# Patient Record
Sex: MALE | Race: WHITE | NOT HISPANIC OR LATINO | Employment: OTHER | URBAN - METROPOLITAN AREA
[De-identification: names, ages, dates, MRNs, and addresses within clinical notes are randomized per-mention and may not be internally consistent; named-entity substitution may affect disease eponyms.]

---

## 2017-01-26 ENCOUNTER — TRANSCRIBE ORDERS (OUTPATIENT)
Dept: LAB | Facility: CLINIC | Age: 62
End: 2017-01-26

## 2017-01-26 ENCOUNTER — APPOINTMENT (OUTPATIENT)
Dept: LAB | Facility: CLINIC | Age: 62
End: 2017-01-26
Payer: MEDICARE

## 2017-01-26 DIAGNOSIS — D72.829 ELEVATED WHITE BLOOD CELL COUNT: ICD-10-CM

## 2017-01-26 LAB
ALBUMIN SERPL BCP-MCNC: 2.9 G/DL (ref 3.5–5)
ALP SERPL-CCNC: 96 U/L (ref 46–116)
ALT SERPL W P-5'-P-CCNC: 17 U/L (ref 12–78)
ANION GAP SERPL CALCULATED.3IONS-SCNC: 7 MMOL/L (ref 4–13)
AST SERPL W P-5'-P-CCNC: 11 U/L (ref 5–45)
BILIRUB SERPL-MCNC: 0.24 MG/DL (ref 0.2–1)
BUN SERPL-MCNC: 12 MG/DL (ref 5–25)
CALCIUM SERPL-MCNC: 8.6 MG/DL (ref 8.3–10.1)
CHLORIDE SERPL-SCNC: 94 MMOL/L (ref 100–108)
CHOLEST SERPL-MCNC: 102 MG/DL (ref 50–200)
CO2 SERPL-SCNC: 29 MMOL/L (ref 21–32)
CREAT SERPL-MCNC: 0.94 MG/DL (ref 0.6–1.3)
ERYTHROCYTE [DISTWIDTH] IN BLOOD BY AUTOMATED COUNT: 14.6 % (ref 11.6–15.1)
EST. AVERAGE GLUCOSE BLD GHB EST-MCNC: 252 MG/DL
GFR SERPL CREATININE-BSD FRML MDRD: >60 ML/MIN/1.73SQ M
GLUCOSE SERPL-MCNC: 361 MG/DL (ref 65–140)
HBA1C MFR BLD: 10.4 % (ref 4.2–6.3)
HCT VFR BLD AUTO: 40.8 % (ref 36.5–49.3)
HDLC SERPL-MCNC: 32 MG/DL (ref 40–60)
HGB BLD-MCNC: 13.1 G/DL (ref 12–17)
LDLC SERPL CALC-MCNC: 38 MG/DL (ref 0–100)
MCH RBC QN AUTO: 27.4 PG (ref 26.8–34.3)
MCHC RBC AUTO-ENTMCNC: 32.1 G/DL (ref 31.4–37.4)
MCV RBC AUTO: 85 FL (ref 82–98)
PLATELET # BLD AUTO: 469 THOUSANDS/UL (ref 149–390)
PMV BLD AUTO: 10 FL (ref 8.9–12.7)
POTASSIUM SERPL-SCNC: 4.5 MMOL/L (ref 3.5–5.3)
PROT SERPL-MCNC: 8.1 G/DL (ref 6.4–8.2)
RBC # BLD AUTO: 4.78 MILLION/UL (ref 3.88–5.62)
SODIUM SERPL-SCNC: 130 MMOL/L (ref 136–145)
TRIGL SERPL-MCNC: 162 MG/DL
TSH SERPL DL<=0.05 MIU/L-ACNC: 2.3 UIU/ML (ref 0.36–3.74)
WBC # BLD AUTO: 11.62 THOUSAND/UL (ref 4.31–10.16)

## 2017-01-26 PROCEDURE — 83036 HEMOGLOBIN GLYCOSYLATED A1C: CPT

## 2017-01-26 PROCEDURE — 80053 COMPREHEN METABOLIC PANEL: CPT

## 2017-01-26 PROCEDURE — 36415 COLL VENOUS BLD VENIPUNCTURE: CPT

## 2017-01-26 PROCEDURE — 84443 ASSAY THYROID STIM HORMONE: CPT

## 2017-01-26 PROCEDURE — 85027 COMPLETE CBC AUTOMATED: CPT

## 2017-01-26 PROCEDURE — 80061 LIPID PANEL: CPT

## 2017-01-27 ENCOUNTER — ALLSCRIPTS OFFICE VISIT (OUTPATIENT)
Dept: OTHER | Facility: OTHER | Age: 62
End: 2017-01-27

## 2017-02-14 ENCOUNTER — ALLSCRIPTS OFFICE VISIT (OUTPATIENT)
Dept: OTHER | Facility: OTHER | Age: 62
End: 2017-02-14

## 2017-04-26 DIAGNOSIS — I10 ESSENTIAL (PRIMARY) HYPERTENSION: ICD-10-CM

## 2017-04-26 DIAGNOSIS — E11.65 TYPE 2 DIABETES MELLITUS WITH HYPERGLYCEMIA (HCC): ICD-10-CM

## 2017-09-05 ENCOUNTER — HOSPITAL ENCOUNTER (EMERGENCY)
Facility: HOSPITAL | Age: 62
Discharge: HOME | End: 2017-09-05
Attending: EMERGENCY MEDICINE
Payer: MEDICARE

## 2017-09-05 VITALS
HEIGHT: 72 IN | HEART RATE: 83 BPM | OXYGEN SATURATION: 92 % | BODY MASS INDEX: 40.36 KG/M2 | WEIGHT: 298 LBS | TEMPERATURE: 98.4 F | SYSTOLIC BLOOD PRESSURE: 106 MMHG | RESPIRATION RATE: 18 BRPM | DIASTOLIC BLOOD PRESSURE: 61 MMHG

## 2017-09-05 DIAGNOSIS — L03.119 CELLULITIS AND ABSCESS OF LEG: ICD-10-CM

## 2017-09-05 DIAGNOSIS — L02.415 ABSCESS OF RIGHT THIGH: Primary | ICD-10-CM

## 2017-09-05 DIAGNOSIS — L02.419 CELLULITIS AND ABSCESS OF LEG: ICD-10-CM

## 2017-09-05 LAB — GLUCOSE SERPL-MCNC: 149 MG/DL (ref 65–140)

## 2017-09-05 PROCEDURE — 82948 REAGENT STRIP/BLOOD GLUCOSE: CPT

## 2017-09-05 PROCEDURE — 99283 EMERGENCY DEPT VISIT LOW MDM: CPT

## 2017-09-05 RX ORDER — SULFAMETHOXAZOLE AND TRIMETHOPRIM 800; 160 MG/1; MG/1
1 TABLET ORAL ONCE
Status: COMPLETED | OUTPATIENT
Start: 2017-09-05 | End: 2017-09-05

## 2017-09-05 RX ORDER — SULFAMETHOXAZOLE AND TRIMETHOPRIM 800; 160 MG/1; MG/1
1 TABLET ORAL 2 TIMES DAILY
Qty: 14 TABLET | Refills: 0 | Status: SHIPPED | OUTPATIENT
Start: 2017-09-05 | End: 2017-09-12

## 2017-09-05 RX ORDER — LEVOFLOXACIN 500 MG/1
500 TABLET, FILM COATED ORAL DAILY
Qty: 10 TABLET | Refills: 0 | Status: SHIPPED | OUTPATIENT
Start: 2017-09-05 | End: 2017-09-15

## 2017-09-05 RX ORDER — LIDOCAINE HYDROCHLORIDE 10 MG/ML
5 INJECTION, SOLUTION EPIDURAL; INFILTRATION; INTRACAUDAL; PERINEURAL ONCE
Status: COMPLETED | OUTPATIENT
Start: 2017-09-05 | End: 2017-09-05

## 2017-09-05 RX ADMIN — SULFAMETHOXAZOLE AND TRIMETHOPRIM 1 TABLET: 800; 160 TABLET ORAL at 10:11

## 2017-09-05 RX ADMIN — LIDOCAINE HYDROCHLORIDE 5 ML: 10 INJECTION, SOLUTION EPIDURAL; INFILTRATION; INTRACAUDAL; PERINEURAL at 08:52

## 2017-09-05 RX ADMIN — LEVOFLOXACIN 750 MG: 500 TABLET, FILM COATED ORAL at 10:11

## 2017-10-12 ENCOUNTER — HOSPITAL ENCOUNTER (INPATIENT)
Facility: HOSPITAL | Age: 62
LOS: 8 days | Discharge: HOME HEALTH | DRG: 854 | End: 2017-10-20
Attending: EMERGENCY MEDICINE | Admitting: FAMILY MEDICINE
Payer: MEDICARE

## 2017-10-12 ENCOUNTER — APPOINTMENT (INPATIENT)
Dept: RADIOLOGY | Facility: HOSPITAL | Age: 62
DRG: 854 | End: 2017-10-12
Payer: MEDICARE

## 2017-10-12 ENCOUNTER — APPOINTMENT (EMERGENCY)
Dept: RADIOLOGY | Facility: HOSPITAL | Age: 62
DRG: 854 | End: 2017-10-12
Payer: MEDICARE

## 2017-10-12 DIAGNOSIS — L03.115 CELLULITIS OF RIGHT FOOT: ICD-10-CM

## 2017-10-12 DIAGNOSIS — E11.621 DIABETIC FOOT ULCER (HCC): Primary | ICD-10-CM

## 2017-10-12 DIAGNOSIS — R65.20 SEVERE SEPSIS (HCC): ICD-10-CM

## 2017-10-12 DIAGNOSIS — R78.81 POSITIVE BLOOD CULTURE: ICD-10-CM

## 2017-10-12 DIAGNOSIS — A41.9 SEVERE SEPSIS (HCC): ICD-10-CM

## 2017-10-12 DIAGNOSIS — L97.512 SKIN ULCER OF RIGHT FOOT WITH FAT LAYER EXPOSED (HCC): ICD-10-CM

## 2017-10-12 DIAGNOSIS — L97.509 DIABETIC FOOT ULCER (HCC): Primary | ICD-10-CM

## 2017-10-12 PROBLEM — I10 ESSENTIAL HYPERTENSION: Status: ACTIVE | Noted: 2017-10-12

## 2017-10-12 PROBLEM — Z79.4 TYPE 2 DIABETES MELLITUS WITH FOOT ULCER, WITH LONG-TERM CURRENT USE OF INSULIN (HCC): Status: ACTIVE | Noted: 2017-10-12

## 2017-10-12 PROBLEM — E66.01 MORBID OBESITY (HCC): Status: ACTIVE | Noted: 2017-10-12

## 2017-10-12 PROBLEM — Z79.891 CHRONIC PRESCRIPTION OPIATE USE: Status: ACTIVE | Noted: 2017-10-12

## 2017-10-12 PROBLEM — Z72.0 TOBACCO ABUSE: Status: ACTIVE | Noted: 2017-10-12

## 2017-10-12 LAB
ALBUMIN SERPL BCP-MCNC: 2.3 G/DL (ref 3.5–5)
ALP SERPL-CCNC: 105 U/L (ref 46–116)
ALT SERPL W P-5'-P-CCNC: 19 U/L (ref 12–78)
ANION GAP SERPL CALCULATED.3IONS-SCNC: 8 MMOL/L (ref 4–13)
APTT PPP: 40 SECONDS (ref 23–35)
AST SERPL W P-5'-P-CCNC: 13 U/L (ref 5–45)
ATRIAL RATE: 89 BPM
BACTERIA UR QL AUTO: NORMAL /HPF
BASOPHILS # BLD AUTO: 0.09 THOUSANDS/ΜL (ref 0–0.1)
BASOPHILS NFR BLD AUTO: 0 % (ref 0–1)
BILIRUB SERPL-MCNC: 0.2 MG/DL (ref 0.2–1)
BILIRUB UR QL STRIP: NEGATIVE
BUN SERPL-MCNC: 18 MG/DL (ref 5–25)
CALCIUM SERPL-MCNC: 9.5 MG/DL (ref 8.3–10.1)
CHLORIDE SERPL-SCNC: 98 MMOL/L (ref 100–108)
CLARITY UR: CLEAR
CO2 SERPL-SCNC: 29 MMOL/L (ref 21–32)
COLOR UR: YELLOW
CREAT SERPL-MCNC: 1.18 MG/DL (ref 0.6–1.3)
EOSINOPHIL # BLD AUTO: 0.17 THOUSAND/ΜL (ref 0–0.61)
EOSINOPHIL NFR BLD AUTO: 1 % (ref 0–6)
ERYTHROCYTE [DISTWIDTH] IN BLOOD BY AUTOMATED COUNT: 14.9 % (ref 11.6–15.1)
GFR SERPL CREATININE-BSD FRML MDRD: 67 ML/MIN/1.73SQ M
GLUCOSE SERPL-MCNC: 210 MG/DL (ref 65–140)
GLUCOSE SERPL-MCNC: 96 MG/DL (ref 65–140)
GLUCOSE UR STRIP-MCNC: ABNORMAL MG/DL
HCT VFR BLD AUTO: 38.3 % (ref 36.5–49.3)
HGB BLD-MCNC: 12.2 G/DL (ref 12–17)
HGB UR QL STRIP.AUTO: NEGATIVE
INR PPP: 1.08 (ref 0.86–1.16)
KETONES UR STRIP-MCNC: NEGATIVE MG/DL
LACTATE SERPL-SCNC: 2 MMOL/L (ref 0.5–2)
LACTATE SERPL-SCNC: 2.5 MMOL/L (ref 0.5–2)
LEUKOCYTE ESTERASE UR QL STRIP: ABNORMAL
LYMPHOCYTES # BLD AUTO: 2.43 THOUSANDS/ΜL (ref 0.6–4.47)
LYMPHOCYTES NFR BLD AUTO: 12 % (ref 14–44)
MCH RBC QN AUTO: 26 PG (ref 26.8–34.3)
MCHC RBC AUTO-ENTMCNC: 31.9 G/DL (ref 31.4–37.4)
MCV RBC AUTO: 82 FL (ref 82–98)
MONOCYTES # BLD AUTO: 1.28 THOUSAND/ΜL (ref 0.17–1.22)
MONOCYTES NFR BLD AUTO: 6 % (ref 4–12)
NEUTROPHILS # BLD AUTO: 15.79 THOUSANDS/ΜL (ref 1.85–7.62)
NEUTS SEG NFR BLD AUTO: 79 % (ref 43–75)
NITRITE UR QL STRIP: NEGATIVE
NON-SQ EPI CELLS URNS QL MICRO: NORMAL /HPF
NRBC BLD AUTO-RTO: 0 /100 WBCS
P AXIS: 54 DEGREES
PH UR STRIP.AUTO: 6.5 [PH] (ref 4.5–8)
PLATELET # BLD AUTO: 620 THOUSANDS/UL (ref 149–390)
PMV BLD AUTO: 9 FL (ref 8.9–12.7)
POTASSIUM SERPL-SCNC: 4.4 MMOL/L (ref 3.5–5.3)
PR INTERVAL: 158 MS
PROT SERPL-MCNC: 8.1 G/DL (ref 6.4–8.2)
PROT UR STRIP-MCNC: NEGATIVE MG/DL
PROTHROMBIN TIME: 14.3 SECONDS (ref 12.1–14.4)
QRS AXIS: -23 DEGREES
QRSD INTERVAL: 100 MS
QT INTERVAL: 362 MS
QTC INTERVAL: 440 MS
RBC # BLD AUTO: 4.69 MILLION/UL (ref 3.88–5.62)
RBC #/AREA URNS AUTO: NORMAL /HPF
SODIUM SERPL-SCNC: 135 MMOL/L (ref 136–145)
SP GR UR STRIP.AUTO: <=1.005 (ref 1–1.03)
T WAVE AXIS: 61 DEGREES
TROPONIN I SERPL-MCNC: <0.02 NG/ML
UROBILINOGEN UR QL STRIP.AUTO: 0.2 E.U./DL
VENTRICULAR RATE: 89 BPM
WBC # BLD AUTO: 20.09 THOUSAND/UL (ref 4.31–10.16)
WBC #/AREA URNS AUTO: NORMAL /HPF

## 2017-10-12 PROCEDURE — 84484 ASSAY OF TROPONIN QUANT: CPT | Performed by: EMERGENCY MEDICINE

## 2017-10-12 PROCEDURE — 36415 COLL VENOUS BLD VENIPUNCTURE: CPT | Performed by: EMERGENCY MEDICINE

## 2017-10-12 PROCEDURE — 85610 PROTHROMBIN TIME: CPT | Performed by: EMERGENCY MEDICINE

## 2017-10-12 PROCEDURE — 81001 URINALYSIS AUTO W/SCOPE: CPT | Performed by: EMERGENCY MEDICINE

## 2017-10-12 PROCEDURE — 87070 CULTURE OTHR SPECIMN AEROBIC: CPT | Performed by: EMERGENCY MEDICINE

## 2017-10-12 PROCEDURE — 85730 THROMBOPLASTIN TIME PARTIAL: CPT | Performed by: EMERGENCY MEDICINE

## 2017-10-12 PROCEDURE — 96375 TX/PRO/DX INJ NEW DRUG ADDON: CPT

## 2017-10-12 PROCEDURE — 0JDQ0ZZ EXTRACTION OF RIGHT FOOT SUBCUTANEOUS TISSUE AND FASCIA, OPEN APPROACH: ICD-10-PCS | Performed by: FAMILY MEDICINE

## 2017-10-12 PROCEDURE — 87186 SC STD MICRODIL/AGAR DIL: CPT | Performed by: EMERGENCY MEDICINE

## 2017-10-12 PROCEDURE — 93005 ELECTROCARDIOGRAM TRACING: CPT | Performed by: EMERGENCY MEDICINE

## 2017-10-12 PROCEDURE — 96361 HYDRATE IV INFUSION ADD-ON: CPT

## 2017-10-12 PROCEDURE — 71020 HB CHEST X-RAY 2VW FRONTAL&LATL: CPT

## 2017-10-12 PROCEDURE — 73630 X-RAY EXAM OF FOOT: CPT

## 2017-10-12 PROCEDURE — 83605 ASSAY OF LACTIC ACID: CPT | Performed by: EMERGENCY MEDICINE

## 2017-10-12 PROCEDURE — 87205 SMEAR GRAM STAIN: CPT | Performed by: EMERGENCY MEDICINE

## 2017-10-12 PROCEDURE — 87077 CULTURE AEROBIC IDENTIFY: CPT | Performed by: EMERGENCY MEDICINE

## 2017-10-12 PROCEDURE — 87040 BLOOD CULTURE FOR BACTERIA: CPT | Performed by: EMERGENCY MEDICINE

## 2017-10-12 PROCEDURE — 82948 REAGENT STRIP/BLOOD GLUCOSE: CPT

## 2017-10-12 PROCEDURE — 99285 EMERGENCY DEPT VISIT HI MDM: CPT

## 2017-10-12 PROCEDURE — 85025 COMPLETE CBC W/AUTO DIFF WBC: CPT | Performed by: EMERGENCY MEDICINE

## 2017-10-12 PROCEDURE — 80053 COMPREHEN METABOLIC PANEL: CPT | Performed by: EMERGENCY MEDICINE

## 2017-10-12 PROCEDURE — 96365 THER/PROPH/DIAG IV INF INIT: CPT

## 2017-10-12 RX ORDER — SODIUM CHLORIDE 9 MG/ML
100 INJECTION, SOLUTION INTRAVENOUS CONTINUOUS
Status: DISCONTINUED | OUTPATIENT
Start: 2017-10-12 | End: 2017-10-14

## 2017-10-12 RX ORDER — MORPHINE SULFATE 30 MG/1
30 TABLET, FILM COATED, EXTENDED RELEASE ORAL EVERY 8 HOURS
COMMUNITY
End: 2019-07-08 | Stop reason: HOSPADM

## 2017-10-12 RX ORDER — BUPROPION HYDROCHLORIDE 150 MG/1
150 TABLET ORAL DAILY
Status: DISCONTINUED | OUTPATIENT
Start: 2017-10-12 | End: 2017-10-20 | Stop reason: HOSPADM

## 2017-10-12 RX ORDER — OXYCODONE HYDROCHLORIDE 15 MG/1
15 TABLET ORAL EVERY 4 HOURS PRN
COMMUNITY
End: 2019-07-08 | Stop reason: HOSPADM

## 2017-10-12 RX ORDER — NICOTINE 21 MG/24HR
1 PATCH, TRANSDERMAL 24 HOURS TRANSDERMAL DAILY
Status: DISCONTINUED | OUTPATIENT
Start: 2017-10-13 | End: 2017-10-20 | Stop reason: HOSPADM

## 2017-10-12 RX ORDER — ATORVASTATIN CALCIUM 10 MG/1
10 TABLET, FILM COATED ORAL DAILY
COMMUNITY

## 2017-10-12 RX ORDER — INSULIN GLARGINE 100 [IU]/ML
70 INJECTION, SOLUTION SUBCUTANEOUS
Status: DISCONTINUED | OUTPATIENT
Start: 2017-10-12 | End: 2017-10-13

## 2017-10-12 RX ORDER — DOCUSATE SODIUM 100 MG/1
100 CAPSULE, LIQUID FILLED ORAL 2 TIMES DAILY
Status: DISCONTINUED | OUTPATIENT
Start: 2017-10-12 | End: 2017-10-20 | Stop reason: HOSPADM

## 2017-10-12 RX ORDER — LISINOPRIL 20 MG/1
40 TABLET ORAL DAILY
Status: DISCONTINUED | OUTPATIENT
Start: 2017-10-12 | End: 2017-10-14

## 2017-10-12 RX ORDER — LISINOPRIL 40 MG/1
40 TABLET ORAL DAILY
Status: ON HOLD | COMMUNITY
End: 2019-04-16 | Stop reason: ALTCHOICE

## 2017-10-12 RX ORDER — MORPHINE SULFATE 30 MG/1
30 TABLET, FILM COATED, EXTENDED RELEASE ORAL EVERY 8 HOURS
Status: DISCONTINUED | OUTPATIENT
Start: 2017-10-12 | End: 2017-10-20 | Stop reason: HOSPADM

## 2017-10-12 RX ORDER — ATORVASTATIN CALCIUM 10 MG/1
10 TABLET, FILM COATED ORAL DAILY
Status: DISCONTINUED | OUTPATIENT
Start: 2017-10-12 | End: 2017-10-20 | Stop reason: HOSPADM

## 2017-10-12 RX ORDER — INSULIN GLARGINE 100 [IU]/ML
70 INJECTION, SOLUTION SUBCUTANEOUS
Status: DISCONTINUED | OUTPATIENT
Start: 2017-10-13 | End: 2017-10-13

## 2017-10-12 RX ORDER — NICOTINE 21 MG/24HR
14 PATCH, TRANSDERMAL 24 HOURS TRANSDERMAL DAILY
Status: DISCONTINUED | OUTPATIENT
Start: 2017-10-12 | End: 2017-10-12

## 2017-10-12 RX ORDER — BUPROPION HYDROCHLORIDE 150 MG/1
150 TABLET ORAL DAILY
COMMUNITY

## 2017-10-12 RX ORDER — CHLORTHALIDONE 25 MG/1
25 TABLET ORAL DAILY
Status: DISCONTINUED | OUTPATIENT
Start: 2017-10-12 | End: 2017-10-20 | Stop reason: HOSPADM

## 2017-10-12 RX ORDER — CHLORTHALIDONE 25 MG/1
25 TABLET ORAL DAILY
COMMUNITY

## 2017-10-12 RX ORDER — CALCIUM CARBONATE 200(500)MG
1000 TABLET,CHEWABLE ORAL DAILY PRN
Status: DISCONTINUED | OUTPATIENT
Start: 2017-10-12 | End: 2017-10-20 | Stop reason: HOSPADM

## 2017-10-12 RX ADMIN — SODIUM CHLORIDE 500 ML: 0.9 INJECTION, SOLUTION INTRAVENOUS at 11:29

## 2017-10-12 RX ADMIN — MORPHINE SULFATE 30 MG: 30 TABLET, EXTENDED RELEASE ORAL at 21:34

## 2017-10-12 RX ADMIN — OXYCODONE HYDROCHLORIDE 15 MG: 5 TABLET ORAL at 20:10

## 2017-10-12 RX ADMIN — INSULIN GLARGINE 70 UNITS: 100 INJECTION, SOLUTION SUBCUTANEOUS at 21:30

## 2017-10-12 RX ADMIN — VANCOMYCIN HYDROCHLORIDE 2000 MG: 1 INJECTION, POWDER, LYOPHILIZED, FOR SOLUTION INTRAVENOUS at 13:29

## 2017-10-12 RX ADMIN — SODIUM CHLORIDE 125 ML/HR: 0.9 INJECTION, SOLUTION INTRAVENOUS at 16:40

## 2017-10-12 RX ADMIN — PIPERACILLIN SODIUM AND TAZOBACTAM SODIUM 3.38 G: 36; 4.5 INJECTION, POWDER, FOR SOLUTION INTRAVENOUS at 12:27

## 2017-10-12 RX ADMIN — MORPHINE SULFATE 30 MG: 30 TABLET, EXTENDED RELEASE ORAL at 16:34

## 2017-10-12 RX ADMIN — SODIUM CHLORIDE 1000 ML: 0.9 INJECTION, SOLUTION INTRAVENOUS at 13:32

## 2017-10-12 RX ADMIN — PIPERACILLIN SODIUM AND TAZOBACTAM SODIUM 3.38 G: 36; 4.5 INJECTION, POWDER, FOR SOLUTION INTRAVENOUS at 21:31

## 2017-10-12 RX ADMIN — ENOXAPARIN SODIUM 40 MG: 40 INJECTION SUBCUTANEOUS at 16:33

## 2017-10-12 RX ADMIN — OXYCODONE HYDROCHLORIDE 15 MG: 5 TABLET ORAL at 16:33

## 2017-10-12 RX ADMIN — HYDROMORPHONE HYDROCHLORIDE 1 MG: 1 INJECTION, SOLUTION INTRAMUSCULAR; INTRAVENOUS; SUBCUTANEOUS at 12:51

## 2017-10-12 RX ADMIN — NICOTINE 14 MG: 14 PATCH TRANSDERMAL at 17:14

## 2017-10-12 NOTE — SEPSIS NOTE
Sepsis Note   Raymond Delgado 63 y o  male MRN: [id number]  Unit/Bed#: ED 08 Encounter: [id number]            Initial Sepsis Screening     [address] Name 10/12/17 1156                Is the patient's history suggestive of a new or worsening infection? (!)  Yes (Proceed)  -SZ        Suspected source of infection soft tissue  -SZ        Are two or more of the following signs & symptoms of infection both present and new to the patient? (!)  Yes (Proceed)  -SZ        Indicate SIRS criteria Tachycardia > 90 bpm;Leukocytosis (WBC > [address] IJL)  -SZ        If the answer is yes to both questions, suspicion of sepsis is present          If severe sepsis is present AND tissue hypoperfusion perists in the hour after fluid resuscitation or lactate > 4, the patient meets criteria for SEPTIC SHOCK          Are any of the following organ dysfunction criteria present within 6 hours of suspected infection and SIRS criteria that are NOT considered to be chronic conditions? (!)  Yes  -SZ        Organ dysfunction          Date of presentation of severe sepsis          Time of presentation of severe sepsis          Tissue hypoperfusion persists in the hour after crystalloid fluid administration, evidenced, by either:          Was hypotension present within one hour of the conclusion of crystalloid fluid administration?           Date of presentation of septic shock          Time of presentation of septic shock            User Key  (r) = Recorded By, (t) = Taken By, (c) = Cosigned By    [address] Name Provider Type    LEIGHANN Liu DO Physician

## 2017-10-12 NOTE — H&P
History and Physical - [facility] Internal Medicine    Patient Information: Dara Gongora 63 y o  male MRN: [id number]  Unit/Bed#: [facility] Encounter: [id number]  Admitting Physician: Trung Zayas MD  PCP: Charlotte Walter MD  Date of Admission:  10/12/17    Assessment/Plan:    Hospital Problem List:     Principal Problem:    Severe sepsis [facility])  Active Problems:    Type 2 diabetes mellitus with foot ulcer, with long-term current use of insulin ([facility] [place] [facility] )    Skin ulcer of right foot with fat layer exposed ([facility] [place] [facility] )    Tobacco abuse    Morbid obesity ([facility] [place] [facility] )    Chronic prescription opiate use    Essential hypertension      Plan for the Primary Problem(s):  · Severe sepsis  · Present on admission, with tachycardia, significant leukocytosis, elevated lactic acid  The patient received IV fluids at 30 cc per sepsis protocol, will continue with aggressive hydration  · Agree with choice of antibiotics, will continue vancomycin and Zosyn, patient is a diabetic with a risk for MRSA  · Podiatry consultation  · Patient refused MRI for now  · Trend lactic acid  · Pain management  · Follow-up blood cultures and wound culture  · Check chest x-ray    Plan for Additional Problems:   · Type 2 diabetes with foot ulcer, with long-term current use of insulin  Per records review patient has been chronically historically uncontrolled  Patient is morbidly obese with limited mobility, significant anxiety contributing as well as chronic pain syndrome    Diabetic diet  Continue Lantus 70 units twice daily plus algorithm 5 sliding scale  Nutrition consult    · Skin ulcer of right foot with fat layer exposed  Apparently this has been ongoing issue for several months per records review, patient claims it has  been present only for a month as a small ulcer with within last couple days opening into a large defect  Wound culture obtained pending, continue vancomycin and Zosyn  Sepsis protocol  Consult Podiatry and wound care, elevate extremity  X-ray did not show any evidence of involvement of bones, significant odor on exam with large ulceration, consider bone scan or MRI, patient declined for now  PT evaluation  Case management for discharge planning, home VNA    · Tobacco abuse  Nicotine patch, continue Wellbutrin    · Morbid obesity  Diabetic diet  PT    · Chronic prescription opiate use  Continue home dose of MS Contin 30 mg t i d  and oxycodone 15 mg every 4 hours as needed  Dilaudid only for breakthrough pain  Patient managed by pain management outpatient, he states he has with tapering of slowly and his pain control at baseline is poor  Avoid escalating doses  Bowel protocol    · Essential hypertension  Continue home medication    VTE Prophylaxis: Enoxaparin (Lovenox)  / foot pump applied   Code Status: full  POLST: There is no POLST form on file for this patient (pre-hospital)    Anticipated Length of Stay:  Patient will be admitted on an Inpatient basis with an anticipated length of stay of  More than 2 midnights  Justification for Hospital Stay:  Management of severe sepsis, diabetic foot ulcer    Total Time for Visit, including Counseling / Coordination of Care: 1 hour  Greater than 50% of this total time spent on direct patient counseling and coordination of care      Chief Complaint:   Foot ulcer and pain    History of Present Illness:    Carina Britt is a 63 y o  male with past medical history pertinent for uncontrolled diabetes, chronic pain syndrome, prior toe amputation due to osteomyelitis, who presents with intractable right foot pain, right toe wound drainage ongoing for several days per patient's report  Patient states that he follows outpatient with Podiatry, he reports that he had a small wound at the bottom of the toe that started several weeks ago and suddenly progressed to a large wound  He apparently was treated outpatient with Keflex recently  He has been avoiding coming for evaluation due to being long caregiver to his disabled wife  He has chronic pain syndrome and takes chronic prescription opiates which were not effective anymore for his pain  Upon evaluation in the ED patient was found to be tachycardic with significant leukocytosis, wound in the plantar aspect of right great toe with foul odor   Patient was started on vancomycin and Zosyn, he received IV fluid resuscitation per sepsis protocol  Currently complains of pain at 8/10 after he received a dose of Dilaudid 40 minutes ago    Review of Systems:    Review of Systems   Constitutional: Positive for fatigue  Negative for chills, diaphoresis and fever  HENT: Positive for rhinorrhea  Eyes: Negative  Respiratory: Positive for cough  Cardiovascular: Negative  Gastrointestinal: Negative  Endocrine: Negative  Genitourinary: Negative  Musculoskeletal: Positive for arthralgias, back pain and myalgias  Skin: Positive for color change, rash and wound  Allergic/Immunologic: Negative  Neurological: Negative  Hematological: Negative  Psychiatric/Behavioral: Positive for sleep disturbance  Negative for suicidal ideas  The patient is nervous/anxious  All other systems reviewed and are negative  Past Medical and Surgical History:     Past Medical History:   Diagnosis Date    Chronic pain syndrome     COPD (chronic obstructive pulmonary disease) ([facility] )     Diabetes mellitus ([facility] )        Past Surgical History:   Procedure Laterality Date    AMPUTATION      HEMICOLECTOMY         Meds/Allergies:    Prior to Admission medications    Medication Sig Start Date End Date Taking?  Authorizing Provider   atorvastatin (LIPITOR) 10 mg tablet Take 10 mg by mouth daily   Yes Historical Provider, MD   buPROPion (WELLBUTRIN XL) 150 mg 24 hr tablet Take 150 mg by mouth daily   Yes Historical Provider, MD   chlorthalidone 25 mg tablet Take 25 mg by mouth daily   Yes Historical Provider, MD   Empagliflozin (JARDIANCE) 25 MG TABS Take 25 mg by mouth every morning   Yes Historical Provider, MD   Insulin Glargine-Lixisenatide (SOLIQUA) 100-33 UNT-MCG/ML SOPN Inject 15 Units under the skin daily   Yes Historical Provider, MD   lisinopril (ZESTRIL) 40 mg tablet Take 40 mg by mouth daily   Yes Historical Provider, MD   metFORMIN (GLUCOPHAGE) 1000 MG tablet Take 1,000 mg by mouth 2 (two) times a day with meals   Yes Historical Provider, MD   morphine (MS CONTIN) 30 mg 12 hr tablet Take 30 mg by mouth every 8 (eight) hours   Yes Historical Provider, MD   oxyCODONE (ROXICODONE) 15 mg immediate release tablet Take 15 mg by mouth every 4 (four) hours as needed for moderate pain   Yes Historical Provider, MD     I have reviewed home medications with patient personally  Allergies: No Known Allergies    Social History:     Marital Status: /Civil Union   Occupation:  Unemployed  Patient Pre-hospital Living Situation:  Home with wife  Patient Pre-hospital Level of Mobility:  Independent  Patient Pre-hospital Diet Restrictions:  Diabetic  Substance Use History:   History   Alcohol Use No     History   Smoking Status    Current Every Day Smoker    Packs/day: 1 00   Smokeless Tobacco    Never Used     History   Drug Use No       Family History:    Family History   Problem Relation Age of Onset    Family history unknown: Yes       Physical Exam:     Vitals:   Blood Pressure: 97/52 (10/12/17 1334)  Pulse: 84 (10/12/17 1334)  Temperature: 97 9 °F (36 6 °C) (10/12/17 1026)  Temp Source: Temporal (10/12/17 1026)  Respirations: 19 (10/12/17 1334)  Height: 6' (182 9 cm) (10/12/17 1434)  Weight - Scale: 136 kg (299 lb 13 2 oz) (10/12/17 1434)  SpO2: 95 % (10/12/17 1334)    Physical Exam   Constitutional: He is oriented to person, place, and time  He appears well-developed  No distress  Obese, disheveled   HENT:   Head: Normocephalic and atraumatic     Mouth/Throat: Oropharynx is clear and moist    Clear rhinorrhea   Eyes: Conjunctivae and EOM are normal  Pupils are equal, round, and reactive to light  Neck: Normal range of motion  Neck supple  No JVD present  No thyromegaly present  Cardiovascular: Regular rhythm and normal heart sounds  Tachycardia present  Exam reveals no gallop and no friction rub  No murmur heard  Intermittently tachycardic, worse with anxiety   Pulmonary/Chest: Effort normal and breath sounds normal  He has no wheezes  Abdominal: Soft  Bowel sounds are normal  There is no tenderness  Musculoskeletal:        Legs:       Feet:    Neurological: He is alert and oriented to person, place, and time  He displays no tremor  No cranial nerve deficit  Coordination and gait normal    Skin: Skin is warm and dry  He is not diaphoretic  Psychiatric: His speech is normal and behavior is normal  Judgment and thought content normal  His mood appears anxious  Cognition and memory are normal  He exhibits a depressed mood  He expresses no suicidal ideation  Nursing note and vitals reviewed  Additional Data:     Lab Results: I have personally reviewed pertinent reports  Results from last 7 days  Lab Units 10/12/17  1126   WBC Thousand/uL 20 09*   HEMOGLOBIN g/dL 12 2   HEMATOCRIT % 38 3   PLATELETS Thousands/uL 620*   NEUTROS PCT % 79*   LYMPHS PCT % 12*   MONOS PCT % 6   EOS PCT % 1       Results from last 7 days  Lab Units 10/12/17  1126   SODIUM mmol/L 135*   POTASSIUM mmol/L 4 4   CHLORIDE mmol/L 98*   CO2 mmol/L 29   BUN mg/dL 18   CREATININE mg/dL 1 18   CALCIUM mg/dL 9 5   TOTAL PROTEIN g/dL 8 1   BILIRUBIN TOTAL mg/dL 0 20   ALK PHOS U/L 105   ALT U/L 19   AST U/L 13   GLUCOSE RANDOM mg/dL 210*       Results from last 7 days  Lab Units 10/12/17  1126   INR  1 08       Imaging: I have personally reviewed pertinent reports  and I have personally reviewed pertinent films in PACS    Xr Foot 3+ Views Right    Result Date: 10/12/2017  Narrative: RIGHT FOOT INDICATION:  62-year-old man with infected wound of the right great toe   COMPARISON: None VIEWS:  3 IMAGES:  3 FINDINGS: There is no acute fracture or dislocation  No degenerative changes  No lytic or blastic lesions are seen  Impression: A soft tissue wound defect is present in the plantar aspect of the right great toe  IMPRESSION: No evidence of osteomyelitis, fracture or other significant bony abnormality in the right foot  Workstation performed: [id number]       EKG, Pathology, and Other Studies Reviewed on Admission:   · EKG: normal sinus rhythm    Allscripts Records Reviewed: Yes     ** Please Note: Dragon 360 Dictation voice to text software may have been used in the creation of this document   **

## 2017-10-12 NOTE — ED PROVIDER NOTES
History  Chief Complaint   Patient presents with    Wound Infection     Pt c/o infection of wound on right foot  Pt states wound on big toe  Hx of amputation on left foot, which also isn't healing  Pt states had wound care but doesn't anymore  Complaints of lots of pain  82-year-old male with a history of insulin-dependent diabetes presenting with chief complaint of right great toe pain  The patient states approximately a month ago he ate developed a small wound on his right great toe since that time it has gotten progressively larger and worse he has been following with his podiatrist's as an outpatient, Dr Vince Dickey and was previously on Keflex, and has been on Levaquin for the last week or so, he completed his last dose today, he has had worsening redness and pain localized to his right MTP joint and spreads proximally to the dorsum of his foot and ankle, it is otherwise nonradiating it is worse when he bears weight it is not improved any longer with his home medications a presents for evaluation of a draining wound from the bottom of his right toe  His saw  podiatrist 1 week ago though this has gotten progressively red and worse thought there is a chance since that time, the prep dry interest reported to the patient that again he might be able to save his foot at that time,  The patient was reluctant to come to the hospital because his wife is bedbound and has difficulty taking care of herself is secondary to some type of neuromuscular/spinal problem and he is here requesting evaluation of his wounds  He otherwise denies fevers chills constitutional symptoms headache chest pain cough shortness of breath nausea vomiting anorexia abdominal pain change in bowels or bladder or other associated symptoms  Complete review systems otherwise negative            Prior to Admission Medications   Prescriptions Last Dose Informant Patient Reported? Taking?    Empagliflozin (JARDIANCE) 25 MG TABS   Yes Yes   Sig: Take 25 mg by mouth every morning   Insulin Glargine-Lixisenatide (SOLIQUA) 100-33 UNT-MCG/ML SOPN   Yes Yes   Sig: Inject 15 Units under the skin daily   atorvastatin (LIPITOR) 10 mg tablet   Yes Yes   Sig: Take 10 mg by mouth daily   buPROPion (WELLBUTRIN XL) 150 mg 24 hr tablet   Yes Yes   Sig: Take 150 mg by mouth daily   chlorthalidone 25 mg tablet   Yes Yes   Sig: Take 25 mg by mouth daily   lisinopril (ZESTRIL) 40 mg tablet   Yes Yes   Sig: Take 40 mg by mouth daily   metFORMIN (GLUCOPHAGE) 1000 MG tablet   Yes Yes   Sig: Take 1,000 mg by mouth 2 (two) times a day with meals   morphine (MS CONTIN) 30 mg 12 hr tablet   Yes Yes   Sig: Take 30 mg by mouth every 8 (eight) hours   oxyCODONE (ROXICODONE) 15 mg immediate release tablet   Yes Yes   Sig: Take 15 mg by mouth every 4 (four) hours as needed for moderate pain      Facility-Administered Medications: None       Past Medical History:   Diagnosis Date    Chronic pain syndrome     [facility]     [facility]        Past Surgical History:   Procedure Laterality Date    AMPUTATION      HEMICOLECTOMY         Family History   Problem Relation Age of Onset    Family history unknown: Yes     I have reviewed and agree with the history as documented  Social History   Substance Use Topics    Smoking status: Current Every Day Smoker     Packs/day: 1 00    Smokeless tobacco: Never Used    Alcohol use No        Review of Systems   Constitutional: Positive for fatigue  Negative for chills and fever  HENT: Negative for rhinorrhea and sore throat  Eyes: Negative for photophobia and pain  Respiratory: Negative for cough and shortness of breath  Cardiovascular: Negative for chest pain and palpitations  Gastrointestinal: Negative for abdominal pain, diarrhea, nausea and vomiting  Genitourinary: Negative for dysuria, frequency and urgency  Musculoskeletal: Negative for arthralgias, back pain and myalgias  Right foot swelling   Skin: Positive for wound  Negative for color change and rash  Neurological: Negative for dizziness, weakness and numbness  Hematological: Negative for adenopathy  Does not bruise/bleed easily  Psychiatric/Behavioral: Negative for agitation and behavioral problems  All other systems reviewed and are negative  Physical Exam  ED Triage Vitals [10/12/17 1026]   Temperature Pulse Respirations Blood Pressure SpO2   97 9 °F (36 6 °C) 101 21 129/62 94 %      Temp Source Heart Rate Source Patient Position - Orthostatic VS BP Location FiO2 (%)   Temporal Monitor Lying Right arm --      Pain Score       Worst Possible Pain           Physical Exam   Constitutional: He is oriented to person, place, and time  He appears well-developed and well-nourished  No distress  HENT:   Head: Normocephalic and atraumatic  Eyes: EOM are normal  Pupils are equal, round, and reactive to light  Neck: Normal range of motion  Neck supple  No tracheal deviation present  Cardiovascular: Normal rate, regular rhythm and normal heart sounds  Exam reveals no gallop and no friction rub  No murmur heard  Pulmonary/Chest: Effort normal and breath sounds normal  He has no wheezes  He has no rales  Abdominal: Soft  Bowel sounds are normal  He exhibits no distension  There is no tenderness  There is no rebound and no guarding  Musculoskeletal:   Patient has approximately 3 centimeter large ulceration on the plantar aspect over his right great toe and 1st MTP joint that is foul smelling with purulent drainage surrounding 2 or 3 centimeters is intensely erythematous there is no lymphangitis how he defers he does has some mild warmth up the dorsum of his foot anterior shin there is no crepitus no bullae he does have palpable pulses close significant peripheral vascular changes on his bilateral lower extremities comma    Patient also has previous left great toe amputation with some mild dehiscence of the wound with granulation on the plantar aspect of his left foot tissue not does not appear acutely infected again he has palpable pulses bilaterally without other acute findings   Neurological: He is alert and oriented to person, place, and time  No cranial nerve deficit  He exhibits normal muscle tone  Coordination normal    Skin: Skin is warm and dry  No rash noted  Psychiatric: He has a normal mood and affect  His behavior is normal    Nursing note and vitals reviewed        ED Medications  Medications   piperacillin-tazobactam (ZOSYN) 3 375 g in sodium chloride 0 9 % 50 mL IVPB (3 375 g Intravenous New Bag 10/13/17 0959)   atorvastatin (LIPITOR) tablet 10 mg (10 mg Oral Not Given 10/12/17 1634)   buPROPion (WELLBUTRIN XL) 24 hr tablet 150 mg (150 mg Oral Given 10/13/17 0811)   chlorthalidone tablet 25 mg (25 mg Oral Given 10/13/17 0814)   lisinopril (ZESTRIL) tablet 40 mg (40 mg Oral Given 10/13/17 0811)   morphine (MS CONTIN) ER tablet 30 mg (30 mg Oral Given 10/13/17 0610)   oxyCODONE (ROXICODONE) IR tablet 15 mg (15 mg Oral Given 10/13/17 0959)   HYDROmorphone (DILAUDID) 1 mg/mL injection 0 5 mg (0 5 mg Intravenous Given 10/13/17 0727)   insulin glargine (LANTUS) subcutaneous injection 70 Units (70 Units Subcutaneous Given 10/13/17 0812)   insulin glargine (LANTUS) subcutaneous injection 70 Units (70 Units Subcutaneous Given 10/12/17 2130)   insulin lispro (HumaLOG) 100 units/mL subcutaneous injection 4-20 Units (4 Units Subcutaneous Given 10/13/17 0959)   sodium chloride 0 9 % infusion (125 mL/hr Intravenous New Bag 10/13/17 0820)   docusate sodium (COLACE) capsule 100 mg (100 mg Oral Given 10/13/17 0812)   nicotine (NICODERM CQ) 21 mg/24 hr TD 24 hr patch 1 patch (1 patch Transdermal Medication Applied 10/13/17 0811)   calcium carbonate (TUMS) chewable tablet 1,000 mg (not administered)   enoxaparin (LOVENOX) subcutaneous injection 40 mg (40 mg Subcutaneous Given 10/13/17 0812)   sodium chloride 0 9 % bolus 500 mL (0 mL Intravenous Stopped 10/12/17 1323)   piperacillin-tazobactam (ZOSYN) 3 375 g in sodium chloride 0 9 % 50 mL IVPB (0 g Intravenous Stopped 10/12/17 1323)   vancomycin (VANCOCIN) 2,000 mg in sodium chloride 0 9 % 500 mL IVPB (0 mg Intravenous Stopped 10/12/17 1716)   HYDROmorphone (DILAUDID) 1 mg/mL injection 1 mg (1 mg Intravenous Given 10/12/17 1251)   sodium chloride 0 9 % bolus 1,000 mL (0 mL Intravenous Stopped 10/12/17 1605)   vancomycin (VANCOCIN) 2,000 mg in sodium chloride 0 9 % 500 mL IVPB (2,000 mg Intravenous New Bag 10/13/17 0053)       Diagnostic Studies  Labs Reviewed   CBC AND DIFFERENTIAL - Abnormal        Result Value Ref Range Status    WBC 20 09 (*) 4 31 - 10 16 Thousand/uL Final    MCH 26 0 (*) 26 8 - 34 3 pg Final    Platelets 127 (*) 969 - 390 Thousands/uL Final    Neutrophils Relative 79 (*) 43 - 75 % Final    Lymphocytes Relative 12 (*) 14 - 44 % Final    Neutrophils Absolute 15 79 (*) 1 85 - 7 62 Thousands/µL Final    Monocytes Absolute 1 28 (*) 0 17 - 1 22 Thousand/µL Final    RBC 4 69  3 88 - 5 62 Million/uL Final    Hemoglobin 12 2  12 0 - 17 0 g/dL Final    Hematocrit 38 3  36 5 - 49 3 % Final    MCV 82  82 - 98 fL Final    MCHC 31 9  31 4 - 37 4 g/dL Final    RDW 14 9  11 6 - 15 1 % Final    MPV 9 0  8 9 - 12 7 fL Final    nRBC 0  /100 WBCs Final    Monocytes Relative 6  4 - 12 % Final    Eosinophils Relative 1  0 - 6 % Final    Basophils Relative 0  0 - 1 % Final    Lymphocytes Absolute 2 43  0 60 - 4 47 Thousands/µL Final    Eosinophils Absolute 0 17  0 00 - 0 61 Thousand/µL Final    Basophils Absolute 0 09  0 00 - 0 10 Thousands/µL Final   COMPREHENSIVE METABOLIC PANEL - Abnormal     Sodium 135 (*) 136 - 145 mmol/L Final    Chloride 98 (*) 100 - 108 mmol/L Final    Glucose 210 (*) 65 - 140 mg/dL Final    Comment:   If the patient is fasting, the ADA then defines impaired fasting glucose as > 100 mg/dL and diabetes as > or equal to 123 mg/dL    Specimen collection should occur prior to Sulfasalazine administration due to the potential for falsely depressed results  Specimen collection should occur prior to Sulfapyridine administration due to the potential for falsely elevated results  Albumin 2 3 (*) 3 5 - 5 0 g/dL Final    Potassium 4 4  3 5 - 5 3 mmol/L Final    CO2 29  21 - 32 mmol/L Final    Anion Gap 8  4 - 13 mmol/L Final    BUN 18  5 - 25 mg/dL Final    Creatinine 1 18  0 60 - 1 30 mg/dL Final    Comment: Standardized to IDMS reference method    Calcium 9 5  8 3 - 10 1 mg/dL Final    AST 13  5 - 45 U/L Final    Comment:   Specimen collection should occur prior to Sulfasalazine administration due to the potential for falsely depressed results  ALT 19  12 - 78 U/L Final    Comment:   Specimen collection should occur prior to Sulfasalazine administration due to the potential for falsely depressed results  Alkaline Phosphatase 105  46 - 116 U/L Final    Total Protein 8 1  6 4 - 8 2 g/dL Final    Total Bilirubin 0 20  0 20 - 1 00 mg/dL Final    eGFR 67  ml/min/1 73sq m Final    Narrative:     National Kidney Disease Education Program recommendations are as follows:  GFR calculation is accurate only with a steady state creatinine  Chronic Kidney disease less than 60 ml/min/1 73 sq  meters  Kidney failure less than 15 ml/min/1 73 sq  meters  LACTIC ACID, PLASMA - Abnormal     LACTIC ACID 2 5 (*) 0 5 - 2 0 mmol/L Final    Narrative:     Result may be elevated if tourniquet was used during collection  APTT - Abnormal     PTT 40 (*) 23 - 35 seconds Final    Narrative:      Therapeutic Heparin Range = 60-90 seconds   TROPONIN I - Normal    Troponin I <0 02  <=0 04 ng/mL Final    Comment: 3Autovalidation override    Narrative:     [employer] Chemistry analyzer 99% cutoff is > 0 04 ng/mL in network labs    o cTnI 99% cutoff is useful only when applied to patients in the clinical setting of myocardial ischemia  o cTnI 99% cutoff should be interpreted in the context of clinical history, ECG findings and possibly cardiac imaging to establish correct diagnosis  o cTnI 99% cutoff may be suggestive but clearly not indicative of a coronary event without the clinical setting of myocardial ischemia  PROTIME-INR - Normal    Protime 14 3  12 1 - 14 4 seconds Final    INR 1 08  0 86 - 1 16 Final   LACTIC ACID, PLASMA - Normal    LACTIC ACID 2 0  0 5 - 2 0 mmol/L Final    Narrative:     Result may be elevated if tourniquet was used during collection  BLOOD CULTURE   BLOOD CULTURE       XR chest pa & lateral   Final Result      No active pulmonary disease  Workstation performed: [id number]         XR foot 3+ views RIGHT   Final Result   A soft tissue wound defect is present in the plantar aspect of the right great toe  IMPRESSION:      No evidence of osteomyelitis, fracture or other significant bony abnormality in the right foot  Workstation performed: [id number]             Procedures  ECG 12 Lead Documentation  Date/Time: 10/12/2017 12:21 PM  Performed by: Chang Martin by: Shirley Gabriel     Indications / Diagnosis:  Sepsis  Patient location:  ED  Previous ECG:     Previous ECG:  Unavailable  Interpretation:     Interpretation: normal    Rate:     ECG rate:  89    ECG rate assessment: normal    Rhythm:     Rhythm: sinus rhythm    Ectopy:     Ectopy: none    QRS:     QRS axis:  Left  ST segments:     ST segments:  Normal  T waves:     T waves: normal            Phone Contacts  ED Phone Contact    ED Course  ED Course as of Oct 13 1156   u Oct 12, 2017   1153 Patient now states sepsis criteria will give antibiotics plan admission                          Initial Sepsis Screening     [address] Name 10/12/17 1156                Is the patient's history suggestive of a new or worsening infection?  (!)  Yes (Proceed)  -SZ        Suspected source of infection soft tissue  -SZ        Are two or more of the following signs & symptoms of infection both present and new to the patient? (!)  Yes (Proceed)  -SZ        Indicate SIRS criteria Tachycardia > 90 bpm;Leukocytosis (WBC > 02518 IJL)  -SZ        If the answer is yes to both questions, suspicion of sepsis is present          If severe sepsis is present AND tissue hypoperfusion perists in the hour after fluid resuscitation or lactate > 4, the patient meets criteria for SEPTIC SHOCK          Are any of the following organ dysfunction criteria present within 6 hours of suspected infection and SIRS criteria that are NOT considered to be chronic conditions? (!)  Yes  -SZ        Organ dysfunction          Date of presentation of severe sepsis          Time of presentation of severe sepsis          Tissue hypoperfusion persists in the hour after crystalloid fluid administration, evidenced, by either:          Was hypotension present within one hour of the conclusion of crystalloid fluid administration?           Date of presentation of septic shock          Time of presentation of septic shock            User Key  (r) = Recorded By, (t) = Taken By, (c) = Cosigned By    [address] Name Provider Type    LEIGHANN Lopez Certain, DO Physician                  MDM  Number of Diagnoses or Management Options  Cellulitis of right foot:   Diabetic foot ulcer ([facility] ):   Severe sepsis [facility]):   Diagnosis management comments: 45-year-old male with diabetes with progressive right foot wound currently on Levaquin no fevers, significant draining wound on the plantar aspect of his right great toe and 1st MTP joint with surrounding cellulitis, no other infectious findings he is afebrile normal vital signs the exception of mildly elevated heart rate initially, Will perform septic workup, fluids, pain control, x-ray of the right foot to exclude obvious osteomyelitis, likely admit comma at this times is not consistent with necrotizing fasciitis, abscess, the appearance of the wound is concerning and foot may not be salvageable, will need podiatrist consult    [facility] Time    Disposition  Final diagnoses:   Diabetic foot ulcer ([address] )   Cellulitis of right foot   Severe sepsis [facility])     ED Disposition     ED Disposition Condition Comment    Admit  Case was discussed with Leann Peter and the patient's admission status was agreed to be Admission Status: inpatient status to the service of Dr Kiran Snyder   Follow-up Information    None       Current Discharge Medication List      CONTINUE these medications which have NOT CHANGED    Details   atorvastatin (LIPITOR) 10 mg tablet Take 10 mg by mouth daily      buPROPion (WELLBUTRIN XL) 150 mg 24 hr tablet Take 150 mg by mouth daily      chlorthalidone 25 mg tablet Take 25 mg by mouth daily      Empagliflozin (JARDIANCE) 25 MG TABS Take 25 mg by mouth every morning      Insulin Glargine-Lixisenatide (SOLIQUA) 100-33 UNT-MCG/ML SOPN Inject 15 Units under the skin daily      lisinopril (ZESTRIL) 40 mg tablet Take 40 mg by mouth daily      metFORMIN (GLUCOPHAGE) 1000 MG tablet Take 1,000 mg by mouth 2 (two) times a day with meals      morphine (MS CONTIN) 30 mg 12 hr tablet Take 30 mg by mouth every 8 (eight) hours      oxyCODONE (ROXICODONE) 15 mg immediate release tablet Take 15 mg by mouth every 4 (four) hours as needed for moderate pain           No discharge procedures on file      ED Provider  Electronically Signed by       Armen Cabezas DO  10/13/17 3533

## 2017-10-13 LAB
ALBUMIN SERPL BCP-MCNC: 2 G/DL (ref 3.5–5)
ALP SERPL-CCNC: 85 U/L (ref 46–116)
ALT SERPL W P-5'-P-CCNC: 17 U/L (ref 12–78)
ANION GAP SERPL CALCULATED.3IONS-SCNC: 7 MMOL/L (ref 4–13)
AST SERPL W P-5'-P-CCNC: 15 U/L (ref 5–45)
BASOPHILS # BLD AUTO: 0.1 THOUSANDS/ΜL (ref 0–0.1)
BASOPHILS NFR BLD AUTO: 1 % (ref 0–1)
BILIRUB SERPL-MCNC: 0.3 MG/DL (ref 0.2–1)
BUN SERPL-MCNC: 14 MG/DL (ref 5–25)
CALCIUM SERPL-MCNC: 9.2 MG/DL (ref 8.3–10.1)
CHLORIDE SERPL-SCNC: 101 MMOL/L (ref 100–108)
CO2 SERPL-SCNC: 27 MMOL/L (ref 21–32)
CREAT SERPL-MCNC: 1.02 MG/DL (ref 0.6–1.3)
EOSINOPHIL # BLD AUTO: 0.33 THOUSAND/ΜL (ref 0–0.61)
EOSINOPHIL NFR BLD AUTO: 2 % (ref 0–6)
ERYTHROCYTE [DISTWIDTH] IN BLOOD BY AUTOMATED COUNT: 14.8 % (ref 11.6–15.1)
GFR SERPL CREATININE-BSD FRML MDRD: 80 ML/MIN/1.73SQ M
GLUCOSE SERPL-MCNC: 134 MG/DL (ref 65–140)
GLUCOSE SERPL-MCNC: 187 MG/DL (ref 65–140)
GLUCOSE SERPL-MCNC: 191 MG/DL (ref 65–140)
GLUCOSE SERPL-MCNC: 252 MG/DL (ref 65–140)
GLUCOSE SERPL-MCNC: 280 MG/DL (ref 65–140)
HCT VFR BLD AUTO: 39.4 % (ref 36.5–49.3)
HGB BLD-MCNC: 12.3 G/DL (ref 12–17)
LYMPHOCYTES # BLD AUTO: 3.68 THOUSANDS/ΜL (ref 0.6–4.47)
LYMPHOCYTES NFR BLD AUTO: 20 % (ref 14–44)
MCH RBC QN AUTO: 25.8 PG (ref 26.8–34.3)
MCHC RBC AUTO-ENTMCNC: 31.2 G/DL (ref 31.4–37.4)
MCV RBC AUTO: 83 FL (ref 82–98)
MONOCYTES # BLD AUTO: 1.33 THOUSAND/ΜL (ref 0.17–1.22)
MONOCYTES NFR BLD AUTO: 7 % (ref 4–12)
NEUTROPHILS # BLD AUTO: 12.96 THOUSANDS/ΜL (ref 1.85–7.62)
NEUTS SEG NFR BLD AUTO: 69 % (ref 43–75)
NRBC BLD AUTO-RTO: 0 /100 WBCS
PLATELET # BLD AUTO: 563 THOUSANDS/UL (ref 149–390)
PMV BLD AUTO: 8.9 FL (ref 8.9–12.7)
POTASSIUM SERPL-SCNC: 4.2 MMOL/L (ref 3.5–5.3)
PROT SERPL-MCNC: 7.7 G/DL (ref 6.4–8.2)
RBC # BLD AUTO: 4.76 MILLION/UL (ref 3.88–5.62)
SODIUM SERPL-SCNC: 135 MMOL/L (ref 136–145)
WBC # BLD AUTO: 18.7 THOUSAND/UL (ref 4.31–10.16)

## 2017-10-13 PROCEDURE — 80053 COMPREHEN METABOLIC PANEL: CPT | Performed by: FAMILY MEDICINE

## 2017-10-13 PROCEDURE — 87077 CULTURE AEROBIC IDENTIFY: CPT | Performed by: PODIATRIST

## 2017-10-13 PROCEDURE — 87186 SC STD MICRODIL/AGAR DIL: CPT | Performed by: PODIATRIST

## 2017-10-13 PROCEDURE — G8979 MOBILITY GOAL STATUS: HCPCS

## 2017-10-13 PROCEDURE — 87070 CULTURE OTHR SPECIMN AEROBIC: CPT | Performed by: PODIATRIST

## 2017-10-13 PROCEDURE — 87205 SMEAR GRAM STAIN: CPT | Performed by: PODIATRIST

## 2017-10-13 PROCEDURE — 82948 REAGENT STRIP/BLOOD GLUCOSE: CPT

## 2017-10-13 PROCEDURE — G8978 MOBILITY CURRENT STATUS: HCPCS

## 2017-10-13 PROCEDURE — 85025 COMPLETE CBC W/AUTO DIFF WBC: CPT | Performed by: FAMILY MEDICINE

## 2017-10-13 PROCEDURE — 97163 PT EVAL HIGH COMPLEX 45 MIN: CPT

## 2017-10-13 PROCEDURE — 87147 CULTURE TYPE IMMUNOLOGIC: CPT | Performed by: PODIATRIST

## 2017-10-13 RX ORDER — INSULIN GLARGINE 100 [IU]/ML
72 INJECTION, SOLUTION SUBCUTANEOUS
Status: DISCONTINUED | OUTPATIENT
Start: 2017-10-13 | End: 2017-10-15

## 2017-10-13 RX ORDER — INSULIN GLARGINE 100 [IU]/ML
72 INJECTION, SOLUTION SUBCUTANEOUS
Status: DISCONTINUED | OUTPATIENT
Start: 2017-10-14 | End: 2017-10-15

## 2017-10-13 RX ADMIN — BUPROPION HYDROCHLORIDE 150 MG: 150 TABLET, EXTENDED RELEASE ORAL at 08:11

## 2017-10-13 RX ADMIN — HYDROMORPHONE HYDROCHLORIDE 0.5 MG: 1 INJECTION, SOLUTION INTRAMUSCULAR; INTRAVENOUS; SUBCUTANEOUS at 18:35

## 2017-10-13 RX ADMIN — LISINOPRIL 40 MG: 20 TABLET ORAL at 08:11

## 2017-10-13 RX ADMIN — PIPERACILLIN SODIUM AND TAZOBACTAM SODIUM 3.38 G: 36; 4.5 INJECTION, POWDER, FOR SOLUTION INTRAVENOUS at 09:59

## 2017-10-13 RX ADMIN — HYDROMORPHONE HYDROCHLORIDE 0.5 MG: 1 INJECTION, SOLUTION INTRAMUSCULAR; INTRAVENOUS; SUBCUTANEOUS at 07:27

## 2017-10-13 RX ADMIN — VANCOMYCIN HYDROCHLORIDE 2000 MG: 1 INJECTION, POWDER, LYOPHILIZED, FOR SOLUTION INTRAVENOUS at 16:55

## 2017-10-13 RX ADMIN — MORPHINE SULFATE 30 MG: 30 TABLET, EXTENDED RELEASE ORAL at 13:44

## 2017-10-13 RX ADMIN — ENOXAPARIN SODIUM 40 MG: 40 INJECTION SUBCUTANEOUS at 08:12

## 2017-10-13 RX ADMIN — HYDROMORPHONE HYDROCHLORIDE 0.5 MG: 1 INJECTION, SOLUTION INTRAMUSCULAR; INTRAVENOUS; SUBCUTANEOUS at 02:43

## 2017-10-13 RX ADMIN — INSULIN GLARGINE 72 UNITS: 100 INJECTION, SOLUTION SUBCUTANEOUS at 21:59

## 2017-10-13 RX ADMIN — PIPERACILLIN SODIUM AND TAZOBACTAM SODIUM 3.38 G: 36; 4.5 INJECTION, POWDER, FOR SOLUTION INTRAVENOUS at 03:31

## 2017-10-13 RX ADMIN — ATORVASTATIN CALCIUM 10 MG: 10 TABLET, FILM COATED ORAL at 16:53

## 2017-10-13 RX ADMIN — VANCOMYCIN HYDROCHLORIDE 2000 MG: 1 INJECTION, POWDER, LYOPHILIZED, FOR SOLUTION INTRAVENOUS at 00:53

## 2017-10-13 RX ADMIN — DOCUSATE SODIUM 100 MG: 100 CAPSULE, LIQUID FILLED ORAL at 08:12

## 2017-10-13 RX ADMIN — INSULIN LISPRO 4 UNITS: 100 INJECTION, SOLUTION INTRAVENOUS; SUBCUTANEOUS at 09:59

## 2017-10-13 RX ADMIN — CHLORTHALIDONE 25 MG: 25 TABLET ORAL at 08:14

## 2017-10-13 RX ADMIN — INSULIN LISPRO 12 UNITS: 100 INJECTION, SOLUTION INTRAVENOUS; SUBCUTANEOUS at 13:45

## 2017-10-13 RX ADMIN — PIPERACILLIN SODIUM AND TAZOBACTAM SODIUM 3.38 G: 36; 4.5 INJECTION, POWDER, FOR SOLUTION INTRAVENOUS at 19:13

## 2017-10-13 RX ADMIN — HYDROMORPHONE HYDROCHLORIDE 0.5 MG: 1 INJECTION, SOLUTION INTRAMUSCULAR; INTRAVENOUS; SUBCUTANEOUS at 14:36

## 2017-10-13 RX ADMIN — OXYCODONE HYDROCHLORIDE 15 MG: 5 TABLET ORAL at 00:08

## 2017-10-13 RX ADMIN — HYDROMORPHONE HYDROCHLORIDE 0.5 MG: 1 INJECTION, SOLUTION INTRAMUSCULAR; INTRAVENOUS; SUBCUTANEOUS at 21:57

## 2017-10-13 RX ADMIN — INSULIN LISPRO 8 UNITS: 100 INJECTION, SOLUTION INTRAVENOUS; SUBCUTANEOUS at 17:05

## 2017-10-13 RX ADMIN — MORPHINE SULFATE 30 MG: 30 TABLET, EXTENDED RELEASE ORAL at 06:10

## 2017-10-13 RX ADMIN — NICOTINE 1 PATCH: 21 PATCH, EXTENDED RELEASE TRANSDERMAL at 08:11

## 2017-10-13 RX ADMIN — DOCUSATE SODIUM 100 MG: 100 CAPSULE, LIQUID FILLED ORAL at 18:35

## 2017-10-13 RX ADMIN — OXYCODONE HYDROCHLORIDE 15 MG: 5 TABLET ORAL at 09:59

## 2017-10-13 RX ADMIN — OXYCODONE HYDROCHLORIDE 15 MG: 5 TABLET ORAL at 16:53

## 2017-10-13 RX ADMIN — SODIUM CHLORIDE 125 ML/HR: 0.9 INJECTION, SOLUTION INTRAVENOUS at 08:20

## 2017-10-13 RX ADMIN — OXYCODONE HYDROCHLORIDE 15 MG: 5 TABLET ORAL at 05:08

## 2017-10-13 RX ADMIN — MORPHINE SULFATE 30 MG: 30 TABLET, EXTENDED RELEASE ORAL at 21:57

## 2017-10-13 RX ADMIN — INSULIN GLARGINE 70 UNITS: 100 INJECTION, SOLUTION SUBCUTANEOUS at 08:12

## 2017-10-13 NOTE — SOCIAL WORK
Dr Sammi Santoyo informed CM that pt has been non-compliant with his wound care and doctor visits due to financial issues  CM spoke to pt about STR's, but pt has to help care for his wife at home  CM left message with Bob Christensen in financial to see if we can provide assistance while he is here  CM also left message with OP Care Coordinator for assistance when he is discharged

## 2017-10-13 NOTE — PLAN OF CARE
INFECTION - ADULT     Absence or prevention of progression during hospitalization Progressing     Absence of fever/infection during neutropenic period Progressing        Knowledge Deficit     Patient/family/caregiver demonstrates understanding of disease process, treatment plan, medications, and discharge instructions Progressing        Nutrition/Hydration-ADULT     Nutrient/Hydration intake appropriate for improving, restoring or maintaining nutritional needs Progressing        PAIN - ADULT     Verbalizes/displays adequate comfort level or baseline comfort level Progressing        Potential for Falls     Patient will remain free of falls Progressing        SAFETY ADULT     Maintain or return to baseline ADL function Progressing     Maintain or return mobility status to optimal level Progressing          INFECTION - ADULT     Absence or prevention of progression during hospitalization Progressing     Absence of fever/infection during neutropenic period Progressing        Knowledge Deficit     Patient/family/caregiver demonstrates understanding of disease process, treatment plan, medications, and discharge instructions Progressing        Nutrition/Hydration-ADULT     Nutrient/Hydration intake appropriate for improving, restoring or maintaining nutritional needs Progressing        PAIN - ADULT     Verbalizes/displays adequate comfort level or baseline comfort level Progressing        Potential for Falls     Patient will remain free of falls Progressing        SAFETY ADULT     Maintain or return to baseline ADL function Progressing     Maintain or return mobility status to optimal level Progressing

## 2017-10-13 NOTE — PLAN OF CARE
Problem: PHYSICAL THERAPY ADULT  Goal: Performs mobility at highest level of function for planned discharge setting  See evaluation for individualized goals  Treatment/Interventions: Functional transfer training, LE strengthening/ROM, Elevations, Therapeutic exercise, Endurance training, Patient/family training, Equipment eval/education, Gait training, Spoke to nursing, Spoke to case management  Equipment Recommended:  (TBD, none at this time)       See flowsheet documentation for full assessment, interventions and recommendations  Prognosis: Good  Problem List: Decreased strength, Decreased endurance, Impaired balance, Decreased mobility, Pain, Decreased skin integrity  Assessment: Pt is 63 y o  male seen for PT evaluation on 10/13/2017 s/p admit to [facility] on 10/12/2017 w/ Severe sepsis ([facility] [address] )  PT consulted to assess pt's functional mobility and d/c needs  Order placed for PT eval and tx, w/ activity as tolerated and up and OOB as tolerated order  Comorbidities affecting pt's physical performance at time of assessment include: uncontrolled DM type 2 w/ foot ulcer, prior toe amputation 2* osteomyelitis, tobacco abuse, morbid obesity, COPD, essential HTN  PTA, pt was independent w/ all functional mobility w/ occasional use of SPC, has 13 GORDO, lives w/ wife in 1 level home and retired  Personal factors affecting pt at time of IE include: stairs to enter home, inability to navigate community distances, positive fall history and difficulty performing ADLs  Please find objective findings from PT assessment regarding body systems outlined above with impairments and limitations including weakness, impaired balance, decreased endurance, gait deviations, pain, decreased functional mobility tolerance, fall risk and decreased skin integrity  The following objective measures performed on IE also reveal limitations: Barthel Index: 65/100 and Modified Haywood: 3 (moderate disability)   Pt's clinical presentation is currently unstable/unpredictable seen in pt's presentation of unstable medical status and uncontrolled DM  Pt to benefit from continued PT tx to address deficits as defined above and maximize level of functional independent mobility and consistency  From PT/mobility standpoint, recommendation at time of d/c would be Home PT pending progress in order to facilitate return to PLOF  Barriers to Discharge: Inaccessible home environment     Recommendation: Home PT, Home with family support     PT - OK to Discharge: No    See flowsheet documentation for full assessment

## 2017-10-13 NOTE — SOCIAL WORK
CM met with pt at bedside  Pt lives with his wife Kurt Oleary in a one story house with 13 steps w/railing to enter the residence  He has no problem navigating steps and takes care of all ADL's  He uses no DME's except a nebulizer from [facility]   His pulmonologist is Dr Migdalia Jordan  His PCP is Dr Freire Ar  He has never been in rehab or used OP/PT  He has used HH with Kelli Jaramillo in the past   He uses [address] and has no problem with his co-pays  He does not have an Advanced Directive or POA  His brother is an , so he will speak to him about getting this done  He is disabled and does not work  He does drive  He denies issues with drugs or alcohol  Denies hx of anxiety or depression  His cousin Suze Bell will probably transport home upon discharge  CM discussed discharge plan  He would like to be discharged with [facility] services  CM will put in referral and keep pt informed of decision    Plan;  Home w/Pepe

## 2017-10-13 NOTE — PLAN OF CARE
Problem: DISCHARGE PLANNING - CARE MANAGEMENT  Goal: Discharge to post-acute care or home with appropriate resources  INTERVENTIONS:  - Conduct assessment to determine patient/family and health care team treatment goals, and need for post-acute services based on payer coverage, community resources, and patient preferences, and barriers to discharge  - Address psychosocial, clinical, and financial barriers to discharge as identified in assessment in conjunction with the patient/family and health care team  - Arrange appropriate level of post-acute services according to patient's   needs and preference and payer coverage in collaboration with the physician and health care team  - Communicate with and update the patient/family, physician, and health care team regarding progress on the discharge plan  - Arrange appropriate transportation to post-acute venues   Outcome: Progressing  CM met with pt at bedside  Pt lives with his wife Rajan Hogan in a one story house with 13 steps w/railing to enter the residence  He has no problem navigating steps and takes care of all ADL's  He uses no DME's except a nebulizer [facility]   His pulmonologist is Dr Luisa Salinas  His PCP is Dr Elton Sarkar  He has never been in rehab or used OP/PT  He has used HH with Shanika Blanco in the past   He uses [facility] and has no problem with his co-pays  He does not have an Advanced Directive or POA  His brother is an , so he will speak to him about getting this done  He is disabled and does not work  He does drive  He denies issues with drugs or alcohol  Denies hx of anxiety or depression  His cousin Patrick Moscoso will probably transport home upon discharge  CM discussed discharge plan  He would like to be discharged with [facility] services  CM will put in referral and keep pt informed of decision    Plan;  Home w/[place]

## 2017-10-13 NOTE — SUBJECTIVE & OBJECTIVE
VTE Pharmacologic Prophylaxis:   Pharmacologic: Enoxaparin (Lovenox)  Mechanical: Mechanical VTE prophylaxis not  in place  Swelling    Patient Centered Rounds: I have performed bedside rounds with nursing staff today  Discussions with Specialists or Other Care Team Provider: n/a  Education and Discussions with Family / Patient:  Patient  Time Spent for Care: 25  More than 50% of total time spent on counseling and coordination of care as described above  Current Length of Stay: 1 day(s)  Current Patient Status: Inpatient   Certification Statement: The patient will continue to require additional inpatient hospital stay due to Management of sepsis in setting of diabetic foot ulcer    Discharge Plan:  Home with home health, patient declined any higher level of care, he has a long caregiver for his wife and this causes him significant increase in stress while he is being admitted here  Code Status: Level 1 - Full Code    Subjective:   Overnight no issues, patient states his pain level is controlled and he feels better today, less anxious, had bowel movement  Denies any fever, chills, chest pressure    Objective:   Vitals:   Temp (24hrs), Av 3 °F (36 8 °C), Min:98 1 °F (36 7 °C), Max:98 4 °F (36 9 °C)    HR:  [76-84] 76  Resp:  [19] 19  BP: (107-126)/(58-64) 114/64  SpO2:  [92 %] 92 %  Body mass index is 40 93 kg/m²  Input and Output Summary (last 24 hours): Intake/Output Summary (Last 24 hours) at 10/13/17 1509  Last data filed at 10/13/17 1301   Gross per 24 hour   Intake           2209 5 ml   Output             1950 ml   Net            259 5 ml       Physical Exam:     Physical Exam   Constitutional: He is oriented to person, place, and time  He appears well-developed  No distress  Morbidly obese male   HENT:   Head: Normocephalic and atraumatic  Moist lips and tongue   Eyes: EOM are normal  Pupils are equal, round, and reactive to light  Neck: Neck supple  No JVD present     Cardiovascular: Normal rate, regular rhythm and normal heart sounds  No murmur heard  Pulmonary/Chest: Effort normal and breath sounds normal    Abdominal: Soft  Bowel sounds are normal  He exhibits distension  Musculoskeletal: He exhibits edema and tenderness  Feet:    Neurological: He is alert and oriented to person, place, and time  He displays no tremor  No cranial nerve deficit  Coordination abnormal    Difficulty with initiating movement, due to body habitus has difficult time reaching around   Skin: Skin is warm and dry  No ecchymosis noted  He is not diaphoretic  No cyanosis  Nails show no clubbing  Psychiatric: His speech is normal and behavior is normal  Judgment and thought content normal  His mood appears anxious  Cognition and memory are normal    Nursing note and vitals reviewed  Additional Data:   Labs:    Results from last 7 days  Lab Units 10/13/17  0515   WBC Thousand/uL 18 70*   HEMOGLOBIN g/dL 12 3   HEMATOCRIT % 39 4   PLATELETS Thousands/uL 563*   NEUTROS PCT % 69   LYMPHS PCT % 20   MONOS PCT % 7   EOS PCT % 2       Results from last 7 days  Lab Units 10/13/17  0515   SODIUM mmol/L 135*   POTASSIUM mmol/L 4 2   CHLORIDE mmol/L 101   CO2 mmol/L 27   BUN mg/dL 14   CREATININE mg/dL 1 02   CALCIUM mg/dL 9 2   TOTAL PROTEIN g/dL 7 7   BILIRUBIN TOTAL mg/dL 0 30   ALK PHOS U/L 85   ALT U/L 17   AST U/L 15   GLUCOSE RANDOM mg/dL 187*       Results from last 7 days  Lab Units 10/12/17  1126   INR  1 08       * I Have Reviewed All Lab Data Listed Above  * Additional Pertinent Lab Tests Reviewed:  All Labs Within Last 24 Hours Reviewed    Imaging:    Imaging Reports Reviewed Today Include: CXR normal    Cultures:   Blood Culture: No results found for: BLOODCX  Urine Culture: No results found for: URINECX  Sputum Culture: No components found for: SPUTUMCX  Wound Culture:   Lab Results   Component Value Date    WOUNDCULT Culture too young- will reincubate 10/12/2017       Last 24 Hours Medication List:     atorvastatin 10 mg Oral Daily   buPROPion 150 mg Oral Daily   chlorthalidone 25 mg Oral Daily   docusate sodium 100 mg Oral BID   enoxaparin 40 mg Subcutaneous Daily   insulin glargine 70 Units Subcutaneous Daily With Breakfast   insulin glargine 70 Units Subcutaneous HS   insulin lispro 4-20 Units Subcutaneous TID AC   lisinopril 40 mg Oral Daily   morphine 30 mg Oral Q8H   nicotine 1 patch Transdermal Daily   piperacillin-tazobactam 3 375 g Intravenous Q6H   vancomycin 2,000 mg Intravenous Q24H        Today, Patient Was Seen By: Rosalba Rivas MD    ** Please Note: Dragon 360 Dictation voice to text software may have been used in the creation of this document   **

## 2017-10-13 NOTE — SEPSIS NOTE
Sepsis Note   Celia Salinas 63 y o  male MRN: [id number]  Unit/Bed#: -01 Encounter: [id number]            Initial Sepsis Screening     [address] Name 10/12/17 1156                Is the patient's history suggestive of a new or worsening infection? (!)  Yes (Proceed)  -SZ        Suspected source of infection soft tissue  -SZ        Are two or more of the following signs & symptoms of infection both present and new to the patient? (!)  Yes (Proceed)  -SZ        Indicate SIRS criteria Tachycardia > 90 bpm;Leukocytosis (WBC > 15993 IJL)  -SZ        If the answer is yes to both questions, suspicion of sepsis is present          If severe sepsis is present AND tissue hypoperfusion perists in the hour after fluid resuscitation or lactate > 4, the patient meets criteria for SEPTIC SHOCK          Are any of the following organ dysfunction criteria present within 6 hours of suspected infection and SIRS criteria that are NOT considered to be chronic conditions? (!)  Yes  -SZ        Organ dysfunction          Date of presentation of severe sepsis          Time of presentation of severe sepsis          Tissue hypoperfusion persists in the hour after crystalloid fluid administration, evidenced, by either:          Was hypotension present within one hour of the conclusion of crystalloid fluid administration?         Date of presentation of septic shock          Time of presentation of septic shock            User Key  (r) = Recorded By, (t) = Taken By, (c) = Cosigned By    Initials Name Provider Type    LEIGHANN Cruz DO Physician               Default Flowsheet Data (last 720 hours)      Sepsis Reassessment     Row Name 10/13/17 3174                   Volume Status and Tissue Perfusion Post Fluid Resuscitation- Must Document ALL of the Following:    Vital Signs Reviewed Yes  -KK        Cardio Normal S1/S2; Regular rate and rhythm; No murmor  -KK        Pulmonary Normal effort  -KK        Capillary Refill Sluggish  -KK Peripheral Pulses Posterior Tibialis  -KK        Posterior Tibialis +2  -KK        Skin Warm;Dry  -KK           *OR*   Intensive Monitoring- Must Document Two * of the Following Four *:    Vital Signs Reviewed          * Central Venous Pressure (CVP or RAP)          * Central Venous Oxygen (SVO2, ScvO2 or Oxygen saturation via central catheter)          * Bedside Cardiovascular US in IVC diameter and % collapse          * Passive Leg Raise OR Crystalloid Challenge            User Key  (r) = Recorded By, (t) = Taken By, (c) = Cosigned By    Initials Name Provider Alcides Silverman MD Physician

## 2017-10-13 NOTE — PLAN OF CARE
Problem: DISCHARGE PLANNING - CARE MANAGEMENT  Goal: Discharge to post-acute care or home with appropriate resources  INTERVENTIONS:  - Conduct assessment to determine patient/family and health care team treatment goals, and need for post-acute services based on payer coverage, community resources, and patient preferences, and barriers to discharge  - Address psychosocial, clinical, and financial barriers to discharge as identified in assessment in conjunction with the patient/family and health care team  - Arrange appropriate level of post-acute services according to patient's   needs and preference and payer coverage in collaboration with the physician and health care team  - Communicate with and update the patient/family, physician, and health care team regarding progress on the discharge plan  - Arrange appropriate transportation to post-acute venues    Outcome: Progressing  Dr Brian Montague informed CM that pt has been non-compliant with his wound care and doctor visits due to financial issues  CM spoke to pt about STR's, but pt has to help care for his wife at home  CM left message with Assgissell Cousins in financial to see if we can provide assistance while he is here  DARON also left message with OP Care Coordinator for assistance when he is discharged

## 2017-10-13 NOTE — PROGRESS NOTES
Progress Note - Tonya Heredia 63 y o  male MRN: [id number]    Unit/Bed#: -01 Encounter: [id number]        * Severe sepsis ([facility] )   Overview    In setting of nonhealing diabetic foot ulcer, associated with tachycardia, leukocytosis, source of infection, lactic acid elevation  Improved with IV antibiotics, hydration     Skin ulcer of right foot with fat layer exposed ([facility] )   Overview    Suspected, depth of ulceration or not entirely evaluated by me due to pain, await podiatry consultation, continue IV vancomycin and Zosyn, leukocytosis is trending down  Wound culture preliminary mixed monserrat  Patient declines to consider additional imaging for now until seen by Podiatry, plain x-ray did not show any bone involvement     Type 2 diabetes mellitus with foot ulcer, with long-term current use of insulin ([facility])   Overview    A1c above 8, will increase long-acting insulin by 2 units each morning and nighttime  Per records reviewed this is a chronic nonhealing ulcer     Chronic prescription opiate use   Overview    Continue same dose of medications, Dilaudid low-dose only for severe pain  Pain control is improved since admission     Morbid obesity ([facility] )   Overview    Complicated by a 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 as well as current nonhealing ulcer, difficulty with ambulation, patient also has issues with anxiety and depression     Tobacco abuse   Overview    On patch, doing well     Essential hypertension   Overview    Patient is on lisinopril and chlorthalidone, however he states he does not have problems with hypertension and refuses to be put on cardiac diet/low sodium  Renal function is stable, blood pressure in normal range           VTE Pharmacologic Prophylaxis:   Pharmacologic: Enoxaparin (Lovenox)  Mechanical: Mechanical VTE prophylaxis not  in place  Swelling    Patient Centered Rounds: I have performed bedside rounds with nursing staff today    Discussions with Specialists or Other Care Team Provider: n/a  Education and Discussions with Family / Patient:  Patient  Time Spent for Care: 25  More than 50% of total time spent on counseling and coordination of care as described above  Current Length of Stay: 1 day(s)  Current Patient Status: Inpatient   Certification Statement: The patient will continue to require additional inpatient hospital stay due to Management of sepsis in setting of diabetic foot ulcer    Discharge Plan:  Home with home health, patient declined any higher level of care, he has a long caregiver for his wife and this causes him significant increase in stress while he is being admitted here  Code Status: Level 1 - Full Code    Subjective:   Overnight no issues, patient states his pain level is controlled and he feels better today, less anxious, had bowel movement  Denies any fever, chills, chest pressure    Objective:   Vitals:   Temp (24hrs), Av 3 °F (36 8 °C), Min:98 1 °F (36 7 °C), Max:98 4 °F (36 9 °C)    HR:  [76-84] 76  Resp:  [19] 19  BP: (107-126)/(58-64) 114/64  SpO2:  [92 %] 92 %  Body mass index is 40 93 kg/m²  Input and Output Summary (last 24 hours): Intake/Output Summary (Last 24 hours) at 10/13/17 1509  Last data filed at 10/13/17 1301   Gross per 24 hour   Intake           2209 5 ml   Output             1950 ml   Net            259 5 ml       Physical Exam:     Physical Exam   Constitutional: He is oriented to person, place, and time  He appears well-developed  No distress  Morbidly obese male   HENT:   Head: Normocephalic and atraumatic  Moist lips and tongue   Eyes: EOM are normal  Pupils are equal, round, and reactive to light  Neck: Neck supple  No JVD present  Cardiovascular: Normal rate, regular rhythm and normal heart sounds  No murmur heard  Pulmonary/Chest: Effort normal and breath sounds normal    Abdominal: Soft  Bowel sounds are normal  He exhibits distension  Musculoskeletal: He exhibits edema and tenderness  Feet:    Neurological: He is alert and oriented to person, place, and time  He displays no tremor  No cranial nerve deficit  Coordination abnormal    Difficulty with initiating movement, due to body habitus has difficult time reaching around   Skin: Skin is warm and dry  No ecchymosis noted  He is not diaphoretic  No cyanosis  Nails show no clubbing  Psychiatric: His speech is normal and behavior is normal  Judgment and thought content normal  His mood appears anxious  Cognition and memory are normal    Nursing note and vitals reviewed  Additional Data:   Labs:    Results from last 7 days  Lab Units 10/13/17  0515   WBC Thousand/uL 18 70*   HEMOGLOBIN g/dL 12 3   HEMATOCRIT % 39 4   PLATELETS Thousands/uL 563*   NEUTROS PCT % 69   LYMPHS PCT % 20   MONOS PCT % 7   EOS PCT % 2       Results from last 7 days  Lab Units 10/13/17  0515   SODIUM mmol/L 135*   POTASSIUM mmol/L 4 2   CHLORIDE mmol/L 101   CO2 mmol/L 27   BUN mg/dL 14   CREATININE mg/dL 1 02   CALCIUM mg/dL 9 2   TOTAL PROTEIN g/dL 7 7   BILIRUBIN TOTAL mg/dL 0 30   ALK PHOS U/L 85   ALT U/L 17   AST U/L 15   GLUCOSE RANDOM mg/dL 187*       Results from last 7 days  Lab Units 10/12/17  1126   INR  1 08       * I Have Reviewed All Lab Data Listed Above  * Additional Pertinent Lab Tests Reviewed:  All Labs Within Last 24 Hours Reviewed    Imaging:    Imaging Reports Reviewed Today Include: CXR normal    Cultures:   Blood Culture: No results found for: BLOODCX  Urine Culture: No results found for: URINECX  Sputum Culture: No components found for: SPUTUMCX  Wound Culture:   Lab Results   Component Value Date    WOUNDCULT Culture too young- will reincubate 10/12/2017       Last 24 Hours Medication List:     atorvastatin 10 mg Oral Daily   buPROPion 150 mg Oral Daily   chlorthalidone 25 mg Oral Daily   docusate sodium 100 mg Oral BID   enoxaparin 40 mg Subcutaneous Daily   insulin glargine 70 Units Subcutaneous Daily With Breakfast   insulin glargine 70 Units Subcutaneous HS   insulin lispro 4-20 Units Subcutaneous TID AC   lisinopril 40 mg Oral Daily   morphine 30 mg Oral Q8H   nicotine 1 patch Transdermal Daily   piperacillin-tazobactam 3 375 g Intravenous Q6H   vancomycin 2,000 mg Intravenous Q24H        Today, Patient Was Seen By: Slim Kearns MD    ** Please Note: Dragon 360 Dictation voice to text software may have been used in the creation of this document   **

## 2017-10-13 NOTE — CONSULTS
Consult - [address] Jonny Alvarez 63 y o  male MRN: [id number]  Unit/Bed#: -01 Encounter: [id number]    Assessment/Plan     Assessment:  (1) Exacerbation of chronic diabetic foot ulcer right foot  (2) chronic diabetic foot ulcer left foot  (3) history of poor compliance  (4) diabetes with peripheral vascular manifestation  (5) diabetic peripheral neuropathy  Plan:  Right foot ulcer debrided at bedside  Deep swabs for culture and sensitivity are obtained  Continue present IV antibiotics until cultures are returned  Instructions are given for daily wound care  Bedrest and [place] chair with bathroom privileges  Consider MRI predicated upon clinical course and labs  History of Present Illness     HPI:  Celia Salinas is a 63 y o  male who presents with increasing redness swelling and drainage from a right hallux ulcer  There is a long history of diabetic ulcers  The patient is well known to the podiatry office and the wound [facility]  He has been being treated for the past several years for bilateral diabetic foot ulcers  He was last seen in the podiatry office 3 weeks ago  At that time the right hallux ulcer had been noted to be getting larger  Patient compliance with treatment regimen has been very poor  He is very inconsistent with follow-up appointments and treatments  He has many stressors at home including financial problems and he has been caring for his wife who has been very sick  Consults  Review of Systems   Constitutional: Negative  HENT: Negative  Eyes: Negative  Respiratory: Negative  Cardiovascular: Negative  Gastrointestinal: Negative  Musculoskeletal:  General orthopedic aches and pains  Skin: Abnormal bilaterally with ulcers  Neurological: Diabetic sensory neuropathy with painful neuropathy      Historical Information   Past Medical History:   Diagnosis Date    Chronic pain syndrome     [facility] [address] [facility]     Diabetes mellitus ([facility] [address] )      Past Surgical History:   Procedure Laterality Date    AMPUTATION      HEMICOLECTOMY       Social History   History   Alcohol Use No     History   Drug Use No     History   Smoking Status    Current Every Day Smoker    Packs/day: 1 00   Smokeless Tobacco    Never Used     Family History:   Family History   Problem Relation Age of Onset    Family history unknown: Yes       Meds/Allergies   Prescriptions Prior to Admission   Medication    atorvastatin (LIPITOR) 10 mg tablet    buPROPion (WELLBUTRIN XL) 150 mg 24 hr tablet    chlorthalidone 25 mg tablet    Empagliflozin (JARDIANCE) 25 MG TABS    Insulin Glargine-Lixisenatide (SOLIQUA) 100-33 UNT-MCG/ML SOPN    lisinopril (ZESTRIL) 40 mg tablet    metFORMIN (GLUCOPHAGE) 1000 MG tablet    morphine (MS CONTIN) 30 mg 12 hr tablet    oxyCODONE (ROXICODONE) 15 mg immediate release tablet     No Known Allergies    Objective   First Vitals:   Blood Pressure: 129/62 (10/12/17 1026)  Pulse: 101 (10/12/17 1026)  Temperature: 97 9 °F (36 6 °C) (10/12/17 1026)  Temp Source: Temporal (10/12/17 1026)  Respirations: 21 (10/12/17 1026)  Height: 6' (182 9 cm) (10/12/17 1434)  Weight - Scale: 136 kg (299 lb 13 2 oz) (10/12/17 1434)  SpO2: 94 % (10/12/17 1026)    Current Vitals:   Blood Pressure: 111/64 (10/13/17 1500)  Pulse: 87 (10/13/17 1500)  Temperature: 97 9 °F (36 6 °C) (10/13/17 1500)  Temp Source: Oral (10/13/17 1500)  Respirations: 18 (10/13/17 1500)  Height: 6' (182 9 cm) (10/12/17 1543)  Weight - Scale: (!) 137 kg (301 lb 13 oz) (10/13/17 0537)  SpO2: 96 % (10/13/17 1500)        /64   Pulse 87   Temp 97 9 °F (36 6 °C) (Oral)   Resp 18   Ht 6' (1 829 m)   Wt (!) 137 kg (301 lb 13 oz)   SpO2 96%   BMI 40 93 kg/m²      General Appearance:    Alert, cooperative, no distress   Head:    Normocephalic, without obvious abnormality, atraumatic   Eyes:    PERRL, conjunctiva/corneas clear, EOM's intact        Nose:   Moist mucous membranes Neck:   Supple, symmetrical, trachea midline   Back:     Symmetric   Lungs:     Respirations unlabored   Heart:    Regular rate and rhythm, S1 and S2 normal, no murmur, rub   or gallop   Abdomen:     Soft, non-tender   Extremities: There is previous partial amputation of the left foot  There is no focal weakness  Pulses:   Pedal pulses are not palpable  There is no distal cyanosis or gangrene  There is no wound cyanosis or gangrene  There is no sign of acute evolving dysvascularity  Skin:   There is bilateral chronic brawny edema  There is full-thickness ulceration present in the left foot at the site of previous amputation  There is pale pink granulation tissue present  There is no undermining there is no deep track no pus or purulence or drainage  There is no cellulitis  There is full-thickness ulceration present in the right foot in the plantar aspect of the right hallux extending from the IPJ to the MPJ  There is no visible bone or tendon however the wound probes deep and bone is easily palpable  There is a modest amount of necrotic tissue and foul smelling slough in the deepest aspect of the wound  There is no altaf pus expressed  Proximally there is no palpable fluctuance or crepitus  Neurologic:   Gross sensation is diminished  Protective sensation is absent             Lab Results:   Admission on 10/12/2017   Component Date Value    WBC 10/12/2017 20 09*    RBC 10/12/2017 4 69     Hemoglobin 10/12/2017 12 2     Hematocrit 10/12/2017 38 3     MCV 10/12/2017 82     MCH 10/12/2017 26 0*    MCHC 10/12/2017 31 9     RDW 10/12/2017 14 9     MPV 10/12/2017 9 0     Platelets 62/69/8429 620*    nRBC 10/12/2017 0     Neutrophils Relative 10/12/2017 79*    Lymphocytes Relative 10/12/2017 12*    Monocytes Relative 10/12/2017 6     Eosinophils Relative 10/12/2017 1     Basophils Relative 10/12/2017 0     Neutrophils Absolute 10/12/2017 15 79*    Lymphocytes Absolute 10/12/2017 2 43     Monocytes Absolute 10/12/2017 1 28*    Eosinophils Absolute 10/12/2017 0 17     Basophils Absolute 10/12/2017 0 09     Sodium 10/12/2017 135*    Potassium 10/12/2017 4 4     Chloride 10/12/2017 98*    CO2 10/12/2017 29     Anion Gap 10/12/2017 8     BUN 10/12/2017 18     Creatinine 10/12/2017 1 18     Glucose 10/12/2017 210*    Calcium 10/12/2017 9 5     AST 10/12/2017 13     ALT 10/12/2017 19     Alkaline Phosphatase 10/12/2017 105     Total Protein 10/12/2017 8 1     Albumin 10/12/2017 2 3*    Total Bilirubin 10/12/2017 0 20     eGFR 10/12/2017 67     Wound Culture 10/13/2017 Culture too young- will reincubate     Gram Stain Result 10/13/2017 1+ Polys     Gram Stain Result 10/13/2017 4+ Gram positive cocci in pairs     Gram Stain Result 10/13/2017 4+ Gram negative rods     Gram Stain Result 10/13/2017 2+ Gram positive rods     Gram Stain Result 10/13/2017 1+ Gram positive cocci in chains     Troponin I 10/12/2017 <0 02     LACTIC ACID 10/12/2017 2 5*    Ventricular Rate 10/12/2017 89     Atrial Rate 10/12/2017 89     CO Interval 10/12/2017 158     QRSD Interval 10/12/2017 100     QT Interval 10/12/2017 362     QTC Interval 10/12/2017 440     P Axis 10/12/2017 54     QRS [place] 10/12/2017 -23     T Wave Axis 10/12/2017 61     Color, UA 10/12/2017 Yellow     Clarity, UA 10/12/2017 Clear     Specific Gravity, UA 10/12/2017 <=1 005     pH, UA 10/12/2017 6 5     Leukocytes, UA 10/12/2017 Elevated glucose may cause decreased leukocyte values   See urine microscopic for [facility] result/*    Nitrite, UA 10/12/2017 Negative     Protein, UA 10/12/2017 Negative     Glucose, UA 10/12/2017 >=1000 (1%)*    Ketones, UA 10/12/2017 Negative     Urobilinogen, UA 10/12/2017 0 2     Bilirubin, UA 10/12/2017 Negative     Blood, UA 10/12/2017 Negative     Protime 10/12/2017 14 3     INR 10/12/2017 1 08     PTT 10/12/2017 40*    LACTIC ACID 10/12/2017 2 0     POC Glucose 10/12/2017 96     RBC, UA 10/12/2017 None Seen     WBC, UA 10/12/2017 None Seen     Epithelial Cells 10/12/2017 None Seen     Bacteria, UA 10/12/2017 None Seen     WBC 10/13/2017 18 70*    RBC 10/13/2017 4 76     Hemoglobin 10/13/2017 12 3     Hematocrit 10/13/2017 39 4     MCV 10/13/2017 83     MCH 10/13/2017 25 8*    MCHC 10/13/2017 31 2*    RDW 10/13/2017 14 8     MPV 10/13/2017 8 9     Platelets 99/36/1581 563*    nRBC 10/13/2017 0     Neutrophils Relative 10/13/2017 69     Lymphocytes Relative 10/13/2017 20     Monocytes Relative 10/13/2017 7     Eosinophils Relative 10/13/2017 2     Basophils Relative 10/13/2017 1     Neutrophils Absolute 10/13/2017 12 96*    Lymphocytes Absolute 10/13/2017 3 68     Monocytes Absolute 10/13/2017 1 33*    Eosinophils Absolute 10/13/2017 0 33     Basophils Absolute 10/13/2017 0 10     Sodium 10/13/2017 135*    Potassium 10/13/2017 4 2     Chloride 10/13/2017 101     CO2 10/13/2017 27     Anion Gap 10/13/2017 7     BUN 10/13/2017 14     Creatinine 10/13/2017 1 02     Glucose 10/13/2017 187*    Calcium 10/13/2017 9 2     AST 10/13/2017 15     ALT 10/13/2017 17     Alkaline Phosphatase 10/13/2017 85     Total Protein 10/13/2017 7 7     Albumin 10/13/2017 2 0*    Total Bilirubin 10/13/2017 0 30     eGFR 10/13/2017 80     POC Glucose 10/13/2017 191*    POC Glucose 10/13/2017 280*    POC Glucose 10/13/2017 252*         Results from last 7 days  Lab Units 10/12/17  1224   GRAM STAIN RESULT  1+ Polys  4+ Gram positive cocci in pairs  4+ Gram negative rods  2+ Gram positive rods  1+ Gram positive cocci in chains             Invalid input(s): LABAEARO            Imaging: I have personally reviewed pertinent films in PACS  EKG, Pathology, and Other Studies: I have personally reviewed pertinent reports        Code Status: Level 1 - Full Code  Advance Directive and Living Will:      Power of :    POLST:

## 2017-10-13 NOTE — MALNUTRITION/BMI
This medical record reflects one or more clinical indicators suggestive of  morbid obesity  Please indicate the one diagnosis below which you feel best reflects the clinical picture  Morbid obesity related to energy intake exceeding energy expenditure over time as evidenced by BMI    BMI Findings:  40-44 9    Body mass index is 40 93 kg/m²  See Nutrition note dated 10/13/17 for additional details  Completed nutrition assessment is viewable in the nutrition documentation

## 2017-10-13 NOTE — PHYSICAL THERAPY NOTE
Physical Therapy Evaluation    Patient's Name: Jailyn Prescott    Admitting Diagnosis  Diabetic foot ulcer ([facility]) [E11 621, L97 509]  Cellulitis of right foot [L03 115]  Encounter for postoperative wound care [Z48 89]  Severe sepsis ([facility]) [A41 9, R65 20]    Problem List  Patient Active Problem List   Diagnosis    Tobacco abuse    Morbid obesity ([facility])    Type 2 diabetes mellitus with foot ulcer, with long-term current use of insulin ([facility])    Chronic prescription opiate use    Severe sepsis ([facility])    Skin ulcer of right foot with fat layer exposed ([facility])    Essential hypertension       Past Medical History  Past Medical History:   Diagnosis Date    Chronic pain syndrome     COPD (chronic obstructive pulmonary disease) ([facility])     Diabetes mellitus ([facility])        Past Surgical History  Past Surgical History:   Procedure Laterality Date    AMPUTATION      HEMICOLECTOMY        10/13/17 9755   Note Type   Note type Eval/Treat   Pain Assessment   Pain Assessment 0-10   Pain Score 7   Pain Type Acute pain;Chronic pain   Pain Location Foot   Pain Orientation Right   Pain Descriptors Sharp; Sore   Pain Frequency Constant/continuous   Effect of Pain on Daily Activities limited prolonged WBing activities   Patient's Stated Pain Goal No pain   Hospital Pain Intervention(s) Medication (See MAR); Elevated; Rest  (pain medications per nsg staff)   Home Living   Type of [address] [place] [address] One level;Performs ADLs on one level; Able to live on main level with bedroom/bathroom;Stairs to enter with rails  (raised ranch; 13 GORDO w/ U/L HR)   Bathroom Shower/Tub Tub/shower unit   Bathroom Toilet Standard   Bathroom Equipment Shower chair   [address]  (no AD at baseline- occasional use of SPC)   Prior Function   Level of [place] Independent with ADLs and functional mobility   Lives With Spouse  (pt reports he is wife's caregiver)   Receives Help From Family   ADL Assistance Independent IADLs Independent   Falls in the last 6 months 1 to 4  (1 fall a few months ago- reports falling out of shower)   Vocational Retired   Comments pt drives; reporting he cooks, cleans   Restrictions/Precautions   [place] Bearing Precautions Per Order No   Braces or Orthoses (pt reporting he has diabetic shoes)   Other Precautions Fall Risk;Pain;Multiple lines   General   Family/Caregiver Present No   Cognition   Overall Cognitive Status WFL   Arousal/Participation Alert   Orientation Level Oriented X4   Memory Within functional limits   Following Commands Follows all commands and directions without difficulty   RUE Assessment   RUE Assessment WFL   LUE Assessment   LUE Assessment WFL   RLE Assessment   RLE Assessment WFL  (AROM WFL)   LLE Assessment   LLE Assessment WFL  (AROM WFL)   Coordination   Movements are Fluid and Coordinated 1   Sensation WFL  (pt denies numbness/tingling)   Bed Mobility   Rolling R Unable to assess  (pt received OOB in recliner)   Transfers   Sit to Stand 5  Supervision   Additional items Assist x 1; Armrests; Increased time required   Stand to Sit 5  Supervision   Additional items Assist x 1;Verbal cues   Ambulation/Elevation   Gait pattern Improper Weight shift; Antalgic; Wide DAVID  (increased lateral sway)   Gait Assistance 5  Supervision   Additional items Verbal cues   Assistive Device None   Distance 15'; PT deferred further at this time 2* pt reports of mod/severe pain in R foot   Stair Management Assistance Not tested   Balance   Static Sitting Normal   Dynamic Sitting Good   Static Standing Fair +   Dynamic Standing Fair   Ambulatory Fair   Endurance Deficit   Endurance Deficit Yes   Assessment   Prognosis Good   Problem List Decreased strength;Decreased endurance; Impaired balance;Decreased mobility;Pain;Decreased skin integrity   Assessment Pt is 63 y o  male seen for PT evaluation on 10/13/2017 s/p admit to [facility] on 10/12/2017 w/ Severe sepsis ([facility] [address] )   PT consulted to assess pt's functional mobility and d/c needs  Order placed for PT eval and tx, w/ activity as tolerated and up and OOB as tolerated order  Comorbidities affecting pt's physical performance at time of assessment include: uncontrolled DM type 2 w/ foot ulcer, prior toe amputation 2* osteomyelitis, tobacco abuse, morbid obesity, COPD, essential HTN  PTA, pt was independent w/ all functional mobility w/ occasional use of SPC, has 13 GODRO, lives w/ wife in 1 level home and retired  Personal factors affecting pt at time of IE include: stairs to enter home, inability to navigate community distances, positive fall history and difficulty performing ADLs  Please find objective findings from PT assessment regarding body systems outlined above with impairments and limitations including weakness, impaired balance, decreased endurance, gait deviations, pain, decreased functional mobility tolerance, fall risk and decreased skin integrity  The following objective measures performed on IE also reveal limitations: Barthel Index: 65/100 and Modified Jelena: 3 (moderate disability)  Pt's clinical presentation is currently unstable/unpredictable seen in pt's presentation of unstable medical status and uncontrolled DM  Pt to benefit from continued PT tx to address deficits as defined above and maximize level of functional independent mobility and consistency  From PT/mobility standpoint, recommendation at time of d/c would be Home PT pending progress in order to facilitate return to PLOF     Barriers to Discharge Inaccessible home environment   Goals   Patient Goals "to go home"   [facility] Expiration Date 10/23/17   Short Term Goal #1 In 7-10 days: Increase bilateral LE strength 1/2 grade to facilitate independent mobility, Perform all bed mobility tasks independently to decrease caregiver burden, Perform all transfers modified independent to improve independence, Ambulate > 100 ft  with least restrictive assistive device modified independent w/o LOB and w/ normalized gait pattern 100% of the time, Navigate 13 stairs modified independent with unilateral handrail to facilitate return to previous living environment, Increase all balance 1/2 grade to decrease risk for falls and Complete exercise program independently   Treatment Day 0   Plan   Treatment/Interventions Functional transfer training;LE strengthening/ROM; Elevations; Therapeutic exercise; Endurance training;Patient/family training;Equipment eval/education;Gait training;Spoke to nursing;Spoke to case management   PT Frequency (3-5x/wk)   Recommendation   Recommendation Home PT; Home with family support   Equipment Recommended (TBD, none at this time)   PT - OK to Discharge No   Additional Comments pt to demonstrate ability to navigate 13 steps to enter home   Modified [place] Scale   Modified [place] Scale 3   Barthel Index   Feeding 10   Bathing 0   Grooming Score 5   Dressing Score 10   Bladder Score 10   Bowels Score 10   Toilet Use Score 10   Transfers (Bed/Chair) Score 10   Mobility (Level Surface) Score 0   Stairs Score 0   Barthel Index Score 65           Blily Larios, PT

## 2017-10-13 NOTE — CASE MANAGEMENT
Initial Clinical Review    Admission: Date/Time/Statement: 10/12/17 @ 1314     Orders Placed This Encounter   Procedures    Inpatient Admission (expected length of stay for this patient is greater than two midnights)     Standing Status:   Standing     Number of Occurrences:   1     Order Specific Question:   Admitting Physician     Answer:   Murali Rocha [[id number]]     Order Specific Question:   Level of Care     Answer:   Med Surg [[id number]]     Order Specific Question:   Estimated length of stay     Answer:   More than 2 Midnights     Order Specific Question:   Certification     Answer:   I certify that inpatient services are medically necessary for this patient for a duration of greater than two midnights  See H&P and MD Progress Notes for additional information about the patient's course of treatment  ED: Date/Time/Mode of Arrival:   ED Arrival Information     Expected Arrival Acuity Means of Arrival Escorted By Service Admission Type    - 10/12/2017 10:17 Emergent [address]  [facility] Emergency    Arrival Complaint    WOUND CARE         Chief Complaint:   Chief Complaint   Patient presents with    Wound Infection     Pt c/o infection of wound on right foot  Pt states wound on big toe  Hx of amputation on left foot, which also isn't healing  Pt states had wound care but doesn't anymore  Complaints of lots of pain  History of Illness: Wero Cisneros is a 63 y o  male with past medical history pertinent for uncontrolled diabetes, chronic pain syndrome, prior toe amputation due to osteomyelitis, who presents with intractable right foot pain, right toe wound drainage ongoing for several days per patient's report Patient states that he follows outpatient with Podiatry, he reports that he had a small wound at the bottom of the toe that started several weeks ago and suddenly progressed to a large wound He apparently was treated outpatient with Keflex recently   He has been avoiding coming for evaluation due to being long caregiver to his disabled wife  He has chronic pain syndrome and takes chronic prescription opiates which were not effective anymore for his pain  Upon evaluation in the ED patient was found to be tachycardic with significant leukocytosis, wound in the plantar aspect of right great toe with foul odor   Patient was started on vancomycin and Zosyn, he received IV fluid resuscitation per sepsis protocol Currently complains of pain at 8/10 after he received a dose of Dilaudid 40 minutes ago      ED Vital Signs:   ED Triage Vitals [10/12/17 1026]   Temperature Pulse Respirations Blood Pressure SpO2   97 9 °F (36 6 °C) 101 21 129/62 94 %      Temp Source Heart Rate Source Patient Position - Orthostatic VS BP Location FiO2 (%)   Temporal Monitor Lying Right arm --      Pain Score       Worst Possible Pain        Wt Readings from Last 1 Encounters:   10/13/17 (!) 137 kg (301 lb 13 oz)     Abnormal Labs/Diagnostic Test Results:   Sodium 135 135   Chloride 98 101   Glucose 210 187   Albumin 2 3 2 0      WBC 20 09 18 70   MCH 26 0 25 8   Platelets 174 886     Trop WNL  Lactic acid - 2 5  PTT 40  POC glu 191  Blood and wound cultures pending    Glucose, UA >=1000    Leukocytes, UA Elevat    Xray R foot - A soft tissue wound defect is present in the plantar aspect of the right great toe  ED Treatment:   Medication Administration from 10/12/2017 1017 to 10/12/2017 1618    Date/Time Order Dose Route Action   10/12/2017 1227 piperacillin-tazobactam (ZOSYN) 3 375 g in sodium chloride 0 9 % 50 mL IVPB 3 375 g Intravenous New Bag   10/12/2017 1329 vancomycin (VANCOCIN) 2,000 mg in sodium chloride 0 9 % 500 mL IVPB 2,000 mg Intravenous New Bag   10/12/2017 1332 sodium chloride 0 9 % bolus 1,000 mL 1,000 mL Intravenous New Bag        Past Medical/Surgical History:    Active Ambulatory Problems     Diagnosis Date Noted    No Active Ambulatory Problems     Resolved Ambulatory Problems     Diagnosis Date Noted    No Resolved Ambulatory Problems     Past Medical History:   Diagnosis Date    Chronic pain syndrome     COPD [facility]chronic obstructive pulmonary disease) ([facility])     Diabetes mellitus ([place] [address] )        Admitting Diagnosis: Diabetic foot ulcer ([place] [address] ) [J75 900, L97 509]  Cellulitis of right foot [L03 115]  Encounter for postoperative wound care [Z48 89]  Severe sepsis ([place] [address] ) [A41 9, R65 20]    Age/Sex: 63 y o  male    Assessment/Plan:   Principal Problem:    Severe sepsis ([place] [address] )  Active Problems:    Type 2 diabetes mellitus with foot ulcer, with long-term current use of insulin ([facility])    Skin ulcer of right foot with fat layer exposed ([place] [address] )    Tobacco abuse    Morbid obesity ([place] [address] )    Chronic prescription opiate use    Essential hypertension      Plan for the Primary Problem(s):  · Severe sepsis  ? Present on admission, with tachycardia, significant leukocytosis, elevated lactic acid  The patient received IV fluids at 30 cc per sepsis protocol, will continue with aggressive hydration  ? Agree with choice of antibiotics, will continue vancomycin and Zosyn, patient is a diabetic with a risk for MRSA  ? Podiatry consultation  ? Patient refused MRI for now  ? Trend lactic acid  ? Pain management  ? Follow-up blood cultures and wound culture  ? Check chest x-ray     Plan for Additional Problems:   · Type 2 diabetes with foot ulcer, with long-term current use of insulin  Per records review patient has been chronically historically uncontrolled  Patient is morbidly obese with limited mobility, significant anxiety contributing as well as chronic pain syndrome    Diabetic diet  Continue Lantus 70 units twice daily plus algorithm 5 sliding scale  Nutrition consult     · Skin ulcer of right foot with fat layer exposed  Apparently this has been ongoing issue for several months per records review, patient claims it has  been present only for a month as a small ulcer with within last couple days opening into a large defect  Wound culture obtained pending, continue vancomycin and Zosyn  Sepsis protocol  Consult Podiatry and wound care, elevate extremity  X-ray did not show any evidence of involvement of bones, significant odor on exam with large ulceration, consider bone scan or MRI, patient declined for now  PT evaluation  Case management for discharge planning, home VNA     · Tobacco abuse  Nicotine patch, continue Wellbutrin     · Morbid obesity  Diabetic diet  PT     · Chronic prescription opiate use  Continue home dose of MS Contin 30 mg t i d  and oxycodone 15 mg every 4 hours as needed  Dilaudid only for breakthrough pain  Patient managed by pain management outpatient, he states he has with tapering of slowly and his pain control at baseline is poor  Avoid escalating doses  Bowel protocol     · Essential hypertension  Continue home medication     VTE Prophylaxis: Enoxaparin (Lovenox)  / foot pump applied   Anticipated Length of Stay:  Patient will be admitted on an Inpatient basis with an anticipated length of stay of  More than 2 midnights     Justification for Hospital Stay:  Management of severe sepsis, diabetic foot ulcer    Admission Orders:  Scheduled Meds:   atorvastatin 10 mg Oral Daily   buPROPion 150 mg Oral Daily   chlorthalidone 25 mg Oral Daily   docusate sodium 100 mg Oral BID   enoxaparin 40 mg Subcutaneous Daily   insulin glargine 70 Units Subcutaneous Daily With Breakfast   insulin glargine 70 Units Subcutaneous HS   insulin lispro 4-20 Units Subcutaneous TID AC   lisinopril 40 mg Oral Daily   morphine 30 mg Oral Q8H   nicotine 1 patch Transdermal Daily   piperacillin-tazobactam 3 375 g Intravenous Q6H     Continuous Infusions:   sodium chloride 125 mL/hr Last Rate: 125 mL/hr (10/13/17 0820)     PRN Meds:  calcium carbonate    HYDROmorphone IV x 2    oxyCODONE x 4    Cond Podiatry  Cons Wound Care  Cons CM   PT pete/tx  Diet   Michael/CHO Controlled Consistent Carbohydrate Diet Level 2 (5 carb servings/75 grams CHO/meal) Other Restriction(s): Cardiac Step 1      Accuchecks ac/hs  Up and OOB as kat  SCD via foot pumps  I/O q shift  Daily wt  Elevate extremity

## 2017-10-13 NOTE — PHYSICIAN ADVISOR
Current patient class: Inpatient  The patient is currently on Hospital Day: 2      The patient was admitted to the hospital at [phone] on 10/12/17 for the following diagnosis:  Diabetic foot ulcer ([facility] ) [Y78 816, L97 509]  Cellulitis of right foot [L03 115]  Encounter for postoperative wound care [Z48 89]  Severe sepsis ([facility] [address] ) [A41 9, R65 20]       There is documentation in the medical record of an expected length of stay of at least 2 midnights  The patient is therefore expected to satisfy the 2 midnight benchmark and given the 2 midnight presumption is appropriate for INPATIENT ADMISSION  Given this expectation of a satisfying stay, CMS instructs us that the patient is most often appropriate for inpatient admission under part A provided medical necessity is documented in the chart  After review of the relevant documentation, labs, vital signs and test results, the patient is appropriate for INPATIENT ADMISSION  Admission to the hospital as an inpatient is a complex decision making process which requires the practitioner to consider the patients presenting complaint, history and physical examination and all relevant testing  With this in mind, in this case, the patient was deemed appropriate for INPATIENT ADMISSION  After review of the documentation and testing available at the time of the admission I concur with this clinical determination of medical necessity  Rationale is as follows: The patient is a 63 yrs old Male who presented to the ED at 10/12/2017 10:21 AM with a chief complaint of Wound Infection (Pt c/o infection of wound on right foot  Pt states wound on big toe  Hx of amputation on left foot, which also isn't healing  Pt states had wound care but doesn't anymore  Complaints of lots of pain  )     The patient presented with intractable right foot pain and open wound with drainage  He has known uncontrolled diabetes with complications and prior amputations due to osteomyelitis    He reported recent outpatient treatment with Keflex  He had drainage with foul odor, tachycardia, and leukocytosis  He was diagnosed with severe sepsis given lactic acidosis  He was aggressively hydrated and started on broad-spectrum intravenous antibiotics  The patient is expected to remain hospitalized for more than two midnights      The patients vitals on arrival were ED Triage Vitals [10/12/17 1026]   Temperature Pulse Respirations Blood Pressure SpO2   97 9 °F (36 6 °C) 101 21 129/62 94 %      Temp Source Heart Rate Source Patient Position - Orthostatic VS BP Location FiO2 (%)   Temporal Monitor Lying Right arm --      Pain Score       Worst Possible Pain           Past Medical History:   Diagnosis Date    Chronic pain syndrome     COPD (chronic obstructive pulmonary disease) ([facility])     Diabetes mellitus ([facility])      Past Surgical History:   Procedure Laterality Date    AMPUTATION      HEMICOLECTOMY             Consults have been placed to:   IP CONSULT TO PODIATRY  IP CONSULT TO CASE MANAGEMENT    Vitals:    10/12/17 1543 10/12/17 2300 10/13/17 0537 10/13/17 0700   BP: 126/60 107/58  114/64   Pulse: 84 83  76   Resp: 19 19  19   Temp: 98 4 °F (36 9 °C) 98 3 °F (36 8 °C)  98 1 °F (36 7 °C)   TempSrc: Oral Oral  Oral   SpO2:  92%  92%   Weight: 136 kg (299 lb 13 2 oz)  (!) 137 kg (301 lb 13 oz)    Height: 6' (1 829 m)          Most recent labs:    Recent Labs      10/12/17   1126  10/13/17   0515   WBC  20 09*  18 70*   HGB  12 2  12 3   HCT  38 3  39 4   PLT  620*  563*   K  4 4  4 2   NA  135*  135*   CALCIUM  9 5  9 2   BUN  18  14   CREATININE  1 18  1 02   INR  1 08   --    TROPONINI  <0 02   --    AST  13  15   ALT  19  17   ALKPHOS  105  85   BILITOT  0 20  0 30       Scheduled Meds:  atorvastatin 10 mg Oral Daily   buPROPion 150 mg Oral Daily   chlorthalidone 25 mg Oral Daily   docusate sodium 100 mg Oral BID   enoxaparin 40 mg Subcutaneous Daily   insulin glargine 70 Units Subcutaneous Daily With Breakfast insulin glargine 70 Units Subcutaneous HS   insulin lispro 4-20 Units Subcutaneous TID AC   lisinopril 40 mg Oral Daily   morphine 30 mg Oral Q8H   nicotine 1 patch Transdermal Daily   piperacillin-tazobactam 3 375 g Intravenous Q6H     Continuous Infusions:  sodium chloride 125 mL/hr Last Rate: 125 mL/hr (10/13/17 0820)     PRN Meds: calcium carbonate    HYDROmorphone    oxyCODONE    Surgical procedures (if appropriate):

## 2017-10-14 LAB
ANION GAP SERPL CALCULATED.3IONS-SCNC: 8 MMOL/L (ref 4–13)
BUN SERPL-MCNC: 13 MG/DL (ref 5–25)
CALCIUM SERPL-MCNC: 9.1 MG/DL (ref 8.3–10.1)
CHLORIDE SERPL-SCNC: 102 MMOL/L (ref 100–108)
CO2 SERPL-SCNC: 27 MMOL/L (ref 21–32)
CREAT SERPL-MCNC: 1 MG/DL (ref 0.6–1.3)
ERYTHROCYTE [DISTWIDTH] IN BLOOD BY AUTOMATED COUNT: 14.8 % (ref 11.6–15.1)
EST. AVERAGE GLUCOSE BLD GHB EST-MCNC: 206 MG/DL
GFR SERPL CREATININE-BSD FRML MDRD: 81 ML/MIN/1.73SQ M
GLUCOSE SERPL-MCNC: 170 MG/DL (ref 65–140)
GLUCOSE SERPL-MCNC: 191 MG/DL (ref 65–140)
GLUCOSE SERPL-MCNC: 260 MG/DL (ref 65–140)
GLUCOSE SERPL-MCNC: 282 MG/DL (ref 65–140)
GLUCOSE SERPL-MCNC: 318 MG/DL (ref 65–140)
HBA1C MFR BLD: 8.8 % (ref 4.2–6.3)
HCT VFR BLD AUTO: 35.7 % (ref 36.5–49.3)
HGB BLD-MCNC: 11.4 G/DL (ref 12–17)
MCH RBC QN AUTO: 26 PG (ref 26.8–34.3)
MCHC RBC AUTO-ENTMCNC: 31.9 G/DL (ref 31.4–37.4)
MCV RBC AUTO: 82 FL (ref 82–98)
PLATELET # BLD AUTO: 609 THOUSANDS/UL (ref 149–390)
PMV BLD AUTO: 8.8 FL (ref 8.9–12.7)
POTASSIUM SERPL-SCNC: 4 MMOL/L (ref 3.5–5.3)
RBC # BLD AUTO: 4.38 MILLION/UL (ref 3.88–5.62)
SODIUM SERPL-SCNC: 137 MMOL/L (ref 136–145)
WBC # BLD AUTO: 22.31 THOUSAND/UL (ref 4.31–10.16)

## 2017-10-14 PROCEDURE — 80048 BASIC METABOLIC PNL TOTAL CA: CPT | Performed by: FAMILY MEDICINE

## 2017-10-14 PROCEDURE — 85027 COMPLETE CBC AUTOMATED: CPT | Performed by: FAMILY MEDICINE

## 2017-10-14 PROCEDURE — 94760 N-INVAS EAR/PLS OXIMETRY 1: CPT

## 2017-10-14 PROCEDURE — 82948 REAGENT STRIP/BLOOD GLUCOSE: CPT

## 2017-10-14 PROCEDURE — 87040 BLOOD CULTURE FOR BACTERIA: CPT | Performed by: FAMILY MEDICINE

## 2017-10-14 PROCEDURE — 83036 HEMOGLOBIN GLYCOSYLATED A1C: CPT | Performed by: FAMILY MEDICINE

## 2017-10-14 PROCEDURE — 94660 CPAP INITIATION&MGMT: CPT

## 2017-10-14 RX ADMIN — VANCOMYCIN HYDROCHLORIDE 2000 MG: 1 INJECTION, POWDER, LYOPHILIZED, FOR SOLUTION INTRAVENOUS at 21:38

## 2017-10-14 RX ADMIN — PIPERACILLIN SODIUM AND TAZOBACTAM SODIUM 3.38 G: 36; 4.5 INJECTION, POWDER, FOR SOLUTION INTRAVENOUS at 01:17

## 2017-10-14 RX ADMIN — HYDROMORPHONE HYDROCHLORIDE 1 MG: 1 INJECTION, SOLUTION INTRAMUSCULAR; INTRAVENOUS; SUBCUTANEOUS at 19:55

## 2017-10-14 RX ADMIN — MORPHINE SULFATE 30 MG: 30 TABLET, EXTENDED RELEASE ORAL at 06:14

## 2017-10-14 RX ADMIN — CHLORTHALIDONE 25 MG: 25 TABLET ORAL at 10:17

## 2017-10-14 RX ADMIN — PIPERACILLIN SODIUM,TAZOBACTAM SODIUM 4.5 G: 4; .5 INJECTION, POWDER, FOR SOLUTION INTRAVENOUS at 18:11

## 2017-10-14 RX ADMIN — OXYCODONE HYDROCHLORIDE 15 MG: 5 TABLET ORAL at 01:17

## 2017-10-14 RX ADMIN — INSULIN LISPRO 4 UNITS: 100 INJECTION, SOLUTION INTRAVENOUS; SUBCUTANEOUS at 10:15

## 2017-10-14 RX ADMIN — INSULIN LISPRO 12 UNITS: 100 INJECTION, SOLUTION INTRAVENOUS; SUBCUTANEOUS at 18:11

## 2017-10-14 RX ADMIN — OXYCODONE HYDROCHLORIDE 15 MG: 5 TABLET ORAL at 12:04

## 2017-10-14 RX ADMIN — OXYCODONE HYDROCHLORIDE 15 MG: 5 TABLET ORAL at 18:11

## 2017-10-14 RX ADMIN — INSULIN GLARGINE 72 UNITS: 100 INJECTION, SOLUTION SUBCUTANEOUS at 21:27

## 2017-10-14 RX ADMIN — HYDROMORPHONE HYDROCHLORIDE 0.5 MG: 1 INJECTION, SOLUTION INTRAMUSCULAR; INTRAVENOUS; SUBCUTANEOUS at 10:16

## 2017-10-14 RX ADMIN — DOCUSATE SODIUM 100 MG: 100 CAPSULE, LIQUID FILLED ORAL at 18:11

## 2017-10-14 RX ADMIN — ENOXAPARIN SODIUM 40 MG: 40 INJECTION SUBCUTANEOUS at 10:18

## 2017-10-14 RX ADMIN — HYDROMORPHONE HYDROCHLORIDE 1 MG: 1 INJECTION, SOLUTION INTRAMUSCULAR; INTRAVENOUS; SUBCUTANEOUS at 15:30

## 2017-10-14 RX ADMIN — NICOTINE 1 PATCH: 21 PATCH, EXTENDED RELEASE TRANSDERMAL at 10:23

## 2017-10-14 RX ADMIN — ATORVASTATIN CALCIUM 10 MG: 10 TABLET, FILM COATED ORAL at 18:11

## 2017-10-14 RX ADMIN — BUPROPION HYDROCHLORIDE 150 MG: 150 TABLET, EXTENDED RELEASE ORAL at 10:16

## 2017-10-14 RX ADMIN — PIPERACILLIN SODIUM AND TAZOBACTAM SODIUM 3.38 G: 36; 4.5 INJECTION, POWDER, FOR SOLUTION INTRAVENOUS at 06:14

## 2017-10-14 RX ADMIN — MORPHINE SULFATE 30 MG: 30 TABLET, EXTENDED RELEASE ORAL at 21:37

## 2017-10-14 RX ADMIN — VANCOMYCIN HYDROCHLORIDE 2000 MG: 1 INJECTION, POWDER, LYOPHILIZED, FOR SOLUTION INTRAVENOUS at 12:05

## 2017-10-14 RX ADMIN — MORPHINE SULFATE 30 MG: 30 TABLET, EXTENDED RELEASE ORAL at 14:20

## 2017-10-14 RX ADMIN — HYDROMORPHONE HYDROCHLORIDE 1 MG: 1 INJECTION, SOLUTION INTRAMUSCULAR; INTRAVENOUS; SUBCUTANEOUS at 23:08

## 2017-10-14 RX ADMIN — INSULIN GLARGINE 72 UNITS: 100 INJECTION, SOLUTION SUBCUTANEOUS at 10:14

## 2017-10-14 RX ADMIN — INSULIN LISPRO 8 UNITS: 100 INJECTION, SOLUTION INTRAVENOUS; SUBCUTANEOUS at 12:05

## 2017-10-14 NOTE — PROGRESS NOTES
Progress Note - Tico Billy 63 y o  male MRN: [id number]    Unit/Bed#: -01 Encounter: [id number]        * Skin ulcer of right foot with fat layer exposed ([facility] )   Overview    Suspected depth, wound cultures are pending, preliminary mixed monserrat  Debrided yesterday by bedside  Leukocytosis worsened today, continue vancomycin and Zosyn, patient is not febrile, adjust antibiotics per sensitivities  Patient is hesitant to consider additional imaging of foot  Pending arterial duplex of both lower extremities  Appreciate Podiatry input      Severe sepsis ([facility] )   Overview    Present on admission, In setting of nonhealing diabetic foot ulcer, associated with tachycardia, leukocytosis, source of infection, lactic acid elevation  Improved with IV antibiotics, IV hydration  Currently does not meet criteria, leukocytosis persists     Type 2 diabetes mellitus with foot ulcer, with long-term current use of insulin ([facility])   Overview    Chronically poorly controlled, with nonadherence to outpatient follow-up in regards to his diabetes and foot ulcer  Recheck A1c, slight improvement and glucose trend after minor change of Lantus yesterday  Appreciate nutrition consult      Chronic prescription opiate use   Overview    Continue same dose of medications, Dilaudid for breakthrough  Pain is well controlled     Morbid obesity ([facility] )   Overview    Complicated by a 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 as well as current nonhealing ulcer, difficulty with ambulation, financial issues, depression and caregiver burnout     Tobacco abuse   Overview    Doing well with nicotine patch     Essential hypertension   Overview    Will hold lisinopril due to soft blood pressures, continue chlorthalidone  Patient claims he does not have hypertension, he refused low-sodium diet           VTE Pharmacologic Prophylaxis:   Pharmacologic: Enoxaparin (Lovenox)  Mechanical: Mechanical VTE prophylaxis in place      Patient Centered Rounds: I have performed bedside rounds with nursing staff today  Discussions with Specialists or Other Care Team Provider:  Case management  Education and Discussions with Family / Patient:  Patient  Time Spent for Care: 20 minutes  More than 50% of total time spent on counseling and coordination of care as described above  Current Length of Stay: 2 day(s)  Current Patient Status: Inpatient   Certification Statement: The patient will continue to require additional inpatient hospital stay due to Management of chronic nonhealing diabetic foot ulcer    Discharge Plan:  Home with home health, patient is unable to agree for placement given that he is the caregiver for his wife  Code Status: Level 1 - Full Code    Subjective:   Overnight no events, podiatry saw patient yesterday, foot ulcer was debrided at bedside  Pain is well controlled, no fevers    Objective:   Vitals:   Temp (24hrs), Av 3 °F (36 8 °C), Min:97 9 °F (36 6 °C), Max:98 9 °F (37 2 °C)    HR:  [78-87] 78  Resp:  [18-19] 19  BP: ()/(54-69) 91/54  SpO2:  [94 %-96 %] 94 %  Body mass index is 41 02 kg/m²  Input and Output Summary (last 24 hours): Intake/Output Summary (Last 24 hours) at 10/14/17 0902  Last data filed at 10/14/17 7493   Gross per 24 hour   Intake           1422 5 ml   Output             2100 ml   Net           -677 5 ml       Physical Exam:     Physical Exam   Constitutional: He is oriented to person, place, and time  He appears well-developed  No distress  Morbidly obese   HENT:   Head: Normocephalic and atraumatic  Eyes: EOM are normal  Pupils are equal, round, and reactive to light  Neck: Normal range of motion  Neck supple  Cardiovascular: Normal rate and regular rhythm  Pulmonary/Chest: Effort normal  No respiratory distress  He has no wheezes  Musculoskeletal: He exhibits edema  Neurological: He is alert and oriented to person, place, and time  No cranial nerve deficit  Skin: Skin is warm and dry   He is not diaphoretic  No cyanosis  No pallor  Nails show no clubbing  Psychiatric: His behavior is normal  Judgment and thought content normal  His mood appears anxious  Cognition and memory are normal  He expresses no suicidal ideation  Nursing note and vitals reviewed  Additional Data:   Labs:    Results from last 7 days  Lab Units 10/14/17  0437 10/13/17  0515   WBC Thousand/uL 22 31* 18 70*   HEMOGLOBIN g/dL 11 4* 12 3   HEMATOCRIT % 35 7* 39 4   PLATELETS Thousands/uL 609* 563*   NEUTROS PCT %  --  69   LYMPHS PCT %  --  20   MONOS PCT %  --  7   EOS PCT %  --  2       Results from last 7 days  Lab Units 10/14/17  0437 10/13/17  0515   SODIUM mmol/L 137 135*   POTASSIUM mmol/L 4 0 4 2   CHLORIDE mmol/L 102 101   CO2 mmol/L 27 27   BUN mg/dL 13 14   CREATININE mg/dL 1 00 1 02   CALCIUM mg/dL 9 1 9 2   TOTAL PROTEIN g/dL  --  7 7   BILIRUBIN TOTAL mg/dL  --  0 30   ALK PHOS U/L  --  85   ALT U/L  --  17   AST U/L  --  15   GLUCOSE RANDOM mg/dL 191* 187*       Results from last 7 days  Lab Units 10/12/17  1126   INR  1 08       * I Have Reviewed All Lab Data Listed Above  * Additional Pertinent Lab Tests Reviewed: All Labs Within Last 24 Hours Reviewed    Imaging:    Imaging Reports Reviewed Today Include:  None new, arterial duplex is pending    Cultures:   Blood Culture:   Lab Results   Component Value Date    BLOODCX No Growth at 24 hrs  10/12/2017    BLOODCX No Growth at 24 hrs   10/12/2017     Urine Culture: No results found for: URINECX  Sputum Culture: No components found for: SPUTUMCX  Wound Culture:   Lab Results   Component Value Date    WOUNDCULT Culture too young- will reincubate 10/12/2017       Last 24 Hours Medication List:     atorvastatin 10 mg Oral Daily   buPROPion 150 mg Oral Daily   chlorthalidone 25 mg Oral Daily   docusate sodium 100 mg Oral BID   enoxaparin 40 mg Subcutaneous Daily   insulin glargine 72 Units Subcutaneous Daily With Breakfast   insulin glargine 72 Units Subcutaneous HS insulin lispro 4-20 Units Subcutaneous TID AC   morphine 30 mg Oral Q8H   nicotine 1 patch Transdermal Daily   piperacillin-tazobactam 3 375 g Intravenous Q6H   vancomycin 2,000 mg Intravenous Q24H        Today, Patient Was Seen By: Georgina Reyes MD    ** Please Note: Dragon 360 Dictation voice to text software may have been used in the creation of this document   **

## 2017-10-14 NOTE — SUBJECTIVE & OBJECTIVE
VTE Pharmacologic Prophylaxis:   Pharmacologic: Enoxaparin (Lovenox)  Mechanical: Mechanical VTE prophylaxis in place  Patient Centered Rounds: I have performed bedside rounds with nursing staff today  Discussions with Specialists or Other Care Team Provider:  Case management  Education and Discussions with Family / Patient:  Patient  Time Spent for Care: 20 minutes  More than 50% of total time spent on counseling and coordination of care as described above  Current Length of Stay: 2 day(s)  Current Patient Status: Inpatient   Certification Statement: The patient will continue to require additional inpatient hospital stay due to Management of chronic nonhealing diabetic foot ulcer    Discharge Plan:  Home with home health, patient is unable to agree for placement given that he is the caregiver for his wife  Code Status: Level 1 - Full Code    Subjective:   Overnight no events, podiatry saw patient yesterday, foot ulcer was debrided at bedside  Pain is well controlled, no fevers    Objective:   Vitals:   Temp (24hrs), Av 3 °F (36 8 °C), Min:97 9 °F (36 6 °C), Max:98 9 °F (37 2 °C)    HR:  [78-87] 78  Resp:  [18-19] 19  BP: ()/(54-69) 91/54  SpO2:  [94 %-96 %] 94 %  Body mass index is 41 02 kg/m²  Input and Output Summary (last 24 hours): Intake/Output Summary (Last 24 hours) at 10/14/17 0902  Last data filed at 10/14/17 1287   Gross per 24 hour   Intake           1422 5 ml   Output             2100 ml   Net           -677 5 ml       Physical Exam:     Physical Exam   Constitutional: He is oriented to person, place, and time  He appears well-developed  No distress  Morbidly obese   HENT:   Head: Normocephalic and atraumatic  Eyes: EOM are normal  Pupils are equal, round, and reactive to light  Neck: Normal range of motion  Neck supple  Cardiovascular: Normal rate and regular rhythm  Pulmonary/Chest: Effort normal  No respiratory distress  He has no wheezes     Musculoskeletal: He exhibits edema  Neurological: He is alert and oriented to person, place, and time  No cranial nerve deficit  Skin: Skin is warm and dry  He is not diaphoretic  No cyanosis  No pallor  Nails show no clubbing  Psychiatric: His behavior is normal  Judgment and thought content normal  His mood appears anxious  Cognition and memory are normal  He expresses no suicidal ideation  Nursing note and vitals reviewed  Additional Data:   Labs:    Results from last 7 days  Lab Units 10/14/17  0437 10/13/17  0515   WBC Thousand/uL 22 31* 18 70*   HEMOGLOBIN g/dL 11 4* 12 3   HEMATOCRIT % 35 7* 39 4   PLATELETS Thousands/uL 609* 563*   NEUTROS PCT %  --  69   LYMPHS PCT %  --  20   MONOS PCT %  --  7   EOS PCT %  --  2       Results from last 7 days  Lab Units 10/14/17  0437 10/13/17  0515   SODIUM mmol/L 137 135*   POTASSIUM mmol/L 4 0 4 2   CHLORIDE mmol/L 102 101   CO2 mmol/L 27 27   BUN mg/dL 13 14   CREATININE mg/dL 1 00 1 02   CALCIUM mg/dL 9 1 9 2   TOTAL PROTEIN g/dL  --  7 7   BILIRUBIN TOTAL mg/dL  --  0 30   ALK PHOS U/L  --  85   ALT U/L  --  17   AST U/L  --  15   GLUCOSE RANDOM mg/dL 191* 187*       Results from last 7 days  Lab Units 10/12/17  1126   INR  1 08       * I Have Reviewed All Lab Data Listed Above  * Additional Pertinent Lab Tests Reviewed: All Labs Within Last 24 Hours Reviewed    Imaging:    Imaging Reports Reviewed Today Include:  None new, arterial duplex is pending    Cultures:   Blood Culture:   Lab Results   Component Value Date    BLOODCX No Growth at 24 hrs  10/12/2017    BLOODCX No Growth at 24 hrs   10/12/2017     Urine Culture: No results found for: URINECX  Sputum Culture: No components found for: SPUTUMCX  Wound Culture:   Lab Results   Component Value Date    WOUNDCULT Culture too young- will reincubate 10/12/2017       Last 24 Hours Medication List:     atorvastatin 10 mg Oral Daily   buPROPion 150 mg Oral Daily   chlorthalidone 25 mg Oral Daily   docusate sodium 100 mg Oral BID   enoxaparin 40 mg Subcutaneous Daily   insulin glargine 72 Units Subcutaneous Daily With Breakfast   insulin glargine 72 Units Subcutaneous HS   insulin lispro 4-20 Units Subcutaneous TID AC   morphine 30 mg Oral Q8H   nicotine 1 patch Transdermal Daily   piperacillin-tazobactam 3 375 g Intravenous Q6H   vancomycin 2,000 mg Intravenous Q24H        Today, Patient Was Seen By: Carly Buenrostro MD    ** Please Note: Dragon 360 Dictation voice to text software may have been used in the creation of this document   **

## 2017-10-14 NOTE — PLAN OF CARE
DISCHARGE PLANNING - CARE MANAGEMENT     Discharge to post-acute care or home with appropriate resources Progressing        INFECTION - ADULT     Absence or prevention of progression during hospitalization Progressing     Absence of fever/infection during neutropenic period Progressing        Knowledge Deficit     Patient/family/caregiver demonstrates understanding of disease process, treatment plan, medications, and discharge instructions Progressing        Nutrition/Hydration-ADULT     Nutrient/Hydration intake appropriate for improving, restoring or maintaining nutritional needs Progressing        Potential for Falls     Patient will remain free of falls Progressing        Prexisting or High Potential for Compromised Skin Integrity     Skin integrity is maintained or improved Progressing        SAFETY ADULT     Maintain or return to baseline ADL function Progressing     Maintain or return mobility status to optimal level Progressing

## 2017-10-14 NOTE — PROGRESS NOTES
Pt's diabetic foot ulcers dressed as per recommendations  Pt currently on geriatric chair with extremities elevated  Continues on IV ABX and Blood glucose management  Continues on pain management  Confirmed with Mitali Gutiérrez from Ultrasound for Vascular Study to be done in the am  Pt aware  Blood cultures to be repeated as per Dr Jayden Menjivar  Will continue to follow up

## 2017-10-15 ENCOUNTER — APPOINTMENT (INPATIENT)
Dept: ULTRASOUND IMAGING | Facility: HOSPITAL | Age: 62
DRG: 854 | End: 2017-10-15
Payer: MEDICARE

## 2017-10-15 PROBLEM — R78.81 POSITIVE BLOOD CULTURE: Status: ACTIVE | Noted: 2017-10-15

## 2017-10-15 PROBLEM — R65.20 SEVERE SEPSIS (HCC): Status: RESOLVED | Noted: 2017-10-12 | Resolved: 2017-10-15

## 2017-10-15 PROBLEM — A41.9 SEVERE SEPSIS (HCC): Status: RESOLVED | Noted: 2017-10-12 | Resolved: 2017-10-15

## 2017-10-15 LAB
ALBUMIN SERPL BCP-MCNC: 2 G/DL (ref 3.5–5)
ALP SERPL-CCNC: 94 U/L (ref 46–116)
ALT SERPL W P-5'-P-CCNC: 19 U/L (ref 12–78)
ANION GAP SERPL CALCULATED.3IONS-SCNC: 8 MMOL/L (ref 4–13)
AST SERPL W P-5'-P-CCNC: 14 U/L (ref 5–45)
BILIRUB SERPL-MCNC: 0.2 MG/DL (ref 0.2–1)
BUN SERPL-MCNC: 13 MG/DL (ref 5–25)
CALCIUM SERPL-MCNC: 9.2 MG/DL (ref 8.3–10.1)
CHLORIDE SERPL-SCNC: 102 MMOL/L (ref 100–108)
CO2 SERPL-SCNC: 26 MMOL/L (ref 21–32)
CREAT SERPL-MCNC: 0.91 MG/DL (ref 0.6–1.3)
ERYTHROCYTE [DISTWIDTH] IN BLOOD BY AUTOMATED COUNT: 14.7 % (ref 11.6–15.1)
GFR SERPL CREATININE-BSD FRML MDRD: 91 ML/MIN/1.73SQ M
GLUCOSE SERPL-MCNC: 234 MG/DL (ref 65–140)
GLUCOSE SERPL-MCNC: 240 MG/DL (ref 65–140)
GLUCOSE SERPL-MCNC: 269 MG/DL (ref 65–140)
GLUCOSE SERPL-MCNC: 288 MG/DL (ref 65–140)
GLUCOSE SERPL-MCNC: 312 MG/DL (ref 65–140)
HCT VFR BLD AUTO: 36.7 % (ref 36.5–49.3)
HGB BLD-MCNC: 11.7 G/DL (ref 12–17)
MCH RBC QN AUTO: 25.8 PG (ref 26.8–34.3)
MCHC RBC AUTO-ENTMCNC: 31.9 G/DL (ref 31.4–37.4)
MCV RBC AUTO: 81 FL (ref 82–98)
PLATELET # BLD AUTO: 591 THOUSANDS/UL (ref 149–390)
PMV BLD AUTO: 9 FL (ref 8.9–12.7)
POTASSIUM SERPL-SCNC: 4.2 MMOL/L (ref 3.5–5.3)
PROT SERPL-MCNC: 7.7 G/DL (ref 6.4–8.2)
RBC # BLD AUTO: 4.53 MILLION/UL (ref 3.88–5.62)
SODIUM SERPL-SCNC: 136 MMOL/L (ref 136–145)
WBC # BLD AUTO: 17.75 THOUSAND/UL (ref 4.31–10.16)

## 2017-10-15 PROCEDURE — 85027 COMPLETE CBC AUTOMATED: CPT | Performed by: FAMILY MEDICINE

## 2017-10-15 PROCEDURE — 93923 UPR/LXTR ART STDY 3+ LVLS: CPT

## 2017-10-15 PROCEDURE — 80053 COMPREHEN METABOLIC PANEL: CPT | Performed by: FAMILY MEDICINE

## 2017-10-15 PROCEDURE — 82948 REAGENT STRIP/BLOOD GLUCOSE: CPT

## 2017-10-15 RX ORDER — SACCHAROMYCES BOULARDII 250 MG
250 CAPSULE ORAL 2 TIMES DAILY
Status: DISCONTINUED | OUTPATIENT
Start: 2017-10-15 | End: 2017-10-20 | Stop reason: HOSPADM

## 2017-10-15 RX ORDER — INSULIN GLARGINE 100 [IU]/ML
74 INJECTION, SOLUTION SUBCUTANEOUS
Status: DISCONTINUED | OUTPATIENT
Start: 2017-10-15 | End: 2017-10-20 | Stop reason: HOSPADM

## 2017-10-15 RX ORDER — INSULIN GLARGINE 100 [IU]/ML
74 INJECTION, SOLUTION SUBCUTANEOUS
Status: DISCONTINUED | OUTPATIENT
Start: 2017-10-16 | End: 2017-10-20 | Stop reason: HOSPADM

## 2017-10-15 RX ADMIN — CHLORTHALIDONE 25 MG: 25 TABLET ORAL at 08:16

## 2017-10-15 RX ADMIN — OXYCODONE HYDROCHLORIDE 15 MG: 5 TABLET ORAL at 13:02

## 2017-10-15 RX ADMIN — INSULIN LISPRO 8 UNITS: 100 INJECTION, SOLUTION INTRAVENOUS; SUBCUTANEOUS at 11:47

## 2017-10-15 RX ADMIN — INSULIN LISPRO 12 UNITS: 100 INJECTION, SOLUTION INTRAVENOUS; SUBCUTANEOUS at 17:02

## 2017-10-15 RX ADMIN — DOCUSATE SODIUM 100 MG: 100 CAPSULE, LIQUID FILLED ORAL at 17:03

## 2017-10-15 RX ADMIN — HYDROMORPHONE HYDROCHLORIDE 1 MG: 1 INJECTION, SOLUTION INTRAMUSCULAR; INTRAVENOUS; SUBCUTANEOUS at 16:21

## 2017-10-15 RX ADMIN — ATORVASTATIN CALCIUM 10 MG: 10 TABLET, FILM COATED ORAL at 17:02

## 2017-10-15 RX ADMIN — HYDROMORPHONE HYDROCHLORIDE 1 MG: 1 INJECTION, SOLUTION INTRAMUSCULAR; INTRAVENOUS; SUBCUTANEOUS at 19:55

## 2017-10-15 RX ADMIN — Medication 250 MG: at 11:38

## 2017-10-15 RX ADMIN — BUPROPION HYDROCHLORIDE 150 MG: 150 TABLET, EXTENDED RELEASE ORAL at 08:09

## 2017-10-15 RX ADMIN — MORPHINE SULFATE 30 MG: 30 TABLET, EXTENDED RELEASE ORAL at 14:40

## 2017-10-15 RX ADMIN — NICOTINE 1 PATCH: 21 PATCH, EXTENDED RELEASE TRANSDERMAL at 08:10

## 2017-10-15 RX ADMIN — Medication 250 MG: at 17:02

## 2017-10-15 RX ADMIN — MORPHINE SULFATE 30 MG: 30 TABLET, EXTENDED RELEASE ORAL at 21:39

## 2017-10-15 RX ADMIN — PIPERACILLIN SODIUM,TAZOBACTAM SODIUM 4.5 G: 4; .5 INJECTION, POWDER, FOR SOLUTION INTRAVENOUS at 06:03

## 2017-10-15 RX ADMIN — HYDROMORPHONE HYDROCHLORIDE 1 MG: 1 INJECTION, SOLUTION INTRAMUSCULAR; INTRAVENOUS; SUBCUTANEOUS at 08:23

## 2017-10-15 RX ADMIN — VANCOMYCIN HYDROCHLORIDE 2000 MG: 1 INJECTION, POWDER, LYOPHILIZED, FOR SOLUTION INTRAVENOUS at 23:03

## 2017-10-15 RX ADMIN — MORPHINE SULFATE 30 MG: 30 TABLET, EXTENDED RELEASE ORAL at 06:00

## 2017-10-15 RX ADMIN — PIPERACILLIN SODIUM,TAZOBACTAM SODIUM 4.5 G: 4; .5 INJECTION, POWDER, FOR SOLUTION INTRAVENOUS at 15:34

## 2017-10-15 RX ADMIN — HYDROMORPHONE HYDROCHLORIDE 1 MG: 1 INJECTION, SOLUTION INTRAMUSCULAR; INTRAVENOUS; SUBCUTANEOUS at 03:40

## 2017-10-15 RX ADMIN — DOCUSATE SODIUM 100 MG: 100 CAPSULE, LIQUID FILLED ORAL at 08:12

## 2017-10-15 RX ADMIN — INSULIN LISPRO 8 UNITS: 100 INJECTION, SOLUTION INTRAVENOUS; SUBCUTANEOUS at 08:15

## 2017-10-15 RX ADMIN — VANCOMYCIN HYDROCHLORIDE 2000 MG: 1 INJECTION, POWDER, LYOPHILIZED, FOR SOLUTION INTRAVENOUS at 11:39

## 2017-10-15 RX ADMIN — PIPERACILLIN SODIUM,TAZOBACTAM SODIUM 4.5 G: 4; .5 INJECTION, POWDER, FOR SOLUTION INTRAVENOUS at 00:17

## 2017-10-15 RX ADMIN — INSULIN GLARGINE 74 UNITS: 100 INJECTION, SOLUTION SUBCUTANEOUS at 21:39

## 2017-10-15 RX ADMIN — ENOXAPARIN SODIUM 40 MG: 40 INJECTION SUBCUTANEOUS at 08:11

## 2017-10-15 RX ADMIN — OXYCODONE HYDROCHLORIDE 15 MG: 5 TABLET ORAL at 01:29

## 2017-10-15 RX ADMIN — OXYCODONE HYDROCHLORIDE 15 MG: 5 TABLET ORAL at 22:30

## 2017-10-15 RX ADMIN — INSULIN GLARGINE 72 UNITS: 100 INJECTION, SOLUTION SUBCUTANEOUS at 08:12

## 2017-10-15 RX ADMIN — PIPERACILLIN SODIUM,TAZOBACTAM SODIUM 4.5 G: 4; .5 INJECTION, POWDER, FOR SOLUTION INTRAVENOUS at 21:39

## 2017-10-15 NOTE — PROGRESS NOTES
Progress Note - Tico Billy 63 y o  male MRN: [id number]    Unit/Bed#: -01 Encounter: [id number]        * Skin ulcer of right foot with fat layer exposed ([facility] 75 )   Overview    Suspected depth, wound cultures are pending, preliminary mixed monserrat  Debridement done at bedside 2 days ago, appreciate Podiatry consult  White count is trending down, continue Zosyn and vancomycin at increased dose  Patient is hesitant to consider additional imaging of foot  Pending arterial duplex of both lower extremities  Patient wants to go home, he has poor insight into significance of his infection, at minimum he will require home health once cleared for discharge     Type 2 diabetes mellitus with foot ulcer, with long-term current use of insulin ([facility])   Overview    Chronically poorly controlled, latest A1c 8 8   nonadherence to outpatient follow-up in regards to his diabetes and foot ulcer  Increase Lantus again 74 units b i d , suspect component of resistance and poor absorption due to body habitus  Suspect her infection is causing hyperglycemia as well     Chronic prescription opiate use   Overview    Continue same dosage of home medications, Dilaudid for breakthrough, patient required increased dose, component of anxiety worsening pain       Morbid obesity ([facility] )   Overview    With 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 as well as current nonhealing ulcer of right great toe, difficulty with ambulation, financial issues, depression and caregiver burnout     Tobacco abuse   Overview    Nicotine patch     Positive blood culture   Overview    Upon admission 1 out of to positive for gram-positive rods, I suspect contamination, 2nd set drawn yesterday pending  White count is trending down, no fevers  Continue Zosyn and vancomycin     Essential hypertension   Overview    Lisinopril on hold due to low blood pressures, continue chlorthalidone  Patient reports he does not have hypertension and refuses low-salt diet           VTE Pharmacologic Prophylaxis:   Pharmacologic: Enoxaparin (Lovenox)  Mechanical: Mechanical VTE prophylaxis in place  Patient Centered Rounds: I have performed bedside rounds with nursing staff today  Discussions with Specialists or Other Care Team Provider: n/a  Education and Discussions with Family / Patient:  Patient  Time Spent for Care: 20 minutes  More than 50% of total time spent on counseling and coordination of care as described above  Current Length of Stay: 3 day(s)  Current Patient Status: Inpatient   Certification Statement: The patient will continue to require additional inpatient hospital stay due to Pending repeat blood cultures, management of diabetic foot ulcer, need for IV antibiotics    Discharge Plan:  Home with physical therapy once cleared by Podiatry  Code Status: Level 1 - Full Code    Subjective:   No events overnight, Dilaudid was increased in strength yesterday, no fevers, no dyspnea  Patient complains of dental pain    Objective:   Vitals:   Temp (24hrs), Av 1 °F (36 7 °C), Min:98 °F (36 7 °C), Max:98 2 °F (36 8 °C)    HR:  [74-82] 75  Resp:  [16-18] 18  BP: (104-126)/(58-59) 126/58  SpO2:  [92 %-98 %] 92 %  Body mass index is 41 02 kg/m²  Input and Output Summary (last 24 hours): Intake/Output Summary (Last 24 hours) at 10/15/17 0919  Last data filed at 10/14/17 2139   Gross per 24 hour   Intake              600 ml   Output                0 ml   Net              600 ml       Physical Exam:     Physical Exam   Constitutional: He is oriented to person, place, and time  He appears well-developed  No distress  Morbidly obese, unkempt   HENT:   Head: Normocephalic and atraumatic  Eyes: EOM are normal  Pupils are equal, round, and reactive to light  Neck: Normal range of motion  Neck supple  Cardiovascular: Normal rate and regular rhythm      Pulmonary/Chest: Effort normal and breath sounds normal    Musculoskeletal:   Bilateral lower extremities with mild edema, chronic venous status changes   Neurological: He is alert and oriented to person, place, and time  No cranial nerve deficit  Skin: He is not diaphoretic  No cyanosis  Nails show no clubbing  Psychiatric: His behavior is normal  Judgment and thought content normal  His mood appears anxious  He exhibits a depressed mood  He expresses no suicidal ideation  Nursing note and vitals reviewed  Additional Data:   Labs:    Results from last 7 days  Lab Units 10/15/17  0429  10/13/17  0515   WBC Thousand/uL 17 75*  < > 18 70*   HEMOGLOBIN g/dL 11 7*  < > 12 3   HEMATOCRIT % 36 7  < > 39 4   PLATELETS Thousands/uL 591*  < > 563*   NEUTROS PCT %  --   --  69   LYMPHS PCT %  --   --  20   MONOS PCT %  --   --  7   EOS PCT %  --   --  2   < > = values in this interval not displayed  Results from last 7 days  Lab Units 10/15/17  0429   SODIUM mmol/L 136   POTASSIUM mmol/L 4 2   CHLORIDE mmol/L 102   CO2 mmol/L 26   BUN mg/dL 13   CREATININE mg/dL 0 91   CALCIUM mg/dL 9 2   TOTAL PROTEIN g/dL 7 7   BILIRUBIN TOTAL mg/dL 0 20   ALK PHOS U/L 94   ALT U/L 19   AST U/L 14   GLUCOSE RANDOM mg/dL 269*       Results from last 7 days  Lab Units 10/12/17  1126   INR  1 08       * I Have Reviewed All Lab Data Listed Above  * Additional Pertinent Lab Tests Reviewed: All Labs Within Last 24 Hours Reviewed    Imaging:    Imaging Reports Reviewed Today Include:  Nothing new    Cultures:   Blood Culture:   Lab Results   Component Value Date    BLOODCX No Growth at 48 hrs  10/12/2017     Urine Culture: No results found for: URINECX  Sputum Culture: No components found for: SPUTUMCX  Wound Culture:   Lab Results   Component Value Date    WOUNDCULT Culture results to follow   10/13/2017    WOUNDCULT 4+ Growth of Diphtheroids 10/12/2017    WOUNDCULT 2+ Growth of  10/12/2017       Last 24 Hours Medication List:     atorvastatin 10 mg Oral Daily   buPROPion 150 mg Oral Daily   chlorthalidone 25 mg Oral Daily   docusate sodium 100 mg Oral BID   enoxaparin 40 mg Subcutaneous Daily   [START ON 10/16/2017] insulin glargine 74 Units Subcutaneous Daily With Breakfast   insulin glargine 74 Units Subcutaneous HS   insulin lispro 4-20 Units Subcutaneous TID AC   morphine 30 mg Oral Q8H   nicotine 1 patch Transdermal Daily   piperacillin-tazobactam 4 5 g Intravenous Q6H   vancomycin 20 mg/kg (Adjusted) Intravenous Q12H        Today, Patient Was Seen By: Slim Kearns MD    ** Please Note: Dragon 360 Dictation voice to text software may have been used in the creation of this document   **

## 2017-10-15 NOTE — SUBJECTIVE & OBJECTIVE
VTE Pharmacologic Prophylaxis:   Pharmacologic: Enoxaparin (Lovenox)  Mechanical: Mechanical VTE prophylaxis in place  Patient Centered Rounds: I have performed bedside rounds with nursing staff today  Discussions with Specialists or Other Care Team Provider: n/a  Education and Discussions with Family / Patient:  Patient  Time Spent for Care: 20 minutes  More than 50% of total time spent on counseling and coordination of care as described above  Current Length of Stay: 3 day(s)  Current Patient Status: Inpatient   Certification Statement: The patient will continue to require additional inpatient hospital stay due to Pending repeat blood cultures, management of diabetic foot ulcer, need for IV antibiotics    Discharge Plan:  Home with physical therapy once cleared by Podiatry  Code Status: Level 1 - Full Code    Subjective:   No events overnight, Dilaudid was increased in strength yesterday, no fevers, no dyspnea  Patient complains of dental pain    Objective:   Vitals:   Temp (24hrs), Av 1 °F (36 7 °C), Min:98 °F (36 7 °C), Max:98 2 °F (36 8 °C)    HR:  [74-82] 75  Resp:  [16-18] 18  BP: (104-126)/(58-59) 126/58  SpO2:  [92 %-98 %] 92 %  Body mass index is 41 02 kg/m²  Input and Output Summary (last 24 hours): Intake/Output Summary (Last 24 hours) at 10/15/17 0919  Last data filed at 10/14/17 2139   Gross per 24 hour   Intake              600 ml   Output                0 ml   Net              600 ml       Physical Exam:     Physical Exam   Constitutional: He is oriented to person, place, and time  He appears well-developed  No distress  Morbidly obese, unkempt   HENT:   Head: Normocephalic and atraumatic  Eyes: EOM are normal  Pupils are equal, round, and reactive to light  Neck: Normal range of motion  Neck supple  Cardiovascular: Normal rate and regular rhythm      Pulmonary/Chest: Effort normal and breath sounds normal    Musculoskeletal:   Bilateral lower extremities with mild edema, chronic venous status changes   Neurological: He is alert and oriented to person, place, and time  No cranial nerve deficit  Skin: He is not diaphoretic  No cyanosis  Nails show no clubbing  Psychiatric: His behavior is normal  Judgment and thought content normal  His mood appears anxious  He exhibits a depressed mood  He expresses no suicidal ideation  Nursing note and vitals reviewed  Additional Data:   Labs:    Results from last 7 days  Lab Units 10/15/17  0429  10/13/17  0515   WBC Thousand/uL 17 75*  < > 18 70*   HEMOGLOBIN g/dL 11 7*  < > 12 3   HEMATOCRIT % 36 7  < > 39 4   PLATELETS Thousands/uL 591*  < > 563*   NEUTROS PCT %  --   --  69   LYMPHS PCT %  --   --  20   MONOS PCT %  --   --  7   EOS PCT %  --   --  2   < > = values in this interval not displayed  Results from last 7 days  Lab Units 10/15/17  0429   SODIUM mmol/L 136   POTASSIUM mmol/L 4 2   CHLORIDE mmol/L 102   CO2 mmol/L 26   BUN mg/dL 13   CREATININE mg/dL 0 91   CALCIUM mg/dL 9 2   TOTAL PROTEIN g/dL 7 7   BILIRUBIN TOTAL mg/dL 0 20   ALK PHOS U/L 94   ALT U/L 19   AST U/L 14   GLUCOSE RANDOM mg/dL 269*       Results from last 7 days  Lab Units 10/12/17  1126   INR  1 08       * I Have Reviewed All Lab Data Listed Above  * Additional Pertinent Lab Tests Reviewed: All Labs Within Last 24 Hours Reviewed    Imaging:    Imaging Reports Reviewed Today Include:  Nothing new    Cultures:   Blood Culture:   Lab Results   Component Value Date    BLOODCX No Growth at 48 hrs  10/12/2017     Urine Culture: No results found for: URINECX  Sputum Culture: No components found for: SPUTUMCX  Wound Culture:   Lab Results   Component Value Date    WOUNDCULT Culture results to follow   10/13/2017    WOUNDCULT 4+ Growth of Diphtheroids 10/12/2017    WOUNDCULT 2+ Growth of  10/12/2017       Last 24 Hours Medication List:     atorvastatin 10 mg Oral Daily   buPROPion 150 mg Oral Daily   chlorthalidone 25 mg Oral Daily   docusate sodium 100 mg Oral BID   enoxaparin 40 mg Subcutaneous Daily   [START ON 10/16/2017] insulin glargine 74 Units Subcutaneous Daily With Breakfast   insulin glargine 74 Units Subcutaneous HS   insulin lispro 4-20 Units Subcutaneous TID AC   morphine 30 mg Oral Q8H   nicotine 1 patch Transdermal Daily   piperacillin-tazobactam 4 5 g Intravenous Q6H   vancomycin 20 mg/kg (Adjusted) Intravenous Q12H        Today, Patient Was Seen By: Joe Holland MD    ** Please Note: Dragon 360 Dictation voice to text software may have been used in the creation of this document   **

## 2017-10-16 ENCOUNTER — ANESTHESIA (INPATIENT)
Dept: PERIOP | Facility: HOSPITAL | Age: 62
DRG: 854 | End: 2017-10-16
Payer: MEDICARE

## 2017-10-16 ENCOUNTER — APPOINTMENT (INPATIENT)
Dept: RADIOLOGY | Facility: HOSPITAL | Age: 62
DRG: 854 | End: 2017-10-16
Payer: MEDICARE

## 2017-10-16 ENCOUNTER — ANESTHESIA EVENT (INPATIENT)
Dept: PERIOP | Facility: HOSPITAL | Age: 62
DRG: 854 | End: 2017-10-16
Payer: MEDICARE

## 2017-10-16 LAB
ANION GAP SERPL CALCULATED.3IONS-SCNC: 8 MMOL/L (ref 4–13)
BACTERIA WND AEROBE CULT: ABNORMAL
BUN SERPL-MCNC: 10 MG/DL (ref 5–25)
CALCIUM SERPL-MCNC: 9 MG/DL (ref 8.3–10.1)
CHLORIDE SERPL-SCNC: 102 MMOL/L (ref 100–108)
CO2 SERPL-SCNC: 25 MMOL/L (ref 21–32)
CREAT SERPL-MCNC: 0.81 MG/DL (ref 0.6–1.3)
ERYTHROCYTE [DISTWIDTH] IN BLOOD BY AUTOMATED COUNT: 14.6 % (ref 11.6–15.1)
GFR SERPL CREATININE-BSD FRML MDRD: 97 ML/MIN/1.73SQ M
GLUCOSE SERPL-MCNC: 101 MG/DL (ref 65–140)
GLUCOSE SERPL-MCNC: 133 MG/DL (ref 65–140)
GLUCOSE SERPL-MCNC: 155 MG/DL (ref 65–140)
GLUCOSE SERPL-MCNC: 204 MG/DL (ref 65–140)
GLUCOSE SERPL-MCNC: 230 MG/DL (ref 65–140)
GLUCOSE SERPL-MCNC: 251 MG/DL (ref 65–140)
GRAM STN SPEC: ABNORMAL
HCT VFR BLD AUTO: 37 % (ref 36.5–49.3)
HGB BLD-MCNC: 12 G/DL (ref 12–17)
MCH RBC QN AUTO: 26 PG (ref 26.8–34.3)
MCHC RBC AUTO-ENTMCNC: 32.4 G/DL (ref 31.4–37.4)
MCV RBC AUTO: 80 FL (ref 82–98)
PLATELET # BLD AUTO: 630 THOUSANDS/UL (ref 149–390)
PMV BLD AUTO: 8.8 FL (ref 8.9–12.7)
POTASSIUM SERPL-SCNC: 3.8 MMOL/L (ref 3.5–5.3)
RBC # BLD AUTO: 4.62 MILLION/UL (ref 3.88–5.62)
SODIUM SERPL-SCNC: 135 MMOL/L (ref 136–145)
VANCOMYCIN TROUGH SERPL-MCNC: 18.6 UG/ML (ref 10–20)
WBC # BLD AUTO: 17.57 THOUSAND/UL (ref 4.31–10.16)

## 2017-10-16 PROCEDURE — 82948 REAGENT STRIP/BLOOD GLUCOSE: CPT

## 2017-10-16 PROCEDURE — 88311 DECALCIFY TISSUE: CPT | Performed by: PODIATRIST

## 2017-10-16 PROCEDURE — 87070 CULTURE OTHR SPECIMN AEROBIC: CPT | Performed by: PODIATRIST

## 2017-10-16 PROCEDURE — 73630 X-RAY EXAM OF FOOT: CPT

## 2017-10-16 PROCEDURE — 87205 SMEAR GRAM STAIN: CPT | Performed by: PODIATRIST

## 2017-10-16 PROCEDURE — 88305 TISSUE EXAM BY PATHOLOGIST: CPT | Performed by: PODIATRIST

## 2017-10-16 PROCEDURE — 85027 COMPLETE CBC AUTOMATED: CPT | Performed by: FAMILY MEDICINE

## 2017-10-16 PROCEDURE — 87075 CULTR BACTERIA EXCEPT BLOOD: CPT | Performed by: PODIATRIST

## 2017-10-16 PROCEDURE — 0Y6P0Z0 DETACHMENT AT RIGHT 1ST TOE, COMPLETE, OPEN APPROACH: ICD-10-PCS | Performed by: FAMILY MEDICINE

## 2017-10-16 PROCEDURE — 87176 TISSUE HOMOGENIZATION CULTR: CPT | Performed by: PODIATRIST

## 2017-10-16 PROCEDURE — 80048 BASIC METABOLIC PNL TOTAL CA: CPT | Performed by: FAMILY MEDICINE

## 2017-10-16 PROCEDURE — 80202 ASSAY OF VANCOMYCIN: CPT | Performed by: FAMILY MEDICINE

## 2017-10-16 RX ORDER — FENTANYL CITRATE/PF 50 MCG/ML
25 SYRINGE (ML) INJECTION
Status: COMPLETED | OUTPATIENT
Start: 2017-10-16 | End: 2017-10-16

## 2017-10-16 RX ORDER — LIDOCAINE HYDROCHLORIDE 10 MG/ML
INJECTION, SOLUTION INFILTRATION; PERINEURAL AS NEEDED
Status: DISCONTINUED | OUTPATIENT
Start: 2017-10-16 | End: 2017-10-16 | Stop reason: SURG

## 2017-10-16 RX ORDER — SODIUM CHLORIDE, SODIUM LACTATE, POTASSIUM CHLORIDE, CALCIUM CHLORIDE 600; 310; 30; 20 MG/100ML; MG/100ML; MG/100ML; MG/100ML
INJECTION, SOLUTION INTRAVENOUS CONTINUOUS PRN
Status: DISCONTINUED | OUTPATIENT
Start: 2017-10-16 | End: 2017-10-16 | Stop reason: SURG

## 2017-10-16 RX ORDER — ONDANSETRON 2 MG/ML
4 INJECTION INTRAMUSCULAR; INTRAVENOUS ONCE AS NEEDED
Status: DISCONTINUED | OUTPATIENT
Start: 2017-10-16 | End: 2017-10-16 | Stop reason: HOSPADM

## 2017-10-16 RX ORDER — METOCLOPRAMIDE HYDROCHLORIDE 5 MG/ML
10 INJECTION INTRAMUSCULAR; INTRAVENOUS ONCE AS NEEDED
Status: DISCONTINUED | OUTPATIENT
Start: 2017-10-16 | End: 2017-10-16 | Stop reason: HOSPADM

## 2017-10-16 RX ORDER — FENTANYL CITRATE 50 UG/ML
INJECTION, SOLUTION INTRAMUSCULAR; INTRAVENOUS AS NEEDED
Status: DISCONTINUED | OUTPATIENT
Start: 2017-10-16 | End: 2017-10-16 | Stop reason: SURG

## 2017-10-16 RX ORDER — PROPOFOL 10 MG/ML
INJECTION, EMULSION INTRAVENOUS CONTINUOUS PRN
Status: DISCONTINUED | OUTPATIENT
Start: 2017-10-16 | End: 2017-10-16 | Stop reason: SURG

## 2017-10-16 RX ORDER — MAGNESIUM HYDROXIDE 1200 MG/15ML
LIQUID ORAL AS NEEDED
Status: DISCONTINUED | OUTPATIENT
Start: 2017-10-16 | End: 2017-10-16 | Stop reason: HOSPADM

## 2017-10-16 RX ORDER — SODIUM CHLORIDE, SODIUM LACTATE, POTASSIUM CHLORIDE, CALCIUM CHLORIDE 600; 310; 30; 20 MG/100ML; MG/100ML; MG/100ML; MG/100ML
100 INJECTION, SOLUTION INTRAVENOUS CONTINUOUS
Status: DISCONTINUED | OUTPATIENT
Start: 2017-10-16 | End: 2017-10-17

## 2017-10-16 RX ORDER — MIDAZOLAM HYDROCHLORIDE 1 MG/ML
INJECTION INTRAMUSCULAR; INTRAVENOUS AS NEEDED
Status: DISCONTINUED | OUTPATIENT
Start: 2017-10-16 | End: 2017-10-16 | Stop reason: SURG

## 2017-10-16 RX ORDER — KETAMINE HYDROCHLORIDE 100 MG/ML
INJECTION, SOLUTION INTRAMUSCULAR; INTRAVENOUS AS NEEDED
Status: DISCONTINUED | OUTPATIENT
Start: 2017-10-16 | End: 2017-10-16 | Stop reason: SURG

## 2017-10-16 RX ORDER — PROPOFOL 10 MG/ML
INJECTION, EMULSION INTRAVENOUS AS NEEDED
Status: DISCONTINUED | OUTPATIENT
Start: 2017-10-16 | End: 2017-10-16 | Stop reason: SURG

## 2017-10-16 RX ADMIN — PIPERACILLIN SODIUM,TAZOBACTAM SODIUM 4.5 G: 4; .5 INJECTION, POWDER, FOR SOLUTION INTRAVENOUS at 20:48

## 2017-10-16 RX ADMIN — PIPERACILLIN SODIUM,TAZOBACTAM SODIUM 4.5 G: 4; .5 INJECTION, POWDER, FOR SOLUTION INTRAVENOUS at 03:30

## 2017-10-16 RX ADMIN — FENTANYL CITRATE 25 MCG: 50 INJECTION INTRAMUSCULAR; INTRAVENOUS at 18:47

## 2017-10-16 RX ADMIN — PROPOFOL 30 MG: 10 INJECTION, EMULSION INTRAVENOUS at 17:26

## 2017-10-16 RX ADMIN — INSULIN GLARGINE 74 UNITS: 100 INJECTION, SOLUTION SUBCUTANEOUS at 21:49

## 2017-10-16 RX ADMIN — MIDAZOLAM HYDROCHLORIDE 2 MG: 1 INJECTION, SOLUTION INTRAMUSCULAR; INTRAVENOUS at 17:17

## 2017-10-16 RX ADMIN — FENTANYL CITRATE 25 MCG: 50 INJECTION INTRAMUSCULAR; INTRAVENOUS at 18:53

## 2017-10-16 RX ADMIN — NICOTINE 1 PATCH: 21 PATCH, EXTENDED RELEASE TRANSDERMAL at 08:16

## 2017-10-16 RX ADMIN — OXYCODONE HYDROCHLORIDE 15 MG: 5 TABLET ORAL at 10:02

## 2017-10-16 RX ADMIN — MORPHINE SULFATE 30 MG: 30 TABLET, EXTENDED RELEASE ORAL at 14:26

## 2017-10-16 RX ADMIN — OXYCODONE HYDROCHLORIDE 15 MG: 5 TABLET ORAL at 03:30

## 2017-10-16 RX ADMIN — FENTANYL CITRATE 25 MCG: 50 INJECTION, SOLUTION INTRAMUSCULAR; INTRAVENOUS at 17:37

## 2017-10-16 RX ADMIN — PROPOFOL 50 MCG/KG/MIN: 10 INJECTION, EMULSION INTRAVENOUS at 17:26

## 2017-10-16 RX ADMIN — INSULIN LISPRO 8 UNITS: 100 INJECTION, SOLUTION INTRAVENOUS; SUBCUTANEOUS at 11:29

## 2017-10-16 RX ADMIN — HYDROMORPHONE HYDROCHLORIDE 1 MG: 1 INJECTION, SOLUTION INTRAMUSCULAR; INTRAVENOUS; SUBCUTANEOUS at 07:40

## 2017-10-16 RX ADMIN — FENTANYL CITRATE 25 MCG: 50 INJECTION, SOLUTION INTRAMUSCULAR; INTRAVENOUS at 17:59

## 2017-10-16 RX ADMIN — PIPERACILLIN SODIUM,TAZOBACTAM SODIUM 4.5 G: 4; .5 INJECTION, POWDER, FOR SOLUTION INTRAVENOUS at 09:55

## 2017-10-16 RX ADMIN — SODIUM CHLORIDE, SODIUM LACTATE, POTASSIUM CHLORIDE, AND CALCIUM CHLORIDE: .6; .31; .03; .02 INJECTION, SOLUTION INTRAVENOUS at 16:48

## 2017-10-16 RX ADMIN — HYDROMORPHONE HYDROCHLORIDE 1 MG: 1 INJECTION, SOLUTION INTRAMUSCULAR; INTRAVENOUS; SUBCUTANEOUS at 21:54

## 2017-10-16 RX ADMIN — FENTANYL CITRATE 25 MCG: 50 INJECTION, SOLUTION INTRAMUSCULAR; INTRAVENOUS at 17:26

## 2017-10-16 RX ADMIN — DOCUSATE SODIUM 100 MG: 100 CAPSULE, LIQUID FILLED ORAL at 08:15

## 2017-10-16 RX ADMIN — VANCOMYCIN HYDROCHLORIDE 2000 MG: 1 INJECTION, POWDER, LYOPHILIZED, FOR SOLUTION INTRAVENOUS at 11:28

## 2017-10-16 RX ADMIN — OXYCODONE HYDROCHLORIDE 15 MG: 5 TABLET ORAL at 20:47

## 2017-10-16 RX ADMIN — LIDOCAINE HYDROCHLORIDE 50 MG: 10 INJECTION, SOLUTION INFILTRATION; PERINEURAL at 17:26

## 2017-10-16 RX ADMIN — FENTANYL CITRATE 25 MCG: 50 INJECTION INTRAMUSCULAR; INTRAVENOUS at 18:42

## 2017-10-16 RX ADMIN — HYDROMORPHONE HYDROCHLORIDE 1 MG: 1 INJECTION, SOLUTION INTRAMUSCULAR; INTRAVENOUS; SUBCUTANEOUS at 12:45

## 2017-10-16 RX ADMIN — INSULIN LISPRO 4 UNITS: 100 INJECTION, SOLUTION INTRAVENOUS; SUBCUTANEOUS at 08:15

## 2017-10-16 RX ADMIN — CHLORTHALIDONE 25 MG: 25 TABLET ORAL at 08:17

## 2017-10-16 RX ADMIN — FENTANYL CITRATE 25 MCG: 50 INJECTION, SOLUTION INTRAMUSCULAR; INTRAVENOUS at 18:10

## 2017-10-16 RX ADMIN — KETAMINE HYDROCHLORIDE 30 MG: 100 INJECTION INTRAMUSCULAR; INTRAVENOUS at 17:27

## 2017-10-16 RX ADMIN — Medication 250 MG: at 08:15

## 2017-10-16 RX ADMIN — SODIUM CHLORIDE, SODIUM LACTATE, POTASSIUM CHLORIDE, AND CALCIUM CHLORIDE 100 ML/HR: .6; .31; .03; .02 INJECTION, SOLUTION INTRAVENOUS at 20:48

## 2017-10-16 RX ADMIN — HYDROMORPHONE HYDROCHLORIDE 1 MG: 1 INJECTION, SOLUTION INTRAMUSCULAR; INTRAVENOUS; SUBCUTANEOUS at 01:54

## 2017-10-16 RX ADMIN — MORPHINE SULFATE 30 MG: 30 TABLET, EXTENDED RELEASE ORAL at 21:50

## 2017-10-16 RX ADMIN — KETAMINE HYDROCHLORIDE 10 MG: 100 INJECTION INTRAMUSCULAR; INTRAVENOUS at 17:37

## 2017-10-16 RX ADMIN — VANCOMYCIN HYDROCHLORIDE 2000 MG: 1 INJECTION, POWDER, LYOPHILIZED, FOR SOLUTION INTRAVENOUS at 21:53

## 2017-10-16 RX ADMIN — PIPERACILLIN SODIUM,TAZOBACTAM SODIUM 4.5 G: 4; .5 INJECTION, POWDER, FOR SOLUTION INTRAVENOUS at 15:15

## 2017-10-16 RX ADMIN — BUPROPION HYDROCHLORIDE 150 MG: 150 TABLET, EXTENDED RELEASE ORAL at 08:15

## 2017-10-16 RX ADMIN — MORPHINE SULFATE 30 MG: 30 TABLET, EXTENDED RELEASE ORAL at 05:58

## 2017-10-16 NOTE — CASE MANAGEMENT
Continued Stay Review    Date: 10/16/17    inpatient    Vital Signs: /65   Pulse 74   Temp 97 8 °F (36 6 °C) (Oral)   Resp 18   Ht 6' (1 829 m)   Wt (!) 137 kg (302 lb 7 5 oz)   SpO2 91%   BMI 41 02 kg/m²     Medications:   Scheduled Meds:   atorvastatin 10 mg Oral Daily   buPROPion 150 mg Oral Daily   chlorthalidone 25 mg Oral Daily   docusate sodium 100 mg Oral BID   enoxaparin 40 mg Subcutaneous Daily   insulin glargine 74 Units Subcutaneous Daily With Breakfast   insulin glargine 74 Units Subcutaneous HS   insulin lispro 4-20 Units Subcutaneous TID AC   morphine 30 mg Oral Q8H   nicotine 1 patch Transdermal Daily   piperacillin-tazobactam 4 5 g Intravenous Q6H   saccharomyces boulardii 250 mg Oral BID   vancomycin 20 mg/kg (Adjusted) Intravenous Q12H      PRN Meds: calcium carbonate    HYDROmorphone    oxyCODONE    Abnormal Labs/Diagnostic Results:   BLE SCOTTY & waveform analysis completed on 10/15  RIGHT LOWER LIMB:  Ankle/Brachial Index: 1 0   PPG/PVR tracing are normal   Unable to obtain metatarsal and large toe pressures due to bandages  Tracing taken on big toe  LEFT LOWER LIMB:  Ankle/Brachial Index: 1 0  PPG/PVR tracing are normal   Unable to obtain metatarsal and large toe pressures due to bandages  Tracing taken on 3 rd toe  10/16: na 135   Gluc 230, 204, 251   Wbc 17 57   Plate 968       Age/Sex: 63 y o  male     Assessment/Plan:   PER MED 10/16:         * Skin ulcer of right foot with fat layer exposed ([facility] )   Overview     Podiatry following, patient to or today for amputation  Continue Zosyn and vancomycin  Wound culture preliminary positive for Enterococcus  ABIs above level of ankles normal      Type 2 diabetes mellitus with foot ulcer, with long-term current use of insulin ([facility])   Overview     Chronically poorly controlled, latest A1c 8 8   nonadherence to outpatient follow-up in regards to his diabetes and foot ulcers  Lantus increased at 74 units b i d      Chronic prescription opiate use   Overview     Continue same dosage of home medications-patient reportedly on morphine and oxycodone, overnight has been requesting switch from morphine to oxycodone 30 mg  He is currently going for surgery, he will need different management postop however upon discharge should be continued on his usual pain management protocol plus water per Podiatry recommends  Further follow up with pain management outpatient,       Morbid obesity ([facility] )   Overview     difficulty with ambulation, financial issues, depression and caregiver burnout   Tobacco abuse   Overview     Continue Nicotine patch   Positive blood culture   Overview     Upon admission one out of two positive for gram-positive rods, I suspect contamination, 2nd set drawn yesterday preliminary negative  White count is high, unchanged  Continue Zosyn and vancomycin   Essential hypertension   Overview     Continue chlorthalidone     Certification Statement: The patient will continue to require additional inpatient hospital stay due to Management of diabetic foot ulcer, planned amputation

## 2017-10-16 NOTE — ANESTHESIA PREPROCEDURE EVALUATION
Review of Systems/Medical History  Patient summary reviewed  Chart reviewed  No history of anesthetic complications     Cardiovascular  EKG reviewed, Hyperlipidemia, Hypertension ,   Comment: Normal sinus rhythm  Normal ECG  No previous ECGs available  Confirmed by Alyssa Mcleod MD, Gricel Alanis ([id number]) on 10/12/2017 12:09:04 PM    Specimen Collected: 10/12/17 11:45      ,  Pulmonary  COPD , Sleep apnea , ,        GI/Hepatic            Endo/Other  Diabetes poorly controlled type 2 Oral agent,      GYN       Hematology   Musculoskeletal       Neurology   Psychology           Physical Exam    Airway    Mallampati score: II  TM Distance: >3 FB  Neck ROM: full     Dental       Cardiovascular  Cardiovascular exam normal    Pulmonary  Pulmonary exam normal     Other Findings        Anesthesia Plan  ASA Score- 3       Anesthesia Type- IV sedation with anesthesia with ASA Monitors  Additional Monitors:   Airway Plan:           Induction- intravenous  Informed Consent-   I personally reviewed this patient with the CRNA  Discussed and agreed on the Anesthesia Plan with the CRNA       Lab Results   Component Value Date    WBC 17 57 (H) 10/16/2017    HGB 12 0 10/16/2017    HCT 37 0 10/16/2017    MCV 80 (L) 10/16/2017     (H) 10/16/2017     Lab Results   Component Value Date    GLUCOSE 230 (H) 10/16/2017    CALCIUM 9 0 10/16/2017     (L) 10/16/2017    K 3 8 10/16/2017    CO2 25 10/16/2017     10/16/2017    BUN 10 10/16/2017    CREATININE 0 81 10/16/2017     Lab Results   Component Value Date    INR 1 08 10/12/2017    PROTIME 14 3 10/12/2017     Lab Results   Component Value Date    PTT 40 (H) 10/12/2017     Type and Screen:        I, Dr Wiliam rKause, the attending physician, have personally seen and evaluated the patient prior to anesthetic care  I have reviewed the pre-anesthetic record, and other medical records if appropriate to the anesthetic care   If a CRNA is involved in the case, I have reviewed the CRNA assessment, if present, and agree  The patient is in a suitable condition to proceed with my formulated anesthetic plan

## 2017-10-16 NOTE — ANESTHESIA POSTPROCEDURE EVALUATION
Post-Op Assessment Note      CV Status:  Stable    Mental Status:  Alert and awake    Hydration Status:  Euvolemic    PONV Controlled:  Controlled    Airway Patency:  Patent    Post Op Vitals Reviewed: Yes          Staff: CRNA           /75 (10/16/17 1832)    Temp 98 2 °F (36 8 °C) (10/16/17 1832)    Pulse 74 (10/16/17 1832)   Resp (!) 23 (10/16/17 1832)    SpO2 99 % (10/16/17 1832)

## 2017-10-16 NOTE — PROGRESS NOTES
Patient was  Cleansed with Chlorhexidine wipes  Feet washed with Chlorhexidine soap  Dressing changed to BLLE wounds  Dilaudid given prior to dressing change  Patient tolerated well

## 2017-10-16 NOTE — SUBJECTIVE & OBJECTIVE
VTE Pharmacologic Prophylaxis:   Pharmacologic: Enoxaparin (Lovenox)  Mechanical: Mechanical VTE prophylaxis in place  Foot pump ordered    Patient Centered Rounds: I have performed bedside rounds with nursing staff today  Discussions with Specialists or Other Care Team Provider: n/a  Education and Discussions with Family / Patient:  Patient  Time Spent for Care: 25  More than 50% of total time spent on counseling and coordination of care as described above  Current Length of Stay: 4 day(s)  Current Patient Status: Inpatient   Certification Statement: The patient will continue to require additional inpatient hospital stay due to Management of diabetic foot ulcer, planned amputation    Discharge Plan:  Patient wants to go home, he will likely refuse rehab  Code Status: Level 1 - Full Code    Subjective:   Overnight patient has been requesting more pain medication, he did agree to surgery today    Objective:   Vitals:   Temp (24hrs), Av 2 °F (36 8 °C), Min:97 8 °F (36 6 °C), Max:98 6 °F (37 °C)    HR:  [72-76] 74  Resp:  [18-21] 18  BP: (114-127)/(59-67) 120/65  SpO2:  [91 %-94 %] 91 %  Body mass index is 41 02 kg/m²  Input and Output Summary (last 24 hours): Intake/Output Summary (Last 24 hours) at 10/16/17 0917  Last data filed at 10/15/17 1302   Gross per 24 hour   Intake              240 ml   Output                0 ml   Net              240 ml       Physical Exam:     Physical Exam   Constitutional: He is oriented to person, place, and time  He appears well-developed  No distress  Morbidly obese   HENT:   Head: Normocephalic and atraumatic  Mouth/Throat: Oropharynx is clear and moist    Eyes: Conjunctivae and EOM are normal  Pupils are equal, round, and reactive to light  Neck: Normal range of motion  Neck supple  Cardiovascular: Normal rate, regular rhythm and normal heart sounds  No murmur heard    Bilateral lower extremities with varicosities   Pulmonary/Chest: Effort normal and breath sounds normal  He has no wheezes  He has no rales  Musculoskeletal: He exhibits edema  Neurological: He is alert and oriented to person, place, and time  No cranial nerve deficit  Skin: Skin is warm and dry  No rash noted  He is not diaphoretic  No pallor  Psychiatric: His speech is normal  Judgment and thought content normal  His mood appears anxious  His affect is angry  He is agitated  Cognition and memory are normal  He expresses no suicidal ideation  He expresses no suicidal plans  Nursing note and vitals reviewed  Additional Data:   Labs:    Results from last 7 days  Lab Units 10/16/17  0517  10/13/17  0515   WBC Thousand/uL 17 57*  < > 18 70*   HEMOGLOBIN g/dL 12 0  < > 12 3   HEMATOCRIT % 37 0  < > 39 4   PLATELETS Thousands/uL 630*  < > 563*   NEUTROS PCT %  --   --  69   LYMPHS PCT %  --   --  20   MONOS PCT %  --   --  7   EOS PCT %  --   --  2   < > = values in this interval not displayed  Results from last 7 days  Lab Units 10/16/17  0517 10/15/17  0429   SODIUM mmol/L 135* 136   POTASSIUM mmol/L 3 8 4 2   CHLORIDE mmol/L 102 102   CO2 mmol/L 25 26   BUN mg/dL 10 13   CREATININE mg/dL 0 81 0 91   CALCIUM mg/dL 9 0 9 2   TOTAL PROTEIN g/dL  --  7 7   BILIRUBIN TOTAL mg/dL  --  0 20   ALK PHOS U/L  --  94   ALT U/L  --  19   AST U/L  --  14   GLUCOSE RANDOM mg/dL 230* 269*       Results from last 7 days  Lab Units 10/12/17  1126   INR  1 08       * I Have Reviewed All Lab Data Listed Above  * Additional Pertinent Lab Tests Reviewed: All Labs Within Last 24 Hours Reviewed    Imaging:    Imaging Reports Reviewed Today Include:  Arterial duplex bilateral lower extremities    Cultures:   Blood Culture:   Lab Results   Component Value Date    BLOODCX No Growth at 24 hrs  10/14/2017    BLOODCX No Growth at 24 hrs  10/14/2017    BLOODCX No Growth at 72 hrs   10/12/2017     Urine Culture: No results found for: URINECX  Sputum Culture: No components found for: SPUTUMCX  Wound Culture: Lab Results   Component Value Date    WOUNDCULT 4+ Growth of Enterococcus species (A) 10/13/2017    WOUNDCULT 4+ Growth of Diphtheroids 10/13/2017    WOUNDCULT 4+ Growth of Diphtheroids 10/12/2017    WOUNDCULT Few Colonies of Enterococcus species (A) 10/12/2017    WOUNDCULT 1+ Growth of  10/12/2017       Last 24 Hours Medication List:     atorvastatin 10 mg Oral Daily   buPROPion 150 mg Oral Daily   chlorthalidone 25 mg Oral Daily   docusate sodium 100 mg Oral BID   enoxaparin 40 mg Subcutaneous Daily   insulin glargine 74 Units Subcutaneous Daily With Breakfast   insulin glargine 74 Units Subcutaneous HS   insulin lispro 4-20 Units Subcutaneous TID AC   morphine 30 mg Oral Q8H   nicotine 1 patch Transdermal Daily   piperacillin-tazobactam 4 5 g Intravenous Q6H   saccharomyces boulardii 250 mg Oral BID   vancomycin 20 mg/kg (Adjusted) Intravenous Q12H        Today, Patient Was Seen By: Rachele Werner MD    ** Please Note: Dragon 360 Dictation voice to text software may have been used in the creation of this document   **

## 2017-10-16 NOTE — PROGRESS NOTES
Progress Note - Boyd Tsang 63 y o  male MRN: [id number]    Unit/Bed#: -01 Encounter: [id number]        * [facility] of right foot with [facility] layer [facility] ([facility] )   Overview    Podiatry following, patient to or today for amputation  Continue Zosyn and vancomycin  Wound culture preliminary positive for Enterococcus  ABIs above level of ankles normal       Type 2 diabetes mellitus with foot ulcer, with long-term current use of insulin (HCC)   Overview    Chronically poorly controlled, latest A1c 8 8   nonadherence to outpatient follow-up in regards to his diabetes and foot ulcers  Lantus increased at 74 units b i d  Chronic prescription opiate use   Overview    Continue same dosage of home medications-patient reportedly on morphine and oxycodone, overnight has been requesting switch from morphine to oxycodone 30 mg  He is currently going for surgery, he will need different management postop however upon discharge should be continued on his usual pain management protocol plus water per Podiatry recommends  Further follow up with pain management outpatient,        Morbid obesity ([facility] )   Overview    difficulty with ambulation, financial issues, depression and caregiver burnout     Tobacco abuse   Overview    Continue Nicotine patch     Positive blood culture   Overview    Upon admission one out of two positive for gram-positive rods, I suspect contamination, 2nd set drawn yesterday preliminary negative  White count is high, unchanged  Continue Zosyn and vancomycin     Essential hypertension   Overview    Continue chlorthalidone           VTE Pharmacologic Prophylaxis:   Pharmacologic: Enoxaparin (Lovenox)  Mechanical: Mechanical VTE prophylaxis in place  Foot pump ordered    Patient Centered Rounds: I have performed bedside rounds with nursing staff today  Discussions with Specialists or Other Care Team Provider: n/a  Education and Discussions with Family / Patient:  Patient  Time Spent for Care: 25    More than 50% of total time spent on counseling and coordination of care as described above  Current Length of Stay: 4 day(s)  Current Patient Status: Inpatient   Certification Statement: The patient will continue to require additional inpatient hospital stay due to Management of diabetic foot ulcer, planned amputation    Discharge Plan:  Patient wants to go home, he will likely refuse rehab  Code Status: Level 1 - Full Code    Subjective:   Overnight patient has been requesting more pain medication, he did agree to surgery today    Objective:   Vitals:   Temp (24hrs), Av 2 °F (36 8 °C), Min:97 8 °F (36 6 °C), Max:98 6 °F (37 °C)    HR:  [72-76] 74  Resp:  [18-21] 18  BP: (114-127)/(59-67) 120/65  SpO2:  [91 %-94 %] 91 %  Body mass index is 41 02 kg/m²  Input and Output Summary (last 24 hours): Intake/Output Summary (Last 24 hours) at 10/16/17 0917  Last data filed at 10/15/17 1302   Gross per 24 hour   Intake              240 ml   Output                0 ml   Net              240 ml       Physical Exam:     Physical Exam   Constitutional: He is oriented to person, place, and time  He appears well-developed  No distress  Morbidly obese   HENT:   Head: Normocephalic and atraumatic  Mouth/Throat: Oropharynx is clear and moist    Eyes: Conjunctivae and EOM are normal  Pupils are equal, round, and reactive to light  Neck: Normal range of motion  Neck supple  Cardiovascular: Normal rate, regular rhythm and normal heart sounds  No murmur heard  Bilateral lower extremities with varicosities   Pulmonary/Chest: Effort normal and breath sounds normal  He has no wheezes  He has no rales  Musculoskeletal: He exhibits edema  Neurological: He is alert and oriented to person, place, and time  No cranial nerve deficit  Skin: Skin is warm and dry  No rash noted  He is not diaphoretic  No pallor  Psychiatric: His speech is normal  Judgment and thought content normal  His mood appears anxious   His affect is angry  He is agitated  Cognition and memory are normal  He expresses no suicidal ideation  He expresses no suicidal plans  Nursing note and vitals reviewed  Additional Data:   Labs:    Results from last 7 days  Lab Units 10/16/17  0517  10/13/17  0515   WBC Thousand/uL 17 57*  < > 18 70*   HEMOGLOBIN g/dL 12 0  < > 12 3   HEMATOCRIT % 37 0  < > 39 4   PLATELETS Thousands/uL 630*  < > 563*   NEUTROS PCT %  --   --  69   LYMPHS PCT %  --   --  20   MONOS PCT %  --   --  7   EOS PCT %  --   --  2   < > = values in this interval not displayed  Results from last 7 days  Lab Units 10/16/17  0517 10/15/17  0429   SODIUM mmol/L 135* 136   POTASSIUM mmol/L 3 8 4 2   CHLORIDE mmol/L 102 102   CO2 mmol/L 25 26   BUN mg/dL 10 13   CREATININE mg/dL 0 81 0 91   CALCIUM mg/dL 9 0 9 2   TOTAL PROTEIN g/dL  --  7 7   BILIRUBIN TOTAL mg/dL  --  0 20   ALK PHOS U/L  --  94   ALT U/L  --  19   AST U/L  --  14   GLUCOSE RANDOM mg/dL 230* 269*       Results from last 7 days  Lab Units 10/12/17  1126   INR  1 08       * I Have Reviewed All Lab Data Listed Above  * Additional Pertinent Lab Tests Reviewed: All Labs Within Last 24 Hours Reviewed    Imaging:    Imaging Reports Reviewed Today Include:  Arterial duplex bilateral lower extremities    Cultures:   Blood Culture:   Lab Results   Component Value Date    BLOODCX No Growth at 24 hrs  10/14/2017    BLOODCX No Growth at 24 hrs  10/14/2017    BLOODCX No Growth at 72 hrs   10/12/2017     Urine Culture: No results found for: URINECX  Sputum Culture: No components found for: SPUTUMCX  Wound Culture:   Lab Results   Component Value Date    WOUNDCULT 4+ Growth of Enterococcus species (A) 10/13/2017    WOUNDCULT 4+ Growth of Diphtheroids 10/13/2017    WOUNDCULT 4+ Growth of Diphtheroids 10/12/2017    WOUNDCULT Few Colonies of Enterococcus species (A) 10/12/2017    WOUNDCULT 1+ Growth of  10/12/2017       Last 24 Hours Medication List:     atorvastatin 10 mg Oral Daily buPROPion 150 mg Oral Daily   chlorthalidone 25 mg Oral Daily   docusate sodium 100 mg Oral BID   enoxaparin 40 mg Subcutaneous Daily   insulin glargine 74 Units Subcutaneous Daily With Breakfast   insulin glargine 74 Units Subcutaneous HS   insulin lispro 4-20 Units Subcutaneous TID AC   morphine 30 mg Oral Q8H   nicotine 1 patch Transdermal Daily   piperacillin-tazobactam 4 5 g Intravenous Q6H   saccharomyces boulardii 250 mg Oral BID   vancomycin 20 mg/kg (Adjusted) Intravenous Q12H        Today, Patient Was Seen By: Joe Holland MD    ** Please Note: Dragon 360 Dictation voice to text software may have been used in the creation of this document   **

## 2017-10-16 NOTE — ANESTHESIA PREPROCEDURE EVALUATION
Review of Systems/Medical History  Patient summary reviewed  Chart reviewed      Cardiovascular  EKG reviewed, Exercise tolerance: poor,  Hyperlipidemia, Hypertension , TYSON,   Comment: Normal sinus rhythm  Normal ECG  No previous ECGs available  Confirmed by Alyssa Mcleod MD, Gricel Alanis ([id number]) on 10/12/2017 12:09:04 PM    Specimen Collected: 10/12/17 11:45      ,  Pulmonary  Smoker more than 10 packs per year , Tobacco cessation counseling given, COPD , Sleep apnea , ,        GI/Hepatic            Endo/Other  Diabetes well controlled type 1 Insulin,      GYN       Hematology   Musculoskeletal  Obesity  morbid obesity,        Neurology  Negative neurology ROS      Psychology   Negative psychology ROS            Physical Exam    Airway    Mallampati score: III  TM Distance: >3 FB  Neck ROM: limited     Dental   Comment: Dentition in poor condition; #29 loose,     Cardiovascular  Rhythm: regular, Rate: normal, Cardiovascular exam normal    Pulmonary  Breath sounds clear to auscultation, Decreased breath sounds,     Other Findings        Anesthesia Plan  ASA Score- 3 Emergent      Anesthesia Type- IV sedation with anesthesia with ASA Monitors  Additional Monitors:   Airway Plan:     Comment: MAC possible GA with LMA if necessary  Induction- intravenous  Informed Consent- Anesthetic plan and risks discussed with patient  I personally reviewed this patient with the CRNA  Discussed and agreed on the Anesthesia Plan with the CRNA       Lab Results   Component Value Date    WBC 17 57 (H) 10/16/2017    HGB 12 0 10/16/2017    HCT 37 0 10/16/2017    MCV 80 (L) 10/16/2017     (H) 10/16/2017     Lab Results   Component Value Date    GLUCOSE 230 (H) 10/16/2017    CALCIUM 9 0 10/16/2017     (L) 10/16/2017    K 3 8 10/16/2017    CO2 25 10/16/2017     10/16/2017    BUN 10 10/16/2017    CREATININE 0 81 10/16/2017     Lab Results   Component Value Date    INR 1 08 10/12/2017    PROTIME 14 3 10/12/2017     Lab Results   Component Value Date    PTT 40 (H) 10/12/2017           I, Dr Sadaf Davis, the attending physician, have personally seen and evaluated the patient prior to anesthetic care  I have reviewed the pre-anesthetic record, and other medical records if appropriate to the anesthetic care  If a CRNA is involved in the case, I have reviewed the CRNA assessment, if present, and agree  The patient is in a suitable condition to proceed with my formulated anesthetic plan

## 2017-10-16 NOTE — OP NOTE
OPERATIVE REPORT  PATIENT NAME: Saskia Harmon    :  1955  MRN: [id number]  Pt Location: [facility] OR ROOM 03    SURGERY DATE: 10/16/2017    Surgeon(s) and Role:     * Lou Pham DPM - Primary    Preop Diagnosis:  Chronic diabetic ulcer, osteomyelitis right hallux and 1st metatarsal     * No Diagnosis Codes entered *    Procedure(s) (LRB):  AMPUTATION TRANSMETATARSAL (TMA) (Right)    Specimen(s):  ID Type Source Tests Collected by Time Destination   1 : right big toe and bone Tissue Toe, Right TISSUE EXAM Jacinto Lozano DPM 10/16/2017 1827    A :  Tissue Toe, Right ANAEROBIC CULTURE AND GRAM STAIN, CULTURE, TISSUE AND GRAM STAIN Jacinto Lozano DPM 10/16/2017 174        Estimated Blood Loss:   20 cc    Drains:  None       Anesthesia Type:   IV Sedation with Anesthesia    Operative Indications:  Chronic diabetic ulcer right hallux with osteomyelitis 1st metatarsal phalangeal joint  Nonhealing worsening ulcer with persistent leukocytosis  Operative Findings:  The patient was identified in the preoperative area, paperwork and consents were confirmed  After satisfactory administration of sedation the foot was blocked with a total of 10 cc of a 1-1 mix of 1% plain lidocaine and 0 5% plain Marcaine in [place] block fashion  The foot was sterilely prepped and draped in the usual manner  Ankle cuff was applied to the right ankle and inflated to 250 mm of mercury where would remain for the duration of the procedure 42 minutes  Preston exsanguination was utilized  There is a very large necrotic ulcer seen encompassing the entire plantar aspect of the base of the proximal phalanx of the hallux and the metatarsophalangeal joint  Two semi elliptical incision were made to encompass the entire hallux these were deepened using sharp and blunt dissection down to the level of the metatarsophalangeal joint  The incisions were joined and the hallux was removed at the joint   A 4 cm linear incision was then made along the dorsal aspect of the 1st metatarsal to facilitate excision of the metatarsal   Sagittal saw was utilized to remove the metatarsal about 2/3 of the way towards the base  Note that the metatarsal head was soft and the cartilage was soft and brown in coloration and not normal in its appearance  The metatarsal was resected to an area of healthy appearing pink bleeding bone  The surgical wound was inspected and all necrotic appearing tissues were removed  There was a small amount of pus in the dorsal aspect of the wound and any necrotic appearing tissues associated with this small collection of pus were identified and resected  The wound was inspected for a final time for any residual soft tissue or osseous pathology or signs of infection or gangrene and there was none to be seen  The surgical wound was copiously irrigated via pulse lavage with 1000 cc of normal saline solution  Deep swabs were taken for culture and sensitivity  A small amount of bone from the 1st metatarsophalangeal joint was also sent for microbiology analysis  The remainder of the toe and metatarsal was sent for pathology analysis  The dorsal longitudinal incision was closed using 4 0 Vicryl suture and 3 0 nylon suture  The remainder of the wound is lightly packed with quarter-inch iodoform Nu Gauze, damp 4 x 4s, 4 x 4 fluffs, ABD, Kerlix roll, and Ace bandage in light compression fashion  The patient tolerated the procedure well and leaves the operating room for the PACU with all vital signs stable and stable circulation noted upon let down of the tourniquet with pinking seen in the remaining digits  The patient will be transferred back to the hospital floor where he will resume his preoperative medical regimen  X-rays will be taken in the PACU  Dr Edenilson Sherman will do the 1st dressing change      Complications:   None  Procedure and Technique:  See above   I was present for the entire procedure    Patient Disposition:  PACU SIGNATURE: [facility]  DATE: October 16, 2017  TIME: 6:46 PM

## 2017-10-17 LAB
ANION GAP SERPL CALCULATED.3IONS-SCNC: 6 MMOL/L (ref 4–13)
BACTERIA BLD CULT: NORMAL
BUN SERPL-MCNC: 10 MG/DL (ref 5–25)
CALCIUM SERPL-MCNC: 8.8 MG/DL (ref 8.3–10.1)
CHLORIDE SERPL-SCNC: 100 MMOL/L (ref 100–108)
CO2 SERPL-SCNC: 27 MMOL/L (ref 21–32)
CREAT SERPL-MCNC: 0.83 MG/DL (ref 0.6–1.3)
ERYTHROCYTE [DISTWIDTH] IN BLOOD BY AUTOMATED COUNT: 14.7 % (ref 11.6–15.1)
GFR SERPL CREATININE-BSD FRML MDRD: 96 ML/MIN/1.73SQ M
GLUCOSE SERPL-MCNC: 250 MG/DL (ref 65–140)
GLUCOSE SERPL-MCNC: 277 MG/DL (ref 65–140)
GLUCOSE SERPL-MCNC: 290 MG/DL (ref 65–140)
GLUCOSE SERPL-MCNC: 329 MG/DL (ref 65–140)
HCT VFR BLD AUTO: 36.7 % (ref 36.5–49.3)
HGB BLD-MCNC: 11.8 G/DL (ref 12–17)
MCH RBC QN AUTO: 25.9 PG (ref 26.8–34.3)
MCHC RBC AUTO-ENTMCNC: 32.2 G/DL (ref 31.4–37.4)
MCV RBC AUTO: 81 FL (ref 82–98)
PLATELET # BLD AUTO: 631 THOUSANDS/UL (ref 149–390)
PMV BLD AUTO: 9 FL (ref 8.9–12.7)
POTASSIUM SERPL-SCNC: 4 MMOL/L (ref 3.5–5.3)
RBC # BLD AUTO: 4.56 MILLION/UL (ref 3.88–5.62)
SODIUM SERPL-SCNC: 133 MMOL/L (ref 136–145)
WBC # BLD AUTO: 17.04 THOUSAND/UL (ref 4.31–10.16)

## 2017-10-17 PROCEDURE — 94760 N-INVAS EAR/PLS OXIMETRY 1: CPT

## 2017-10-17 PROCEDURE — 94640 AIRWAY INHALATION TREATMENT: CPT

## 2017-10-17 PROCEDURE — 82948 REAGENT STRIP/BLOOD GLUCOSE: CPT

## 2017-10-17 PROCEDURE — 85027 COMPLETE CBC AUTOMATED: CPT | Performed by: FAMILY MEDICINE

## 2017-10-17 PROCEDURE — 80048 BASIC METABOLIC PNL TOTAL CA: CPT | Performed by: FAMILY MEDICINE

## 2017-10-17 RX ORDER — IPRATROPIUM BROMIDE AND ALBUTEROL SULFATE 2.5; .5 MG/3ML; MG/3ML
3 SOLUTION RESPIRATORY (INHALATION)
Status: DISCONTINUED | OUTPATIENT
Start: 2017-10-17 | End: 2017-10-20 | Stop reason: HOSPADM

## 2017-10-17 RX ORDER — IPRATROPIUM BROMIDE AND ALBUTEROL SULFATE 2.5; .5 MG/3ML; MG/3ML
3 SOLUTION RESPIRATORY (INHALATION)
Status: DISCONTINUED | OUTPATIENT
Start: 2017-10-17 | End: 2017-10-17

## 2017-10-17 RX ORDER — METRONIDAZOLE 500 MG/1
500 TABLET ORAL EVERY 8 HOURS SCHEDULED
Status: DISCONTINUED | OUTPATIENT
Start: 2017-10-17 | End: 2017-10-19

## 2017-10-17 RX ORDER — METRONIDAZOLE 500 MG/1
500 TABLET ORAL EVERY 8 HOURS SCHEDULED
Status: DISCONTINUED | OUTPATIENT
Start: 2017-10-17 | End: 2017-10-17

## 2017-10-17 RX ADMIN — INSULIN LISPRO 8 UNITS: 100 INJECTION, SOLUTION INTRAVENOUS; SUBCUTANEOUS at 16:40

## 2017-10-17 RX ADMIN — PIPERACILLIN SODIUM,TAZOBACTAM SODIUM 4.5 G: 4; .5 INJECTION, POWDER, FOR SOLUTION INTRAVENOUS at 22:30

## 2017-10-17 RX ADMIN — Medication 250 MG: at 17:09

## 2017-10-17 RX ADMIN — HYDROMORPHONE HYDROCHLORIDE 1 MG: 1 INJECTION, SOLUTION INTRAMUSCULAR; INTRAVENOUS; SUBCUTANEOUS at 01:04

## 2017-10-17 RX ADMIN — METRONIDAZOLE 500 MG: 500 TABLET ORAL at 22:00

## 2017-10-17 RX ADMIN — CHLORTHALIDONE 25 MG: 25 TABLET ORAL at 08:15

## 2017-10-17 RX ADMIN — MORPHINE SULFATE 30 MG: 30 TABLET, EXTENDED RELEASE ORAL at 14:26

## 2017-10-17 RX ADMIN — DOCUSATE SODIUM 100 MG: 100 CAPSULE, LIQUID FILLED ORAL at 17:09

## 2017-10-17 RX ADMIN — INSULIN LISPRO 12 UNITS: 100 INJECTION, SOLUTION INTRAVENOUS; SUBCUTANEOUS at 12:36

## 2017-10-17 RX ADMIN — IPRATROPIUM BROMIDE AND ALBUTEROL SULFATE 3 ML: .5; 3 SOLUTION RESPIRATORY (INHALATION) at 13:20

## 2017-10-17 RX ADMIN — MORPHINE SULFATE 30 MG: 30 TABLET, EXTENDED RELEASE ORAL at 23:09

## 2017-10-17 RX ADMIN — ATORVASTATIN CALCIUM 10 MG: 10 TABLET, FILM COATED ORAL at 16:39

## 2017-10-17 RX ADMIN — OXYCODONE HYDROCHLORIDE 15 MG: 5 TABLET ORAL at 14:21

## 2017-10-17 RX ADMIN — METRONIDAZOLE 500 MG: 500 TABLET ORAL at 14:21

## 2017-10-17 RX ADMIN — INSULIN LISPRO 12 UNITS: 100 INJECTION, SOLUTION INTRAVENOUS; SUBCUTANEOUS at 08:11

## 2017-10-17 RX ADMIN — HYDROMORPHONE HYDROCHLORIDE 1 MG: 1 INJECTION, SOLUTION INTRAMUSCULAR; INTRAVENOUS; SUBCUTANEOUS at 03:58

## 2017-10-17 RX ADMIN — DOCUSATE SODIUM 100 MG: 100 CAPSULE, LIQUID FILLED ORAL at 08:09

## 2017-10-17 RX ADMIN — IPRATROPIUM BROMIDE AND ALBUTEROL SULFATE 3 ML: .5; 3 SOLUTION RESPIRATORY (INHALATION) at 20:21

## 2017-10-17 RX ADMIN — BUPROPION HYDROCHLORIDE 150 MG: 150 TABLET, EXTENDED RELEASE ORAL at 08:10

## 2017-10-17 RX ADMIN — INSULIN GLARGINE 74 UNITS: 100 INJECTION, SOLUTION SUBCUTANEOUS at 22:30

## 2017-10-17 RX ADMIN — MORPHINE SULFATE 30 MG: 30 TABLET, EXTENDED RELEASE ORAL at 06:15

## 2017-10-17 RX ADMIN — OXYCODONE HYDROCHLORIDE 15 MG: 5 TABLET ORAL at 05:09

## 2017-10-17 RX ADMIN — HYDROMORPHONE HYDROCHLORIDE 1 MG: 1 INJECTION, SOLUTION INTRAMUSCULAR; INTRAVENOUS; SUBCUTANEOUS at 18:28

## 2017-10-17 RX ADMIN — HYDROMORPHONE HYDROCHLORIDE 1 MG: 1 INJECTION, SOLUTION INTRAMUSCULAR; INTRAVENOUS; SUBCUTANEOUS at 13:00

## 2017-10-17 RX ADMIN — INSULIN GLARGINE 74 UNITS: 100 INJECTION, SOLUTION SUBCUTANEOUS at 08:09

## 2017-10-17 RX ADMIN — PIPERACILLIN SODIUM,TAZOBACTAM SODIUM 4.5 G: 4; .5 INJECTION, POWDER, FOR SOLUTION INTRAVENOUS at 09:56

## 2017-10-17 RX ADMIN — ENOXAPARIN SODIUM 40 MG: 40 INJECTION SUBCUTANEOUS at 08:09

## 2017-10-17 RX ADMIN — OXYCODONE HYDROCHLORIDE 15 MG: 5 TABLET ORAL at 20:12

## 2017-10-17 RX ADMIN — HYDROMORPHONE HYDROCHLORIDE 1 MG: 1 INJECTION, SOLUTION INTRAMUSCULAR; INTRAVENOUS; SUBCUTANEOUS at 08:34

## 2017-10-17 RX ADMIN — Medication 250 MG: at 08:10

## 2017-10-17 RX ADMIN — PIPERACILLIN SODIUM,TAZOBACTAM SODIUM 4.5 G: 4; .5 INJECTION, POWDER, FOR SOLUTION INTRAVENOUS at 03:32

## 2017-10-17 RX ADMIN — OXYCODONE HYDROCHLORIDE 15 MG: 5 TABLET ORAL at 00:31

## 2017-10-17 RX ADMIN — VANCOMYCIN HYDROCHLORIDE 2000 MG: 1 INJECTION, POWDER, LYOPHILIZED, FOR SOLUTION INTRAVENOUS at 10:58

## 2017-10-17 RX ADMIN — NICOTINE 1 PATCH: 21 PATCH, EXTENDED RELEASE TRANSDERMAL at 08:17

## 2017-10-17 RX ADMIN — PIPERACILLIN SODIUM,TAZOBACTAM SODIUM 4.5 G: 4; .5 INJECTION, POWDER, FOR SOLUTION INTRAVENOUS at 16:26

## 2017-10-17 RX ADMIN — OXYCODONE HYDROCHLORIDE 15 MG: 5 TABLET ORAL at 09:52

## 2017-10-17 NOTE — PROGRESS NOTES
[facility] Internal Medicine Progress Note  Patient: Cameron Faith 63 y o  male   MRN: [id number]  PCP: Bryant Grant MD  Unit/Bed#: [facility]-01 Encounter: [id number]  Date Of Visit: 10/17/17    Assessment:    Principal Problem:    Skin ulcer of right foot with fat layer exposed ([facility] )  Active Problems:    Tobacco abuse    Morbid obesity ([place] [address] )    Type 2 diabetes mellitus with foot ulcer, with long-term current use of insulin ([facility] 75 )    Chronic prescription opiate use    Essential hypertension    Positive blood culture      Plan:    * Skin ulcer of right foot with fat layer exposed ([place] [address] )   Overview     Podiatry following, s/p amputation 10/16/17  Wound culture positive for diptheroids Enterococcus sens to vanco and zosyn and diptheroids-d/c vancomycin and continue zosyn and add flagyl  l      Type 2 diabetes mellitus with foot ulcer, with long-term current use of insulin ([facility])   Overview     Chronically poorly controlled, latest A1c 8 8   nonadherence to outpatient follow-up in regards to his diabetes and foot ulcers  Lantus increased at 74 units b i d  Chronic prescription opiate use   Overview     Podiatry recommends  Further follow up with pain management outpatient,       Morbid obesity ([facility] 75 )   Overview     difficulty with ambulation, financial issues, depression and caregiver burnout   Tobacco abuse   Overview     Continue Nicotine patch   Positive blood culture   Overview     GPR-likely diptheroids-but also in wound culture-add flagyl with leucocytosis   Essential hypertension   Overview     Continue chlorthalidone            VTE Pharmacologic Prophylaxis:   Pharmacologic: Enoxaparin (Lovenox)  Mechanical VTE Prophylaxis in Place: Yes    Patient Centered Rounds: I have performed bedside rounds with nursing staff today  Discussions with Specialists or Other Care Team Provider:     Education and Discussions with Family / Patient:     Time Spent for Care: 30 minutes    More than 50% of total time spent on counseling and coordination of care as described above  Current Length of Stay: 5 day(s)    Current Patient Status: Inpatient   Certification Statement: The patient will continue to require additional inpatient hospital stay due to osteomyelitis    Discharge Plan: pending    Code Status: Level 1 - Full Code      Subjective:   C/o post op pain right; says he has a pilonidal cyst that is draining-he has had prior surgery    Objective:     Vitals:   Temp (24hrs), Av °F (36 7 °C), Min:97 3 °F (36 3 °C), Max:98 6 °F (37 °C)    HR:  [70-82] 79  Resp:  [13-26] 20  BP: (120-140)/(56-75) 128/64  SpO2:  [92 %-100 %] 93 %  Body mass index is 40 96 kg/m²  Input and Output Summary (last 24 hours): Intake/Output Summary (Last 24 hours) at 10/17/17 1114  Last data filed at 10/17/17 7210   Gross per 24 hour   Intake             1550 ml   Output             2000 ml   Net             -450 ml       Physical Exam:     Physical Exam   Constitutional: He is oriented to person, place, and time  He appears well-developed and well-nourished  HENT:   Head: Normocephalic  Eyes: Pupils are equal, round, and reactive to light  Cardiovascular: Normal rate and regular rhythm  Pulmonary/Chest: Effort normal  He has wheezes  Abdominal: Soft  Musculoskeletal: He exhibits edema  Right foot bandaged   Neurological: He is alert and oriented to person, place, and time  Additional Data:     Labs:      Results from last 7 days  Lab Units 10/17/17  0508  10/13/17  0515   WBC Thousand/uL 17 04*  < > 18 70*   HEMOGLOBIN g/dL 11 8*  < > 12 3   HEMATOCRIT % 36 7  < > 39 4   PLATELETS Thousands/uL 631*  < > 563*   NEUTROS PCT %  --   --  69   LYMPHS PCT %  --   --  20   MONOS PCT %  --   --  7   EOS PCT %  --   --  2   < > = values in this interval not displayed      Results from last 7 days  Lab Units 10/17/17  0508  10/15/17  0429   SODIUM mmol/L 133*  < > 136   POTASSIUM mmol/L 4 0  < > 4 2   CHLORIDE mmol/L 100  < > 102 CO2 mmol/L 27  < > 26   BUN mg/dL 10  < > 13   CREATININE mg/dL 0 83  < > 0 91   CALCIUM mg/dL 8 8  < > 9 2   TOTAL PROTEIN g/dL  --   --  7 7   BILIRUBIN TOTAL mg/dL  --   --  0 20   ALK PHOS U/L  --   --  94   ALT U/L  --   --  19   AST U/L  --   --  14   GLUCOSE RANDOM mg/dL 329*  < > 269*   < > = values in this interval not displayed  Results from last 7 days  Lab Units 10/12/17  1126   INR  1 08       * I Have Reviewed All Lab Data Listed Above  * Additional Pertinent Lab Tests Reviewed: [facility] Admission Reviewed    Imaging:    Imaging Reports Reviewed Today Include:   Imaging Personally Reviewed by Myself Includes:      Recent Cultures (last 7 days):       Results from last 7 days  Lab Units 10/16/17  1744 10/14/17  1738 10/13/17  1618 10/12/17  1224 10/12/17  1222 10/12/17  1220   BLOOD CULTURE   --  No Growth at 48 hrs  No Growth at 48 hrs   --   --  No Growth After 4 Days   Anaerobe (Organism type)*   GRAM STAIN RESULT  No Polys or Bacteria seen  --  Rare Polys  4+ Gram negative rods  2+ Gram positive cocci in pairs 1+ Polys  4+ Gram positive cocci in pairs  4+ Gram negative rods  2+ Gram positive rods  1+ Gram positive cocci in chains  --  Gram positive rods   WOUND CULTURE   --   --  4+ Growth of Enterococcus faecalis*  4+ Growth of Diphtheroids 4+ Growth of Diphtheroids  Few Colonies of Enterococcus faecalis*  1+ Growth of   --   --        Last 24 Hours Medication List:     atorvastatin 10 mg Oral Daily   buPROPion 150 mg Oral Daily   chlorthalidone 25 mg Oral Daily   docusate sodium 100 mg Oral BID   enoxaparin 40 mg Subcutaneous Daily   insulin glargine 74 Units Subcutaneous Daily With Breakfast   insulin glargine 74 Units Subcutaneous HS   insulin lispro 4-20 Units Subcutaneous TID AC   morphine 30 mg Oral Q8H   nicotine 1 patch Transdermal Daily   piperacillin-tazobactam 4 5 g Intravenous Q6H   saccharomyces boulardii 250 mg Oral BID   vancomycin 20 mg/kg (Adjusted) Intravenous Q12H        Today, Patient Was Seen By: Gabby Merino MD    ** Please Note: Dragon 360 Dictation voice to text software may have been used in the creation of this document   **

## 2017-10-17 NOTE — SOCIAL WORK
CM met with pt at bedside  [facility] (pt's [facility]st co[facility]) did not accept him, but [facility] did  Pt is agreeable to [facility]   OP/Care Coordinator will see pt upon discharge

## 2017-10-17 NOTE — PHYSICAL THERAPY NOTE
Physical Therapy Cancellation Note      Chart review performed  PT spoke with nurse Petrona Mendenhall regarding weight bearing status of patient secondary to patient underwent R transmetatarsal amputation on 10/16/2017  PT session cancelled at this time secondary to awaiting weight bearing clarification order for right lower extremity  PT will continue to follow patient and provide PT interventions as appropriate       Katerina Gaston, PT

## 2017-10-17 NOTE — PROGRESS NOTES
The metronidazole has / have been converted to Oral per [facility] IV-to-PO Auto-Conversion Protocol for Adults as approved by the Pharmacy and Therapeutics Committee  The patient met all eligible criteria:  3 Age = 27years old   2) Received at least one dose of the IV form   3) Receiving at least one other scheduled oral/enteral medication   4) Tolerating an oral/enteral diet   and did not have any exclusions:   1) Critical care patient   2) Active GI bleed (IF assessing H2RAs or PPIs)   3) Continuous tube feeding (IF assessing cipro, doxycycline, levofloxacin, minocycline, rifampin, or voriconazole)   4) Receiving PO vancomycin (IF assessing metronidazole)   5) Persistent nausea and/or vomiting   6) Ileus or gastrointestinal obstruction   7) Kenneth/nasogastric tube set for continuous suction   8) Specific order not to automatically convert to PO (in the order's comments or if discussed in the most recent Infectious Disease or primary team's progress notes)

## 2017-10-17 NOTE — PLAN OF CARE
Problem: DISCHARGE PLANNING - CARE MANAGEMENT  Goal: Discharge to post-acute care or home with appropriate resources  INTERVENTIONS:  - Conduct assessment to determine patient/family and health care team treatment goals, and need for post-acute services based on payer coverage, community resources, and patient preferences, and barriers to discharge  - Address psychosocial, clinical, and financial barriers to discharge as identified in assessment in conjunction with the patient/family and health care team  - Arrange appropriate level of post-acute services according to patient's   needs and preference and payer coverage in collaboration with the physician and health care team  - Communicate with and update the patient/family, physician, and health care team regarding progress on the discharge plan  - Arrange appropriate transportation to post-acute venues    Outcome: Progressing  CM met with pt at bedside  [facility] (pt's [facility]) did not accept him, but [facility] did  Pt is agreeable to [facility]   OP/Care Coordinator will see pt upon discharge

## 2017-10-17 NOTE — PROGRESS NOTES
Patient c/o Burning with IV Vanco  This nurse aware of Antibiotic changes per conversation with Dr Marianne Pagan    Per Dr Guy Jennie ok to discontinue Vanco at this time

## 2017-10-17 NOTE — PROGRESS NOTES
Progress Note - Podiatry  Shakeel Weaver 63 y o  male MRN: [id number]  Unit/Bed#: [facility] Encounter: [id number]      Assessment:   Status post amputation right hallux and 1st metatarsal     Plan:   Reviewed progress with patient  Wound examined irrigated and dressed  Nursing instructions given for daily irrigation with normal saline and light packing with Dermagran  Should continue present antibiotics until cultures are returned  Patient can be out of bed to go to was chair or to the bathroom  Limited weight-bearing in postop shoe  Subjective:  Patient reports general aches and pains otherwise feeling generally well  Had a pilonidal cyst looked that earlier today  He is anxious for discharge  Objective:    Vitals: Blood pressure 128/64, pulse 79, temperature 98 6 °F (37 °C), temperature source Oral, resp  rate 20, height 6' (1 829 m), weight (!) 137 kg (302 lb 0 5 oz), SpO2 94 %  ,Body mass index is 40 96 kg/m²  WBCs 17 04  Intra op cultures are pending  Physical Exam:  Patient is alert and oriented in all spheres he is in no acute distress  There are no new or acute neurovascular changes  There is no toe cyanosis or gangrene  There is no wound cyanosis or gangrene  Surgical wound is clean with pink edges and no appreciable necrotic tissue seen  There is some bloody oozing and some small blood clots in the wound  There is no pus or purulent discharge  There is no malodor  Appearance is stable  Left foot ulcer appears stable  There is pink pale moist granulation present  There is no acute edema or erythema  There is no pus or purulent discharge  Lab, Imaging and other studies: I have personally reviewed pertinent reports  Incision 10/16/17 Foot Right (Active)   Incision Description REFUGIO 10/17/2017  8:34 AM   Leeann-wound Assessment Clean;Dry; Intact 10/16/2017  6:56 PM   Drainage Amount Small 10/17/2017 12:31 AM   Drainage Description Bloody 10/17/2017 12:31 AM   Dressing Gauze 10/17/2017 12:31 AM   Wound packed? Yes 10/16/2017  6:56 PM   Packing- # inserted 1 10/16/2017  6:56 PM   Dressing Status Intact; Other (Comment) 10/17/2017 12:31 AM       Other Ulcers 10/13/17 Foot Right (Active)   Wound Description REFUGIO 10/17/2017  8:34 AM   Leeann-wound Assessment Edema;Pink;Purple 10/17/2017  8:34 AM   Shape oval 10/13/2017  4:53 PM   Wound Length (cm) 4 5 cm 10/13/2017  4:53 PM   Wound Width (cm) 2 5 cm 10/13/2017  4:53 PM   Wound Depth (cm) 1 5 10/13/2017  4:53 PM   Calculated Wound Volume (cm^3) 16 88 cm^3 10/13/2017  4:53 PM   Drainage Amount Small 10/17/2017  8:34 AM   Drainage Description Bloody 10/17/2017 12:31 AM   Treatment Offload 10/16/2017 12:45 PM   Dressings Gauze; Ace bandage;Sterile dressing 10/17/2017 12:31 AM   Wound packed? Yes 10/15/2017  1:00 PM   Dressing Changed Reinforced 10/16/2017 12:45 PM   Patient Tolerance Tolerated well 10/15/2017  1:00 PM   Dressing Status Intact; Old drainage 10/17/2017  8:34 AM       Other Ulcers 10/13/17 Foot Left (Active)   Wound Description REFUGIO 10/17/2017 12:31 AM   Leeann-wound Assessment Clean;Pink 10/15/2017  8:00 PM   Size oval 10/13/2017  4:53 PM   Wound Length (cm) 6 cm 10/13/2017  4:53 PM   Wound Width (cm) 2 75 cm 10/13/2017  4:53 PM   Drainage Amount None 10/17/2017 12:31 AM   Drainage Description REFUGIO 10/16/2017 12:45 PM   Treatment Offload 10/16/2017 12:45 PM   Dressings Gauze;Sterile dressing;Calcium Alginate with Silver 10/15/2017  1:00 PM   Wound packed? Yes 10/15/2017  1:00 PM   Dressing Changed Reinforced 10/16/2017 12:45 PM   Patient Tolerance Tolerated well 10/15/2017  1:00 PM   Dressing Status Clean;Dry; Intact 10/17/2017 12:31 AM       Other Ulcers 10/13/17 Foot Right; Inner (Active)   Wound Description REFUGIO 10/17/2017  8:34 AM   Leeann-wound Assessment [facility] 10/17/2017  8:34 AM   Wound Length (cm) 0 5 cm 10/13/2017  4:53 PM   Wound Width (cm) 0 75 cm 10/13/2017  4:53 PM   Drainage Amount REFUGIO 10/16/2017 12:45 PM   Drainage Description [facility] 10/16/2017 12:45 PM   Treatment Offload 10/16/2017 12:45 PM   Dressings Other (Comment) 10/15/2017  8:00 PM   Wound packed? Yes 10/14/2017  7:41 PM   Dressing Changed Reinforced 10/16/2017 12:45 PM   Patient Tolerance Tolerated well 10/15/2017  8:00 PM   Dressing Status Clean;Dry; Intact 10/17/2017  8:34 AM

## 2017-10-18 LAB
ALBUMIN SERPL BCP-MCNC: 2.2 G/DL (ref 3.5–5)
ALP SERPL-CCNC: 83 U/L (ref 46–116)
ALT SERPL W P-5'-P-CCNC: 18 U/L (ref 12–78)
ANION GAP SERPL CALCULATED.3IONS-SCNC: 8 MMOL/L (ref 4–13)
AST SERPL W P-5'-P-CCNC: 12 U/L (ref 5–45)
BASOPHILS # BLD AUTO: 0.12 THOUSANDS/ΜL (ref 0–0.1)
BASOPHILS NFR BLD AUTO: 1 % (ref 0–1)
BILIRUB SERPL-MCNC: 0.2 MG/DL (ref 0.2–1)
BUN SERPL-MCNC: 11 MG/DL (ref 5–25)
CALCIUM SERPL-MCNC: 9.1 MG/DL (ref 8.3–10.1)
CHLORIDE SERPL-SCNC: 101 MMOL/L (ref 100–108)
CO2 SERPL-SCNC: 26 MMOL/L (ref 21–32)
CREAT SERPL-MCNC: 0.82 MG/DL (ref 0.6–1.3)
EOSINOPHIL # BLD AUTO: 0.38 THOUSAND/ΜL (ref 0–0.61)
EOSINOPHIL NFR BLD AUTO: 3 % (ref 0–6)
ERYTHROCYTE [DISTWIDTH] IN BLOOD BY AUTOMATED COUNT: 14.7 % (ref 11.6–15.1)
GFR SERPL CREATININE-BSD FRML MDRD: 96 ML/MIN/1.73SQ M
GLUCOSE SERPL-MCNC: 190 MG/DL (ref 65–140)
GLUCOSE SERPL-MCNC: 212 MG/DL (ref 65–140)
GLUCOSE SERPL-MCNC: 247 MG/DL (ref 65–140)
GLUCOSE SERPL-MCNC: 277 MG/DL (ref 65–140)
GLUCOSE SERPL-MCNC: 300 MG/DL (ref 65–140)
GLUCOSE SERPL-MCNC: 310 MG/DL (ref 65–140)
HCT VFR BLD AUTO: 39.3 % (ref 36.5–49.3)
HGB BLD-MCNC: 12.7 G/DL (ref 12–17)
LYMPHOCYTES # BLD AUTO: 4.07 THOUSANDS/ΜL (ref 0.6–4.47)
LYMPHOCYTES NFR BLD AUTO: 28 % (ref 14–44)
MCH RBC QN AUTO: 26.1 PG (ref 26.8–34.3)
MCHC RBC AUTO-ENTMCNC: 32.3 G/DL (ref 31.4–37.4)
MCV RBC AUTO: 81 FL (ref 82–98)
MONOCYTES # BLD AUTO: 0.81 THOUSAND/ΜL (ref 0.17–1.22)
MONOCYTES NFR BLD AUTO: 6 % (ref 4–12)
NEUTROPHILS # BLD AUTO: 8.93 THOUSANDS/ΜL (ref 1.85–7.62)
NEUTS SEG NFR BLD AUTO: 62 % (ref 43–75)
NRBC BLD AUTO-RTO: 0 /100 WBCS
PLATELET # BLD AUTO: 634 THOUSANDS/UL (ref 149–390)
PMV BLD AUTO: 9 FL (ref 8.9–12.7)
POTASSIUM SERPL-SCNC: 4 MMOL/L (ref 3.5–5.3)
PROT SERPL-MCNC: 7.8 G/DL (ref 6.4–8.2)
RBC # BLD AUTO: 4.87 MILLION/UL (ref 3.88–5.62)
SODIUM SERPL-SCNC: 135 MMOL/L (ref 136–145)
WBC # BLD AUTO: 14.51 THOUSAND/UL (ref 4.31–10.16)

## 2017-10-18 PROCEDURE — 94760 N-INVAS EAR/PLS OXIMETRY 1: CPT

## 2017-10-18 PROCEDURE — 94640 AIRWAY INHALATION TREATMENT: CPT

## 2017-10-18 PROCEDURE — 85025 COMPLETE CBC W/AUTO DIFF WBC: CPT | Performed by: GENERAL PRACTICE

## 2017-10-18 PROCEDURE — 82948 REAGENT STRIP/BLOOD GLUCOSE: CPT

## 2017-10-18 PROCEDURE — 80053 COMPREHEN METABOLIC PANEL: CPT | Performed by: GENERAL PRACTICE

## 2017-10-18 RX ADMIN — HYDROMORPHONE HYDROCHLORIDE 1 MG: 1 INJECTION, SOLUTION INTRAMUSCULAR; INTRAVENOUS; SUBCUTANEOUS at 00:20

## 2017-10-18 RX ADMIN — OXYCODONE HYDROCHLORIDE 15 MG: 5 TABLET ORAL at 09:05

## 2017-10-18 RX ADMIN — Medication 250 MG: at 08:56

## 2017-10-18 RX ADMIN — INSULIN LISPRO 8 UNITS: 100 INJECTION, SOLUTION INTRAVENOUS; SUBCUTANEOUS at 11:37

## 2017-10-18 RX ADMIN — PIPERACILLIN SODIUM,TAZOBACTAM SODIUM 4.5 G: 4; .5 INJECTION, POWDER, FOR SOLUTION INTRAVENOUS at 03:04

## 2017-10-18 RX ADMIN — MORPHINE SULFATE 30 MG: 30 TABLET, EXTENDED RELEASE ORAL at 21:41

## 2017-10-18 RX ADMIN — ATORVASTATIN CALCIUM 10 MG: 10 TABLET, FILM COATED ORAL at 15:58

## 2017-10-18 RX ADMIN — IPRATROPIUM BROMIDE AND ALBUTEROL SULFATE 3 ML: .5; 3 SOLUTION RESPIRATORY (INHALATION) at 20:43

## 2017-10-18 RX ADMIN — IPRATROPIUM BROMIDE AND ALBUTEROL SULFATE 3 ML: .5; 3 SOLUTION RESPIRATORY (INHALATION) at 08:00

## 2017-10-18 RX ADMIN — NICOTINE 1 PATCH: 21 PATCH, EXTENDED RELEASE TRANSDERMAL at 08:56

## 2017-10-18 RX ADMIN — PIPERACILLIN SODIUM,TAZOBACTAM SODIUM 4.5 G: 4; .5 INJECTION, POWDER, FOR SOLUTION INTRAVENOUS at 22:40

## 2017-10-18 RX ADMIN — INSULIN LISPRO 4 UNITS: 100 INJECTION, SOLUTION INTRAVENOUS; SUBCUTANEOUS at 07:49

## 2017-10-18 RX ADMIN — INSULIN GLARGINE 74 UNITS: 100 INJECTION, SOLUTION SUBCUTANEOUS at 21:40

## 2017-10-18 RX ADMIN — IPRATROPIUM BROMIDE AND ALBUTEROL SULFATE 3 ML: .5; 3 SOLUTION RESPIRATORY (INHALATION) at 13:42

## 2017-10-18 RX ADMIN — METRONIDAZOLE 500 MG: 500 TABLET ORAL at 14:07

## 2017-10-18 RX ADMIN — HYDROMORPHONE HYDROCHLORIDE 1 MG: 1 INJECTION, SOLUTION INTRAMUSCULAR; INTRAVENOUS; SUBCUTANEOUS at 11:00

## 2017-10-18 RX ADMIN — METRONIDAZOLE 500 MG: 500 TABLET ORAL at 06:19

## 2017-10-18 RX ADMIN — Medication 250 MG: at 18:04

## 2017-10-18 RX ADMIN — CHLORTHALIDONE 25 MG: 25 TABLET ORAL at 08:56

## 2017-10-18 RX ADMIN — OXYCODONE HYDROCHLORIDE 15 MG: 5 TABLET ORAL at 13:42

## 2017-10-18 RX ADMIN — OXYCODONE HYDROCHLORIDE 15 MG: 5 TABLET ORAL at 03:05

## 2017-10-18 RX ADMIN — ENOXAPARIN SODIUM 40 MG: 40 INJECTION SUBCUTANEOUS at 08:56

## 2017-10-18 RX ADMIN — MORPHINE SULFATE 30 MG: 30 TABLET, EXTENDED RELEASE ORAL at 14:07

## 2017-10-18 RX ADMIN — DOCUSATE SODIUM 100 MG: 100 CAPSULE, LIQUID FILLED ORAL at 18:04

## 2017-10-18 RX ADMIN — PIPERACILLIN SODIUM,TAZOBACTAM SODIUM 4.5 G: 4; .5 INJECTION, POWDER, FOR SOLUTION INTRAVENOUS at 15:49

## 2017-10-18 RX ADMIN — MORPHINE SULFATE 30 MG: 30 TABLET, EXTENDED RELEASE ORAL at 06:19

## 2017-10-18 RX ADMIN — DOCUSATE SODIUM 100 MG: 100 CAPSULE, LIQUID FILLED ORAL at 08:56

## 2017-10-18 RX ADMIN — OXYCODONE HYDROCHLORIDE 15 MG: 5 TABLET ORAL at 18:10

## 2017-10-18 RX ADMIN — PIPERACILLIN SODIUM,TAZOBACTAM SODIUM 4.5 G: 4; .5 INJECTION, POWDER, FOR SOLUTION INTRAVENOUS at 09:05

## 2017-10-18 RX ADMIN — OXYCODONE HYDROCHLORIDE 15 MG: 5 TABLET ORAL at 22:48

## 2017-10-18 RX ADMIN — METRONIDAZOLE 500 MG: 500 TABLET ORAL at 21:41

## 2017-10-18 RX ADMIN — INSULIN GLARGINE 74 UNITS: 100 INJECTION, SOLUTION SUBCUTANEOUS at 07:46

## 2017-10-18 RX ADMIN — BUPROPION HYDROCHLORIDE 150 MG: 150 TABLET, EXTENDED RELEASE ORAL at 08:56

## 2017-10-18 RX ADMIN — INSULIN LISPRO 12 UNITS: 100 INJECTION, SOLUTION INTRAVENOUS; SUBCUTANEOUS at 16:02

## 2017-10-18 NOTE — PROGRESS NOTES
Patient continuously requesting for food, (3 cereal bowls etc), snack brought from home at bedside, gums, etc  Patient is told that his blood glucose is high and him eating very often might not be such a good idea  Patient not very happy about it  Will continue to monitor

## 2017-10-18 NOTE — PROGRESS NOTES
Progress Note - Wound   Phylliss Dennis 63 y o  male MRN: [id number]  Unit/Bed#: [facility] Encounter: [id number]      Assessment:   Status post amputation right hallux and 1st metatarsal   Leukocytosis is improving  Plan:   Reviewed progress with patient  Wound examined irrigated and dressed  Continue present wound care  Intraoperative cultures are pending  Subjective:  Patient reports his usual general aches and pains  He takes oral narcotic analgesics for this at home  Objective:    Vitals: Blood pressure 118/68, pulse 76, temperature 98 °F (36 7 °C), temperature source Oral, resp  rate 18, height 6' (1 829 m), weight (!) 137 kg (302 lb 0 5 oz), SpO2 94 %  ,Body mass index is 40 96 kg/m²  WBCs are improving at 14 51  Intraoperative cultures are still pending  Previous wound swabs showed enterococcus and diphtheroids  Physical Exam:  Patient is alert and oriented in all spheres  He is in no acute distress  There are no new or acute neurovascular changes  There is a profound bilateral sensory neuropathy in the feet  There is no pain with manipulation and examination of surgical wound  There is no cyanosis or gangrene  There has been bleeding through the previous right foot bandage  Surgical wound right foot is clean with pink bleeding edges  There is no necrotic material seen  There is no pus or purulence expressed  There is no malodor  The wound is moist and pink with some bloody oozing  Appearance is stable  Left foot ulcer remains stable  There is no acute edema or erythema, no pus or purulent discharge  There is pink pale moist granulation present throughout  Lab, Imaging and other studies: I have personally reviewed pertinent reports        Incision 10/16/17 Foot Right (Active)   Incision Description REFUGIO 10/18/2017  7:45 AM   Leeann-wound Assessment Bleeding 10/18/2017 12:00 AM   Drainage Amount Large 10/18/2017 12:00 AM   Drainage Description Bloody 10/18/2017 12:00 AM   Dressing Gauze 10/18/2017 12:00 AM   Wound packed? Yes 10/16/2017  6:56 PM   Packing- # inserted 1 10/16/2017  6:56 PM   Dressing Changed New dressing applied 10/18/2017 12:00 AM   Patient Tolerance Tolerated well 10/18/2017 12:00 AM   Dressing Status Clean;Dry; Intact 10/18/2017 12:00 AM       Other Ulcers 10/13/17 Foot Right (Active)   Wound Description REFUGIO 10/18/2017  7:45 AM   Leeann-wound Assessment Edema 10/18/2017 12:00 AM   Shape oval 10/13/2017  4:53 PM   Wound Length (cm) 4 5 cm 10/13/2017  4:53 PM   Wound Width (cm) 2 5 cm 10/13/2017  4:53 PM   Wound Depth (cm) 1 5 10/13/2017  4:53 PM   Calculated Wound Volume (cm^3) 16 88 cm^3 10/13/2017  4:53 PM   Drainage Amount Small 10/18/2017  7:45 AM   Drainage Description Bloody 10/18/2017 12:00 AM   Treatment Elevated with pillow(s) 10/18/2017  7:45 AM   Dressings Gauze 10/18/2017 12:00 AM   Wound packed? Yes 10/18/2017 12:00 AM   Dressing Changed Changed 10/18/2017 12:00 AM   Patient Tolerance Tolerated well 10/18/2017 12:00 AM   Dressing Status Intact; Old drainage 10/18/2017  7:45 AM       Other Ulcers 10/13/17 Foot Left (Active)   Wound Description REFUGIO 10/18/2017 12:00 AM   Leeann-wound Assessment [place];Pale 10/17/2017  6:00 PM   Size oval 10/13/2017  4:53 PM   Wound Length (cm) 6 cm 10/13/2017  4:53 PM   Wound Width (cm) 2 75 cm 10/13/2017  4:53 PM   Drainage Amount None 10/18/2017 12:00 AM   Drainage Description [place] 10/17/2017  6:00 PM   Treatment Cleansed;Elevated with pillow(s) 10/17/2017  6:00 PM   Dressings Gauze; Other (Comment) 10/17/2017  6:00 PM   Wound packed? Yes 10/15/2017  1:00 PM   Dressing Changed Reinforced 10/16/2017 12:45 PM   Patient Tolerance Tolerated well 10/17/2017  8:00 PM   Dressing Status Clean;Dry; Intact 10/18/2017 12:00 AM       Other Ulcers 10/13/17 Foot Right; Inner (Active)   Wound Description REFUGIO 10/18/2017  7:45 AM   Leeann-wound Assessment REFUGIO 10/18/2017  7:45 AM   Wound Length (cm) 0 5 cm 10/13/2017  4:53 PM   Wound Width (cm) 0 75 cm 10/13/2017  4:53 PM   Drainage Amount Large 10/18/2017 12:00 AM   Drainage Description Bloody 10/18/2017 12:00 AM   Treatment Offload;Cleansed;Elevated with pillow(s) 10/18/2017 12:00 AM   Dressings Gauze 10/18/2017 12:00 AM   Wound packed? Yes 10/18/2017 12:00 AM   Dressing Changed New dressing applied 10/18/2017 12:00 AM   Patient Tolerance Tolerated well 10/18/2017 12:00 AM   Dressing Status Clean;Dry; Intact 10/18/2017  7:45 AM

## 2017-10-18 NOTE — PROGRESS NOTES
[facility] Internal Medicine Progress Note  Patient: Rafi Faustin 63 y o  male   MRN: [id number]  PCP: Roxana Lou MD  Unit/Bed#: -01 Encounter: [id number]  Date Of Visit: 10/18/17    Assessment:    Principal Problem:    Skin ulcer of right foot with fat layer exposed ([facility] )  Active Problems:    Tobacco abuse    Morbid obesity ([facility] )    Type 2 diabetes mellitus with foot ulcer, with long-term current use of insulin ([facility] )    Chronic prescription opiate use    Essential hypertension    Positive blood culture      Plan:      * Skin ulcer of right foot with fat layer exposed ([facility] )   Overview     Podiatry following, s/p amputation 10/16/17  Wound culture positive for diptheroids Enterococcus sens to vanco and zosyn and diptheroids-d/c vancomycin and continue zosyn and add flagyl; awaiting tissue cultures   l      Type 2 diabetes mellitus with foot ulcer, with long-term current use of insulin ([facility])   Overview     Chronically poorly controlled, latest A1c 8 8   nonadherence to outpatient follow-up in regards to his diabetes and foot ulcers  Lantus 74 units b i d  Chronic prescription opiate use   Overview     Podiatry recommends  Further follow up with pain management outpatient,       Morbid obesity ([facility] )   Overview     difficulty with ambulation, financial issues, depression and caregiver burnout   Tobacco abuse   Overview     Continue Nicotine patch   Positive blood culture   Overview     GPR-likely diptheroids-but also in wound culture-add flagyl with leucocytosis   Essential hypertension   Overview     Continue chlorthalidone      Opioid dependence, in the setting of  MS Contin Q 8 hours & oxycodone prn use at home, and continued on this admission  VTE Pharmacologic Prophylaxis:   Pharmacologic: Enoxaparin (Lovenox)  Mechanical VTE Prophylaxis in Place: Yes    Patient Centered Rounds: I have performed bedside rounds with nursing staff today      Discussions with Specialists or Other Care Team Provider: Education and Discussions with Family / Patient:     Time Spent for Care: 30 minutes  More than 50% of total time spent on counseling and coordination of care as described above  Current Length of Stay: 6 day(s)    Current Patient Status: Inpatient   Certification Statement: The patient will continue to require additional inpatient hospital stay due to osteomyelitis    Discharge Plan: home with PT    Code Status: Level 1 - Full Code      Subjective:   Less foot pain  Objective:     Vitals:   Temp (24hrs), Av 2 °F (36 8 °C), Min:97 8 °F (36 6 °C), Max:98 7 °F (37 1 °C)    HR:  [72-81] 76  Resp:  [16-18] 18  BP: (109-129)/(59-69) 118/68  SpO2:  [92 %-96 %] 94 %  Body mass index is 40 96 kg/m²  Input and Output Summary (last 24 hours): Intake/Output Summary (Last 24 hours) at 10/18/17 1157  Last data filed at 10/18/17 0800   Gross per 24 hour   Intake              460 ml   Output             2025 ml   Net            -1565 ml       Physical Exam:     Physical Exam   HENT:   Head: Normocephalic and atraumatic  Eyes: Pupils are equal, round, and reactive to light  Cardiovascular: Normal rate and regular rhythm  Pulmonary/Chest: Effort normal and breath sounds normal    Abdominal: Soft  Musculoskeletal: He exhibits edema     sanginous d/c on foot bandages       Additional Data:     Labs:      Results from last 7 days  Lab Units 10/18/17  0526   WBC Thousand/uL 14 51*   HEMOGLOBIN g/dL 12 7   HEMATOCRIT % 39 3   PLATELETS Thousands/uL 634*   NEUTROS PCT % 62   LYMPHS PCT % 28   MONOS PCT % 6   EOS PCT % 3       Results from last 7 days  Lab Units 10/18/17  0527   SODIUM mmol/L 135*   POTASSIUM mmol/L 4 0   CHLORIDE mmol/L 101   CO2 mmol/L 26   BUN mg/dL 11   CREATININE mg/dL 0 82   CALCIUM mg/dL 9 1   TOTAL PROTEIN g/dL 7 8   BILIRUBIN TOTAL mg/dL 0 20   ALK PHOS U/L 83   ALT U/L 18   AST U/L 12   GLUCOSE RANDOM mg/dL 212*       Results from last 7 days  Lab Units 10/12/17  1126   INR  1 08 * I Have Reviewed All Lab Data Listed Above  * Additional Pertinent Lab Tests Reviewed: All Labs Within Last 24 Hours Reviewed    Imaging:    Imaging Reports Reviewed Today Include:   Imaging Personally Reviewed by Myself Includes:      Recent Cultures (last 7 days):       Results from last 7 days  Lab Units 10/16/17  1744 10/14/17  1738 10/13/17  1618 10/12/17  1224 10/12/17  1222 10/12/17  1220   BLOOD CULTURE   --  No Growth at 72 hrs  No Growth at 72 hrs   --   --  No Growth After 5 Days  Anaerobe (Organism type)*   GRAM STAIN RESULT  No Polys or Bacteria seen  --  Rare Polys  4+ Gram negative rods  2+ Gram positive cocci in pairs 1+ Polys  4+ Gram positive cocci in pairs  4+ Gram negative rods  2+ Gram positive rods  1+ Gram positive cocci in chains  --  Gram positive rods   WOUND CULTURE   --   --  4+ Growth of Enterococcus faecalis*  4+ Growth of Diphtheroids 4+ Growth of Diphtheroids  Few Colonies of Enterococcus faecalis*  1+ Growth of   --   --        Last 24 Hours Medication List:     atorvastatin 10 mg Oral Daily   buPROPion 150 mg Oral Daily   chlorthalidone 25 mg Oral Daily   docusate sodium 100 mg Oral BID   enoxaparin 40 mg Subcutaneous Daily   insulin glargine 74 Units Subcutaneous Daily With Breakfast   insulin glargine 74 Units Subcutaneous HS   insulin lispro 4-20 Units Subcutaneous TID AC   ipratropium-albuterol 3 mL Nebulization TID   metroNIDAZOLE 500 mg Oral Q8H LEONCIO   morphine 30 mg Oral Q8H   nicotine 1 patch Transdermal Daily   piperacillin-tazobactam 4 5 g Intravenous Q6H   saccharomyces boulardii 250 mg Oral BID        Today, Patient Was Seen By: Cj Hoover MD    ** Please Note: Dragon 360 Dictation voice to text software may have been used in the creation of this document   **

## 2017-10-19 LAB
ALBUMIN SERPL BCP-MCNC: 2.2 G/DL (ref 3.5–5)
ALP SERPL-CCNC: 84 U/L (ref 46–116)
ALT SERPL W P-5'-P-CCNC: 17 U/L (ref 12–78)
ANION GAP SERPL CALCULATED.3IONS-SCNC: 7 MMOL/L (ref 4–13)
AST SERPL W P-5'-P-CCNC: 11 U/L (ref 5–45)
BACTERIA BLD CULT: ABNORMAL
BACTERIA BLD CULT: NORMAL
BACTERIA BLD CULT: NORMAL
BACTERIA SPEC ANAEROBE CULT: NO GROWTH
BASOPHILS # BLD AUTO: 0.12 THOUSANDS/ΜL (ref 0–0.1)
BASOPHILS NFR BLD AUTO: 1 % (ref 0–1)
BILIRUB SERPL-MCNC: 0.2 MG/DL (ref 0.2–1)
BUN SERPL-MCNC: 15 MG/DL (ref 5–25)
CALCIUM SERPL-MCNC: 8.9 MG/DL (ref 8.3–10.1)
CHLORIDE SERPL-SCNC: 100 MMOL/L (ref 100–108)
CO2 SERPL-SCNC: 27 MMOL/L (ref 21–32)
CREAT SERPL-MCNC: 0.97 MG/DL (ref 0.6–1.3)
EOSINOPHIL # BLD AUTO: 0.41 THOUSAND/ΜL (ref 0–0.61)
EOSINOPHIL NFR BLD AUTO: 3 % (ref 0–6)
ERYTHROCYTE [DISTWIDTH] IN BLOOD BY AUTOMATED COUNT: 14.6 % (ref 11.6–15.1)
GFR SERPL CREATININE-BSD FRML MDRD: 84 ML/MIN/1.73SQ M
GLUCOSE SERPL-MCNC: 246 MG/DL (ref 65–140)
GLUCOSE SERPL-MCNC: 250 MG/DL (ref 65–140)
GLUCOSE SERPL-MCNC: 302 MG/DL (ref 65–140)
GLUCOSE SERPL-MCNC: 321 MG/DL (ref 65–140)
GLUCOSE SERPL-MCNC: 359 MG/DL (ref 65–140)
GRAM STN SPEC: ABNORMAL
HCT VFR BLD AUTO: 39.1 % (ref 36.5–49.3)
HGB BLD-MCNC: 12.3 G/DL (ref 12–17)
LYMPHOCYTES # BLD AUTO: 4.05 THOUSANDS/ΜL (ref 0.6–4.47)
LYMPHOCYTES NFR BLD AUTO: 28 % (ref 14–44)
MCH RBC QN AUTO: 25.6 PG (ref 26.8–34.3)
MCHC RBC AUTO-ENTMCNC: 31.5 G/DL (ref 31.4–37.4)
MCV RBC AUTO: 81 FL (ref 82–98)
MONOCYTES # BLD AUTO: 0.78 THOUSAND/ΜL (ref 0.17–1.22)
MONOCYTES NFR BLD AUTO: 5 % (ref 4–12)
NEUTROPHILS # BLD AUTO: 9.18 THOUSANDS/ΜL (ref 1.85–7.62)
NEUTS SEG NFR BLD AUTO: 62 % (ref 43–75)
NRBC BLD AUTO-RTO: 0 /100 WBCS
PLATELET # BLD AUTO: 643 THOUSANDS/UL (ref 149–390)
PMV BLD AUTO: 8.9 FL (ref 8.9–12.7)
POTASSIUM SERPL-SCNC: 4.2 MMOL/L (ref 3.5–5.3)
PROT SERPL-MCNC: 7.8 G/DL (ref 6.4–8.2)
RBC # BLD AUTO: 4.81 MILLION/UL (ref 3.88–5.62)
SODIUM SERPL-SCNC: 134 MMOL/L (ref 136–145)
WBC # BLD AUTO: 14.75 THOUSAND/UL (ref 4.31–10.16)

## 2017-10-19 PROCEDURE — 85025 COMPLETE CBC W/AUTO DIFF WBC: CPT | Performed by: GENERAL PRACTICE

## 2017-10-19 PROCEDURE — 94760 N-INVAS EAR/PLS OXIMETRY 1: CPT

## 2017-10-19 PROCEDURE — 82948 REAGENT STRIP/BLOOD GLUCOSE: CPT

## 2017-10-19 PROCEDURE — 94640 AIRWAY INHALATION TREATMENT: CPT

## 2017-10-19 PROCEDURE — 80053 COMPREHEN METABOLIC PANEL: CPT | Performed by: GENERAL PRACTICE

## 2017-10-19 RX ORDER — MAGNESIUM CARB/ALUMINUM HYDROX 105-160MG
296 TABLET,CHEWABLE ORAL ONCE
Status: COMPLETED | OUTPATIENT
Start: 2017-10-19 | End: 2017-10-19

## 2017-10-19 RX ORDER — AMOXICILLIN AND CLAVULANATE POTASSIUM 875; 125 MG/1; MG/1
1 TABLET, FILM COATED ORAL EVERY 12 HOURS SCHEDULED
Status: DISCONTINUED | OUTPATIENT
Start: 2017-10-19 | End: 2017-10-20 | Stop reason: HOSPADM

## 2017-10-19 RX ADMIN — INSULIN LISPRO 12 UNITS: 100 INJECTION, SOLUTION INTRAVENOUS; SUBCUTANEOUS at 11:59

## 2017-10-19 RX ADMIN — AMOXICILLIN AND CLAVULANATE POTASSIUM 1 TABLET: 875; 125 TABLET, FILM COATED ORAL at 21:36

## 2017-10-19 RX ADMIN — METRONIDAZOLE 500 MG: 500 TABLET ORAL at 05:50

## 2017-10-19 RX ADMIN — IPRATROPIUM BROMIDE AND ALBUTEROL SULFATE 3 ML: .5; 3 SOLUTION RESPIRATORY (INHALATION) at 20:26

## 2017-10-19 RX ADMIN — INSULIN GLARGINE 74 UNITS: 100 INJECTION, SOLUTION SUBCUTANEOUS at 08:01

## 2017-10-19 RX ADMIN — INSULIN LISPRO 8 UNITS: 100 INJECTION, SOLUTION INTRAVENOUS; SUBCUTANEOUS at 15:53

## 2017-10-19 RX ADMIN — ENOXAPARIN SODIUM 40 MG: 40 INJECTION SUBCUTANEOUS at 08:03

## 2017-10-19 RX ADMIN — OXYCODONE HYDROCHLORIDE 15 MG: 5 TABLET ORAL at 08:02

## 2017-10-19 RX ADMIN — OXYCODONE HYDROCHLORIDE 15 MG: 5 TABLET ORAL at 03:20

## 2017-10-19 RX ADMIN — Medication 250 MG: at 08:03

## 2017-10-19 RX ADMIN — MAGESIUM CITRATE 296 ML: 1.75 LIQUID ORAL at 16:20

## 2017-10-19 RX ADMIN — BUPROPION HYDROCHLORIDE 150 MG: 150 TABLET, EXTENDED RELEASE ORAL at 08:03

## 2017-10-19 RX ADMIN — METRONIDAZOLE 500 MG: 500 TABLET ORAL at 13:42

## 2017-10-19 RX ADMIN — MORPHINE SULFATE 30 MG: 30 TABLET, EXTENDED RELEASE ORAL at 13:42

## 2017-10-19 RX ADMIN — OXYCODONE HYDROCHLORIDE 15 MG: 5 TABLET ORAL at 21:42

## 2017-10-19 RX ADMIN — MORPHINE SULFATE 30 MG: 30 TABLET, EXTENDED RELEASE ORAL at 05:50

## 2017-10-19 RX ADMIN — PIPERACILLIN SODIUM,TAZOBACTAM SODIUM 4.5 G: 4; .5 INJECTION, POWDER, FOR SOLUTION INTRAVENOUS at 09:16

## 2017-10-19 RX ADMIN — NICOTINE 1 PATCH: 21 PATCH, EXTENDED RELEASE TRANSDERMAL at 08:03

## 2017-10-19 RX ADMIN — Medication 250 MG: at 15:51

## 2017-10-19 RX ADMIN — OXYCODONE HYDROCHLORIDE 15 MG: 5 TABLET ORAL at 15:51

## 2017-10-19 RX ADMIN — OXYCODONE HYDROCHLORIDE 15 MG: 5 TABLET ORAL at 11:58

## 2017-10-19 RX ADMIN — IPRATROPIUM BROMIDE AND ALBUTEROL SULFATE 3 ML: .5; 3 SOLUTION RESPIRATORY (INHALATION) at 08:26

## 2017-10-19 RX ADMIN — DOCUSATE SODIUM 100 MG: 100 CAPSULE, LIQUID FILLED ORAL at 08:03

## 2017-10-19 RX ADMIN — PIPERACILLIN SODIUM,TAZOBACTAM SODIUM 4.5 G: 4; .5 INJECTION, POWDER, FOR SOLUTION INTRAVENOUS at 03:20

## 2017-10-19 RX ADMIN — IPRATROPIUM BROMIDE AND ALBUTEROL SULFATE 3 ML: .5; 3 SOLUTION RESPIRATORY (INHALATION) at 15:32

## 2017-10-19 RX ADMIN — CHLORTHALIDONE 25 MG: 25 TABLET ORAL at 09:16

## 2017-10-19 RX ADMIN — INSULIN GLARGINE 74 UNITS: 100 INJECTION, SOLUTION SUBCUTANEOUS at 21:36

## 2017-10-19 RX ADMIN — ATORVASTATIN CALCIUM 10 MG: 10 TABLET, FILM COATED ORAL at 15:51

## 2017-10-19 RX ADMIN — INSULIN LISPRO 12 UNITS: 100 INJECTION, SOLUTION INTRAVENOUS; SUBCUTANEOUS at 07:54

## 2017-10-19 NOTE — PROGRESS NOTES
[facility] Internal Medicine Progress Note  Patient: Karla Steven 63 y o  male   MRN: [id number]  PCP: Soni Evans MD  Unit/Bed#: -01 Encounter: [id number]  Date Of Visit: 10/19/17    Assessment:    Principal Problem:    Skin ulcer of right foot with fat layer exposed ([facility] )  Active Problems:    Tobacco abuse    Morbid obesity ([place] [id number] )    Type 2 diabetes mellitus with foot ulcer, with long-term current use of insulin ([facility] )    Chronic prescription opiate use    Essential hypertension    Positive blood culture      Plan:  * Skin ulcer of right foot with fat layer exposed ([place] [address] )   Overview     Podiatry following, s/p amputation 10/16/17  Wound culture positive for diptheroids Enterococcus sens to vanco and zosyn and diptheroids-d/c vancomycin and continue zosyn and add flagyl; awaiting tissue cultures   Persistent leucocytosis-ID consult      Type 2 diabetes mellitus with foot ulcer, with long-term current use of insulin ([facility])   Overview     Chronically poorly controlled, latest A1c 8 8   nonadherence to outpatient follow-up in regards to his diabetes and foot ulcers  Lantus 74 units b i d      Chronic prescription opiate use   Overview     Podiatry recommends  Further follow up with pain management outpatient,       Morbid obesity ([facility] )   Overview     difficulty with ambulation, financial issues, depression and caregiver burnout   Tobacco abuse   Overview     Continue Nicotine patch   Positive blood culture   Overview     GPR-likely diptheroids-but also in wound culture-flagyl with leucocytosis; fup BC neg with persistent leucocytosis-ID consult   Essential hypertension   Overview     Continue chlorthalidone      Opioid dependence, in the setting of  MS Contin Q 8 hours & oxycodone prn use at home, and continued on this admission            VTE Pharmacologic Prophylaxis:   Pharmacologic: Enoxaparin (Lovenox)  Mechanical VTE Prophylaxis in Place: Yes    Patient Centered Rounds: I have performed bedside rounds with nursing staff today  Discussions with Specialists or Other Care Team Provider:     Education and Discussions with Family / Patient:     Time Spent for Care: 30 minutes  More than 50% of total time spent on counseling and coordination of care as described above  Current Length of Stay: 7 day(s)    Current Patient Status: Inpatient   Certification Statement: The patient will continue to require additional inpatient hospital stay due to osteomyelitis    Discharge Plan: home with PT    Code Status: Level 1 - Full Code      Subjective:   Anxious to go home foot pain improved    Objective:     Vitals:   Temp (24hrs), Av 9 °F (36 6 °C), Min:97 6 °F (36 4 °C), Max:98 °F (36 7 °C)    HR:  [71-82] 71  Resp:  [18] 18  BP: (118-132)/(60-71) 123/68  SpO2:  [94 %-97 %] 96 %  Body mass index is 39 87 kg/m²  Input and Output Summary (last 24 hours): Intake/Output Summary (Last 24 hours) at 10/19/17 1059  Last data filed at 10/19/17 0826   Gross per 24 hour   Intake              960 ml   Output              525 ml   Net              435 ml       Physical Exam:     Physical Exam   Constitutional: He appears well-developed and well-nourished  HENT:   Head: Normocephalic and atraumatic  Eyes: Pupils are equal, round, and reactive to light  Cardiovascular: Normal rate and regular rhythm  Pulmonary/Chest: Effort normal and breath sounds normal    Musculoskeletal: He exhibits edema         Additional Data:     Labs:      Results from last 7 days  Lab Units 10/19/17  0432   WBC Thousand/uL 14 75*   HEMOGLOBIN g/dL 12 3   HEMATOCRIT % 39 1   PLATELETS Thousands/uL 643*   NEUTROS PCT % 62   LYMPHS PCT % 28   MONOS PCT % 5   EOS PCT % 3       Results from last 7 days  Lab Units 10/19/17  0432   SODIUM mmol/L 134*   POTASSIUM mmol/L 4 2   CHLORIDE mmol/L 100   CO2 mmol/L 27   BUN mg/dL 15   CREATININE mg/dL 0 97   CALCIUM mg/dL 8 9   TOTAL PROTEIN g/dL 7 8   BILIRUBIN TOTAL mg/dL 0 20   ALK PHOS U/L 84   ALT U/L 17 AST U/L 11   GLUCOSE RANDOM mg/dL 359*       Results from last 7 days  Lab Units 10/12/17  1126   INR  1 08       * I Have Reviewed All Lab Data Listed Above  * Additional Pertinent Lab Tests Reviewed: All Labs Within Last 24 Hours Reviewed    Imaging:    Imaging Reports Reviewed Today Include:   Imaging Personally Reviewed by Myself Includes:      Recent Cultures (last 7 days):       Results from last 7 days  Lab Units 10/16/17  1744 10/14/17  1738 10/13/17  1618 10/12/17  1224 10/12/17  1222 10/12/17  1220   BLOOD CULTURE   --  No Growth After 4 Days  No Growth After 4 Days  --   --  No Growth After 5 Days  Anaerobe (Organism type)*   GRAM STAIN RESULT  No Polys or Bacteria seen  --  Rare Polys  4+ Gram negative rods  2+ Gram positive cocci in pairs 1+ Polys  4+ Gram positive cocci in pairs  4+ Gram negative rods  2+ Gram positive rods  1+ Gram positive cocci in chains  --  Gram positive rods   WOUND CULTURE   --   --  4+ Growth of Enterococcus faecalis*  4+ Growth of Diphtheroids 4+ Growth of Diphtheroids  Few Colonies of Enterococcus faecalis*  1+ Growth of   --   --        Last 24 Hours Medication List:     atorvastatin 10 mg Oral Daily   buPROPion 150 mg Oral Daily   chlorthalidone 25 mg Oral Daily   docusate sodium 100 mg Oral BID   enoxaparin 40 mg Subcutaneous Daily   insulin glargine 74 Units Subcutaneous Daily With Breakfast   insulin glargine 74 Units Subcutaneous HS   insulin lispro 4-20 Units Subcutaneous TID AC   ipratropium-albuterol 3 mL Nebulization TID   metroNIDAZOLE 500 mg Oral Q8H LEONCIO   morphine 30 mg Oral Q8H   nicotine 1 patch Transdermal Daily   piperacillin-tazobactam 4 5 g Intravenous Q6H   saccharomyces boulardii 250 mg Oral BID        Today, Patient Was Seen By: Gabby Merino MD    ** Please Note: Dragon 360 Dictation voice to text software may have been used in the creation of this document   **

## 2017-10-19 NOTE — PROGRESS NOTES
Upon entering pt's room, smell of smoke from bathroom  Pt denied smoking in room  This RN stated: Hospital policy is no smoking and with 02 in room very dangerous " Pt also advised of having a nicotine patch in place  [facility]

## 2017-10-19 NOTE — CONSULTS
Consultation - Infectious Disease   Laura Vo 63 y o  male MRN: [id number]  Unit/Bed#: -01 Encounter: [id number]      IMPRESSION & RECOMMENDATIONS:   1  Presumptive right hallux osteomyelitis-likely polymicrobial   No resistant organisms isolated  He is status post curative resection of the toe down to the proximal metatarsal region  He has been on extremely broad-spectrum antibiotics  At this point it seems reasonable to narrow the spectrum of the antibiotics, and transition to oral   -discontinue Zosyn and Flagyl  -Augmentin 875 mg p o  q 12 hours through 10/23/2017 to complete 7 days of treatment status post resection  -local wound care  -close Podiatry follow-up  -possible vac dressing placement    2  Gram-positive xiomara bacteremia-appears to be an anaerobe  Not sure this is of clinical significance  But if this is clinically significant, the patient has cleared the bacteremia, and the patient has received more than 7 days of intravenous antibiotics   -discontinue intravenous antibiotics as above  -follow-up blood cultures  -no additional ID workup for now    3  Sepsis-POA  Leukocytosis and tachycardia  Likely all secondary to 1  No other clear source appreciated  Sepsis has resolved   -antibiotics as above  -no additional ID workup for now    4  Leukocytosis-appears to be chronic  Possibly that 1  Is still playing a role  Possible that the patient has a primary blood disorder   -monitor CBC with diff  -if the leukocytosis would persist, would recommend hematology oncology evaluation  -no additional ID workup for now    5  Diabetes mellitus-type 2 with foot ulcer    On long-term insulin    HISTORY OF PRESENT ILLNESS:  Reason for Consult:  Right foot osteomyelitis  HPI: Laura Vo is a 63y o  year old male with a history of poorly controlled diabetes mellitus, chronic pain syndrome, and prior left toe amputation Lyme asked to assist with management of an infected right foot and positive blood cultures  The patient states that he has had a small ulceration in his right hallux for number of years  However over the last month prior to this admission it broke down substantially and started draining more  He was treated as an outpatient with Keflex without any therapeutic response  Because of the lack of response to treatment he came to the emergency department for further evaluation on the 12th of October  In the emergency department he was found to be tachycardic with a brisk leukocytosis  He was found to have a large defect in his toe with malodorous drainage  He had blood cultures and wound cultures obtained and was started on vancomycin and Zosyn admitted for further management  X-ray of the foot did not reveal any clear osteomyelitis  He was seen by Podiatry and after bedside debridement was noted to have a more deep ulceration that went down and the tendons  He was diagnosed with presumptive right hallux osteomyelitis, and taken to the operating room and underwent hallux amputation down to the level of the proximal metatarsal   The vancomycin was discontinued and the patient was continued on Zosyn  A blood culture came back positive with 1/2 sets as an anaerobic gram-positive xiomara  Flagyl was added to his antibiotics  The patient is actually feeling very well and has remained afebrile during his entire hospital stay  He denies any fever chills or sweats, denies any nausea vomiting or diarrhea, denies any cough or shortness of breath  REVIEW OF SYSTEMS:  A complete 12 point system-based review of systems is otherwise negative      PAST MEDICAL HISTORY:  Past Medical History:   Diagnosis Date    Chronic pain syndrome     [facility]     [facility]     Diverticulitis     Hyperlipidemia     Hypertension     Sleep apnea      Past Surgical History:   Procedure Laterality Date    AMPUTATION Left     toe     HEMICOLECTOMY      [place] AMPUTATION FOOT,TRANSMETATARSAL Right 10/16/2017    Procedure: AMPUTATION TRANSMETATARSAL (TMA);  Surgeon: Aureliano Redd DPM;  Location: [facility];  Service: Podiatry    TREATMENT FISTULA ANAL         FAMILY HISTORY:  Non-contributory    SOCIAL HISTORY:  Social History   History   Alcohol Use No     History   Drug Use No     History   Smoking Status    Current Every Day Smoker    Packs/day: 0 50   Smokeless Tobacco    Never Used       ALLERGIES:  No Known Allergies    MEDICATIONS:  All current active medications have been reviewed  Antibiotics:  Zosyn 8   and Flagyl 3  Postop day 3  PHYSICAL EXAM:  HR:  [71-82] 71  Resp:  [18] 18  BP: (123-132)/(60-71) 123/68  SpO2:  [94 %-97 %] 96 %  Temp (24hrs), Av 8 °F (36 6 °C), Min:97 6 °F (36 4 °C), Max:98 °F (36 7 °C)  Current: Temperature: 97 6 °F (36 4 °C)    Intake/Output Summary (Last 24 hours) at 10/19/17 1442  Last data filed at 10/19/17 1300   Gross per 24 hour   Intake              960 ml   Output              525 ml   Net              435 ml       General Appearance:  Appearing well, nontoxic, and in no distress   Head:  Normocephalic, without obvious abnormality, atraumatic   Eyes:  Conjunctiva pink and sclera anicteric, both eyes   Nose: Nares normal, mucosa normal, no drainage   Throat: Oropharynx moist without lesions   Neck: Supple, symmetrical, no adenopathy, no tenderness/mass/nodules   Back:   Symmetric, no curvature, ROM normal, no CVA tenderness   Lungs:   Clear to auscultation bilaterally, no audible wheezes, rhonchi and rales, respirations unlabored   Chest Wall:  No tenderness or deformity   Heart:  RRR; no murmur, rub or gallop   Abdomen:   Soft, non-tender, non-distended, positive bowel sounds,    Extremities: No cyanosis, clubbing or edema  Right foot status post hallux amputation with a deep postoperative ulceration noted  No purulence or surrounding erythema  Skin: No other rashes or lesions   No other draining wounds noted    Lymph nodes: Cervical, supraclavicular nodes normal   Neurologic: Alert and oriented times 3, extremity strength 5/5 and symmetric  Decreased sensation in both feet       LABS, IMAGING, & OTHER STUDIES:  Lab Results:  I have personally reviewed pertinent labs  Results from last 7 days  Lab Units 10/19/17  0432 10/18/17  0526 10/17/17  0508   WBC Thousand/uL 14 75* 14 51* 17 04*   HEMOGLOBIN g/dL 12 3 12 7 11 8*   PLATELETS Thousands/uL 643* 634* 631*       Results from last 7 days  Lab Units 10/19/17  0432 10/18/17  0527 10/17/17  0508  10/15/17  0429   SODIUM mmol/L 134* 135* 133*  < > 136   POTASSIUM mmol/L 4 2 4 0 4 0  < > 4 2   CHLORIDE mmol/L 100 101 100  < > 102   CO2 mmol/L 27 26 27  < > 26   ANION GAP mmol/L 7 8 6  < > 8   BUN mg/dL 15 11 10  < > 13   CREATININE mg/dL 0 97 0 82 0 83  < > 0 91   EGFR ml/min/1 73sq m 84 96 96  < > 91   GLUCOSE RANDOM mg/dL 359* 212* 329*  < > 269*   CALCIUM mg/dL 8 9 9 1 8 8  < > 9 2   AST U/L 11 12  --   --  14   ALT U/L 17 18  --   --  19   ALK PHOS U/L 84 83  --   --  94   TOTAL PROTEIN g/dL 7 8 7 8  --   --  7 7   ALBUMIN g/dL 2 2* 2 2*  --   --  2 0*   BILIRUBIN TOTAL mg/dL 0 20 0 20  --   --  0 20   < > = values in this interval not displayed  Results from last 7 days  Lab Units 10/16/17  1744 10/14/17  1738 10/13/17  1618   BLOOD CULTURE   --  No Growth After 4 Days  No Growth After 4 Days  --    GRAM STAIN RESULT  No Polys or Bacteria seen  --  Rare Polys  4+ Gram negative rods  2+ Gram positive cocci in pairs   WOUND CULTURE   --   --  4+ Growth of Enterococcus faecalis*  4+ Growth of Diphtheroids       Imaging Studies:   I have personally reviewed pertinent imaging study reports and images in PACS      X-ray right foot-status post amputation of the right 1st toe at the level of the 1st metatarsal proximal shaft

## 2017-10-19 NOTE — PROGRESS NOTES
Progress Note - Wound   Karla Steven 63 y o  male MRN: [id number]  Unit/Bed#: -01 Encounter: [id number]      Assessment:   Status post amputation right hallux and 1st metatarsal   White count is still high today  Plan:   Reviewed progress with patient  Continue present wound care  Intraoperative cultures are pending  Subjective:  Reports overall feeling a lot better  Objective:    Vitals: Blood pressure 123/68, pulse 71, temperature 97 6 °F (36 4 °C), temperature source Oral, resp  rate 18, height 6' (1 829 m), weight 133 kg (294 lb), SpO2 97 %  ,Body mass index is 39 87 kg/m²  WBCs 14 75, intraoperative cultures are pending  Most recent blood cultures are negative  Physical Exam:  Patient is alert and oriented and in no acute distress  There is some bloody drainage through the bandage on the right foot otherwise the bandage is clean and intact  Left foot bandages are clean dry and intact  There are no new or acute changes in the appearance of the feet  Lab, Imaging and other studies: I have personally reviewed pertinent reports  Incision 10/16/17 Foot Right (Active)   Incision Description REFUGIO 10/18/2017  8:33 PM   Leeann-wound Assessment Bleeding 10/18/2017 12:00 AM   Drainage Amount Large 10/18/2017 12:00 AM   Drainage Description Bloody 10/18/2017 12:00 AM   Dressing Gauze 10/18/2017 12:00 AM   Wound packed? Yes 10/16/2017  6:56 PM   Packing- # inserted 1 10/16/2017  6:56 PM   Dressing Changed New dressing applied 10/18/2017 12:00 AM   Patient Tolerance Tolerated well 10/18/2017 12:00 AM   Dressing Status Clean;Dry; Intact 10/18/2017  8:33 PM       Other Ulcers 10/13/17 Foot Right (Active)   Wound Description REFUGIO 10/18/2017  8:33 PM   Leeann-wound Assessment Edema 10/18/2017 12:00 AM   Shape oval 10/13/2017  4:53 PM   Wound Length (cm) 4 5 cm 10/13/2017  4:53 PM   Wound Width (cm) 2 5 cm 10/13/2017  4:53 PM   Wound Depth (cm) 1 5 10/13/2017  4:53 PM   Calculated Wound Volume (cm^3) 16 88 cm^3 10/13/2017  4:53 PM   Drainage Amount Small 10/18/2017  8:33 PM   Drainage Description Bloody 10/18/2017 12:00 AM   Treatment Elevated with pillow(s) 10/18/2017  7:45 AM   Dressings Gauze 10/18/2017 12:00 AM   Wound packed? Yes 10/18/2017 12:00 AM   Dressing Changed Changed 10/18/2017 12:00 AM   Patient Tolerance Tolerated well 10/18/2017 12:00 AM   Dressing Status Intact; Clean;Dry 10/18/2017  8:33 PM       Other Ulcers 10/13/17 Foot Left (Active)   Wound Description Beefy red 10/18/2017  8:33 PM   Leeann-wound Assessment Pink;Pale;Fragile 10/18/2017  8:33 PM   Shape Oval 10/18/2017  8:33 PM   Size oval 10/13/2017  4:53 PM   Wound Length (cm) 6 cm 10/13/2017  4:53 PM   Wound Width (cm) 2 75 cm 10/13/2017  4:53 PM   Drainage Amount None 10/18/2017  8:33 PM   Drainage Description Tan 10/17/2017  6:00 PM   Treatment Cleansed 10/18/2017  8:33 PM   Dressings Other (Comment) 10/18/2017  8:33 PM   Wound packed? No 10/18/2017  8:33 PM   Dressing Changed Changed 10/18/2017  8:33 PM   Patient Tolerance Tolerated well 10/18/2017  8:33 PM   Dressing Status Clean;Dry; Intact 10/18/2017  8:33 PM       Other Ulcers 10/13/17 Foot Right; Inner (Active)   Wound Description REFUGIO 10/18/2017  8:33 PM   Leeann-wound Assessment REFUGIO 10/18/2017  8:33 PM   Wound Length (cm) 0 5 cm 10/13/2017  4:53 PM   Wound Width (cm) 0 75 cm 10/13/2017  4:53 PM   Drainage Amount Large 10/18/2017 12:00 AM   Drainage Description Bloody 10/18/2017 12:00 AM   Treatment Offload;Cleansed;Elevated with pillow(s) 10/18/2017 12:00 AM   Dressings Gauze 10/18/2017 12:00 AM   Wound packed? Yes 10/18/2017 12:00 AM   Dressing Changed New dressing applied 10/18/2017 12:00 AM   Patient Tolerance Tolerated well 10/18/2017 12:00 AM   Dressing Status Clean;Dry; Intact 10/18/2017  8:33 PM

## 2017-10-20 VITALS
DIASTOLIC BLOOD PRESSURE: 63 MMHG | TEMPERATURE: 98.3 F | SYSTOLIC BLOOD PRESSURE: 131 MMHG | HEIGHT: 72 IN | RESPIRATION RATE: 18 BRPM | WEIGHT: 297.62 LBS | OXYGEN SATURATION: 98 % | BODY MASS INDEX: 40.31 KG/M2 | HEART RATE: 71 BPM

## 2017-10-20 PROBLEM — L97.512 SKIN ULCER OF RIGHT FOOT WITH FAT LAYER EXPOSED (HCC): Status: RESOLVED | Noted: 2017-10-12 | Resolved: 2017-10-20

## 2017-10-20 PROBLEM — R78.81 POSITIVE BLOOD CULTURE: Status: RESOLVED | Noted: 2017-10-15 | Resolved: 2017-10-20

## 2017-10-20 LAB
BACTERIA TISS AEROBE CULT: NORMAL
BASOPHILS # BLD AUTO: 0.08 THOUSANDS/ΜL (ref 0–0.1)
BASOPHILS NFR BLD AUTO: 1 % (ref 0–1)
EOSINOPHIL # BLD AUTO: 0.36 THOUSAND/ΜL (ref 0–0.61)
EOSINOPHIL NFR BLD AUTO: 2 % (ref 0–6)
ERYTHROCYTE [DISTWIDTH] IN BLOOD BY AUTOMATED COUNT: 14.8 % (ref 11.6–15.1)
GLUCOSE SERPL-MCNC: 313 MG/DL (ref 65–140)
GLUCOSE SERPL-MCNC: 337 MG/DL (ref 65–140)
GRAM STN SPEC: NORMAL
HCT VFR BLD AUTO: 38.9 % (ref 36.5–49.3)
HGB BLD-MCNC: 12.4 G/DL (ref 12–17)
LYMPHOCYTES # BLD AUTO: 3.39 THOUSANDS/ΜL (ref 0.6–4.47)
LYMPHOCYTES NFR BLD AUTO: 23 % (ref 14–44)
MCH RBC QN AUTO: 25.8 PG (ref 26.8–34.3)
MCHC RBC AUTO-ENTMCNC: 31.9 G/DL (ref 31.4–37.4)
MCV RBC AUTO: 81 FL (ref 82–98)
MONOCYTES # BLD AUTO: 0.74 THOUSAND/ΜL (ref 0.17–1.22)
MONOCYTES NFR BLD AUTO: 5 % (ref 4–12)
NEUTROPHILS # BLD AUTO: 10.05 THOUSANDS/ΜL (ref 1.85–7.62)
NEUTS SEG NFR BLD AUTO: 68 % (ref 43–75)
NRBC BLD AUTO-RTO: 0 /100 WBCS
PLATELET # BLD AUTO: 680 THOUSANDS/UL (ref 149–390)
PMV BLD AUTO: 9.2 FL (ref 8.9–12.7)
RBC # BLD AUTO: 4.81 MILLION/UL (ref 3.88–5.62)
WBC # BLD AUTO: 14.79 THOUSAND/UL (ref 4.31–10.16)

## 2017-10-20 PROCEDURE — 94640 AIRWAY INHALATION TREATMENT: CPT

## 2017-10-20 PROCEDURE — 85025 COMPLETE CBC W/AUTO DIFF WBC: CPT | Performed by: GENERAL PRACTICE

## 2017-10-20 PROCEDURE — 82948 REAGENT STRIP/BLOOD GLUCOSE: CPT

## 2017-10-20 PROCEDURE — 94760 N-INVAS EAR/PLS OXIMETRY 1: CPT

## 2017-10-20 RX ORDER — SACCHAROMYCES BOULARDII 250 MG
250 CAPSULE ORAL 2 TIMES DAILY
Qty: 10 CAPSULE | Refills: 0 | Status: SHIPPED | OUTPATIENT
Start: 2017-10-20

## 2017-10-20 RX ORDER — NICOTINE 21 MG/24HR
1 PATCH, TRANSDERMAL 24 HOURS TRANSDERMAL DAILY
Qty: 14 PATCH | Refills: 0 | Status: ON HOLD | OUTPATIENT
Start: 2017-10-21 | End: 2019-04-16 | Stop reason: SDDI

## 2017-10-20 RX ORDER — AMOXICILLIN AND CLAVULANATE POTASSIUM 875; 125 MG/1; MG/1
1 TABLET, FILM COATED ORAL EVERY 12 HOURS SCHEDULED
Qty: 20 TABLET | Refills: 0 | Status: SHIPPED | OUTPATIENT
Start: 2017-10-20 | End: 2017-10-24

## 2017-10-20 RX ADMIN — ENOXAPARIN SODIUM 40 MG: 40 INJECTION SUBCUTANEOUS at 08:46

## 2017-10-20 RX ADMIN — INSULIN GLARGINE 74 UNITS: 100 INJECTION, SOLUTION SUBCUTANEOUS at 08:46

## 2017-10-20 RX ADMIN — OXYCODONE HYDROCHLORIDE 15 MG: 5 TABLET ORAL at 11:13

## 2017-10-20 RX ADMIN — IPRATROPIUM BROMIDE AND ALBUTEROL SULFATE 3 ML: .5; 3 SOLUTION RESPIRATORY (INHALATION) at 08:34

## 2017-10-20 RX ADMIN — BUPROPION HYDROCHLORIDE 150 MG: 150 TABLET, EXTENDED RELEASE ORAL at 08:47

## 2017-10-20 RX ADMIN — AMOXICILLIN AND CLAVULANATE POTASSIUM 1 TABLET: 875; 125 TABLET, FILM COATED ORAL at 08:48

## 2017-10-20 RX ADMIN — CHLORTHALIDONE 25 MG: 25 TABLET ORAL at 08:49

## 2017-10-20 RX ADMIN — OXYCODONE HYDROCHLORIDE 15 MG: 5 TABLET ORAL at 02:46

## 2017-10-20 RX ADMIN — Medication 250 MG: at 10:23

## 2017-10-20 RX ADMIN — INSULIN LISPRO 16 UNITS: 100 INJECTION, SOLUTION INTRAVENOUS; SUBCUTANEOUS at 08:48

## 2017-10-20 RX ADMIN — INSULIN LISPRO 12 UNITS: 100 INJECTION, SOLUTION INTRAVENOUS; SUBCUTANEOUS at 12:44

## 2017-10-20 RX ADMIN — OXYCODONE HYDROCHLORIDE 15 MG: 5 TABLET ORAL at 06:37

## 2017-10-20 RX ADMIN — HYDROMORPHONE HYDROCHLORIDE 1 MG: 1 INJECTION, SOLUTION INTRAMUSCULAR; INTRAVENOUS; SUBCUTANEOUS at 12:44

## 2017-10-20 RX ADMIN — MORPHINE SULFATE 30 MG: 30 TABLET, EXTENDED RELEASE ORAL at 05:24

## 2017-10-20 RX ADMIN — NICOTINE 1 PATCH: 21 PATCH, EXTENDED RELEASE TRANSDERMAL at 08:50

## 2017-10-20 NOTE — PROGRESS NOTES
Progress Note - Infectious Disease   Rafi Faustin 63 y o  male MRN: [id number]  Unit/Bed#: -01 Encounter: [id number]      Impression/Plan:  1  Presumptive right hallux osteomyelitis-likely polymicrobial   No resistant organisms isolated  He is status post curative resection of the toe down to the proximal metatarsal region  He has been on extremely broad-spectrum antibiotics  At this point it seems reasonable to narrow the spectrum of the antibiotics, and transition to oral   -continue Augmentin through 10/23/2017 to complete 7 days of treatment status post resection  -local wound care  -close Podiatry follow-up  -possible vac dressing placement as an outpatient     2  Gram-positive xiomara bacteremia-appears to be an anaerobe  Not sure this is of clinical significance  But if this is clinically significant, the patient has cleared the bacteremia, and the patient has received more than 7 days of intravenous antibiotics  -antibiotics as above  -follow-up blood cultures  -no additional ID workup for now     3  Sepsis-POA  Leukocytosis and tachycardia  Likely all secondary to 1  No other clear source appreciated  Sepsis has resolved   -antibiotics as above  -no additional ID workup for now     4   Leukocytosis-appears to be chronic  Possibly that 1  Is still playing a role  Possible that the patient has a primary blood disorder  No labs done today   -monitor CBC with diff as an outpatient  -if the leukocytosis would persist, would recommend hematology oncology evaluation  -no additional ID workup for now     5  Diabetes mellitus-type 2 with foot ulcer  On long-term insulin    6  Disposition-okay to discharge from infectious disease standpoint  Discussed in detail with the primary service    Antibiotics:  Augmentin 2  Postop day 4  Subjective:  Patient has no fever, chills, sweats; no nausea, vomiting, diarrhea; no cough, shortness of breath; no pain  No new symptoms    He is in good spirits and anxious to get home  Objective:  Vitals:  HR:  [71-83] 71  Resp:  [18] 18  BP: (125-131)/(63-69) 131/63  SpO2:  [94 %-98 %] 98 %  Temp (24hrs), Av 3 °F (36 8 °C), Min:98 2 °F (36 8 °C), Max:98 5 °F (36 9 °C)  Current: Temperature: 98 3 °F (36 8 °C)    Physical Exam:   General Appearance:  Alert, nontoxic, no acute distress  Throat: Oropharynx moist without lesions  Lungs:   Clear to auscultation bilaterally; respirations unlabored   Heart:  RRR; no murmur, rub or gallop   Abdomen:   Soft, non-tender, non-distended, positive bowel sounds  Extremities: No clubbing, cyanosis  Bilateral feet heavily dressed with dry dressings in place  Skin: No new rashes or lesions  No draining wounds noted  Labs, Imaging, & Other studies:   All pertinent labs and imaging studies were personally reviewed    Results from last 7 days  Lab Units 10/19/17  0432 10/18/17  0526 10/17/17  0508   WBC Thousand/uL 14 75* 14 51* 17 04*   HEMOGLOBIN g/dL 12 3 12 7 11 8*   PLATELETS Thousands/uL 643* 634* 631*       Results from last 7 days  Lab Units 10/19/17  0432 10/18/17  0527 10/17/17  0508  10/15/17  0429   SODIUM mmol/L 134* 135* 133*  < > 136   POTASSIUM mmol/L 4 2 4 0 4 0  < > 4 2   CHLORIDE mmol/L 100 101 100  < > 102   CO2 mmol/L 27 26 27  < > 26   ANION GAP mmol/L 7 8 6  < > 8   BUN mg/dL 15 11 10  < > 13   CREATININE mg/dL 0 97 0 82 0 83  < > 0 91   EGFR ml/min/1 73sq m 84 96 96  < > 91   GLUCOSE RANDOM mg/dL 359* 212* 329*  < > 269*   CALCIUM mg/dL 8 9 9 1 8 8  < > 9 2   AST U/L 11 12  --   --  14   ALT U/L 17 18  --   --  19   ALK PHOS U/L 84 83  --   --  94   TOTAL PROTEIN g/dL 7 8 7 8  --   --  7 7   ALBUMIN g/dL 2 2* 2 2*  --   --  2 0*   BILIRUBIN TOTAL mg/dL 0 20 0 20  --   --  0 20   < > = values in this interval not displayed  Results from last 7 days  Lab Units 10/16/17  1744 10/14/17  1738 10/13/17  1618   BLOOD CULTURE   --  No Growth After 5 Days  No Growth After 5 Days    --    GRAM STAIN RESULT  No Polys or Bacteria seen  --  Rare Polys  4+ Gram negative rods  2+ Gram positive cocci in pairs   WOUND CULTURE   --   --  4+ Growth of Enterococcus faecalis*  4+ Growth of Diphtheroids

## 2017-10-20 NOTE — SOCIAL WORK
Cm met with pt at bedside  Pt to be discharged home with [facility]  Pt's cousin will transport home  IMM reviewed with pt  No questions offered  Pt signed IMM  Copy to pt and copy to MR for scanning

## 2017-10-20 NOTE — PROGRESS NOTES
Progress Note - Wound   Avery Nicole 63 y o  male MRN: [id number]  Unit/Bed#: -[place] Encounter: [id number]      Assessment:   Status post amputation right hallux and 1st metatarsal     Plan:  Reviewed progress with patient  Reviewed ID consult  Continue present wound care  From a foot point of view condition is stable for discharge on p o  antibiotic per ID and daily wound care from visiting nurses  Will need follow-up in the [address] [place] [address] Road on Monday  Patient advised of the importance of following diabetic and medical regimen including diet and exercise for the benefit of his health, improved healing, and to attain/maintain a healthy weight and body mass index  Subjective:  Patient reports feeling well  Objective:    Vitals: Blood pressure 131/63, pulse 71, temperature 98 3 °F (36 8 °C), temperature source Oral, resp  rate 18, height 6' (1 829 m), weight 135 kg (297 lb 9 9 oz), SpO2 98 %  ,Body mass index is 40 36 kg/m²  Intraoperative cultures:  No anaerobes  Normal skin monserrat isolated  Physical Exam:  Patient is alert and oriented and in no acute distress  There are no new or acute neurovascular changes or findings  Amputation site continues to improve  There is good granulation filling in the wound  There is no pus or purulence  There is no malodor  The wound looks good  Left foot ulcer is stable  No deep track no undermining and no drainage  Lab, Imaging and other studies: I have personally reviewed pertinent reports  Incision 10/16/17 Foot Right (Active)   Incision Description REFUGIO 10/19/2017  3:56 PM   Leeann-wound Assessment Bleeding 10/18/2017 12:00 AM   Drainage Amount Large 10/18/2017 12:00 AM   Drainage Description Bloody 10/18/2017 12:00 AM   Dressing Gauze 10/18/2017 12:00 AM   Wound packed?  Yes 10/16/2017  6:56 PM   Packing- # inserted 1 10/16/2017  6:56 PM   Dressing Changed New dressing applied 10/18/2017 12:00 AM   Patient Tolerance Tolerated well 10/18/2017 12:00 AM   Dressing Status Clean;Dry; Intact 10/19/2017  9:00 AM       Other Ulcers 10/13/17 Foot Right (Active)   Wound Description Clean;Fragile 10/19/2017  9:00 AM   Leeann-wound Assessment Clean;Pink 10/19/2017  3:56 PM   Shape oval 10/13/2017  4:53 PM   Wound Length (cm) 4 5 cm 10/13/2017  4:53 PM   Wound Width (cm) 2 5 cm 10/13/2017  4:53 PM   Wound Depth (cm) 1 5 10/13/2017  4:53 PM   Calculated Wound Volume (cm^3) 16 88 cm^3 10/13/2017  4:53 PM   Drainage Amount Small 10/19/2017  9:00 AM   Drainage Description Serosanguineous 10/19/2017  9:00 AM   Treatment Cleansed 10/19/2017  9:00 AM   Dressings Saline gauze; Wet to dry 10/19/2017  9:00 AM   Wound packed? Yes 10/18/2017 12:00 AM   Dressing Changed Changed 10/18/2017 12:00 AM   Patient Tolerance Tolerated well 10/19/2017  9:00 AM   Dressing Status Intact; Clean;Dry 10/19/2017  9:00 AM       Other Ulcers 10/13/17 Foot Left (Active)   Wound Description Beefy red 10/19/2017  9:00 AM   Leeann-wound Assessment Pink;Pale;Fragile 10/19/2017  9:00 AM   Shape Oval 10/18/2017  8:33 PM   Size oval 10/13/2017  4:53 PM   Wound Length (cm) 6 cm 10/13/2017  4:53 PM   Wound Width (cm) 2 75 cm 10/13/2017  4:53 PM   Drainage Amount None 10/19/2017  9:00 AM   Drainage Description Tan 10/17/2017  6:00 PM   Treatment Cleansed 10/18/2017  8:33 PM   Dressings Other (Comment) 10/18/2017  8:33 PM   Wound packed? No 10/19/2017  9:00 AM   Dressing Changed Changed 10/18/2017  8:33 PM   Patient Tolerance Tolerated well 10/18/2017  8:33 PM   Dressing Status Clean;Dry; Intact 10/19/2017  9:00 AM       Other Ulcers 10/13/17 Foot Right; Inner (Active)   Wound Description REFUGIO 10/19/2017  3:56 PM   Leeann-wound Assessment REFUGIO 10/19/2017  3:56 PM   Wound Length (cm) 0 5 cm 10/13/2017  4:53 PM   Wound Width (cm) 0 75 cm 10/13/2017  4:53 PM   Drainage Amount Large 10/18/2017 12:00 AM   Drainage Description Bloody 10/18/2017 12:00 AM   Treatment Offload;Cleansed;Elevated with pillow(s) 10/18/2017 12:00 AM Dressings Gauze 10/18/2017 12:00 AM   Wound packed? Yes 10/18/2017 12:00 AM   Dressing Changed New dressing applied 10/18/2017 12:00 AM   Patient Tolerance Tolerated well 10/18/2017 12:00 AM   Dressing Status Clean;Dry; Intact 10/19/2017  9:00 AM

## 2017-10-20 NOTE — CASE MANAGEMENT
Continued Stay Review    Date: 10/20/17    Vital Signs: /63   Pulse 71   Temp 98 3 °F (36 8 °C) (Oral)   Resp 18   Ht 6' (1 829 m)   Wt 135 kg (297 lb 9 9 oz)   SpO2 98%   BMI 40 36 kg/m²     Medications:   Scheduled Meds:   amoxicillin-clavulanate 1 tablet Oral Q12H LEONCIO   atorvastatin 10 mg Oral Daily   buPROPion 150 mg Oral Daily   chlorthalidone 25 mg Oral Daily   docusate sodium 100 mg Oral BID   enoxaparin 40 mg Subcutaneous Daily   insulin glargine 74 Units Subcutaneous Daily With Breakfast   insulin glargine 74 Units Subcutaneous HS   insulin lispro 4-20 Units Subcutaneous TID AC   ipratropium-albuterol 3 mL Nebulization TID   morphine 30 mg Oral Q8H   nicotine 1 patch Transdermal Daily   saccharomyces boulardii 250 mg Oral BID     Continuous Infusions:    PRN Meds: calcium carbonate    HYDROmorphone    oxyCODONE    Abnormal Labs/Diagnostic Results:   Lab Units 10/19/17  0432   WBC Thousand/uL 14 75*   HEMOGLOBIN g/dL 12 3   HEMATOCRIT % 39 1   PLATELETS Thousands/uL 643*   NEUTROS PCT % 62   LYMPHS PCT % 28   MONOS PCT % 5   EOS PCT % 3         Results from last 7 days  Lab Units 10/19/17  0432   SODIUM mmol/L 134*   POTASSIUM mmol/L 4 2   CHLORIDE mmol/L 100   CO2 mmol/L 27   BUN mg/dL 15   CREATININE mg/dL 0 97   CALCIUM mg/dL 8 9   TOTAL PROTEIN g/dL 7 8   BILIRUBIN TOTAL mg/dL 0 20   ALK PHOS U/L 84   ALT U/L 17   AST U/L 11   GLUCOSE RANDOM mg/dL 359*         Results from last 7 days  Lab Units 10/12/17  1126   INR   1 08         * I Have Reviewed All Lab Data Listed Above  * Additional Pertinent Lab Tests Reviewed: All Labs Within Last 24 Hours Reviewed     Imaging:     Imaging Reports Reviewed Today Include:   Imaging Personally Reviewed by Myself Includes:       Recent Cultures (last 7 days):         Results from last 7 days  Lab Units 10/16/17  1744 10/14/17  1738 10/13/17  1618 10/12/17  1224 10/12/17  1222 10/12/17  1220   BLOOD CULTURE    --  No Growth After 4 Days    No Growth After 4 Days  --   --  No Growth After 5 Days  Anaerobe (Organism type)*   GRAM STAIN RESULT   No Polys or Bacteria seen  --  Rare Polys  4+ Gram negative rods  2+ Gram positive cocci in pairs 1+ Polys  4+ Gram positive cocci in pairs  4+ Gram negative rods  2+ Gram positive rods  1+ Gram positive cocci in chains  --  Gram positive rods   WOUND CULTURE    --   --  4+ Growth of Enterococcus faecalis*  4+ Growth of Diphtheroids 4+ Growth of Diphtheroids  Few Colonies of Enterococcus faecalis*  1+ Growth of   --   --           Age/Sex: 63 y o  male     Assessment/Plan: Assessment:     Principal Problem:    Skin ulcer of right foot with fat layer exposed ([facility] )  Active Problems:    Tobacco abuse    Morbid obesity ([facility])    Type 2 diabetes mellitus with foot ulcer, with long-term current use of insulin ([facility])    Chronic prescription opiate use    Essential hypertension    Positive blood culture        Plan:      * Skin ulcer of right foot with fat layer exposed ([facility] )   Overview     Podiatry following, s/p amputation 10/16/17  Wound culture positive for diptheroids Enterococcus sens to vanco and zosyn and diptheroids-d/c vancomycin and continue zosyn and add flagyl; awaiting tissue cultures   Persistent leucocytosis-ID consult      Type 2 diabetes mellitus with foot ulcer, with long-term current use of insulin ([facility])   Overview     Chronically poorly controlled, latest A1c 8 8   nonadherence to outpatient follow-up in regards to his diabetes and foot ulcers  Lantus 74 units b i d      Chronic prescription opiate use   Overview     Podiatry recommends  Further follow up with pain management outpatient,       Morbid obesity ([facility] )   Overview     difficulty with ambulation, financial issues, depression and caregiver burnout   Tobacco abuse   Overview     Continue Nicotine patch   Positive blood culture   Overview     GPR-likely diptheroids-but also in wound culture-flagyl with leucocytosis; fup BC neg with persistent leucocytosis-ID consult   Essential hypertension   Overview     Continue chlorthalidone      Opioid dependence, in the setting of  MS Contin Q 8 hours & oxycodone prn use at home, and continued on this admission               VTE Pharmacologic Prophylaxis:   Pharmacologic: Enoxaparin (Lovenox)  Mechanical VTE Prophylaxis in Place: Yes     Patient Centered Rounds: I have performed bedside rounds with nursing staff today      Discussions with Specialists or Other Care Team Provider:      Education and Discussions with Family / Patient:      Time Spent for Care: 30 minutes    More than 50% of total time spent on counseling and coordination of care as described above      Current Length of Stay: 7 day(s)     Current Patient Status: Inpatient   Certification Statement: The patient will continue to require additional inpatient hospital stay due to osteomyelitis     Discharge Plan: home with PT     Code Status: Level 1 - Full

## 2017-10-20 NOTE — DISCHARGE SUMMARY
Discharge Summary - [address] [facility]    Patient Information: Karla Steven 63 y o  male MRN: [id number]  Unit/Bed#: -01 Encounter: [id number]    Discharging Physician / Practitioner: Navya Munguia MD  PCP: Soni Evans MD  Admission Date: 10/12/2017  Discharge Date: 10/20/17    Reason for Admission:     Principal Problem:    Severe sepsis [facility])  Active Problems:    Type 2 diabetes mellitus with foot ulcer, with long-term current use of insulin ([facility] )    Skin ulcer of right foot with fat layer exposed ([facility] )    Tobacco abuse    Morbid obesity ([facility] )    Chronic prescription opiate use    Essential hypertension    Discharge Diagnoses:     Principal Problem:    Skin ulcer of right foot with fat layer exposed ([facility] )  Active Problems:    Tobacco abuse    Morbid obesity ([facility] )    Type 2 diabetes mellitus with foot ulcer, with long-term current use of insulin ([facility] )    Chronic prescription opiate use    Essential hypertension    Positive blood culture  Resolved Problems:    Severe sepsis ([facility] )  leucocytosis    Consultations During Hospital Stay:  · Infectious disease, podiatry    Procedures Performed:     AMPUTATION TRANSMETATARSAL (TMA) (Right) 10/16/17    Significant Findings:     ·     Incidental Findings:   ·      Test Results Pending at Discharge (will require follow up):   ·      Outpatient Tests Requested:  · Cbc and bmp 1 week    Complications:      Hospital Course:     Karla Steven is a 63 y o  male patient who originally presented to the hospital on 10/12/2017 due to infected right great toe  Patient with a history of diabetes and has had chronic pain and possible osteomyelitis whitish of his right great toe previously  The pain had been worsening in his foot and he came to the emergency department for evaluation  He was evaluated by Podiatry and a transmetatarsal amputation was performed after appropriate vascular studies    Cultures grow Enterococcus and he was initially treated with vancomycin and Zosyn Enterococcus was sensitive to both ampicillin and vancomycin patient does have a history of leukocytosis and his initial leukocytosis up to 17 she will to resolve infectious Disease saw him and felt comfortable with a switched to Augmentin for discharge orally and and he will follow up with Hematology for his persistent leukocytosis he is discharged with home health care    Condition at Discharge: good     Discharge Day Visit / Exam:     * Please refer to separate progress for these details *    Discharge instructions/Information to patient and family:   See after visit summary for information provided to patient and family  Provisions for Follow-Up Care:  See after visit summary for information related to follow-up care and any pertinent home health orders  Disposition:     Home with [facility]NA Services (Reminder: Complete face to face encounter)    For Discharges to [facility] SNF:   · Not Applicable to this Patient - Not Applicable to this Patient    Planned Readmission:     Discharge Statement:  I spent 40 minutes discharging the patient  This time was spent on the day of discharge  I had direct contact with the patient on the day of discharge  Greater than 50% of the total time was spent examining patient, answering all patient questions, arranging and discussing plan of care with patient as well as directly providing post-discharge instructions  Additional time then spent on discharge activities  Discharge Medications:  See after visit summary for reconciled discharge medications provided to patient and family  ** Please Note: Dragon 360 Dictation voice to text software may have been used in the creation of this document   **

## 2017-10-20 NOTE — PLAN OF CARE
Problem: DISCHARGE PLANNING - CARE MANAGEMENT  Goal: Discharge to post-acute care or home with appropriate resources  INTERVENTIONS:  - Conduct assessment to determine patient/family and health care team treatment goals, and need for post-acute services based on payer coverage, community resources, and patient preferences, and barriers to discharge  - Address psychosocial, clinical, and financial barriers to discharge as identified in assessment in conjunction with the patient/family and health care team  - Arrange appropriate level of post-acute services according to patient's   needs and preference and payer coverage in collaboration with the physician and health care team  - Communicate with and update the patient/family, physician, and health care team regarding progress on the discharge plan  - Arrange appropriate transportation to post-acute venues    Outcome: Completed Date Met: 10/20/17  Cm met with pt at bedside  Pt to be discharged home with [facility]  Pt's cousin will transport home  IMM reviewed with pt  No questions offered  Pt signed IMM  Copy to pt and copy to MR for scanning

## 2017-10-20 NOTE — PLAN OF CARE
DISCHARGE PLANNING     Discharge to home or other facility with appropriate resources Completed        INFECTION - ADULT     Absence or prevention of progression during hospitalization Completed     Absence of fever/infection during neutropenic period Completed        Knowledge Deficit     Patient/family/caregiver demonstrates understanding of disease process, treatment plan, medications, and discharge instructions Completed        Nutrition/Hydration-ADULT     Nutrient/Hydration intake appropriate for improving, restoring or maintaining nutritional needs Completed        PAIN - ADULT     Verbalizes/displays adequate comfort level or baseline comfort level Completed        Potential for Falls     Patient will remain free of falls Completed        Prexisting or High Potential for Compromised Skin Integrity     Skin integrity is maintained or improved Completed        SAFETY ADULT     Maintain or return to baseline ADL function Completed     Maintain or return mobility status to optimal level Completed

## 2017-10-25 ENCOUNTER — APPOINTMENT (OUTPATIENT)
Dept: LAB | Facility: CLINIC | Age: 62
End: 2017-10-25
Payer: MEDICARE

## 2017-10-25 ENCOUNTER — GENERIC CONVERSION - ENCOUNTER (OUTPATIENT)
Dept: OTHER | Facility: OTHER | Age: 62
End: 2017-10-25

## 2017-10-25 ENCOUNTER — ALLSCRIPTS OFFICE VISIT (OUTPATIENT)
Dept: OTHER | Facility: OTHER | Age: 62
End: 2017-10-25

## 2017-10-25 DIAGNOSIS — E11.65 TYPE 2 DIABETES MELLITUS WITH HYPERGLYCEMIA (HCC): ICD-10-CM

## 2017-10-25 LAB
ALBUMIN SERPL BCP-MCNC: 2.8 G/DL (ref 3.5–5)
ALP SERPL-CCNC: 76 U/L (ref 46–116)
ALT SERPL W P-5'-P-CCNC: 13 U/L (ref 12–78)
ANION GAP SERPL CALCULATED.3IONS-SCNC: 8 MMOL/L (ref 4–13)
AST SERPL W P-5'-P-CCNC: 12 U/L (ref 5–45)
BASOPHILS # BLD AUTO: 0.08 THOUSANDS/ΜL (ref 0–0.1)
BASOPHILS NFR BLD AUTO: 1 % (ref 0–1)
BILIRUB SERPL-MCNC: 0.21 MG/DL (ref 0.2–1)
BUN SERPL-MCNC: 36 MG/DL (ref 5–25)
CALCIUM SERPL-MCNC: 9.1 MG/DL (ref 8.3–10.1)
CHLORIDE SERPL-SCNC: 100 MMOL/L (ref 100–108)
CO2 SERPL-SCNC: 28 MMOL/L (ref 21–32)
CREAT SERPL-MCNC: 1.43 MG/DL (ref 0.6–1.3)
EOSINOPHIL # BLD AUTO: 0.2 THOUSAND/ΜL (ref 0–0.61)
EOSINOPHIL NFR BLD AUTO: 1 % (ref 0–6)
ERYTHROCYTE [DISTWIDTH] IN BLOOD BY AUTOMATED COUNT: 15.8 % (ref 11.6–15.1)
GFR SERPL CREATININE-BSD FRML MDRD: 53 ML/MIN/1.73SQ M
GLUCOSE P FAST SERPL-MCNC: 161 MG/DL (ref 65–99)
HCT VFR BLD AUTO: 39.2 % (ref 36.5–49.3)
HGB BLD-MCNC: 12.7 G/DL (ref 12–17)
LYMPHOCYTES # BLD AUTO: 3.62 THOUSANDS/ΜL (ref 0.6–4.47)
LYMPHOCYTES NFR BLD AUTO: 22 % (ref 14–44)
MCH RBC QN AUTO: 26.6 PG (ref 26.8–34.3)
MCHC RBC AUTO-ENTMCNC: 32.4 G/DL (ref 31.4–37.4)
MCV RBC AUTO: 82 FL (ref 82–98)
MONOCYTES # BLD AUTO: 0.97 THOUSAND/ΜL (ref 0.17–1.22)
MONOCYTES NFR BLD AUTO: 6 % (ref 4–12)
NEUTROPHILS # BLD AUTO: 11.39 THOUSANDS/ΜL (ref 1.85–7.62)
NEUTS SEG NFR BLD AUTO: 70 % (ref 43–75)
NRBC BLD AUTO-RTO: 0 /100 WBCS
PLATELET # BLD AUTO: 665 THOUSANDS/UL (ref 149–390)
PMV BLD AUTO: 9.7 FL (ref 8.9–12.7)
POTASSIUM SERPL-SCNC: 4.5 MMOL/L (ref 3.5–5.3)
PROT SERPL-MCNC: 8.2 G/DL (ref 6.4–8.2)
RBC # BLD AUTO: 4.77 MILLION/UL (ref 3.88–5.62)
SODIUM SERPL-SCNC: 136 MMOL/L (ref 136–145)
WBC # BLD AUTO: 16.38 THOUSAND/UL (ref 4.31–10.16)

## 2017-10-25 PROCEDURE — 85025 COMPLETE CBC W/AUTO DIFF WBC: CPT

## 2017-10-25 PROCEDURE — 80053 COMPREHEN METABOLIC PANEL: CPT

## 2017-10-25 PROCEDURE — 36415 COLL VENOUS BLD VENIPUNCTURE: CPT

## 2017-11-14 ENCOUNTER — GENERIC CONVERSION - ENCOUNTER (OUTPATIENT)
Dept: OTHER | Facility: OTHER | Age: 62
End: 2017-11-14

## 2017-12-04 ENCOUNTER — GENERIC CONVERSION - ENCOUNTER (OUTPATIENT)
Dept: OTHER | Facility: OTHER | Age: 62
End: 2017-12-04

## 2017-12-12 ENCOUNTER — GENERIC CONVERSION - ENCOUNTER (OUTPATIENT)
Dept: OTHER | Facility: OTHER | Age: 62
End: 2017-12-12

## 2018-01-03 ENCOUNTER — GENERIC CONVERSION - ENCOUNTER (OUTPATIENT)
Dept: OTHER | Facility: OTHER | Age: 63
End: 2018-01-03

## 2018-01-11 ENCOUNTER — GENERIC CONVERSION - ENCOUNTER (OUTPATIENT)
Dept: OTHER | Facility: OTHER | Age: 63
End: 2018-01-11

## 2018-01-11 NOTE — PROGRESS NOTES
Assessment    1  Cellulitis of foot, left (682 7) (L03 116)   2  Chronic, continuous use of opioids (305 51) (F11 90)    Plan  Acute Osteomyelitis Of The Foot    · From  Nucynta  MG Oral Tablet Extended Release 12 Hour take 1 tab  twice a day To Nucynta  MG Oral Tablet Extended Release 12 Hour 1 TAB TWICE  A DAY   Rx By: Fabiola Knutson; Dispense: 30 Days ; #:60 Tablet Extended Release 12 Hour; Refill: 0; For: Acute Osteomyelitis Of The Foot; DA = N; Print Rx; Last Updated By: Carlos Rubalcava; 1/18/2016 8:32:02 AM  Cellulitis of foot, left, Low back pain    · From  OxyCODONE HCl - 20 MG Oral Tablet 1 tablet q6hrs To OxyCODONE  HCl - 20 MG Oral Tablet 1-2  tablets q6hrs   Rx By: Fabiola Knutson; Dispense: 30 Days ; #:130 Tablet; Refill: 0; For: Cellulitis of foot, left, Low back pain; DA = N; Print Rx; Msg to Pharmacy: Additional 10 pills for the month; Last Updated By: Carlos Rubalcava; 1/18/2016 8:31:06 AM    Discussion/Summary    This is a 65-year-old male who presents today with chronic pain syndrome here for medication management  At this time, I informed patient that I will provide him with 10 additional pills of oxycodone for the month due to severe pain and see him back in 4 weeks for re-assessment  I plan on cutting him back to 120 pills for the month  I will also decrease nucynta dose to 100mg po q12hrs  Last UDS was consistent  Possible side effects of new medications were reviewed with the patient/guardian today  The treatment plan was reviewed with the patient/guardian  The patient/guardian understands and agrees with the treatment plan   The treatment plan was reviewed with the patient/guardian  The patient/guardian understands and agrees with the treatment plan   The patient was counseled regarding instructions for management, risk factor reductions, patient and family education, impressions, risks and benefits of treatment options and importance of compliance with treatment  There are risks associated with opiod medications, including dependence, addiction and tolerance  The patient understands and agrees to use these medications only as prescribed  Potential side effects of the medications include, but are not limited to, constipation, drowsiness, addiction, impaired judgment and risk of fatal overdose if not taken as prescribed  Sharing medications is a felony  At this point and time, the patient is showing no signs of addiction, abuse, diversion or suicidal ideation  Chief Complaint    1  Back Pain    History of Present Illness  The patient is a 77-year-old male who presents today for followup visit for medication management  The patient is currently on oxycodone 20 mg by mouth one tab every 6 hours and nucynta 150mg ER Q12HRS  He states that nucynta makes him somewhat drowsy  He would like to cut back on nucynta dose  He is scheduled for foot surgery today  He denies any side effects from medications  Referring physician is  [facility]   Primary Care physician is  Dr Read Friend presents with complaints of gradual onset of constant episodes of severe bilateral lower back pain, described as sharp and throbbing, radiating to the right thigh and left foot  On a scale of 1 to 10, the patient rates the pain as 8  Symptoms are improved by opioid analgesics  Symptoms are made worse by prolonged standing and walking down stairs  Symptoms are worsening  Review of Systems    Constitutional: no fever, no recent weight gain and no recent weight loss  Eyes: no double vision and no blurry vision  Cardiovascular: no chest pain, no palpitations and no lower extremity edema  Respiratory: no complaints of shortness of breath and no wheezing  Musculoskeletal: difficulty walking and decreased range of motion, but no muscle weakness, no joint stiffness, no joint swelling, no limb swelling` and no pain in extremity     Neurological: no dizziness, no difficulty swallowing, no memory loss, no loss of consciousness and no seizures  Gastrointestinal: no nausea, no vomiting, no constipation and no diarrhea  Genitourinary: no difficulty initiating urine stream, no genital pain and no frequent urination  Integumentary: a rash  Psychiatric: no depression  Endocrine: no excessive thirst, no adrenal disease, no hypothyroidism and no hyperthyroidism  Hematologic/Lymphatic: no tendency for easy bruising and no tendency for easy bleeding  Active Problems    1  Abnormal glucose (790 29) (R73 09)   2  Abrasion of toe of right foot, initial encounter (917 0) (S90 414A)   3  Accelerated essential hypertension (401 0) (I10)   4  Actinic keratosis (702 0) (L57 0)   5  Acute Osteomyelitis Of The Foot (730 07)   6  Anxiety disorder (300 00) (F41 9)   7  Benign essential hypertension (401 1) (I10)   8  Blister of toe (917 2) (S90 426A)   9  Cellulitis of foot, left (682 7) (L03 116)   10  Changes in skin texture (782 8) (R23 4)   11  Changing skin lesion (709 9) (L98 9)   12  Chronic maxillary sinusitis (473 0) (J32 0)   13  Chronic obstructive pulmonary disease (496) (J44 9)   14  Cough (786 2) (R05)   15  Dental caries (521 00) (K02 9)   16  Diabetic Charcot foot (250 60,713 5) (E11 610)   17  Diabetic foot ulcer (250 80,707 15) (E11 621,L97 509)   18  Disc degeneration, lumbar (722 52) (M51 36)   19  Facet Syndrome (724 8)   20  Flu vaccine need (V04 81) (Z23)   21  Generalized osteoarthritis of multiple sites (715 09) (M15 9)   22  Generalized pain (780 96) (R52)   23  Hypercholesterolemia (272 0) (E78 0)   24  Hypertension (401 9) (I10)   25  Leucocytosis (288 60) (D72 829)   26  Leukocytosis (288 60) (D72 829)   27  Low back pain (724 2) (M54 5)   28  Morbid or severe obesity due to excess calories (278 01) (E66 01)   29  Open wound of foot except toe(s) alone (892 0) (S91 309A)   30  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)   31  Other chronic pain (338 29) (G51 29)   32  Pain syndrome, chronic (338 4) (G89 4)   33  Pilonidal cyst (685 1) (L05 91)   34  Screening for skin condition (V82 0) (Z13 89)   35  Seborrheic keratosis (702 19) (L82 1)   36  Squamous cell cancer of skin of forearm (173 62) (C44 621)   37  Type 2 Diabetes Mellitus - Uncomplicated, Controlled (250 00)   38  Type 2 diabetes mellitus with hyperglycemia (250 00) (E11 65)   39  Wound, open, foot (892 0) (S91 309A)    Past Medical History    1  History of Arthralgia Of The Knee / Patella / Tibia / Fibula (719 46)   2  History of Arthralgia Of The Pelvis / Hip / Femur (719 45)   3  History of Benign Localized Prostatic Hyperplasia Without Urinary Obstruction (600 20)   4  History of Cellulitis (682 9) (L03 90)   5  History of Colonoscopy (Fiberoptic) Screening   6  History of Depression (311) (F32 9)   7  History of Diabetes Mellitus (250 00)   8  History of Drug dependence, in remission (304 93) (F19 21)   9  History of Dysfunction of eustachian tube, unspecified laterality (381 81) (H69 80)   10  History of Foot ulcer, unspecified laterality, with unspecified severity (707 15) (L97 509)   11  H/O nonmelanoma skin cancer (V10 83) (Z85 828)   12  History of athlete's foot (V12 09) (Z86 19)   13  History of chronic obstructive lung disease (V12 69) (Z87 09)   14  History of diverticulitis of colon (V12 79) (Z87 19)   15  History of headache (V13 89) (Z87 898)   16  History of hidradenitis suppurativa (V13 3) (Z87 2)   17  History of hidradenitis suppurativa (V13 3) (Z87 2)   18  History of hyperlipidemia (V12 29) (Z86 39)   19  History of hypertension (V12 59) (Z86 79)   20  History of obesity (V13 89) (Z86 39)   21  History of sciatica (V12 49) (Z86 69)   22  History of Myoclonus (333 2) (G25 3)   23  History of Open Wound Of The Foot (892 0)   24  History of Other muscle spasm (728 85) (M62 838)   25  History of Pilonidal cyst with abscess (685 0) (L05 01)   26  History of Poisoning By Tobacco (989 84)   27   History of Subacromial or subdeltoid bursitis, unspecified laterality (726 19) (M75 50)   28  History of Subacute osteomyelitis, osteomyelitis of unspecified site (730 00) (M86 20)    The active problems and past medical history were reviewed and updated today  Surgical History    1  History of Hemicolectomy   2  History of Incision And Drainage Of Pilonidal Cyst   3  History of Rectal Surgery Repair Of Perirectal Fistula   4  History of Surgery Foot Amputation Metatarsal And Toe    The surgical history was reviewed and updated today  Family History    1  Family history of Family Health Status Of Father -     2  Family history of Metastatic stomach plasmacytoma (multiple myeloma EBV+)    The family history was reviewed and updated today  Social History    · Current Smoker (305 1)   · Current Some Day Smoker (305 1)   · Denied: History of Drug Use   · Ex-cigarette smoker (V15 82) (Z80 18)   · Denied: History of Heavy Alcohol Consumption   · Home DME CPAP   · Lives with spouse   · Denied: History of Marijuana   ·    · No drug use   · Occupation:   · Retired  The social history was reviewed and updated today  The social history was reviewed and is unchanged  Current Meds   1  Albuterol Sulfate (2 5 MG/3ML) 0 083% Inhalation Nebulization Solution; INHALE   CONTENTS OF 1 VIAL IN NEBULIZER Q 4 HRS IF NEEDED; Therapy: 89CAK3653 to (Last Rx:45Hbb1019)  Requested for: 29UMO0782 Ordered   2  ALPRAZolam 1 MG Oral Tablet; take one tablet by mouth twice daily; Therapy: 32XAK5813 to (Evaluate:2015); Last Rx:2015 Ordered   3  AmLODIPine Besylate 5 MG Oral Tablet; TAKE 1 TABLET DAILY; Therapy: 68DVM0969 to (Complete:2016)  Requested for: 31JWG6574; Last   Rx:2015 Ordered   4  Aspirin 81 MG Oral Tablet; TAKE 1 TABLET DAILY; Therapy: 37LVH8871 to ([phone])  Requested for: [phone]; Last   Rx:2014 Ordered   5   BD Insulin Syringe 30G X 1/2" 0 5 ML Miscellaneous; FOR 5X INJECTIONS; Therapy: 44Fiw9852 to (Evaluate:76Stx1149)  Requested for: 12PGX8742; Last   Rx:71Kgo4074 Ordered   6  Chlorthalidone 25 MG Oral Tablet; TAKE 1/2 TABLET DAILY; Therapy: 68QSO0595 to (Evaluate:99Lox2995)  Requested for: 16PSL0022; Last   Rx:10Npl8821 Ordered   7  DULoxetine HCl - 30 MG Oral Capsule Delayed Release Particles; take one capsule by   mouth daily; Therapy: 08QSF4496 to (Last Rx:30Oct2015)  Requested for: 30Oct2015 Ordered   8  FreeStyle Lite Test In Vitro Strip; TEST 5 X A DAY TESTING; Therapy: 61QOK9786 to (Last Rx:49Grk3145)  Requested for: 91Hra0887 Ordered   9  Gabapentin 300 MG Oral Capsule; week 1: take 1 tab at bed time   week 2: take 1 tab twice a day    week 3: take 1 tab three times a day  continue same dose moving forward; Therapy: 93CJP0315 to (Evaluate:31Toa5619)  Requested for: 22RDB1886; Last   Rx:96Rfq6851 Ordered   10  Ibuprofen 800 MG Oral Tablet; TAKE 1 TABLET 3 TIMES DAILY; Therapy: 54CTI3278 to (Evaluate:30Jan2016)  Requested for: 46WJJ6030; Last    Rx:65Zao1387 Ordered   11  Ibuprofen 800 MG Oral Tablet; Therapy: 09DVO6286 to Recorded   12  Lantus 100 UNIT/ML Subcutaneous Solution; 85 units twice daily; Therapy: 66HWX5705 to (Samuel Clap)  Requested for: 35KPG4644; Last    Rx:33Oxv9582 Ordered   13  Lisinopril 40 MG Oral Tablet; TAKE 1 TABLET DAILY  CV;    Therapy: 51PVZ2382 to (Evaluate:34Ixm0963)  Requested for: 24Fmt4670; Last    Rx:78Wln9946 Ordered   14  Litetouch Insulin Syringe 31G X 5/16" 1 ML Miscellaneous; Therapy: 72WQI3974 to (Evaluate:55Ppa4805) Recorded   15  MetFORMIN HCl - 1000 MG Oral Tablet; TAKE 1 TABLET TWICE DAILY  CLOSED    VACATION AND 45TH ANNIVERSARY JUNE 22-30; Therapy: 64DZL5042 to (Joseline Lundy)  Requested for: 85Bjm0548; Last    Rx:87Mir6993 Ordered   16   NovoLOG 100 UNIT/ML Subcutaneous Solution; INJECT 2-20 UNITS WITH EACH MEAL    PLUS 7 UNITS AT BREAKFAST, 7 AT LUNCH AND 9 UNITS AT    Dozwil; Therapy: 39ZCM6108 to (Jhoan Meredith)  Requested for: 10MRV0541; Last    Rx:47Vbz2947 Ordered   17  Nucynta  MG Oral Tablet Extended Release 12 Hour; take 1 tab twice a day; Therapy: 15YTU2853 to (Evaluate:22Jan2016); Last Rx:12Chw6515 Ordered   18  Omeprazole 20 MG Oral Tablet Delayed Release; Take 1 tablet daily; Therapy: 76FCF2559 to Recorded   19  OxyCODONE HCl - 20 MG Oral Tablet; 1 tablet q6hrs; Therapy: 18LKK8597 to (Evaluate:23Jan2016); Last Rx:38Zrf3441 Ordered   20  ProAir  (90 Base) MCG/ACT Inhalation Aerosol Solution; INHALE 1 TO 2 PUFFS    EVERY 4 TO 6 HOURS AS NEEDED; Therapy: 93BDG8747 to (Last RW:49MTH7713)  Requested for: 12UUT5509 Ordered   21  Santyl 250 UNIT/GM External Ointment; Therapy: 50LCQ1472 to (Evaluate:11Jan2015) Recorded   22  Simvastatin 10 MG Oral Tablet; ONE (1) TABLET DAILY; Therapy: 10PKB0992 to (Evaluate:12Mar2016)  Requested for: 45UDY3961; Last    Rx:37Gdv5354 Ordered   23  TiZANidine HCl - 4 MG Oral Tablet; TAKE 1 TABLET BY MOUTH 3 TIMES A DAY; Therapy: 34DGJ6159 to (Evaluate:24Mar2016)  Requested for: 53IVG3494; Last    Rx:54Dxj6147 Ordered   24  Viagra 100 MG Oral Tablet; TAKE 1 TABLET AS DIRECTED; Therapy: 92YVN3322 to (Evaluate:23Oct2014); Last Rx:11Vch4880 Ordered    The medication list was reviewed and updated today  Allergies    1  No Known Drug Allergies    Vitals  Vital Signs [Data Includes: Current Encounter]    Recorded: 99RFQ7672 08:09AM   Temperature 98 2 F   Heart Rate 100   Respiration 16   Systolic 864   Diastolic 70   Weight 597 lb 4 16 oz   Pain Scale 8     Physical Exam    Constitutional   General appearance: Well developed, well nourished, alert, in no distress, non-toxic and no overt pain behavior  Obese  Eyes   Sclera: anicteric   HEENT   Hearing grossly intact  Neck   Neck: Supple, symmetric, trachea midline, no masses  Pulmonary   Respiratory effort: Even and unlabored        Cardiovascular Examination of extremities: No edema or pitting edema present  Skin   Skin and subcutaneous tissue: Normal without rashes or lesions, well hydrated  Psychiatric   Mood and affect: Mood and affect appropriate  Neurologic   Cranial nerves: Cranial nerves II-XII grossly intact  the muscle tone was normal   Musculoskeletal   Gait and station: Normal     Lumbar/Sacral Spine examination demonstrates Lumbosacral Spine:   Appearance: Normal  Spinal alignment exhibits normal lordosis  Tenderness: None at lumbar spine and at the sacral spine  Lumbosacral Spine Sensory: intact to light touch and pinprick in the lower extremities  ROM Lumbosacral Spine: Full  ROM Hips: Full   Foot and ankle strength was normal bilaterally  Knee strength was normal bilaterally  Hip strength was normal bilaterally  Evaluation of Muscle Stretch Reflexes on the right side demonstrates muscle stretch reflexes equal and symmetric right lower limbs  Evaluation of Muscle Stretch Reflexes on the left side demonstrates muscle stretch reflexes equal and symmetric right lower limbs  Special Tests: Negative except as noted  Future Appointments    Date/Time Provider Specialty Site   02/10/2016 08:00 AM Troy Villa MD Pain Management [address] [facility]   05/23/2016 09:20 AM CHUCK Romero   Dermatology [facility] [place]     Signatures   Electronically signed by : Yajaira García MD; Jan 18 2016  9:35AM EST                       (Author)

## 2018-01-11 NOTE — PROGRESS NOTES
History of Present Illness  Care Coordination Encounter Information:   Type of Encounter: Telephonic    Spoke to Patient  Care Coordination SL Nurse [facility]:   The reason for call is to discuss outreach for follow up/needed services and coordination of meeting care plan treatment goals  Mr Myrna Moritz stated that he is doing a lot better since discharge  He stated that the home health aides have been very helpful and has been coming in to change dressing to right great toe  Reviewed over signs and symptoms of infection with Casandra Sullivan and he denied s/s of infection  Only thing he has is slight pain at times  He stated he takes his oxycodone at least one time a day and his usage has decreased  He stated that he realizes pain is expected but would decrease during time  Discussed with patient other alternatives as the pain decreases  He has his f/u appointment with PCP today for DEACON  He stated he will be attending and has no issues with transportation  Also he stated he has appointment made with wound care (Dr Sandy Gallegos)  Medical assistance pending and stated that he realizes it is a process but at this time he does not need services  He does continues to assist wife at home but stated that his family has been extremely helpful  Discussed with patient smoking cessation and the importance of it, especially with his wound and his health history  He stated that he understands and will try to decrease his smoking  He also stated his blood sugars have been stable  Son Dawn to care coordination and he is in agreement to f/u phone calls  Took down contact information  Will continue to f/u  Active Problems    1  Abnormal glucose (790 29) (R73 09)   2  Abrasion of toe of right foot, initial encounter (917 0) (S90 414A)   3  Actinic keratosis (702 0) (L57 0)   4  Anxiety disorder (300 00) (F41 9)   5  Benign essential hypertension (401 1) (I10)   6  Blister of toe (917 2) (S90 426A)   7   Cellulitis of foot, left (682 7) (L03 116)   8  Changes in skin texture (782 8) (R23 4)   9  Changing skin lesion (709 9) (L98 9)   10  Chronic maxillary sinusitis (473 0) (J32 0)   11  Chronic obstructive pulmonary disease (496) (J44 9)   12  Chronic, continuous use of opioids (305 51) (F11 90)   13  Colon cancer screening (V76 51) (Z12 11)   14  Cough (786 2) (R05)   15  Dental caries (521 00) (K02 9)   16  Diabetic Charcot foot (250 60,713 5) (E11 610)   17  Diabetic foot ulcer (250 80,707 15) (E11 621,L97 509)   18  Disc degeneration, lumbar (722 52) (M51 36)   19  Dyslipidemia (272 4) (E78 5)   20  Elevated white blood cell count (288 60) (D72 829)   21  Facet Syndrome (724 8)   22  Flu vaccine need (V04 81) (Z23)   23  Generalized osteoarthritis of multiple sites (715 09) (M15 9)   24  Generalized pain (780 96) (R52)   25  Hypercholesterolemia (272 0) (E78 00)   26  Hypertension (401 9) (I10)   27  Hypertension, essential (401 9) (I10)   28  Leukocytosis (288 60) (D72 829)   29  Low back pain (724 2) (M54 5)   30  Morbid or severe obesity due to excess calories (278 01) (E66 01)   31  Open wound of foot except toe(s) alone (892 0) (S91 309A)   32  Organic impotence (607 84) (N52 9)   33  YOVANI on CPAP (327 23,V46 8) (G47 33,Z99 89)   34  Osteomyelitis, acute, ankle or foot (730 07) (M86 179)   35  Other chronic pain (338 29) (G89 29)   36  Pain syndrome, chronic (338 4) (G89 4)   37  Pilonidal cyst (685 1) (L05 91)   38  Screening for skin condition (V82 0) (Z13 89)   39  Seborrheic keratosis (702 19) (L82 1)   40  Squamous cell cancer of skin of forearm (173 62) (C44 621)   41  Type 2 Diabetes Mellitus - Uncomplicated, Controlled (250 00)   42  Type 2 diabetes mellitus with hyperglycemia (250 00) (E11 65)   43  Wound, open, foot (892 0) (S91 309A)    Past Medical History    1  History of Arthralgia Of The Knee / Patella / Tibia / Fibula (839 46)   2  History of Arthralgia Of The Pelvis / Hip / Femur (559 45)   3   History of Benign Localized Prostatic Hyperplasia Without Urinary Obstruction (600 20)   4  History of Cellulitis (682 9) (L03 90)   5  History of Colonoscopy (Fiberoptic) Screening   6  History of Depression (311) (F32 9)   7  History of Diabetes Mellitus (250 00)   8  History of Drug dependence, in remission (304 93) (F19 21)   9  History of Dysfunction of Eustachian tube, unspecified laterality (381 81) (H69 80)   10  History of Foot ulcer, unspecified laterality, with unspecified severity (707 15) (L97 509)   11  H/O nonmelanoma skin cancer (V10 83) (Z85 828)   12  History of athlete's foot (V12 09) (Z86 19)   13  History of chronic obstructive lung disease (V12 69) (Z87 09)   14  History of diverticulitis of colon (V12 79) (Z87 19)   15  History of headache (V13 89) (Z87 898)   16  History of hidradenitis suppurativa (V13 3) (Z87 2)   17  History of hidradenitis suppurativa (V13 3) (Z87 2)   18  History of hyperlipidemia (V12 29) (Z86 39)   19  History of hypertension (V12 59) (Z86 79)   20  History of obesity (V13 89) (Z86 39)   21  History of sciatica (V12 49) (Z86 69)   22  History of Myoclonus (333 2) (G25 3)   23  History of Open Wound Of The Foot (892 0)   24  History of Other muscle spasm (728 85) (M62 838)   25  History of Pilonidal cyst with abscess (685 0) (L05 01)   26  History of Poisoning By Tobacco (989 84)   27  History of Subacromial or subdeltoid bursitis, unspecified laterality   28  History of Subacute osteomyelitis, osteomyelitis of unspecified site    Surgical History    1  History of Hemicolectomy   2  History of Incision And Drainage Of Pilonidal Cyst   3  History of Rectal Surgery Repair Of Perirectal Fistula   4  History of Surgery Foot Amputation Metatarsal And Toe    Family History  Father    1  Family history of Family Health Status Of Father -   Brother    2   Family history of Metastatic stomach plasmacytoma (multiple myeloma EBV+)    Social History    · Current Smoker (305 1)   · Current Some Day Smoker (305 1)   · Denied: History of Drug Use   · Ex-cigarette smoker (Y17 17) (F11 673)   · Denied: History of Heavy Alcohol Consumption   · Home DME CPAP   · Lives with spouse   · Denied: History of Marijuana   ·    · No drug use   · Occupation:   · Retired    Current Meds    1  ALPRAZolam 1 MG Oral Tablet; take one tablet by mouth twice daily; Therapy: 62IWN7712 to (Evaluate:72Rfz7743); Last Rx:04Jun2016 Ordered    2  Aspirin 81 MG TABS; TAKE 1 TABLET DAILY; Therapy: 58KRA6562 to ()  Requested for: [phone]; Last   Rx:27Oct2014 Ordered    3  OxyCODONE HCl - 20 MG Oral Tablet; 1  tablet q6hrs; Therapy: 56RLY3903 to (Evaluate:14Jun2016); Last Rx:83Zlq2161 Ordered    4  Albuterol Sulfate (2 5 MG/3ML) 0 083% Inhalation Nebulization Solution; INHALE   CONTENTS OF 1 VIAL IN NEBULIZER Q 4 HRS IF NEEDED; Therapy: 13VQY8692 to (Last Rx:16Vdr4071)  Requested for: 34QHU4318 Ordered   5  Symbicort 160-4 5 MCG/ACT Inhalation Aerosol; INHALE 2 PUFFS TWICE DAILY  RINSE   MOUTH AFTER USE; Therapy: 56FZM4727 to (Last XK:52EFF5183)  Requested for: 11QJG6458 Ordered   6  Ventolin  (90 Base) MCG/ACT Inhalation Aerosol Solution; INHALE 1 PUFF   EVERY 4 HOURS AS NEEDED; Therapy: 02MMJ7905 to (Evaluate:03Reb1517)  Requested for: 51IED9097; Last   IU:88OTV3015 Ordered    7  Atorvastatin Calcium 10 MG Oral Tablet; take 1 tablet every day; Therapy: 99VNI9177 to (Evaluate:77Vdj1967)  Requested for: 64YKP6414; Last   Rx:27Jan2017 Ordered    8  Ibuprofen 800 MG Oral Tablet; TAKE 1 TABLET 3 TIMES DAILY; Therapy: 02LVI4598 to (Evaluate:30Jan2016)  Requested for: 18ICQ7059; Last   Rx:08Rdz2094 Ordered    9  AmLODIPine Besylate 5 MG Oral Tablet; TAKE 1 TABLET DAILY; Therapy: 99GNJ8379 to (Evaluate:04Dnn5106)  Requested for: 94Cmm8756; Last   Rx:96Att3862 Ordered   10  Chlorthalidone 25 MG Oral Tablet; ONE-HALF(1/2) OF A TABLET DAILY;     Therapy: 00IKR1963 to (Evaluate:18Jvx0008)  Requested for: 81RRC5840; Last    Rx:01Mar2016 Ordered   11  Lisinopril 40 MG Oral Tablet; TAKE 1 TABLET DAILY  CV;    Therapy: 21UKZ8776 to (Evaluate:56Byn4438)  Requested for: 25OZL7168; Last    Rx:61Wcj6714 Ordered    12  Viagra 100 MG Oral Tablet; TAKE 1 TABLET AS DIRECTED; Therapy: 26ZKT4464 to (Evaluate:49Nii4414); Last Rx:96Ktq4748 Ordered    13  MetFORMIN HCl - 1000 MG Oral Tablet; Take 1 tablet twice daily; Therapy: 06SIT1927 to (Evaluate:11May2017)  Requested for: 53QCE6210; Last    Rx:16May2016 Ordered    14  BD Insulin Syringe 30G X 1/2" 0 5 ML Miscellaneous; FOR 5X INJECTIONS; Therapy: 69Tkh0121 to (Evaluate:68Eps7055)  Requested for: 60Scy5450; Last    Rx:61Mvi9271 Ordered   15  Insulin Syringe 30G X 5/16" 1 ML Miscellaneous; pt  injecting 5 x's a day; Therapy: 98LGW4827 to (Last Rx:28Feb2017)  Requested for: 41Pmk3196 Ordered   16  Lantus 100 UNIT/ML Subcutaneous Solution; INJECT 85 UNITS SUBCUTANEOUSLY    TWICE DAILY; Therapy: 70MHU4019 to (Evaluate:31May2017)  Requested for: 19AAY8491; Last    Rx:24Mar2017 Ordered   17  NovoLOG 100 UNIT/ML Subcutaneous Solution; INJECT 2 TO 20 UNITS    SUBCUTANEOUSLY WITH EACH MEAL PLUS 7 UNITS AT BREAKFAST, 7 UNITS AT    LUNCH, AND 9 UNITS AT Dozwil; Therapy: 00VSN9226 to [phone]  Requested for: 52BGB8884; Last    Rx:30May2017 Ordered   18  TRUEresult Blood Glucose w/Device Kit; USE AS DIRECTED; Therapy: 93EFN7688 to (Last Rx:42Kcg4434)  Requested for: 75FDQ9894 Ordered   19  TRUEtest Test In Vitro Strip; TEST 5 X A DAY TESTING; Therapy: 47Mcf7733 to (Evaluate:09Ues3400)  Requested for: 63OEY7774; Last    Rx:47Fyr6551 Ordered    20  Litetouch Insulin Syringe 31G X 5/16" 1 ML Miscellaneous; Therapy: 95SZR6740 to (Evaluate:09Hch2203) Recorded   21  Omeprazole 20 MG Oral Tablet Delayed Release; Take 1 tablet daily; Therapy: 54GHJ5070 to Recorded   22  Vitamin D 1000 UNIT Oral Tablet Recorded    Allergies    1   No Known Drug Allergies    Health Management   Influenza (Split); every 1 year; Last 58JHW0535; Next Due: 92FXC5835; Overdue    End of Encounter Meds    1  ALPRAZolam 1 MG Oral Tablet; take one tablet by mouth twice daily; Therapy: 83COT2448 to (Evaluate:91Zwl7848); Last Rx:04Jun2016 Ordered    2  Aspirin 81 MG TABS; TAKE 1 TABLET DAILY; Therapy: 79QMW1257 to (Rick Jackson)  Requested for: ; Last   Rx:61Sjr6780 Ordered    3  OxyCODONE HCl - 20 MG Oral Tablet; 1  tablet q6hrs; Therapy: 98ZUX8671 to (Evaluate:14Jun2016); Last Rx:55Osn8152 Ordered    4  Albuterol Sulfate (2 5 MG/3ML) 0 083% Inhalation Nebulization Solution; INHALE   CONTENTS OF 1 VIAL IN NEBULIZER Q 4 HRS IF NEEDED; Therapy: 21ZLY5939 to (Last Rx:08Xgj5889)  Requested for: 19PXL5268 Ordered   5  Symbicort 160-4 5 MCG/ACT Inhalation Aerosol; INHALE 2 PUFFS TWICE DAILY  RINSE   MOUTH AFTER USE; Therapy: 53BQH9393 to (Last NH:94YOS6117)  Requested for: 21FOD8512 Ordered   6  Ventolin  (90 Base) MCG/ACT Inhalation Aerosol Solution; INHALE 1 PUFF   EVERY 4 HOURS AS NEEDED; Therapy: 65HOJ8489 to (Evaluate:85Kxm4195)  Requested for: 02VQN2482; Last   NC:49FUI8517 Ordered    7  Atorvastatin Calcium 10 MG Oral Tablet; take 1 tablet every day; Therapy: 33YTN3472 to (Evaluate:26Own7821)  Requested for: 25MXU7172; Last   Rx:27Jan2017 Ordered    8  Ibuprofen 800 MG Oral Tablet; TAKE 1 TABLET 3 TIMES DAILY; Therapy: 81EHC5145 to (Evaluate:30Jan2016)  Requested for: 30KOZ7937; Last   Rx:02Oct2015 Ordered    9  AmLODIPine Besylate 5 MG Oral Tablet; TAKE 1 TABLET DAILY; Therapy: 47RLG2092 to (Evaluate:03Spv9309)  Requested for: 58Inv2887; Last   Rx:30Beu4664 Ordered   10  Chlorthalidone 25 MG Oral Tablet; ONE-HALF(1/2) OF A TABLET DAILY; Therapy: 10VNY1426 to (Evaluate:46Gei2355)  Requested for: 09AYB1870; Last    Rx:01Mar2016 Ordered   11  Lisinopril 40 MG Oral Tablet; TAKE 1 TABLET DAILY   CV;    Therapy: 86TVU8149 to (Evaluate:28Ixh0443) Requested for: 86IEW3976; Last    Rx:42Jyu9150 Ordered    12  Viagra 100 MG Oral Tablet; TAKE 1 TABLET AS DIRECTED; Therapy: 91ODD6620 to (Evaluate:16Scv2309); Last Rx:55Quq2071 Ordered    13  MetFORMIN HCl - 1000 MG Oral Tablet; Take 1 tablet twice daily; Therapy: 30SZR1321 to (Evaluate:56Ipv2387)  Requested for: 45FCY6129; Last    Rx:96Rap8445 Ordered    14  BD Insulin Syringe 30G X 1/2" 0 5 ML Miscellaneous; FOR 5X INJECTIONS; Therapy: 97Wxp8045 to (Evaluate:30Ltg4786)  Requested for: 84Zqj0573; Last    Rx:28Utl0029 Ordered   15  Insulin Syringe 30G X 5/16" 1 ML Miscellaneous; pt  injecting 5 x's a day; Therapy: 02ZVV4394 to (Last Rx:07Miw8660)  Requested for: 95Gwv9434 Ordered   16  Lantus 100 UNIT/ML Subcutaneous Solution; INJECT 85 UNITS SUBCUTANEOUSLY    TWICE DAILY; Therapy: 37PCQ9904 to (Evaluate:45Sra7277)  Requested for: 34GPW5678; Last    Rx:24Mar2017 Ordered   17  NovoLOG 100 UNIT/ML Subcutaneous Solution; INJECT 2 TO 20 UNITS    SUBCUTANEOUSLY WITH EACH MEAL PLUS 7 UNITS AT BREAKFAST, 7 UNITS AT    LUNCH, AND 9 UNITS AT Dozwil; Therapy: 20RHB5706 to (21 )  Requested for: 53VXU8270; Last    Rx:17Umx1203 Ordered   18  TRUEresult Blood Glucose w/Device Kit; USE AS DIRECTED; Therapy: 59JDJ5537 to (Last Rx:05Sum3986)  Requested for: 57YGJ1364 Ordered   19  TRUEtest Test In Vitro Strip; TEST 5 X A DAY TESTING; Therapy: 49Egt2701 to (Evaluate:46Sfv8646)  Requested for: 91NNJ9673; Last    Rx:68Tma2546 Ordered    20  Litetouch Insulin Syringe 31G X 5/16" 1 ML Miscellaneous; Therapy: 72BXH6526 to (Evaluate:85Rfm1778) Recorded   21  Omeprazole 20 MG Oral Tablet Delayed Release; Take 1 tablet daily; Therapy: 46AWS7092 to Recorded   22   Vitamin D 1000 UNIT Oral Tablet Recorded    Future Appointments    Date/Time Provider Specialty Site   10/25/2017 01:00 PM Willis Carl[facility]     Patient Care Team    Care Team Member Role Specialty Office Number   Magui PATEL  Internal Medicine [phone]   Cody PATEL    Dermatology [phone]   Oliver VALVERDE  Pain Management [phone]     Signatures   Electronically signed by : Mary Brown RN; Oct 25 2017 12:28PM EST                       (Author)

## 2018-01-14 VITALS
BODY MASS INDEX: 42.66 KG/M2 | HEART RATE: 94 BPM | WEIGHT: 315 LBS | RESPIRATION RATE: 20 BRPM | SYSTOLIC BLOOD PRESSURE: 122 MMHG | HEIGHT: 72 IN | DIASTOLIC BLOOD PRESSURE: 70 MMHG

## 2018-01-14 NOTE — PROGRESS NOTES
History of Present Illness  Care Coordination Encounter Information:   Type of Encounter: Telephonic    Spoke to Patient  Care Coordination  Nurse [address] [place] [address] [address]:   The reason for call is to discuss outreach for follow up/needed services and coordination of meeting care plan treatment goals  Lisandro Pruitt stated he continues to do well  He stated that his wound to right great toe is healing extremely well  Blood sugars per patient have been in the 115s  He denied any recent s/s of hypo/hyperglycemia  Reviewed over signs and symptoms of hyper/hypoglycemia and when to seek medical attention  He verbalized understanding  He stated that he has cut down on smoking to two cigarettes a day per last conversation  He also stated that he has been having a desirable weight loss and have been watching his diet well  He currently has no questions or concerns  Continues to have contact information if needed  PCP office updated on patient  Will continue to f/u  Active Problems    1  Abnormal glucose (790 29) (R73 09)   2  Abrasion of toe of right foot, initial encounter (917 0) (S90 414A)   3  Actinic keratosis (702 0) (L57 0)   4  Anxiety disorder (300 00) (F41 9)   5  Benign essential hypertension (401 1) (I10)   6  Blister of toe (917 2) (S90 426A)   7  Cellulitis of foot, left (682 7) (L03 116)   8  Changes in skin texture (782 8) (R23 4)   9  Changing skin lesion (709 9) (L98 9)   10  Chronic maxillary sinusitis (473 0) (J32 0)   11  Chronic obstructive pulmonary disease (496) (J44 9)   12  Chronic, continuous use of opioids (305 51) (F11 90)   13  Colon cancer screening (V76 51) (Z12 11)   14  Cough (786 2) (R05)   15  Dental caries (521 00) (K02 9)   16  Diabetic Charcot foot (250 60,713 5) (E11 610)   17  Diabetic foot ulcer (250 80,707 15) (E11 621,L97 509)   18  Disc degeneration, lumbar (722 52) (M51 36)   19  Dyslipidemia (272 4) (E78 5)   20  Elevated white blood cell count (288 60) (D72 829)   21   Facet Syndrome (724 8)   22  Flu vaccine need (V04 81) (Z23)   23  Generalized osteoarthritis of multiple sites (715 09) (M15 9)   24  Generalized pain (780 96) (R52)   25  Hypercholesterolemia (272 0) (E78 00)   26  Hypertension (401 9) (I10)   27  Hypertension, essential (401 9) (I10)   28  Leukocytosis (288 60) (D72 829)   29  Low back pain (724 2) (M54 5)   30  Morbid or severe obesity due to excess calories (278 01) (E66 01)   31  Open wound of foot except toe(s) alone (892 0) (S91 309A)   32  Organic impotence (607 84) (N52 9)   33  YOVANI on CPAP (327 23,V46 8) (G47 33,Z99 89)   34  Osteomyelitis, acute, ankle or foot (730 07) (M86 179)   35  Other chronic pain (338 29) (G89 29)   36  Pain syndrome, chronic (338 4) (G89 4)   37  Pilonidal cyst (685 1) (L05 91)   38  Screening for skin condition (V82 0) (Z13 89)   39  Seborrheic keratosis (702 19) (L82 1)   40  Squamous cell cancer of skin of forearm (173 62) (C44 621)   41  Type 2 Diabetes Mellitus - Uncomplicated, Controlled (250 00)   42  Type 2 diabetes mellitus with hyperglycemia (250 00) (E11 65)   43  Wound, open, foot (892 0) (S91 309A)    Past Medical History    1  History of Arthralgia Of The Knee / Patella / Tibia / Fibula (719 46)   2  History of Arthralgia Of The Pelvis / Hip / Femur (719 45)   3  History of Benign Localized Prostatic Hyperplasia Without Urinary Obstruction (600 20)   4  History of Cellulitis (682 9) (L03 90)   5  History of Colonoscopy (Fiberoptic) Screening   6  History of Depression (311) (F32 9)   7  History of Diabetes Mellitus (250 00)   8  History of Drug dependence, in remission (304 93) (F19 21)   9  History of Dysfunction of Eustachian tube, unspecified laterality (381 81) (H69 80)   10  History of Foot ulcer, unspecified laterality, with unspecified severity (707 15) (L97 509)   11  H/O nonmelanoma skin cancer (V10 83) (Z85 828)   12  History of athlete's foot (V12 09) (Z86 19)   13   History of chronic obstructive lung disease (V12 69) (Z87 09)   14  History of diverticulitis of colon (V12 79) (Z87 19)   15  History of headache (V13 89) (Z87 898)   16  History of hidradenitis suppurativa (V13 3) (Z87 2)   17  History of hidradenitis suppurativa (V13 3) (Z87 2)   18  History of hyperlipidemia (V12 29) (Z86 39)   19  History of hypertension (V12 59) (Z86 79)   20  History of obesity (V13 89) (Z86 39)   21  History of sciatica (V12 49) (Z86 69)   22  History of Myoclonus (333 2) (G25 3)   23  History of Open Wound Of The Foot (892 0)   24  History of Other muscle spasm (728 85) (M62 838)   25  History of Pilonidal cyst with abscess (685 0) (L05 01)   26  History of Poisoning By Tobacco (989 84)   27  History of Subacromial or subdeltoid bursitis, unspecified laterality   28  History of Subacute osteomyelitis, osteomyelitis of unspecified site    Surgical History    1  History of Hemicolectomy   2  History of Incision And Drainage Of Pilonidal Cyst   3  History of Rectal Surgery Repair Of Perirectal Fistula   4  History of Surgery Foot Amputation Metatarsal And Toe    Family History  Father    1  Family history of Family Health Status Of Father -   Brother    2  Family history of Metastatic stomach plasmacytoma (multiple myeloma EBV+)    Social History    · Current Smoker (305 1)   · Current Some Day Smoker (305 1)   · Denied: History of Drug Use   · Ex-cigarette smoker (V15 82) (Z80 18)   · Denied: History of Heavy Alcohol Consumption   · Home DME CPAP   · Lives with spouse   · Denied: History of Marijuana   ·    · No drug use   · Occupation:   · Retired   · Tobacco user (305 1) (Z72 0)    Current Meds    1  ALPRAZolam 1 MG Oral Tablet; take one tablet by mouth twice daily; Therapy: 20CPN4525 to (Evaluate:29Yea8354); Last Rx:2016 Ordered    2  Aspirin 81 MG TABS; TAKE 1 TABLET DAILY; Therapy: 98YOM3610 to ([phone])  Requested for: [phone]; Last   Rx:2014 Ordered    3   OxyCODONE HCl - 20 MG Oral Tablet; 1 tablet q6hrs; Therapy: 79DNK5466 to (Evaluate:53Yxj1590); Last Rx:79Gaf2851 Ordered    4  Albuterol Sulfate (2 5 MG/3ML) 0 083% Inhalation Nebulization Solution; INHALE   CONTENTS OF 1 VIAL IN NEBULIZER Q 4 HRS IF NEEDED; Therapy: 01AMH6924 to (Last Rx:51Zqt6530)  Requested for: 96GEI1545 Ordered   5  Symbicort 160-4 5 MCG/ACT Inhalation Aerosol; INHALE 2 PUFFS TWICE DAILY  RINSE   MOUTH AFTER USE; Therapy: 41LGD3687 to (Last MA:79ZMD1557)  Requested for: 09PFN2226 Ordered   6  Ventolin  (90 Base) MCG/ACT Inhalation Aerosol Solution; INHALE 1 PUFF   EVERY 4 HOURS AS NEEDED; Therapy: 65LMJ1810 to (Evaluate:34Tzh2436)  Requested for: 10GNX4544; Last   ZS:59DPF1894 Ordered    7  Atorvastatin Calcium 10 MG Oral Tablet; take 1 tablet every day; Therapy: 04IEF7571 to (Evaluate:62Fer3930)  Requested for: 29WXB4084; Last   Rx:27Jan2017 Ordered    8  Ibuprofen 800 MG Oral Tablet; TAKE 1 TABLET 3 TIMES DAILY; Therapy: 14HDZ7253 to (Evaluate:30Jan2016)  Requested for: 92STJ6391; Last   Rx:02Oct2015 Ordered    9  Chlorthalidone 25 MG Oral Tablet; ONE-HALF(1/2) OF A TABLET DAILY; Therapy: 65NMI7698 to (Evaluate:00Usb3763)  Requested for: 60XYU6129; Last   Rx:01Mar2016 Ordered   10  Lisinopril 40 MG Oral Tablet; TAKE 1 TABLET DAILY  CV;    Therapy: 44YWH7702 to (Evaluate:77Btd7186)  Requested for: 87DVX8919; Last    Rx:03Kie7618 Ordered    11  Viagra 100 MG Oral Tablet; TAKE 1 TABLET AS DIRECTED; Therapy: 52FTT0724 to (Evaluate:31Bix0276); Last Rx:81Vjd0824 Ordered    12  MetFORMIN HCl - 1000 MG Oral Tablet; Take 1 tablet twice daily; Therapy: 71UDX4475 to (Evaluate:55Rdd0801)  Requested for: 92PBZ9239; Last    Rx:06Ung7217 Ordered    13  Accu-Chek Anisha Plus In Vitro Strip; CHECK BLOOD SUGARS 5 TIMES DAILY; Therapy: 67ZIO1071 to (Last Rx:01Nov2017)  Requested for: 88BMU1199 Ordered   14  BD Insulin Syringe 30G X 1/2" 0 5 ML Miscellaneous; FOR 5X INJECTIONS;     Therapy: 62Nyx6411 to (Evaluate:03Rhu8534)  Requested for: 93Ejq4711; Last    Rx:55Jcc3683 Ordered   15  Insulin Syringe 30G X 5/16" 1 ML Miscellaneous; pt  injecting 5 x's a day; Therapy: 52AAN3522 to (Last Rx:00Zts1497)  Requested for: 14Lki5315 Ordered   16  Lantus 100 UNIT/ML Subcutaneous Solution; INJECT 85 UNITS SUBCUTANEOUSLY    TWICE DAILY; Therapy: 14QZU5908 to (Evaluate:31May2017)  Requested for: 17HFO1713; Last    Rx:24Mar2017 Ordered   17  NovoLOG 100 UNIT/ML Subcutaneous Solution; INJECT 2 TO 20 UNITS    SUBCUTANEOUSLY WITH EACH MEAL PLUS 7 UNITS AT BREAKFAST, 7 UNITS AT    LUNCH, AND 9 UNITS AT Dozwil; Therapy: 69YMY6824 to ([phone])  Requested for: 05JQR1032; Last    Rx:41Qai5328 Ordered   18  TRUEresult Blood Glucose w/Device Kit; USE AS DIRECTED; Therapy: 00END8996 to (Last Rx:61Ldp0846)  Requested for: 06ILF0717 Ordered   19  TRUEtest Test In Vitro Strip; TEST 5 X A DAY TESTING; Therapy: 56Oxm6116 to (Evaluate:98Rpl7469)  Requested for: 20PGY6790; Last    Rx:94Rlc7717 Ordered    20  Litetouch Insulin Syringe 31G X 5/16" 1 ML Miscellaneous; Therapy: 73KPS7698 to (Evaluate:31Kgd1231) Recorded   21  Omeprazole 20 MG Oral Tablet Delayed Release; Take 1 tablet daily; Therapy: 32NIX4382 to Recorded   22  Vitamin D 1000 UNIT Oral Tablet Recorded    Allergies    1  No Known Drug Allergies    Health Management   Influenza (Split); every 1 year; Last 57WFO4222; Next Due: 83WMV7219; Overdue    End of Encounter Meds    1  ALPRAZolam 1 MG Oral Tablet; take one tablet by mouth twice daily; Therapy: 03FXW4287 to (Evaluate:97Fua3239); Last Rx:04Jun2016 Ordered    2  Aspirin 81 MG TABS; TAKE 1 TABLET DAILY; Therapy: 21XQN7805 to [facility])  Requested for: [phone]; Last   Rx:27Oct2014 Ordered    3  OxyCODONE HCl - 20 MG Oral Tablet; 1  tablet q6hrs; Therapy: 53QYX1575 to (Evaluate:14Jun2016); Last Rx:13Apr2016 Ordered    4   Albuterol Sulfate (2 5 MG/3ML) 0 083% Inhalation Nebulization Solution; INHALE   CONTENTS OF 1 VIAL IN NEBULIZER Q 4 HRS IF NEEDED; Therapy: 96LEH2587 to (Last Rx:91Nrw5823)  Requested for: 29CAZ1783 Ordered   5  Symbicort 160-4 5 MCG/ACT Inhalation Aerosol; INHALE 2 PUFFS TWICE DAILY  RINSE   MOUTH AFTER USE; Therapy: 14QHP8154 to (Last JT:35ZOL0328)  Requested for: 01CCQ0480 Ordered   6  Ventolin  (90 Base) MCG/ACT Inhalation Aerosol Solution; INHALE 1 PUFF   EVERY 4 HOURS AS NEEDED; Therapy: 48FHH7509 to (Evaluate:26Czz6092)  Requested for: 98ACS8250; Last   Z64BQG1187 Ordered    7  Atorvastatin Calcium 10 MG Oral Tablet; take 1 tablet every day; Therapy: 25EMT4690 to (Evaluate:74Yeb0384)  Requested for: 29ART4746; Last   Rx:2017 Ordered    8  Ibuprofen 800 MG Oral Tablet; TAKE 1 TABLET 3 TIMES DAILY; Therapy: 45MEE4449 to (Evaluate:2016)  Requested for: 40WPZ4758; Last   Rx:2015 Ordered    9  Chlorthalidone 25 MG Oral Tablet; ONE-HALF(1/2) OF A TABLET DAILY; Therapy: 93LEE8398 to (Evaluate:73Twm1784)  Requested for: 44PDL9114; Last   Rx:2016 Ordered   10  Lisinopril 40 MG Oral Tablet; TAKE 1 TABLET DAILY  CV;    Therapy: 96IXT4469 to (Evaluate:38Dyp3182)  Requested for: 21LQL4694; Last    Rx:60Ply4347 Ordered    11  Viagra 100 MG Oral Tablet; TAKE 1 TABLET AS DIRECTED; Therapy: 75IQE5458 to (Evaluate:2014); Last Rx:57Tck1778 Ordered    12  MetFORMIN HCl - 1000 MG Oral Tablet; Take 1 tablet twice daily; Therapy: 35OYE0502 to (Evaluate:82Dgv2439)  Requested for: 96MIU0589; Last    Rx:32Lfk9859 Ordered    13  Accu-Chek Anisha Plus In Vitro Strip; CHECK BLOOD SUGARS 5 TIMES DAILY; Therapy: 55IJX1571 to (Last Rx:2017)  Requested for: 13WTP1722 Ordered   14  BD Insulin Syringe 30G X 1/2" 0 5 ML Miscellaneous; FOR 5X INJECTIONS; Therapy: 07Cge9714 to (Evaluate:59Jns6830)  Requested for: 2016; Last    Rx:2016 Ordered   15  Insulin Syringe 30G X 5/16" 1 ML Miscellaneous; pt  injecting 5 x's a day;     Therapy: 30DCC8357 to (Last Rx:49Pbm5979)  Requested for: 45Uzy8321 Ordered   16  Lantus 100 UNIT/ML Subcutaneous Solution; INJECT 85 UNITS SUBCUTANEOUSLY    TWICE DAILY; Therapy: 71VEG3075 to (Evaluate:31May2017)  Requested for: 43FFN4990; Last    Rx:24Mar2017 Ordered   17  NovoLOG 100 UNIT/ML Subcutaneous Solution; INJECT 2 TO 20 UNITS    SUBCUTANEOUSLY WITH EACH MEAL PLUS 7 UNITS AT BREAKFAST, 7 UNITS AT    LUNCH, AND 9 UNITS AT Dozwil; Therapy: 44LJL7660 to ([phone] [id number])  Requested for: 37KAV8857; Last    Rx:46Srg4279 Ordered   18  TRUEresult Blood Glucose w/Device Kit; USE AS DIRECTED; Therapy: 79DLD0759 to (Last Rx:59Cwg5765)  Requested for: 97UQB1735 Ordered   19  TRUEtest Test In Vitro Strip; TEST 5 X A DAY TESTING; Therapy: 34Ygw6143 to (Evaluate:85Zuw7635)  Requested for: 12JDO0238; Last    Rx:04Nzc0503 Ordered    20  Litetouch Insulin Syringe 31G X 5/16" 1 ML Miscellaneous; Therapy: 69IDS7998 to (Evaluate:40Ysj5781) Recorded   21  Omeprazole 20 MG Oral Tablet Delayed Release; Take 1 tablet daily; Therapy: 02ANW7579 to Recorded   22  Vitamin D 1000 UNIT Oral Tablet Recorded    Patient Care Team    Care Team Member Role Specialty Office Number   Joe PATEL  Internal Medicine [phone]   Sukhdeep PATEL    Dermatology [phone]   Ric Ceballos MD  Pain Management [phone]     Signatures   Electronically signed by : Hilary Pitts RN; Nov 14 2017  1:55PM EST                       (Author)

## 2018-01-16 ENCOUNTER — GENERIC CONVERSION - ENCOUNTER (OUTPATIENT)
Dept: OTHER | Facility: OTHER | Age: 63
End: 2018-01-16

## 2018-01-22 VITALS
DIASTOLIC BLOOD PRESSURE: 54 MMHG | WEIGHT: 293 LBS | TEMPERATURE: 98.2 F | BODY MASS INDEX: 39.68 KG/M2 | HEIGHT: 72 IN | HEART RATE: 82 BPM | SYSTOLIC BLOOD PRESSURE: 90 MMHG | RESPIRATION RATE: 16 BRPM

## 2018-01-23 NOTE — PROGRESS NOTES
History of Present Illness  Care Coordination Encounter Information:   Type of Encounter: Telephonic    Spoke to Patient  Care Coordination SL Nurse [facility]:   The reason for call is to discuss outreach for follow up/needed services and coordination of meeting care plan treatment goals  Patient stated that his wound to his right great toe is healing extremely well  He has been taking Januvia as ordered and he stated that its been working well in assisting with wound healing  His blood sugars have been in the 120s since last conversation on 11/14/17  He denied s/s of hypo/hyperglycemia  Has no questions or concerns at this time  Continues to have contact information  Will continue to f/u  Active Problems    1  Abnormal glucose (790 29) (R73 09)   2  Abrasion of toe of right foot, initial encounter (917 0) (S90 414A)   3  Actinic keratosis (702 0) (L57 0)   4  Anxiety disorder (300 00) (F41 9)   5  Benign essential hypertension (401 1) (I10)   6  Blister of toe (917 2) (S90 426A)   7  Cellulitis of foot, left (682 7) (L03 116)   8  Changes in skin texture (782 8) (R23 4)   9  Changing skin lesion (709 9) (L98 9)   10  Chronic maxillary sinusitis (473 0) (J32 0)   11  Chronic obstructive pulmonary disease (496) (J44 9)   12  Chronic, continuous use of opioids (305 51) (F11 90)   13  Colon cancer screening (V76 51) (Z12 11)   14  Cough (786 2) (R05)   15  Dental caries (521 00) (K02 9)   16  Diabetic Charcot foot (250 60,713 5) (E11 610)   17  Diabetic foot ulcer (250 80,707 15) (E11 621,L97 509)   18  Disc degeneration, lumbar (722 52) (M51 36)   19  Dyslipidemia (272 4) (E78 5)   20  Elevated white blood cell count (288 60) (D72 829)   21  Facet Syndrome (724 8)   22  Flu vaccine need (V04 81) (Z23)   23  Generalized osteoarthritis of multiple sites (715 09) (M15 9)   24  Generalized pain (780 96) (R52)   25  Hypercholesterolemia (272 0) (E78 00)   26  Hypertension (401 9) (I10)   27   Hypertension, essential (401 9) (I10)   28  Leukocytosis (288 60) (D72 829)   29  Low back pain (724 2) (M54 5)   30  Morbid or severe obesity due to excess calories (278 01) (E66 01)   31  Open wound of foot except toe(s) alone (892 0) (S91 309A)   32  Organic impotence (607 84) (N52 9)   33  YOVANI on CPAP (327 23,V46 8) (G47 33,Z99 89)   34  Osteomyelitis, acute, ankle or foot (730 07) (M86 179)   35  Other chronic pain (338 29) (G89 29)   36  Pain syndrome, chronic (338 4) (G89 4)   37  Pilonidal cyst (685 1) (L05 91)   38  Screening for skin condition (V82 0) (Z13 89)   39  Seborrheic keratosis (702 19) (L82 1)   40  Squamous cell cancer of skin of forearm (173 62) (C44 621)   41  Type 2 Diabetes Mellitus - Uncomplicated, Controlled (250 00)   42  Type 2 diabetes mellitus with hyperglycemia (250 00) (E11 65)   43  Wound, open, foot (892 0) (S91 309A)    Past Medical History    1  History of Arthralgia Of The Knee / Patella / Tibia / Fibula (719 46)   2  History of Arthralgia Of The Pelvis / Hip / Femur (719 45)   3  History of Benign Localized Prostatic Hyperplasia Without Urinary Obstruction (600 20)   4  History of Cellulitis (682 9) (L03 90)   5  History of Colonoscopy (Fiberoptic) Screening   6  History of Depression (311) (F32 9)   7  History of Diabetes Mellitus (250 00)   8  History of Drug dependence, in remission (304 93) (F19 21)   9  History of Dysfunction of Eustachian tube, unspecified laterality (381 81) (H69 80)   10  History of Foot ulcer, unspecified laterality, with unspecified severity (707 15) (L97 509)   11  H/O nonmelanoma skin cancer (V10 83) (Z85 828)   12  History of athlete's foot (V12 09) (Z86 19)   13  History of chronic obstructive lung disease (V12 69) (Z87 09)   14  History of diverticulitis of colon (V12 79) (Z87 19)   15  History of headache (V13 89) (Z87 898)   16  History of hidradenitis suppurativa (V13 3) (Z87 2)   17  History of hidradenitis suppurativa (V13 3) (Z87 2)   18   History of hyperlipidemia (V12 29) (Z86 39)   19  History of hypertension (V12 59) (Z86 79)   20  History of obesity (V12 29) (Z86 39)   21  History of sciatica (V12 49) (Z86 69)   22  History of Myoclonus (333 2) (G25 3)   23  History of Open Wound Of The Foot (892 0)   24  History of Other muscle spasm (728 85) (M62 838)   25  History of Pilonidal cyst with abscess (685 0) (L05 01)   26  History of Poisoning By Tobacco (989 84)   27  History of Subacromial or subdeltoid bursitis, unspecified laterality   28  History of Subacute osteomyelitis, osteomyelitis of unspecified site    Surgical History    1  History of Hemicolectomy   2  History of Incision And Drainage Of Pilonidal Cyst   3  History of Rectal Surgery Repair Of Perirectal Fistula   4  History of Surgery Foot Amputation Metatarsal And Toe    Family History  Father    1  Family history of Family Health Status Of Father -   Brother    2  Family history of Metastatic stomach plasmacytoma (multiple myeloma EBV+)    Social History    · Current Smoker (305 1)   · Current Some Day Smoker (305 1)   · Denied: History of Drug Use   · Ex-cigarette smoker (V15 82) (Z80 18)   · Denied: History of Heavy Alcohol Consumption   · Home DME CPAP   · Lives with spouse   · Denied: History of Marijuana   ·    · No drug use   · Occupation:   · Retired   · Tobacco user (305 1) (Z72 0)    Current Meds    1  ALPRAZolam 1 MG Oral Tablet; take one tablet by mouth twice daily; Therapy: 19ORU5517 to (Evaluate:70Nqx0015); Last Rx:2016 Ordered    2  Aspirin 81 MG TABS; TAKE 1 TABLET DAILY; Therapy: 46HZP5421 to ([phone])  Requested for: [phone]; Last   Rx:2014 Ordered    3  OxyCODONE HCl - 20 MG Oral Tablet; 1  tablet q6hrs; Therapy: 10OTA7935 to (Evaluate:2016); Last Rx:2016 Ordered    4  Albuterol Sulfate (2 5 MG/3ML) 0 083% Inhalation Nebulization Solution; INHALE   CONTENTS OF 1 VIAL IN NEBULIZER Q 4 HRS IF NEEDED;    Therapy: 67CLD1403 to (Last Rx:66Zug7988)  Requested for: 71SMU4809 Ordered   5  Symbicort 160-4 5 MCG/ACT Inhalation Aerosol; INHALE 2 PUFFS TWICE DAILY  RINSE   MOUTH AFTER USE; Therapy: 86BBO9913 to (Last TE:65KDG0932)  Requested for: 47SPE8041 Ordered   6  Ventolin  (90 Base) MCG/ACT Inhalation Aerosol Solution; INHALE 1 PUFF   EVERY 4 HOURS AS NEEDED; Therapy: 46BAH2107 to (Evaluate:85Fjd7177)  Requested for: 16FXN6539; Last   PV:38NDE3850 Ordered    7  Atorvastatin Calcium 10 MG Oral Tablet; take 1 tablet every day; Therapy: 78MDC6679 to (Evaluate:34Qsf2801)  Requested for: 32RLX7188; Last   Rx:27Jan2017 Ordered    8  Ibuprofen 800 MG Oral Tablet; TAKE 1 TABLET 3 TIMES DAILY; Therapy: 36PVQ0388 to (Evaluate:30Jan2016)  Requested for: 79PZF6953; Last   Rx:02Oct2015 Ordered    9  Chlorthalidone 25 MG Oral Tablet; ONE-HALF(1/2) OF A TABLET DAILY; Therapy: 44MBE5913 to (Evaluate:27Bkv0359)  Requested for: 04IFA1636; Last   Rx:01Mar2016 Ordered   10  Lisinopril 40 MG Oral Tablet; TAKE 1 TABLET DAILY  CV;    Therapy: 52KXP0238 to (Evaluate:44Pqc3294)  Requested for: 12EAW0278; Last    Rx:86Def1200 Ordered    11  Viagra 100 MG Oral Tablet; TAKE 1 TABLET AS DIRECTED; Therapy: 67DHP8648 to (Evaluate:23Oct2014); Last Rx:86Vgf8425 Ordered    12  MetFORMIN HCl - 1000 MG Oral Tablet; Take 1 tablet twice daily; Therapy: 36XDJ2976 to (Evaluate:29Crx0729)  Requested for: 36PWZ8698; Last    Rx:34Oop1216 Ordered    13  Accu-Chek Anisha Plus In Vitro Strip; CHECK BLOOD SUGARS 5 TIMES DAILY; Therapy: 87RGO0611 to (Last Rx:01Nov2017)  Requested for: 50HKQ2294 Ordered   14  BD Insulin Syringe 30G X 1/2" 0 5 ML Miscellaneous; FOR 5X INJECTIONS; Therapy: 23Rah9356 to (Evaluate:55Cuj4856)  Requested for: 25Apr2016; Last    Rx:23Apr2016 Ordered   15  Insulin Syringe 30G X 5/16" 1 ML Miscellaneous; pt  injecting 5 x's a day; Therapy: 49TVS3749 to (Last Rx:28Feb2017)  Requested for: 28Feb2017 Ordered   16   Lantus 100 UNIT/ML Subcutaneous Solution; INJECT 85 UNITS SUBCUTANEOUSLY    TWICE DAILY; Therapy: 50KYQ5697 to (Evaluate:72Lxb0873)  Requested for: 58BWM1999; Last    Rx:24Mar2017 Ordered   17  NovoLOG 100 UNIT/ML Subcutaneous Solution; INJECT 2 TO 20 UNITS    SUBCUTANEOUSLY WITH EACH MEAL PLUS 7 UNITS AT BREAKFAST, 7 UNITS AT    LUNCH, AND 9 UNITS AT Dozwil; Therapy: 00ZUP8401 to [phone])  Requested for: 58ILC4846; Last    Rx:29Isw7464 Ordered   18  TRUEresult Blood Glucose w/Device Kit; USE AS DIRECTED; Therapy: 59PVJ8856 to (Last Rx:10Kvx6760)  Requested for: 83TRB4443 Ordered   19  TRUEtest Test In Vitro Strip; TEST 5 X A DAY TESTING; Therapy: 37Bgj3441 to (Evaluate:13Apr2017)  Requested for: 91DGK1881; Last    Rx:25Zia1917 Ordered    20  Litetouch Insulin Syringe 31G X 5/16" 1 ML Miscellaneous; Therapy: 77EUH5198 to (Evaluate:67Zlu5969) Recorded   21  Omeprazole 20 MG Oral Tablet Delayed Release; Take 1 tablet daily; Therapy: 81ESK8192 to Recorded   22  Vitamin D 1000 UNIT Oral Tablet Recorded    Allergies    1  No Known Drug Allergies    Health Management   Influenza (Split); every 1 year; Last 89FNY6644; Next Due: 25UZJ0828; Overdue    End of Encounter Meds    1  ALPRAZolam 1 MG Oral Tablet; take one tablet by mouth twice daily; Therapy: 31WLJ1015 to (Evaluate:10Cyu7623); Last Rx:04Jun2016 Ordered    2  Aspirin 81 MG TABS; TAKE 1 TABLET DAILY; Therapy: 28LSD5134 to ([phone])  Requested for: [phone]; Last   Rx:45Dau2333 Ordered    3  OxyCODONE HCl - 20 MG Oral Tablet; 1  tablet q6hrs; Therapy: 22XLY3189 to (Evaluate:95Uuj9890); Last Rx:85Tjr4587 Ordered    4  Albuterol Sulfate (2 5 MG/3ML) 0 083% Inhalation Nebulization Solution; INHALE   CONTENTS OF 1 VIAL IN NEBULIZER Q 4 HRS IF NEEDED; Therapy: 98MKR5988 to (Last Rx:58Lax6654)  Requested for: 10NHY4877 Ordered   5  Symbicort 160-4 5 MCG/ACT Inhalation Aerosol; INHALE 2 PUFFS TWICE DAILY  RINSE   MOUTH AFTER USE;    Therapy: 95VHY9187 to (Last CC:79KUT4594)  Requested for: 41OTJ3776 Ordered   6  Ventolin  (90 Base) MCG/ACT Inhalation Aerosol Solution; INHALE 1 PUFF   EVERY 4 HOURS AS NEEDED; Therapy: 00TDL6129 to (Evaluate:15Svo9327)  Requested for: 27DFX7936; Last   HR:07RRJ6357 Ordered    7  Atorvastatin Calcium 10 MG Oral Tablet; take 1 tablet every day; Therapy: 29OPE9941 to (Evaluate:08Tfd6096)  Requested for: 07YKI8315; Last   Rx:27Jan2017 Ordered    8  Ibuprofen 800 MG Oral Tablet; TAKE 1 TABLET 3 TIMES DAILY; Therapy: 11KZR7553 to (Evaluate:30Jan2016)  Requested for: 86KQE2772; Last   Rx:02Oct2015 Ordered    9  Chlorthalidone 25 MG Oral Tablet; ONE-HALF(1/2) OF A TABLET DAILY; Therapy: 31RJY1740 to (Evaluate:17Xlh1201)  Requested for: 15IAH8142; Last   Rx:01Mar2016 Ordered   10  Lisinopril 40 MG Oral Tablet; TAKE 1 TABLET DAILY  CV;    Therapy: 68AFJ1130 to (Evaluate:42Env2663)  Requested for: 62QMF9039; Last    Rx:69Eat5165 Ordered    11  Viagra 100 MG Oral Tablet; TAKE 1 TABLET AS DIRECTED; Therapy: 65DSS0367 to (Evaluate:23Oct2014); Last Rx:46Twx6297 Ordered    12  MetFORMIN HCl - 1000 MG Oral Tablet; Take 1 tablet twice daily; Therapy: 28JUQ9555 to (Evaluate:72Ltz0194)  Requested for: 88NFA1138; Last    Rx:46Xja4068 Ordered    13  Accu-Chek Anisha Plus In Vitro Strip; CHECK BLOOD SUGARS 5 TIMES DAILY; Therapy: 91UPX7965 to (Last Rx:01Nov2017)  Requested for: 66ELD0467 Ordered   14  BD Insulin Syringe 30G X 1/2" 0 5 ML Miscellaneous; FOR 5X INJECTIONS; Therapy: 02Cok9505 to (Evaluate:45Lca8160)  Requested for: 80Mfa2578; Last    Rx:86Zek8813 Ordered   15  Insulin Syringe 30G X 5/16" 1 ML Miscellaneous; pt  injecting 5 x's a day; Therapy: 71LTY4381 to (Last Rx:28Feb2017)  Requested for: 28Feb2017 Ordered   16  Lantus 100 UNIT/ML Subcutaneous Solution; INJECT 85 UNITS SUBCUTANEOUSLY    TWICE DAILY;     Therapy: 33CJM5929 to (Evaluate:34Ysx3212)  Requested for: 58MMW8498; Last    Rx:24Mar2017 Ordered   17  NovoLOG 100 UNIT/ML Subcutaneous Solution; INJECT 2 TO 20 UNITS    SUBCUTANEOUSLY WITH EACH MEAL PLUS 7 UNITS AT BREAKFAST, 7 UNITS AT    LUNCH, AND 9 UNITS AT Dozwil; Therapy: 87MVB5381 to ([phone])  Requested for: 17LTR3033; Last    Rx:69Lkw5804 Ordered   18  TRUEresult Blood Glucose w/Device Kit; USE AS DIRECTED; Therapy: 21ZQO2700 to (Last Rx:15Cst3832)  Requested for: 72XRR1416 Ordered   19  TRUEtest Test In Vitro Strip; TEST 5 X A DAY TESTING; Therapy: 13Qby1187 to (Evaluate:40Fqb2496)  Requested for: 06LWM4677; Last    Rx:06Kod5439 Ordered    20  Litetouch Insulin Syringe 31G X 5/16" 1 ML Miscellaneous; Therapy: 40DWS9238 to (Evaluate:72Zvf0044) Recorded   21  Omeprazole 20 MG Oral Tablet Delayed Release; Take 1 tablet daily; Therapy: 08NYA5610 to Recorded   22  Vitamin D 1000 UNIT Oral Tablet Recorded    Patient Care Team    Care Team Member Role Specialty Office Number   Nataliia PATEL  Internal Medicine [phone]   Vinnie PATEL    Dermatology [phone]   Charity Guaman MD  Pain Management [phone]     Signatures   Electronically signed by : Sarah Nazario RN; Dec  4 2017  3:08PM EST                       (Author)

## 2018-01-23 NOTE — PROGRESS NOTES
History of Present Illness  Care Coordination Encounter Information:   Type of Encounter: Telephonic    Spoke to Patient  Care Coordination SL Nurse [facility]:   The reason for call is to discuss outreach for follow up/needed services, coordination of meeting care plan treatment goals and results  Patient stated that he is doing good  He stated that his wound to right foot is healing extremely well  He continues to take Januvia and stated that he feels it is working well  He continues to tolerate all his medications and has no issues with side effects  Reviewed over good medication management and the importance of it  He stated that he now has a medication list and continues to use his daily pill box to help him remember his medications  He stated his blood sugars have been up and down but he stated that it is his own fault  He stated, "I know what I have to do but I have not been following a diabetic diet " I asked him what is making it hard for him to eat well  He stated that he just loves to eat  Educated the importance of following a diabetic diet and possible risks  He verbalized understanding and stated that his goal is to "eat less carbs and sugary foods " Also explained to him that it is important for him to eat well to increase good wound healing  He verbalized understanding  Will continue to f/u  Active Problems    1  Abnormal glucose (790 29) (R73 09)   2  Abrasion of toe of right foot, initial encounter (917 0) (S90 414A)   3  Actinic keratosis (702 0) (L57 0)   4  Anxiety disorder (300 00) (F41 9)   5  Benign essential hypertension (401 1) (I10)   6  Blister of toe (917 2) (S90 426A)   7  Cellulitis of foot, left (682 7) (L03 116)   8  Changes in skin texture (782 8) (R23 4)   9  Changing skin lesion (709 9) (L98 9)   10  Chronic maxillary sinusitis (473 0) (J32 0)   11  Chronic obstructive pulmonary disease (496) (J44 9)   12  Chronic, continuous use of opioids (305 51) (F11 90)   13   Colon cancer screening (V76 51) (Z12 11)   14  Cough (786 2) (R05)   15  Dental caries (521 00) (K02 9)   16  Diabetic Charcot foot (250 60,713 5) (E11 610)   17  Diabetic foot ulcer (250 80,707 15) (E11 621,L97 509)   18  Disc degeneration, lumbar (722 52) (M51 36)   19  Dyslipidemia (272 4) (E78 5)   20  Elevated white blood cell count (288 60) (D72 829)   21  Facet Syndrome (724 8)   22  Flu vaccine need (V04 81) (Z23)   23  Generalized osteoarthritis of multiple sites (715 09) (M15 9)   24  Generalized pain (780 96) (R52)   25  Hypercholesterolemia (272 0) (E78 00)   26  Hypertension (401 9) (I10)   27  Hypertension, essential (401 9) (I10)   28  Leukocytosis (288 60) (D72 829)   29  Low back pain (724 2) (M54 5)   30  Morbid or severe obesity due to excess calories (278 01) (E66 01)   31  Open wound of foot except toe(s) alone (892 0) (S91 309A)   32  Organic impotence (607 84) (N52 9)   33  YOVANI on CPAP (327 23,V46 8) (G47 33,Z99 89)   34  Osteomyelitis, acute, ankle or foot (730 07) (M86 179)   35  Other chronic pain (338 29) (G89 29)   36  Pain syndrome, chronic (338 4) (G89 4)   37  Pilonidal cyst (685 1) (L05 91)   38  Screening for skin condition (V82 0) (Z13 89)   39  Seborrheic keratosis (702 19) (L82 1)   40  Squamous cell cancer of skin of forearm (173 62) (C44 621)   41  Type 2 Diabetes Mellitus - Uncomplicated, Controlled (250 00)   42  Type 2 diabetes mellitus with hyperglycemia (250 00) (E11 65)   43  Wound, open, foot (892 0) (S91 309A)    Past Medical History    1  History of Arthralgia Of The Knee / Patella / Tibia / Fibula (719 46)   2  History of Arthralgia Of The Pelvis / Hip / Femur (719 45)   3  History of Benign Localized Prostatic Hyperplasia Without Urinary Obstruction (600 20)   4  History of Cellulitis (682 9) (L03 90)   5  History of Colonoscopy (Fiberoptic) Screening   6  History of Depression (311) (F32 9)   7  History of Diabetes Mellitus (250 00)   8   History of Drug dependence, in remission (304 93) (F19 21)   9  History of Dysfunction of Eustachian tube, unspecified laterality (381 81) (H69 80)   10  History of Foot ulcer, unspecified laterality, with unspecified severity (707 15) (L97 509)   11  H/O nonmelanoma skin cancer (V10 83) (Z85 828)   12  History of athlete's foot (V12 09) (Z86 19)   13  History of chronic obstructive lung disease (V12 69) (Z87 09)   14  History of diverticulitis of colon (V12 79) (Z87 19)   15  History of headache (V13 89) (Z87 898)   16  History of hidradenitis suppurativa (V13 3) (Z87 2)   17  History of hidradenitis suppurativa (V13 3) (Z87 2)   18  History of hyperlipidemia (V12 29) (Z86 39)   19  History of hypertension (V12 59) (Z86 79)   20  History of obesity (V12 29) (Z86 39)   21  History of sciatica (V12 49) (Z86 69)   22  History of Myoclonus (333 2) (G25 3)   23  History of Open Wound Of The Foot (892 0)   24  History of Other muscle spasm (728 85) (M62 838)   25  History of Pilonidal cyst with abscess (685 0) (L05 01)   26  History of Poisoning By Tobacco (989 84)   27  History of Subacromial or subdeltoid bursitis, unspecified laterality   28  History of Subacute osteomyelitis, osteomyelitis of unspecified site    Surgical History    1  History of Hemicolectomy   2  History of Incision And Drainage Of Pilonidal Cyst   3  History of Rectal Surgery Repair Of Perirectal Fistula   4  History of Surgery Foot Amputation Metatarsal And Toe    Family History  Father    1  Family history of Family Health Status Of Father -   Brother    2   Family history of Metastatic stomach plasmacytoma (multiple myeloma EBV+)    Social History    · Current Smoker (305 1)   · Current Some Day Smoker (305 1)   · Denied: History of Drug Use   · Ex-cigarette smoker (S93 07) (Z80 18)   · Denied: History of Heavy Alcohol Consumption   · Home DME CPAP   · Lives with spouse   · Denied: History of Marijuana   ·    · No drug use   · Occupation:   · Retired   · Tobacco user (305 1) (Z72 0)    Current Meds    1  ALPRAZolam 1 MG Oral Tablet; take one tablet by mouth twice daily; Therapy: 12OND0058 to (Evaluate:48Baf3373); Last Rx:04Jun2016 Ordered    2  Aspirin 81 MG TABS; TAKE 1 TABLET DAILY; Therapy: 41WLS1105 to (Juanjo Sanchez)  Requested for: ; Last   Rx:27Oct2014 Ordered    3  OxyCODONE HCl - 20 MG Oral Tablet; 1  tablet q6hrs; Therapy: 28OQS2829 to (Evaluate:14Jun2016); Last Rx:22Any7170 Ordered    4  Albuterol Sulfate (2 5 MG/3ML) 0 083% Inhalation Nebulization Solution; INHALE   CONTENTS OF 1 VIAL IN NEBULIZER Q 4 HRS IF NEEDED; Therapy: 42GCO1102 to (Last Rx:31Qpr5324)  Requested for: 02TQO6735 Ordered   5  Symbicort 160-4 5 MCG/ACT Inhalation Aerosol; INHALE 2 PUFFS TWICE DAILY  RINSE   MOUTH AFTER USE; Therapy: 51TAK9156 to (Last WV:88XJB1059)  Requested for: 30ZHM8296 Ordered   6  Ventolin  (90 Base) MCG/ACT Inhalation Aerosol Solution; INHALE 1 PUFF   EVERY 4 HOURS AS NEEDED; Therapy: 29MJS3832 to (Evaluate:07Kfm7258)  Requested for: 47KZG5629; Last   CB:40SWT9110 Ordered    7  Atorvastatin Calcium 10 MG Oral Tablet; take 1 tablet every day; Therapy: 00ITC6874 to (Evaluate:94Iln9056)  Requested for: 52CBL4304; Last   Rx:27Jan2017 Ordered    8  Ibuprofen 800 MG Oral Tablet; TAKE 1 TABLET 3 TIMES DAILY; Therapy: 40ZHG8114 to (Evaluate:30Jan2016)  Requested for: 14VTB2182; Last   Rx:02Oct2015 Ordered    9  Chlorthalidone 25 MG Oral Tablet; ONE-HALF(1/2) OF A TABLET DAILY; Therapy: 44VGC0576 to (Evaluate:52Tuc4127)  Requested for: 17TIR5280; Last   Rx:01Mar2016 Ordered   10  Lisinopril 40 MG Oral Tablet; TAKE 1 TABLET DAILY  CV;    Therapy: 35CMY8370 to (Evaluate:47Mrc4185)  Requested for: 78CPY6944; Last    Rx:30Sep2016 Ordered    11  Viagra 100 MG Oral Tablet (Sildenafil Citrate); TAKE 1 TABLET AS DIRECTED; Therapy: 93SOM5031 to (Evaluate:23Oct2014); Last Rx:23Sep2014 Ordered    12   MetFORMIN HCl - 1000 MG Oral Tablet; Take 1 tablet twice daily; Therapy: 08WCR0952 to (Evaluate:11May2017)  Requested for: 60ACL9198; Last    Rx:16May2016 Ordered    13  Accu-Chek Anisha Plus In Vitro Strip; CHECK BLOOD SUGARS 5 TIMES DAILY; Therapy: 43YPQ6122 to (Last Rx:01Nov2017)  Requested for: 91XGD5483 Ordered   14  BD Insulin Syringe 30G X 1/2" 0 5 ML Miscellaneous; FOR 5X INJECTIONS; Therapy: 86Aif3218 to (Evaluate:16Uct3501)  Requested for: 23Zsi6237; Last    Rx:91Ywh5486 Ordered   15  Insulin Syringe 30G X 5/16" 1 ML Miscellaneous; pt  injecting 5 x's a day; Therapy: 03DYJ3704 to (Last Rx:28Feb2017)  Requested for: 28Feb2017 Ordered   16  Lantus 100 UNIT/ML Subcutaneous Solution; INJECT 85 UNITS SUBCUTANEOUSLY    TWICE DAILY; Therapy: 01DJD8965 to (Evaluate:31May2017)  Requested for: 47QGE4455; Last    Rx:24Mar2017 Ordered   17  NovoLOG 100 UNIT/ML Subcutaneous Solution; INJECT 2 TO 20 UNITS    SUBCUTANEOUSLY WITH EACH MEAL PLUS 7 UNITS AT BREAKFAST, 7 UNITS AT    LUNCH, AND 9 UNITS AT Dozwil; Therapy: 49FQS1548 to (Maria M Gimenez)  Requested for: 52FGQ5114; Last    Rx:30May2017 Ordered   18  TRUEresult Blood Glucose w/Device Kit; USE AS DIRECTED; Therapy: 78QRH4794 to (Last Rx:20Eyx8328)  Requested for: 89YHI5633 Ordered   19  TRUEtest Test In Vitro Strip; TEST 5 X A DAY TESTING; Therapy: 73Isq9082 to (Evaluate:38Ijn3193)  Requested for: 52HMV3699; Last    Rx:34Usd5687 Ordered    20  Litetouch Insulin Syringe 31G X 5/16" 1 ML Miscellaneous; Therapy: 56LBO4069 to (Evaluate:93Sne1176) Recorded   21  Omeprazole 20 MG Oral Tablet Delayed Release; Take 1 tablet daily; Therapy: 49VMH8345 to Recorded   22  Vitamin D 1000 UNIT Oral Tablet Recorded    Allergies    1  No Known Drug Allergies    Health Management   Influenza (Split); every 1 year; Last 86XAN8865; Next Due: 59JGB0102; Overdue    End of Encounter Meds    1  ALPRAZolam 1 MG Oral Tablet; take one tablet by mouth twice daily;    Therapy: 90KYX0804 to (Evaluate:52Wuf2758); Last Rx:04Jun2016 Ordered    2  Aspirin 81 MG TABS; TAKE 1 TABLET DAILY; Therapy: 32EAF8487 to ([phone])  Requested for: [phone]; Last   Rx:27Oct2014 Ordered    3  OxyCODONE HCl - 20 MG Oral Tablet; 1  tablet q6hrs; Therapy: 54EKC4679 to (Evaluate:14Jun2016); Last Rx:80Tsc1600 Ordered    4  Albuterol Sulfate (2 5 MG/3ML) 0 083% Inhalation Nebulization Solution; INHALE   CONTENTS OF 1 VIAL IN NEBULIZER Q 4 HRS IF NEEDED; Therapy: 79AOT6586 to (Last Rx:44Mdq8869)  Requested for: 31BAU0476 Ordered   5  Symbicort 160-4 5 MCG/ACT Inhalation Aerosol; INHALE 2 PUFFS TWICE DAILY  RINSE   MOUTH AFTER USE; Therapy: 00KVY6413 to (Last GD:39XAW9268)  Requested for: 84HPU6445 Ordered   6  Ventolin  (90 Base) MCG/ACT Inhalation Aerosol Solution; INHALE 1 PUFF   EVERY 4 HOURS AS NEEDED; Therapy: 45RRG5412 to (Evaluate:46Fcm0184)  Requested for: 10VXO6077; Last   MARY:86ZXU2957 Ordered    7  Atorvastatin Calcium 10 MG Oral Tablet; take 1 tablet every day; Therapy: 71IMI5207 to (Evaluate:86Irc3365)  Requested for: 24RNI1541; Last   Rx:27Jan2017 Ordered    8  Ibuprofen 800 MG Oral Tablet; TAKE 1 TABLET 3 TIMES DAILY; Therapy: 49ILV1594 to (Evaluate:30Jan2016)  Requested for: 98TZU6247; Last   Rx:02Oct2015 Ordered    9  Chlorthalidone 25 MG Oral Tablet; ONE-HALF(1/2) OF A TABLET DAILY; Therapy: 75TYL8275 to (Evaluate:15Qbv1784)  Requested for: 66DAF3636; Last   Rx:01Mar2016 Ordered   10  Lisinopril 40 MG Oral Tablet; TAKE 1 TABLET DAILY  CV;    Therapy: 27XIL3625 to (Evaluate:86Fan9277)  Requested for: 21GZN4000; Last    Rx:46Yqs4918 Ordered    11  Viagra 100 MG Oral Tablet (Sildenafil Citrate); TAKE 1 TABLET AS DIRECTED; Therapy: 21QMM9415 to (Evaluate:23Oct2014); Last Rx:05Smj8341 Ordered    12  MetFORMIN HCl - 1000 MG Oral Tablet; Take 1 tablet twice daily; Therapy: 74UZH9257 to (Evaluate:41Hkf7409)  Requested for: 02CLK6889; Last    Rx:88Rny5479 Ordered    13   Accu-Chek Anisha Plus In Vitro Strip; CHECK BLOOD SUGARS 5 TIMES DAILY; Therapy: 51DYO5159 to (Last Rx:01Nov2017)  Requested for: 24OOG2947 Ordered   14  BD Insulin Syringe 30G X 1/2" 0 5 ML Miscellaneous; FOR 5X INJECTIONS; Therapy: 04Vdc0903 to (Evaluate:16Acf5635)  Requested for: 94Jtf4476; Last    Rx:54Pfd2866 Ordered   15  Insulin Syringe 30G X 5/16" 1 ML Miscellaneous; pt  injecting 5 x's a day; Therapy: 45JOG8888 to (Last Rx:70Acd6314)  Requested for: 26Adf2165 Ordered   16  Lantus 100 UNIT/ML Subcutaneous Solution; INJECT 85 UNITS SUBCUTANEOUSLY    TWICE DAILY; Therapy: 79GEF3125 to (Evaluate:31May2017)  Requested for: 03UAC7562; Last    Rx:24Mar2017 Ordered   17  NovoLOG 100 UNIT/ML Subcutaneous Solution; INJECT 2 TO 20 UNITS    SUBCUTANEOUSLY WITH EACH MEAL PLUS 7 UNITS AT BREAKFAST, 7 UNITS AT    LUNCH, AND 9 UNITS AT Dozwil; Therapy: 80VBX6427 to ([phone] [id number])  Requested for: 39EFU3561; Last    Rx:30May2017 Ordered   18  TRUEresult Blood Glucose w/Device Kit; USE AS DIRECTED; Therapy: 67FBZ2926 to (Last Rx:94Quz0957)  Requested for: 80YVJ7362 Ordered   19  TRUEtest Test In Vitro Strip; TEST 5 X A DAY TESTING; Therapy: 53Cdl7007 to (Evaluate:30Uji9126)  Requested for: 71NGA6374; Last    Rx:12Dxn8838 Ordered    20  Litetouch Insulin Syringe 31G X 5/16" 1 ML Miscellaneous; Therapy: 12CCZ7413 to (Evaluate:24Cze7263) Recorded   21  Omeprazole 20 MG Oral Tablet Delayed Release; Take 1 tablet daily; Therapy: 44AAE0641 to Recorded   22  Vitamin D 1000 UNIT Oral Tablet Recorded    Patient Care Team    Care Team Member Role Specialty Office Number   Nataliia PATEL  Internal Medicine [phone]   Vinnie PATEL    Dermatology [phone]   Poncho Acevedo MD  Pain Management [phone]     Signatures   Electronically signed by : Sarah Nazario RN; Kraig  3 2018 12:05PM EST                       (Author)

## 2018-01-23 NOTE — MISCELLANEOUS
Assessment   1  Osteomyelitis, acute, ankle or foot (730 07) (M86 179)1   2  Diabetic Charcot foot (250 60,713 5) (E11 610)1   3  Hypercholesterolemia (272 0) (E78 00)1   4  Hypertension (401 9) (I10)1   5  Dyslipidemia (272 4) (E78 5)1   6  Morbid or severe obesity due to excess calories (278 01) (E66 01)1   7  Pain syndrome, chronic (338 4) (G89 4)1   8  Type 2 diabetes mellitus with hyperglycemia (250 00) (E11 65)1      1 Amended By: Jarod Harris; Oct 25 2017 1:16 PM EST    Plan  Type 2 diabetes mellitus with hyperglycemia    · (1) CBC/PLT/DIFF; Status:Active; Requested JJK:[id number]; 1   Perform:[facility]; LVA:73ZVB0530; Ordered; For:Type 2 diabetes mellitus   with hyperglycemia; Ordered By:Kristopher Morrison    · (1) COMPREHENSIVE METABOLIC PANEL; Status:Active; Requested ZJI:[id number]; 1   Perform:[facility]; XMM:13TQN7205; Ordered; For:Type 2 diabetes mellitus   with hyperglycemia; Ordered By:Kristopher Morrison    · Follow-up as previously scheduled Evaluation and Treatment  Follow-up  Status:  Complete  Done:1 1,2  31IZQ3968;7 1,2   Ordered; For: Type 2 diabetes mellitus with hyperglycemia; Ordered By: Jarod Harris    Performed:   Due: 14JPD94390      1 Amended By: Jarod Harris; Oct 25 2017 1:17 PM EST   2 Amended By: Jarod Harris; Oct 25 2017 1:17 PM EST    Discussion/Summary  Discussion Summary:   He is doing well I reviewed all his hospital data we'll follow his labs his white count is up his sodium was a little low he will continue following with his PCP regarding his sugar and his blood pressure which is somewhat low no changes in his medication regimen for now2    Medication SE Review and Pt Understands Tx: Possible side effects of new medications were reviewed with the patient/guardian today1  The treatment plan was reviewed with the patient/guardian   The patient/guardian understands and agrees with the treatment plan1        1 Amended By: Jarod Harris; Oct 25 2017 1:13 PM EST   2 Amended By: Samm Chawla; Oct 25 2017 6:38 PM EST    Chief Complaint  Chief Complaint Free Text Note Form: Hospital follow-up1        1 Amended By: Samm Chawla; Oct 25 2017 6:32 PM EST    History of Present Illness  TCM Communication [facility]: RYAN LOZANO  DIXIE records were reviewed  He was hospitalized at [facility]  The date of admission: 10/12/17, date of discharge: 10/20/17  Diagnosis: Right toe wound/10-16 Right toe amputation  He was discharged to home  Medications reviewed and updated today  He scheduled a follow up appointment  Communication performed and completed by   HPI: Hospital follow-up  He was hospitalized for 10 days last week because of foot ulcer on the right  This resulted in transmetatarsal amputation of the right first toe  He is doing well postoperatively  No fevers his pain is under control  His blood sugars have been labile not well controlled  He is now followed closely by PCP Dr Stacie Angela  He denies chest pain shortness of breath fever nausea vomiting or dizziness he has a few more days of oral antibiotics to take no sign of any infection although he thinks he may require a wound VAC  Having close follow-up with visiting nurses and following at the wound Center with podiatry1    Osteomyelitis (Brief): The patient is being seen for  follow-up of a recent hospitalization for1  osteomyelitis1   This is located in the  right1  1   Symptoms: 1  pain1   The patient is currently experiencing symptoms  1  No associated symptoms are reported1    Chronic Pain (Follow-Up): The patient is being seen for follow-up of  chronic neck pain1  and  chronic back pain1  1   The patient reports  no change in the condition1  1   Comorbid Illnesses:1  depression1   He has had no significant interval events1  The patient is currently asymptomatic1    Obesity (Follow-Up): The patient is being seen for follow-up of  obesity1  1   Hypertension (Follow-Up): The patient presents for follow-up of1  essential hypertension1    The patient states he has been  doing well1  with his blood pressure control since the last visit1   He has no comorbid illnesses1  He has no significant interval events1    Symptoms: The patient is currently asymptomatic1        1 Amended By: Vitaliy Bernstein; Oct 25 2017 6:35 PM EST    Review of Systems  Complete-Male:   Constitutional:1  No fever or chills, feels well, no tiredness, no recent weight gain or weight loss1   Eyes:1  No complaints of eye pain, no red eyes, no discharge from eyes, no itchy eyes1   ENT:1  no complaints of earache, no hearing loss, no nosebleeds, no nasal discharge, no sore throat, no hoarseness1   Cardiovascular: 1  No complaints of slow heart rate, no fast heart rate, no chest pain, no palpitations, no leg claudication, no lower extremity1   Respiratory:1  No complaints of shortness of breath, no wheezing, no cough, no SOB on exertion, no orthopnea or PND1   Gastrointestinal:1  No complaints of abdominal pain, no constipation, no nausea or vomiting, no diarrhea or bloody stools1   Genitourinary:1  No complaints of dysuria, no incontinence, no hesitancy, no nocturia, no genital lesion, no testicular pain1   Musculoskeletal: as noted in HPI1   Integumentary:1  No complaints of skin rash or skin lesions, no itching, no skin wound, no dry skin1   Neurological:1  No compliants of headache, no confusion, no convulsions, no numbness or tingling, no dizziness or fainting, no limb weakness, no difficulty walking1   Psychiatric:1  Is not suicidal, no sleep disturbances, no anxiety or depression, no change in personality, no emotional problems1   Endocrine:1  No complaints of proptosis, no hot flashes, no muscle weakness, no erectile dysfunction, no deepening of the voice, no feelings of weakness1   Hematologic/Lymphatic:1  No complaints of swollen glands, no swollen glands in the neck, does not bleed easily, no easy bruising1   ROS Reviewed:   ROS reviewed  1        1 Amended Dianne Sutton; Oct 25 2017 6:35 PM EST    Active Problems   1  Abnormal glucose (790 29) (R73 09)  2  Abrasion of toe of right foot, initial encounter (917 0) (S90 414A)  3  Actinic keratosis (702 0) (L57 0)  4  Anxiety disorder (300 00) (F41 9)  5  Benign essential hypertension (401 1) (I10)  6  Blister of toe (917 2) (S90 426A)  7  Cellulitis of foot, left (682 7) (L03 116)  8  Changes in skin texture (782 8) (R23 4)  9  Changing skin lesion (709 9) (L98 9)  10  Chronic maxillary sinusitis (473 0) (J32 0)  11  Chronic obstructive pulmonary disease (496) (J44 9)  12  Chronic, continuous use of opioids (305 51) (F11 90)  13  Colon cancer screening (V76 51) (Z12 11)  14  Cough (786 2) (R05)  15  Dental caries (521 00) (K02 9)  16  Diabetic Charcot foot (250 60,713 5) (E11 610)  17  Diabetic foot ulcer (250 80,707 15) (E11 621,L97 509)  18  Disc degeneration, lumbar (722 52) (M51 36)  19  Dyslipidemia (272 4) (E78 5)  20  Elevated white blood cell count (288 60) (D72 829)  21  Facet Syndrome (724 8)  22  Flu vaccine need (V04 81) (Z23)  23  Generalized osteoarthritis of multiple sites (715 09) (M15 9)  24  Generalized pain (780 96) (R52)  25  Hypercholesterolemia (272 0) (E78 00)  26  Hypertension (401 9) (I10)  27  Hypertension, essential (401 9) (I10)  28  Leukocytosis (288 60) (D72 829)  29  Low back pain (724 2) (M54 5)  30  Morbid or severe obesity due to excess calories (278 01) (E66 01)  31  Open wound of foot except toe(s) alone (892 0) (S91 309A)  32  Organic impotence (607 84) (N52 9)  33  YOVANI on CPAP (327 23,V46 8) (G47 33,Z99 89)  34  Osteomyelitis, acute, ankle or foot (730 07) (M86 179)  35  Other chronic pain (338 29) (G89 29)  36  Pain syndrome, chronic (338 4) (G89 4)  37  Pilonidal cyst (685 1) (L05 91)  38  Screening for skin condition (V82 0) (Z13 89)  39  Seborrheic keratosis (702 19) (L82 1)  40  Squamous cell cancer of skin of forearm (173 62) (C44 978)  41   Type 2 Diabetes Mellitus - Uncomplicated, Controlled (250 00)  42  Type 2 diabetes mellitus with hyperglycemia (250 00) (E11 65)  43  Wound, open, foot (892 0) (S91 309A)    Past Medical History   1  History of Arthralgia Of The Knee / Patella / Tibia / Fibula (719 46)  2  History of Arthralgia Of The Pelvis / Hip / Femur (719 45)  3  History of Benign Localized Prostatic Hyperplasia Without Urinary Obstruction (600 20)  4  History of Cellulitis (682 9) (L03 90)  5  History of Colonoscopy (Fiberoptic) Screening  6  History of Depression (311) (F32 9)  7  History of Diabetes Mellitus (250 00)  8  History of Drug dependence, in remission (304 93) (F19 21)  9  History of Dysfunction of Eustachian tube, unspecified laterality (381 81) (H69 80)  10  History of Foot ulcer, unspecified laterality, with unspecified severity (707 15) (L97 509)  11  H/O nonmelanoma skin cancer (V10 83) (Z85 828)  12  History of athlete's foot (V12 09) (Z86 19)  13  History of chronic obstructive lung disease (V12 69) (Z87 09)  14  History of diverticulitis of colon (V12 79) (Z87 19)  15  History of headache (V13 89) (Z87 898)  16  History of hidradenitis suppurativa (V13 3) (Z87 2)  17  History of hidradenitis suppurativa (V13 3) (Z87 2)  18  History of hyperlipidemia (V12 29) (Z86 39)  19  History of hypertension (V12 59) (Z86 79)  20  History of obesity (V13 89) (Z86 39)  21  History of sciatica (V12 49) (Z86 69)  22  History of Myoclonus (333 2) (G25 3)  23  History of Open Wound Of The Foot (892 0)  24  History of Other muscle spasm (728 85) (M62 838)  25  History of Pilonidal cyst with abscess (685 0) (L05 01)  26  History of Poisoning By Tobacco (989 84)  27  History of Subacromial or subdeltoid bursitis, unspecified laterality  28  History of Subacute osteomyelitis, osteomyelitis of unspecified site    Surgical History   1  History of Hemicolectomy  2  History of Incision And Drainage Of Pilonidal Cyst  3   History of Rectal Surgery Repair Of Perirectal Fistula  4  History of Surgery Foot Amputation Metatarsal And Toe  Surgical History Reviewed: The surgical history was reviewed and updated today  1        1 Amended By: Bernadette Oliveros; Oct 25 2017 6:36 PM EST    Family History  Father   1  Family history of Family Health Status Of Father -   Brother   2  Family history of Metastatic stomach plasmacytoma (multiple myeloma EBV+)  Family History Reviewed: The family history was reviewed and updated today  1        1 Amended By: Bernadette Oliveros; Oct 25 2017 6:36 PM EST    Social History    · Current Smoker (305 1)   · Current Some Day Smoker (305 1)   · Denied: History of Drug Use   · Ex-cigarette smoker (V15 82) (Z87 891)   · Denied: History of Heavy Alcohol Consumption   · Home DME CPAP   · Lives with spouse   · Denied: History of Marijuana   ·    · No drug use   · Occupation:   · Retired    Social History Reviewed: The social history was reviewed and updated today  1        1 Amended By: Bernadette Oliveros; Oct 25 2017 6:37 PM EST    Current Meds  1  Albuterol Sulfate (2 5 MG/3ML) 0 083% Inhalation Nebulization Solution; INHALE   CONTENTS OF 1 VIAL IN NEBULIZER Q 4 HRS IF NEEDED; Therapy: 71ICR1164 to (Last Rx:92Smw3842)  Requested for: 02OQC1458 Ordered  2  ALPRAZolam 1 MG Oral Tablet; take one tablet by mouth twice daily; Therapy: 91HJB0701 to (Evaluate:18Lsl0357); Last Rx:03Zyc1624 Ordered  3  1   4  Aspirin 81 MG TABS; TAKE 1 TABLET DAILY; Therapy: 66XPB5176 to (Mai Olvera)  Requested for: ; Last   Rx:86Zvv6928 Ordered  5  Atorvastatin Calcium 10 MG Oral Tablet; take 1 tablet every day; Therapy: 51VNE5704 to (Evaluate:59Rek3863)  Requested for: 91DUZ8486; Last   Rx:2017 Ordered  6  BD Insulin Syringe 30G X 1/2" 0 5 ML Miscellaneous; FOR 5X INJECTIONS; Therapy: 76Vhx1616 to (Evaluate:11Xes5887)  Requested for: 92Oun2681; Last   Rx:91Tqh7953 Ordered  7   Chlorthalidone 25 MG Oral Tablet; ONE-HALF(1/2) OF A TABLET DAILY; Therapy: 89QGJ5236 to (Evaluate:06Eqa2866)  Requested for: 82VBW6324; Last   Rx:01Mar2016 Ordered  8  Ibuprofen 800 MG Oral Tablet; TAKE 1 TABLET 3 TIMES DAILY; Therapy: 43BDZ8519 to (Evaluate:30Jan2016)  Requested for: 36XCN0421; Last   Rx:02Oct2015 Ordered  9  Insulin Syringe 30G X 5/16" 1 ML Miscellaneous; pt  injecting 5 x's a day; Therapy: 71LMF9376 to (Last Rx:11Vsb3312)  Requested for: 65Trl4534 Ordered  10  Lantus 100 UNIT/ML Subcutaneous Solution; INJECT 85 UNITS SUBCUTANEOUSLY    TWICE DAILY; Therapy: 58KQD0038 to (Evaluate:31May2017)  Requested for: 75THL8887; Last    Rx:24Mar2017 Ordered  11  Lisinopril 40 MG Oral Tablet; TAKE 1 TABLET DAILY  CV;    Therapy: 81BCX1063 to (Evaluate:05Qhy6570)  Requested for: 82HSN6574; Last    Rx:83Cjh1630 Ordered  12  Litetouch Insulin Syringe 31G X 5/16" 1 ML Miscellaneous; Therapy: 88LMI2560 to (Evaluate:26Xdh2403) Recorded  13  MetFORMIN HCl - 1000 MG Oral Tablet; Take 1 tablet twice daily; Therapy: 77SOI5923 to (Evaluate:11May2017)  Requested for: 91WGH2839; Last    Rx:16May2016 Ordered  14  NovoLOG 100 UNIT/ML Subcutaneous Solution; INJECT 2 TO 20 UNITS    SUBCUTANEOUSLY WITH EACH MEAL PLUS 7 UNITS AT BREAKFAST, 7 UNITS AT    LUNCH, AND 9 UNITS AT Dozwil; Therapy: 65GHQ8862 to ()  Requested for: 63SAR2482; Last    Rx:30May2017 Ordered  15  Omeprazole 20 MG Oral Tablet Delayed Release; Take 1 tablet daily; Therapy: 98ANG3178 to Recorded  16  OxyCODONE HCl - 20 MG Oral Tablet; 1  tablet q6hrs; Therapy: 42LVB0861 to (Evaluate:14Jun2016); Last Rx:17Ilx0638 Ordered  17  Symbicort 160-4 5 MCG/ACT Inhalation Aerosol; INHALE 2 PUFFS TWICE DAILY  RINSE    MOUTH AFTER USE; Therapy: 86KFH0717 to (Last RR:64VOV4925)  Requested for: 32UIA0625 Ordered  18  TRUEresult Blood Glucose w/Device Kit; USE AS DIRECTED; Therapy: 16NJD9639 to (Last Rx:60Ytg6547)  Requested for: 60QMC8053 Ordered  19   TRUEtest Test In Vitro Strip; TEST 5 X A DAY TESTING; Therapy: 91Ght1784 to (Evaluate:13Apr2017)  Requested for: 96PUX4874; Last    Rx:78Qwp1492 Ordered  20  Ventolin  (90 Base) MCG/ACT Inhalation Aerosol Solution; INHALE 1 PUFF    EVERY 4 HOURS AS NEEDED; Therapy: 13ASL6951 to (Evaluate:41Mfg0954)  Requested for: 03OQT0282; Last    LK:01XBR0182 Ordered  21  Viagra 100 MG Oral Tablet; TAKE 1 TABLET AS DIRECTED; Therapy: 53CCP9325 to (Evaluate:23Oct2014); Last Rx:86Ofm6687 Ordered  22  Vitamin D 1000 UNIT Oral Tablet Recorded  Medication List Reviewed: The medication list was reviewed and updated today  1        1 Amended By: Mica Alvarez; Oct 25 2017 6:37 PM EST    Allergies   1  No Known Drug Allergies    Vitals  Signs   Recorded: 30NMD6292 12:54PM    Temperature: 98 2 F 1    Heart Rate: 82 1    Respiration: 16 1    Systolic: 90 1    Diastolic: 54 1    Height: 6 ft  1    Weight: 293 lb  1    BMI Calculated: 39 74 1    BSA Calculated: 2 51 1      1 Amended By: Mica Alvarez; Oct 25 2017 6:37 PM EST    Physical Exam    Constitutional   General appearance: Abnormal  1  Chronically ill1 , morbidly obese1  and disheveled clothing1   Eyes1    Conjunctiva and lids: No swelling, erythema, or discharge  1    Pupils and irises: Equal, round and reactive to light  1    Ears, Nose, Mouth, and Throat1    External inspection of ears and nose: Normal 1    Otoscopic examination: Tympanic membrance translucent with normal light reflex  Canals patent without erythema  1    Nasal mucosa, septum, and turbinates: Normal without edema or erythema  1    Oropharynx: Normal with no erythema, edema, exudate or lesions  1    Pulmonary1    Respiratory effort: No increased work of breathing or signs of respiratory distress  1    Auscultation of lungs: Clear to auscultation, equal breath sounds bilaterally, no wheezes, no rales, no rhonci  1    Cardiovascular1    Palpation of heart: Normal PMI, no thrills  1    Auscultation of heart: Normal rate and rhythm, normal S1 and S2, without murmurs  1    Examination of extremities for edema and/or varicosities: Normal 1    Carotid pulses: Normal 1    Abdomen1    Abdomen: Non-tender, no masses  1    Liver and spleen: No hepatomegaly or splenomegaly  1    Lymphatic1    Palpation of lymph nodes in neck: No lymphadenopathy  1    Musculoskeletal1    Gait and station: Normal 1    Digits and nails: Normal without clubbing or cyanosis  1    Inspection/palpation of joints, bones, and muscles: Normal 1    Skin   Skin and subcutaneous tissue: Abnormal  1  Right foot covered with a bandage1   Neurologic1    Cranial nerves: Cranial nerves 2-12 intact  1    Reflexes: 2+ and symmetric  1    Sensation: No sensory loss  1    Psychiatric1    Orientation to person, place and time: Normal 1    Mood and affect: Normal 1          1 Amended By: Leanna Hartley; Oct 25 2017 6:38 PM EST    Health Management  Health Maintenance   Influenza (Split); every 1 year; Last 08PAZ0049; Next Due: 52QCT9714;  Overdue    Signatures   Electronically signed by : CHUCK Cantu ; Oct 20 2017  5:47PM EST                          Electronically signed by : Carmen Tamayo, [facility]; Oct 25 2017  6:38PM EST                       (Author)    Electronically signed by : CHUCK Cantu ; Oct 25 2017  8:59PM EST

## 2018-01-23 NOTE — PROGRESS NOTES
History of Present Illness  Care Coordination Encounter Information:   Type of Encounter: Telephonic    Spoke to Patient  Care Coordination SL Nurse [facility]:   The reason for call is to discuss outreach for follow up/needed services, coordination of meeting care plan treatment goals and results  Jennifer Snowden stated that he is doing extremely well  Wound to right foot is healing well  He knows s/s of infection and when to seek medical attention  Vitals have been stable and he continues to have home health change his dressings  He is comfortable managing his health the way he is at this time  Has no questions, concerns or needs services at this time  Goals are met  Continues to have contact information if needed  PCP office updated on patient  Will close from care coordination at this time due to episode end  Active Problems    1  Abnormal glucose (790 29) (R73 09)   2  Abrasion of toe of right foot, initial encounter (917 0) (S90 414A)   3  Actinic keratosis (702 0) (L57 0)   4  Anxiety disorder (300 00) (F41 9)   5  Benign essential hypertension (401 1) (I10)   6  Blister of toe (917 2) (S90 426A)   7  Cellulitis of foot, left (682 7) (L03 116)   8  Changes in skin texture (782 8) (R23 4)   9  Changing skin lesion (709 9) (L98 9)   10  Chronic maxillary sinusitis (473 0) (J32 0)   11  Chronic obstructive pulmonary disease (496) (J44 9)   12  Chronic, continuous use of opioids (305 51) (F11 90)   13  Colon cancer screening (V76 51) (Z12 11)   14  Cough (786 2) (R05)   15  Dental caries (521 00) (K02 9)   16  Diabetic Charcot foot (250 60,713 5) (E11 610)   17  Diabetic foot ulcer (250 80,707 15) (E11 621,L97 509)   18  Disc degeneration, lumbar (722 52) (M51 36)   19  Dyslipidemia (272 4) (E78 5)   20  Elevated white blood cell count (288 60) (D72 829)   21  Facet Syndrome (724 8)   22  Flu vaccine need (V04 81) (Z23)   23  Generalized osteoarthritis of multiple sites (715 09) (M15 9)   24   Generalized pain (780 96) (R52)   25  Hypercholesterolemia (272 0) (E78 00)   26  Hypertension (401 9) (I10)   27  Hypertension, essential (401 9) (I10)   28  Leukocytosis (288 60) (D72 829)   29  Low back pain (724 2) (M54 5)   30  Morbid or severe obesity due to excess calories (278 01) (E66 01)   31  Open wound of foot except toe(s) alone (892 0) (S91 309A)   32  Organic impotence (607 84) (N52 9)   33  YOVANI on CPAP (327 23,V46 8) (G47 33,Z99 89)   34  Osteomyelitis, acute, ankle or foot (730 07) (M86 179)   35  Other chronic pain (338 29) (G89 29)   36  Pain syndrome, chronic (338 4) (G89 4)   37  Pilonidal cyst (685 1) (L05 91)   38  Screening for skin condition (V82 0) (Z13 89)   39  Seborrheic keratosis (702 19) (L82 1)   40  Squamous cell cancer of skin of forearm (173 62) (C44 621)   41  Type 2 Diabetes Mellitus - Uncomplicated, Controlled (250 00)   42  Type 2 diabetes mellitus with hyperglycemia (250 00) (E11 65)   43  Wound, open, foot (892 0) (S91 309A)    Past Medical History    1  History of Arthralgia Of The Knee / Patella / Tibia / Fibula (719 46)   2  History of Arthralgia Of The Pelvis / Hip / Femur (719 45)   3  History of Benign Localized Prostatic Hyperplasia Without Urinary Obstruction (600 20)   4  History of Cellulitis (682 9) (L03 90)   5  History of Colonoscopy (Fiberoptic) Screening   6  History of Depression (311) (F32 9)   7  History of Diabetes Mellitus (250 00)   8  History of Drug dependence, in remission (304 93) (F19 21)   9  History of Dysfunction of Eustachian tube, unspecified laterality (381 81) (H69 80)   10  History of Foot ulcer, unspecified laterality, with unspecified severity (707 15) (L97 509)   11  H/O nonmelanoma skin cancer (V10 83) (Z85 828)   12  History of athlete's foot (V12 09) (Z86 19)   13  History of chronic obstructive lung disease (V12 69) (Z87 09)   14  History of diverticulitis of colon (V12 79) (Z87 19)   15  History of headache (V13 89) (Z87 898)   16   History of hidradenitis suppurativa (V13 3) (Z87 2)   17  History of hidradenitis suppurativa (V13 3) (Z87 2)   18  History of hyperlipidemia (V12 29) (Z86 39)   19  History of hypertension (V12 59) (Z86 79)   20  History of obesity (V12 29) (Z86 39)   21  History of sciatica (V12 49) (Z86 69)   22  History of Myoclonus (333 2) (G25 3)   23  History of Open Wound Of The Foot (892 0)   24  History of Other muscle spasm (728 85) (M62 838)   25  History of Pilonidal cyst with abscess (685 0) (L05 01)   26  History of Poisoning By Tobacco (989 84)   27  History of Subacromial or subdeltoid bursitis, unspecified laterality   28  History of Subacute osteomyelitis, osteomyelitis of unspecified site    Surgical History    1  History of Hemicolectomy   2  History of Incision And Drainage Of Pilonidal Cyst   3  History of Rectal Surgery Repair Of Perirectal Fistula   4  History of Surgery Foot Amputation Metatarsal And Toe    Family History  Father    1  Family history of Family Health Status Of Father -   Brother    2  Family history of Metastatic stomach plasmacytoma (multiple myeloma EBV+)    Social History    · Current Smoker (305 1)   · Current Some Day Smoker (305 1)   · Denied: History of Drug Use   · Ex-cigarette smoker (V15 82) (Z80 18)   · Denied: History of Heavy Alcohol Consumption   · Home DME CPAP   · Lives with spouse   · Denied: History of Marijuana   ·    · No drug use   · Occupation:   · Retired   · Tobacco user (305 1) (Z72 0)    Current Meds    1  ALPRAZolam 1 MG Oral Tablet; take one tablet by mouth twice daily; Therapy: 99CHZ0174 to (Evaluate:01Ozs0582); Last Rx:2016 Ordered    2  Aspirin 81 MG TABS; TAKE 1 TABLET DAILY; Therapy: 30RIY7802 to (Gold Rizo)  Requested for: [phone]; Last   Rx:2014 Ordered    3  OxyCODONE HCl - 20 MG Oral Tablet; 1  tablet q6hrs; Therapy: 67OBK1315 to (Evaluate:2016); Last Rx:35Nxu3853 Ordered    4   Albuterol Sulfate (2 5 MG/3ML) 0 083% Inhalation Nebulization Solution; INHALE   CONTENTS OF 1 VIAL IN NEBULIZER Q 4 HRS IF NEEDED; Therapy: 81LSD1593 to (Last Rx:16Ikl5716)  Requested for: 36AMI3905 Ordered   5  Symbicort 160-4 5 MCG/ACT Inhalation Aerosol; INHALE 2 PUFFS TWICE DAILY  RINSE   MOUTH AFTER USE; Therapy: 67SMH5587 to (Last AY:43JER2100)  Requested for: 04QWC5416 Ordered   6  Ventolin  (90 Base) MCG/ACT Inhalation Aerosol Solution; INHALE 1 PUFF   EVERY 4 HOURS AS NEEDED; Therapy: 82UWE3921 to (Evaluate:85Lxp1033)  Requested for: 45FRV6241; Last   QS:61CJX7160 Ordered    7  Atorvastatin Calcium 10 MG Oral Tablet; take 1 tablet every day; Therapy: 17VUI3668 to (Evaluate:89Mjs2344)  Requested for: 76ROQ1282; Last   Rx:27Jan2017 Ordered    8  Ibuprofen 800 MG Oral Tablet; TAKE 1 TABLET 3 TIMES DAILY; Therapy: 48GJL1909 to (Evaluate:30Jan2016)  Requested for: 80IOO1439; Last   Rx:02Oct2015 Ordered    9  Chlorthalidone 25 MG Oral Tablet; ONE-HALF(1/2) OF A TABLET DAILY; Therapy: 18IFI2603 to (Evaluate:59Cmw7770)  Requested for: 19LFA6921; Last   Rx:01Mar2016 Ordered   10  Lisinopril 40 MG Oral Tablet; TAKE 1 TABLET DAILY  CV;    Therapy: 11KQK9843 to (Evaluate:59Qun0525)  Requested for: 00YWE6581; Last    Rx:59Cjp6702 Ordered    11  Viagra 100 MG Oral Tablet (Sildenafil Citrate); TAKE 1 TABLET AS DIRECTED; Therapy: 66QSA0590 to (Evaluate:23Ime5855); Last Rx:93Vvu7309 Ordered    12  MetFORMIN HCl - 1000 MG Oral Tablet; Take 1 tablet twice daily; Therapy: 28DGM4429 to (Evaluate:88Qjj1488)  Requested for: 88LHJ6069; Last    Rx:78Jhz4338 Ordered    13  Accu-Chek Anisha Plus In Vitro Strip; CHECK BLOOD SUGAR 5 TIMES DAILY,DX E11 65; Therapy: 51FPL0807 to (Evaluate:07Hmv7783)  Requested for: 34LYW2818; Last    Rx:03Jan2018 Ordered   14  BD Insulin Syringe 30G X 1/2" 0 5 ML Miscellaneous; FOR 5X INJECTIONS; Therapy: 48Wil7654 to (Evaluate:00Hdt0803)  Requested for: 25Apr2016; Last    Rx:23Apr2016 Ordered   15   Insulin Syringe 30G X 5/16" 1 ML Miscellaneous; pt  injecting 5 x's a day; Therapy: 61WIX1960 to (Last Rx:67Vrw4905)  Requested for: 69Afn1536 Ordered   16  Lantus 100 UNIT/ML Subcutaneous Solution; INJECT 85 UNITS SUBCUTANEOUSLY    TWICE DAILY; Therapy: 23IQT1716 to (Evaluate:48Uig6131)  Requested for: 65TUR2107; Last    Rx:24Mar2017 Ordered   17  NovoLOG 100 UNIT/ML Subcutaneous Solution; INJECT 2 TO 20 UNITS    SUBCUTANEOUSLY WITH EACH MEAL PLUS 7 UNITS AT BREAKFAST, 7 UNITS AT    LUNCH, AND 9 UNITS AT Dozwil; Therapy: 45MFI9771 to (96 [phone])  Requested for: 62IVW8000; Last    Rx:96Aes8038 Ordered   18  TRUEresult Blood Glucose w/Device Kit; USE AS DIRECTED; Therapy: 47WUE1641 to (Last Rx:25Szo7824)  Requested for: 85MFL1089 Ordered   19  TRUEtest Test In Vitro Strip; TEST 5 X A DAY TESTING; Therapy: 57Itx9025 to (Evaluate:13Apr2017)  Requested for: 50NRZ6014; Last    Rx:13Evs2948 Ordered    20  Litetouch Insulin Syringe 31G X 5/16" 1 ML Miscellaneous; Therapy: 50UIS1787 to (Evaluate:46Ojb8277) Recorded   21  Omeprazole 20 MG Oral Tablet Delayed Release; Take 1 tablet daily; Therapy: 62LCS1946 to Recorded   22  Vitamin D 1000 UNIT Oral Tablet Recorded    Allergies    1  No Known Drug Allergies    Health Management   Influenza (Split); every 1 year; Last 51UKP1088; Next Due: 27XHB0970; Overdue    End of Encounter Meds    1  ALPRAZolam 1 MG Oral Tablet; take one tablet by mouth twice daily; Therapy: 63OPR4906 to (Evaluate:33Izy2188); Last Rx:04Jun2016 Ordered    2  Aspirin 81 MG TABS; TAKE 1 TABLET DAILY; Therapy: 47ZBK5795 to (Jasmin Greene)  Requested for: [phone]; Last   Rx:59Rgd8178 Ordered    3  OxyCODONE HCl - 20 MG Oral Tablet; 1  tablet q6hrs; Therapy: 24WDO7676 to (Evaluate:14Jun2016); Last Rx:42Rzb5940 Ordered    4  Albuterol Sulfate (2 5 MG/3ML) 0 083% Inhalation Nebulization Solution; INHALE   CONTENTS OF 1 VIAL IN NEBULIZER Q 4 HRS IF NEEDED;    Therapy: 90PTY9990 to (Last Rx:23Qha7374)  Requested for: 70SRA6590 Ordered   5  Symbicort 160-4 5 MCG/ACT Inhalation Aerosol; INHALE 2 PUFFS TWICE DAILY  RINSE   MOUTH AFTER USE; Therapy: 20FJJ5364 to (Last MN:79AGO3256)  Requested for: 13RGU4036 Ordered   6  Ventolin  (90 Base) MCG/ACT Inhalation Aerosol Solution; INHALE 1 PUFF   EVERY 4 HOURS AS NEEDED; Therapy: 08JIQ5631 to (Evaluate:59Zek6481)  Requested for: 43JNV5047; Last   KI:53NQQ7135 Ordered    7  Atorvastatin Calcium 10 MG Oral Tablet; take 1 tablet every day; Therapy: 95WSG5678 to (Evaluate:72Viw8910)  Requested for: 22TWV7064; Last   Rx:27Jan2017 Ordered    8  Ibuprofen 800 MG Oral Tablet; TAKE 1 TABLET 3 TIMES DAILY; Therapy: 81STI2919 to (Evaluate:30Jan2016)  Requested for: 53MXK4015; Last   Rx:16Kep6059 Ordered    9  Chlorthalidone 25 MG Oral Tablet; ONE-HALF(1/2) OF A TABLET DAILY; Therapy: 59YZB1124 to (Evaluate:58Wgl3755)  Requested for: 62KFQ7954; Last   Rx:01Mar2016 Ordered   10  Lisinopril 40 MG Oral Tablet; TAKE 1 TABLET DAILY  CV;    Therapy: 99KHS4071 to (Evaluate:47Ptk3898)  Requested for: 82WQS6453; Last    Rx:10Jpr8109 Ordered    11  Viagra 100 MG Oral Tablet (Sildenafil Citrate); TAKE 1 TABLET AS DIRECTED; Therapy: 77DJE0302 to (Evaluate:47Mdg7358); Last Rx:46Rme2965 Ordered    12  MetFORMIN HCl - 1000 MG Oral Tablet; Take 1 tablet twice daily; Therapy: 58TQP7913 to (Evaluate:41Jrc4899)  Requested for: 11AFR9599; Last    Rx:89Dsq0141 Ordered    13  Accu-Chek Anisha Plus In Vitro Strip; CHECK BLOOD SUGAR 5 TIMES DAILY,DX E11 65; Therapy: 06EXQ7256 to (Evaluate:73Pyb2873)  Requested for: 80FOJ5438; Last    Rx:03Jan2018 Ordered   14  BD Insulin Syringe 30G X 1/2" 0 5 ML Miscellaneous; FOR 5X INJECTIONS; Therapy: 90Itl7090 to (Evaluate:00Ubs7747)  Requested for: 25Apr2016; Last    Rx:23Apr2016 Ordered   15  Insulin Syringe 30G X 5/16" 1 ML Miscellaneous; pt  injecting 5 x's a day;     Therapy: 63ECW4932 to (Last Rx:38Riw0228) Requested for: 84Vnv8443 Ordered   16  Lantus 100 UNIT/ML Subcutaneous Solution; INJECT 85 UNITS SUBCUTANEOUSLY    TWICE DAILY; Therapy: 34PEU8717 to (Evaluate:37Vaj7573)  Requested for: 08JMG7644; Last    Rx:24Mar2017 Ordered   17  NovoLOG 100 UNIT/ML Subcutaneous Solution; INJECT 2 TO 20 UNITS    SUBCUTANEOUSLY WITH EACH MEAL PLUS 7 UNITS AT BREAKFAST, 7 UNITS AT    LUNCH, AND 9 UNITS AT Dozwil; Therapy: 96KIL0622 to ([phone])  Requested for: 83EDG5553; Last    Rx:30May2017 Ordered   18  TRUEresult Blood Glucose w/Device Kit; USE AS DIRECTED; Therapy: 29QZN6324 to (Last Rx:47Mhv8986)  Requested for: 90RRJ0953 Ordered   19  TRUEtest Test In Vitro Strip; TEST 5 X A DAY TESTING; Therapy: 62Yyz3136 to (Evaluate:19Hqa4282)  Requested for: 05HYA0670; Last    Rx:29Sxi0504 Ordered    20  Litetouch Insulin Syringe 31G X 5/16" 1 ML Miscellaneous; Therapy: 60UKI5226 to (Evaluate:69Lhp0297) Recorded   21  Omeprazole 20 MG Oral Tablet Delayed Release; Take 1 tablet daily; Therapy: 94AQO6750 to Recorded   22  Vitamin D 1000 UNIT Oral Tablet Recorded    Patient Care Team    Care Team Member Role Specialty Office Number   Ashish PATEL  Internal Medicine [phone]   Derian PATEL    Dermatology [phone]   Misbah Painting MD  Pain Management [phone]     Signatures   Electronically signed by : Edu Marin RN; Jan 16 2018 10:27AM EST                       (Author)

## 2018-01-23 NOTE — PROGRESS NOTES
History of Present Illness  Care Coordination Encounter Information:   Type of Encounter: Telephonic    Spoke to Patient  Care Coordination SL Nurse [facility]:   The reason for call is to discuss outreach for follow up/needed services and coordination of meeting care plan treatment goals  Patient stated that he has been having a little discomfort due to phantom pain on his amputated right lower limb  He stated that it is bearable and it usually comes and goes  Suggested to patient to take frequent rests and possible warm compress  He stated that he will try that  He stated that blood sugars continue to be around 120  Currently has no questions or concerns  Continues to have contact information  Active Problems    1  Abnormal glucose (790 29) (R73 09)   2  Abrasion of toe of right foot, initial encounter (917 0) (S90 414A)   3  Actinic keratosis (702 0) (L57 0)   4  Anxiety disorder (300 00) (F41 9)   5  Benign essential hypertension (401 1) (I10)   6  Blister of toe (917 2) (S90 426A)   7  Cellulitis of foot, left (682 7) (L03 116)   8  Changes in skin texture (782 8) (R23 4)   9  Changing skin lesion (709 9) (L98 9)   10  Chronic maxillary sinusitis (473 0) (J32 0)   11  Chronic obstructive pulmonary disease (496) (J44 9)   12  Chronic, continuous use of opioids (305 51) (F11 90)   13  Colon cancer screening (V76 51) (Z12 11)   14  Cough (786 2) (R05)   15  Dental caries (521 00) (K02 9)   16  Diabetic Charcot foot (250 60,713 5) (E11 610)   17  Diabetic foot ulcer (250 80,707 15) (E11 621,L97 509)   18  Disc degeneration, lumbar (722 52) (M51 36)   19  Dyslipidemia (272 4) (E78 5)   20  Elevated white blood cell count (288 60) (D72 829)   21  Facet Syndrome (724 8)   22  Flu vaccine need (V04 81) (Z23)   23  Generalized osteoarthritis of multiple sites (715 09) (M15 9)   24  Generalized pain (780 96) (R52)   25  Hypercholesterolemia (272 0) (E78 00)   26  Hypertension (401 9) (I10)   27   Hypertension, essential (401 9) (I10)   28  Leukocytosis (288 60) (D72 829)   29  Low back pain (724 2) (M54 5)   30  Morbid or severe obesity due to excess calories (278 01) (E66 01)   31  Open wound of foot except toe(s) alone (892 0) (S91 309A)   32  Organic impotence (607 84) (N52 9)   33  YOVANI on CPAP (327 23,V46 8) (G47 33,Z99 89)   34  Osteomyelitis, acute, ankle or foot (730 07) (M86 179)   35  Other chronic pain (338 29) (G89 29)   36  Pain syndrome, chronic (338 4) (G89 4)   37  Pilonidal cyst (685 1) (L05 91)   38  Screening for skin condition (V82 0) (Z13 89)   39  Seborrheic keratosis (702 19) (L82 1)   40  Squamous cell cancer of skin of forearm (173 62) (C44 621)   41  Type 2 Diabetes Mellitus - Uncomplicated, Controlled (250 00)   42  Type 2 diabetes mellitus with hyperglycemia (250 00) (E11 65)   43  Wound, open, foot (892 0) (S91 309A)    Past Medical History    1  History of Arthralgia Of The Knee / Patella / Tibia / Fibula (719 46)   2  History of Arthralgia Of The Pelvis / Hip / Femur (719 45)   3  History of Benign Localized Prostatic Hyperplasia Without Urinary Obstruction (600 20)   4  History of Cellulitis (682 9) (L03 90)   5  History of Colonoscopy (Fiberoptic) Screening   6  History of Depression (311) (F32 9)   7  History of Diabetes Mellitus (250 00)   8  History of Drug dependence, in remission (304 93) (F19 21)   9  History of Dysfunction of Eustachian tube, unspecified laterality (381 81) (H69 80)   10  History of Foot ulcer, unspecified laterality, with unspecified severity (707 15) (L97 509)   11  H/O nonmelanoma skin cancer (V10 83) (Z85 828)   12  History of athlete's foot (V12 09) (Z86 19)   13  History of chronic obstructive lung disease (V12 69) (Z87 09)   14  History of diverticulitis of colon (V12 79) (Z87 19)   15  History of headache (V13 89) (Z87 898)   16  History of hidradenitis suppurativa (V13 3) (Z87 2)   17  History of hidradenitis suppurativa (V13 3) (Z87 2)   18   History of hyperlipidemia (V12 29) (Z86 39)   19  History of hypertension (V12 59) (Z86 79)   20  History of obesity (V12 29) (Z86 39)   21  History of sciatica (V12 49) (Z86 69)   22  History of Myoclonus (333 2) (G25 3)   23  History of Open Wound Of The Foot (892 0)   24  History of Other muscle spasm (728 85) (M62 838)   25  History of Pilonidal cyst with abscess (685 0) (L05 01)   26  History of Poisoning By Tobacco (989 84)   27  History of Subacromial or subdeltoid bursitis, unspecified laterality   28  History of Subacute osteomyelitis, osteomyelitis of unspecified site    Surgical History    1  History of Hemicolectomy   2  History of Incision And Drainage Of Pilonidal Cyst   3  History of Rectal Surgery Repair Of Perirectal Fistula   4  History of Surgery Foot Amputation Metatarsal And Toe    Family History  Father    1  Family history of Family Health Status Of Father -   Brother    2  Family history of Metastatic stomach plasmacytoma (multiple myeloma EBV+)    Social History    · Current Smoker (305 1)   · Current Some Day Smoker (305 1)   · Denied: History of Drug Use   · Ex-cigarette smoker (V15 82) (Z80 18)   · Denied: History of Heavy Alcohol Consumption   · Home DME CPAP   · Lives with spouse   · Denied: History of Marijuana   ·    · No drug use   · Occupation:   · Retired   · Tobacco user (305 1) (Z72 0)    Current Meds    1  ALPRAZolam 1 MG Oral Tablet; take one tablet by mouth twice daily; Therapy: 12ZVF5143 to (Evaluate:06Egd8316); Last Rx:2016 Ordered    2  Aspirin 81 MG TABS; TAKE 1 TABLET DAILY; Therapy: 49DVK6354 to ([phone])  Requested for: [phone]; Last   Rx:24Psh8126 Ordered    3  OxyCODONE HCl - 20 MG Oral Tablet; 1  tablet q6hrs; Therapy: 09WIO8952 to (Evaluate:2016); Last Rx:2016 Ordered    4  Albuterol Sulfate (2 5 MG/3ML) 0 083% Inhalation Nebulization Solution; INHALE   CONTENTS OF 1 VIAL IN NEBULIZER Q 4 HRS IF NEEDED;    Therapy: 45KQR7111 to (Last Rx:90Nzo0076)  Requested for: 63SIX3972 Ordered   5  Symbicort 160-4 5 MCG/ACT Inhalation Aerosol; INHALE 2 PUFFS TWICE DAILY  RINSE   MOUTH AFTER USE; Therapy: 78XDL7500 to (Last BI:10QMR3205)  Requested for: 73MZS6902 Ordered   6  Ventolin  (90 Base) MCG/ACT Inhalation Aerosol Solution; INHALE 1 PUFF   EVERY 4 HOURS AS NEEDED; Therapy: 20MHP8109 to (Evaluate:53Yir0032)  Requested for: 01RAH9549; Last   IX:73DGW1781 Ordered    7  Atorvastatin Calcium 10 MG Oral Tablet; take 1 tablet every day; Therapy: 55AJB7624 to (Evaluate:10Qhq7669)  Requested for: 72QLH1674; Last   Rx:27Jan2017 Ordered    8  Ibuprofen 800 MG Oral Tablet; TAKE 1 TABLET 3 TIMES DAILY; Therapy: 84QBW2250 to (Evaluate:30Jan2016)  Requested for: 48ARE0838; Last   Rx:02Oct2015 Ordered    9  Chlorthalidone 25 MG Oral Tablet; ONE-HALF(1/2) OF A TABLET DAILY; Therapy: 56PQY7445 to (Evaluate:80Sgv1156)  Requested for: 59FPE4351; Last   Rx:01Mar2016 Ordered   10  Lisinopril 40 MG Oral Tablet; TAKE 1 TABLET DAILY  CV;    Therapy: 18EVI4172 to (Evaluate:00Jtr9413)  Requested for: 33GOX9377; Last    Rx:54Nlu0536 Ordered    11  Viagra 100 MG Oral Tablet; TAKE 1 TABLET AS DIRECTED; Therapy: 28BEW0209 to (Evaluate:23Oct2014); Last Rx:84Gls3912 Ordered    12  MetFORMIN HCl - 1000 MG Oral Tablet; Take 1 tablet twice daily; Therapy: 41JIB7123 to (Evaluate:13Xkv5981)  Requested for: 45OTW3456; Last    Rx:27Mvt9634 Ordered    13  Accu-Chek Anisha Plus In Vitro Strip; CHECK BLOOD SUGARS 5 TIMES DAILY; Therapy: 21FRZ8865 to (Last Rx:01Nov2017)  Requested for: 74PCI8013 Ordered   14  BD Insulin Syringe 30G X 1/2" 0 5 ML Miscellaneous; FOR 5X INJECTIONS; Therapy: 72Bfc3210 to (Evaluate:57Ift3380)  Requested for: 25Apr2016; Last    Rx:23Apr2016 Ordered   15  Insulin Syringe 30G X 5/16" 1 ML Miscellaneous; pt  injecting 5 x's a day; Therapy: 95PRX1734 to (Last Rx:28Feb2017)  Requested for: 28Feb2017 Ordered   16   Lantus 100 UNIT/ML Subcutaneous Solution; INJECT 85 UNITS SUBCUTANEOUSLY    TWICE DAILY; Therapy: 08EZM6511 to (Evaluate:77Ctf8295)  Requested for: 05SLZ7168; Last    Rx:24Mar2017 Ordered   17  NovoLOG 100 UNIT/ML Subcutaneous Solution; INJECT 2 TO 20 UNITS    SUBCUTANEOUSLY WITH EACH MEAL PLUS 7 UNITS AT BREAKFAST, 7 UNITS AT    LUNCH, AND 9 UNITS AT Dozwil; Therapy: 68RLS4260 to [phone])  Requested for: 66KLY3219; Last    Rx:49Kbg0607 Ordered   18  TRUEresult Blood Glucose w/Device Kit; USE AS DIRECTED; Therapy: 08OHE4422 to (Last Rx:84Ljp0668)  Requested for: 50LZQ5705 Ordered   19  TRUEtest Test In Vitro Strip; TEST 5 X A DAY TESTING; Therapy: 47Afq9722 to (Evaluate:13Apr2017)  Requested for: 58AQT8902; Last    Rx:29Ond9104 Ordered    20  Litetouch Insulin Syringe 31G X 5/16" 1 ML Miscellaneous; Therapy: 53TKC8418 to (Evaluate:27Bke4820) Recorded   21  Omeprazole 20 MG Oral Tablet Delayed Release; Take 1 tablet daily; Therapy: 68DTG2927 to Recorded   22  Vitamin D 1000 UNIT Oral Tablet Recorded    Allergies    1  No Known Drug Allergies    Health Management   Influenza (Split); every 1 year; Last 80LZF0205; Next Due: 53OWL3843; Overdue    End of Encounter Meds    1  ALPRAZolam 1 MG Oral Tablet; take one tablet by mouth twice daily; Therapy: 21MDH1945 to (Evaluate:56Cwp6982); Last Rx:04Jun2016 Ordered    2  Aspirin 81 MG TABS; TAKE 1 TABLET DAILY; Therapy: 76ZGH9174 to (Christy Castillo)  Requested for: [phone]; Last   Rx:22Elm2574 Ordered    3  OxyCODONE HCl - 20 MG Oral Tablet; 1  tablet q6hrs; Therapy: 18GTM6890 to (Evaluate:14Jun2016); Last Rx:41Erd2531 Ordered    4  Albuterol Sulfate (2 5 MG/3ML) 0 083% Inhalation Nebulization Solution; INHALE   CONTENTS OF 1 VIAL IN NEBULIZER Q 4 HRS IF NEEDED; Therapy: 03SYG8761 to (Last Rx:91Xqc7369)  Requested for: 99BSY3786 Ordered   5  Symbicort 160-4 5 MCG/ACT Inhalation Aerosol; INHALE 2 PUFFS TWICE DAILY  RINSE   MOUTH AFTER USE;    Therapy: 47IAL4334 to (Last RK:96GNV3026)  Requested for: 72SYQ2530 Ordered   6  Ventolin  (90 Base) MCG/ACT Inhalation Aerosol Solution; INHALE 1 PUFF   EVERY 4 HOURS AS NEEDED; Therapy: 34CYF1751 to (Evaluate:79Tdx4706)  Requested for: 39GJB2353; Last   CA:78SCV8897 Ordered    7  Atorvastatin Calcium 10 MG Oral Tablet; take 1 tablet every day; Therapy: 55ZFK1134 to (Evaluate:84Aip7343)  Requested for: 14HQV7540; Last   Rx:27Jan2017 Ordered    8  Ibuprofen 800 MG Oral Tablet; TAKE 1 TABLET 3 TIMES DAILY; Therapy: 82AZE6068 to (Evaluate:30Jan2016)  Requested for: 62UHD9698; Last   Rx:02Oct2015 Ordered    9  Chlorthalidone 25 MG Oral Tablet; ONE-HALF(1/2) OF A TABLET DAILY; Therapy: 45OTR2954 to (Evaluate:12Xij1971)  Requested for: 69CMR0820; Last   Rx:01Mar2016 Ordered   10  Lisinopril 40 MG Oral Tablet; TAKE 1 TABLET DAILY  CV;    Therapy: 44JSS7331 to (Evaluate:70Dcg6953)  Requested for: 05XKG8814; Last    Rx:82Hcd9351 Ordered    11  Viagra 100 MG Oral Tablet; TAKE 1 TABLET AS DIRECTED; Therapy: 74MNT5415 to (Evaluate:23Oct2014); Last Rx:86Pse6988 Ordered    12  MetFORMIN HCl - 1000 MG Oral Tablet; Take 1 tablet twice daily; Therapy: 58MPQ1125 to (Evaluate:30Sty7736)  Requested for: 23MWK0400; Last    Rx:23Agj4623 Ordered    13  Accu-Chek Anisha Plus In Vitro Strip; CHECK BLOOD SUGARS 5 TIMES DAILY; Therapy: 26SUN8292 to (Last Rx:01Nov2017)  Requested for: 27SWB3705 Ordered   14  BD Insulin Syringe 30G X 1/2" 0 5 ML Miscellaneous; FOR 5X INJECTIONS; Therapy: 53Syk6347 to (Evaluate:85Yoa8029)  Requested for: 08Zhv2970; Last    Rx:70Sqx3406 Ordered   15  Insulin Syringe 30G X 5/16" 1 ML Miscellaneous; pt  injecting 5 x's a day; Therapy: 95MWL7278 to (Last Rx:28Feb2017)  Requested for: 28Feb2017 Ordered   16  Lantus 100 UNIT/ML Subcutaneous Solution; INJECT 85 UNITS SUBCUTANEOUSLY    TWICE DAILY;     Therapy: 65XGY2170 to (Evaluate:31May2017)  Requested for: 62HKI4971; Last    Rx:24Mar2017 Ordered   17  NovoLOG 100 UNIT/ML Subcutaneous Solution; INJECT 2 TO 20 UNITS    SUBCUTANEOUSLY WITH EACH MEAL PLUS 7 UNITS AT BREAKFAST, 7 UNITS AT    LUNCH, AND 9 UNITS AT Dozwil; Therapy: 11ZOY0184 to ()  Requested for: 36IEA3244; Last    Rx:70Goo3315 Ordered   18  TRUEresult Blood Glucose w/Device Kit; USE AS DIRECTED; Therapy: 64HFI3565 to (Last Rx:88Vjy4918)  Requested for: 23YIS1671 Ordered   19  TRUEtest Test In Vitro Strip; TEST 5 X A DAY TESTING; Therapy: 28Cne0912 to (Evaluate:39Ukk1268)  Requested for: 54SYJ7253; Last    Rx:12Xfx3834 Ordered    20  Litetouch Insulin Syringe 31G X 5/16" 1 ML Miscellaneous; Therapy: 96LNV2530 to (Evaluate:26Txt0423) Recorded   21  Omeprazole 20 MG Oral Tablet Delayed Release; Take 1 tablet daily; Therapy: 58MUJ7120 to Recorded   22  Vitamin D 1000 UNIT Oral Tablet Recorded    Patient Care Team    Care Team Member Role Specialty Office Number   Wyatt PATEL  Internal Medicine [phone]   Parry Landau M D    Dermatology [phone]   Jaylyn Anderson MD  Pain Management [phone]     Signatures   Electronically signed by : Cordelia Eid RN; Dec 12 2017  2:49PM EST                       (Author)

## 2018-02-01 ENCOUNTER — TRANSCRIBE ORDERS (OUTPATIENT)
Dept: ADMINISTRATIVE | Facility: HOSPITAL | Age: 63
End: 2018-02-01

## 2018-02-01 DIAGNOSIS — E04.0 GOITER, SIMPLE: Primary | ICD-10-CM

## 2018-02-26 NOTE — PROGRESS NOTES
History of Present Illness  Care Coordination Encounter Information:   Type of Encounter: Telephonic    Spoke to Patient  Care Coordination SL Nurse [facility]:   The reason for call is to discuss outreach for follow up/needed services, coordination of meeting care plan treatment goals and results  Per patient, vitals have been stable  Wound nurse continues to come to his home to change dressings  Wound to right foot continues to heal well  Blood sugars have been better since last conversation  He stated that it has been in the 115s  He has been trying to add more fresh vegetables to his diet along with lean proteins  He has been trying to keep a little more active but he stated it does get a little hard due to his wound and also he is caring for his wife  He does not over exert himself though because then his right foot does get sore due to open wound  Reviewed over with patient s/s of infection such as drainage, fever, redness and when to seek medical attention  He verbalized understanding  Also discussed correlation of maintaining a good healthy diet and wound healing  He continues to have contact information  Has no questions or concerns at this time  Active Problems    1  Abnormal glucose (790 29) (R73 09)   2  Abrasion of toe of right foot, initial encounter (917 0) (S90 414A)   3  Actinic keratosis (702 0) (L57 0)   4  Anxiety disorder (300 00) (F41 9)   5  Benign essential hypertension (401 1) (I10)   6  Blister of toe (917 2) (S90 426A)   7  Cellulitis of foot, left (682 7) (L03 116)   8  Changes in skin texture (782 8) (R23 4)   9  Changing skin lesion (709 9) (L98 9)   10  Chronic maxillary sinusitis (473 0) (J32 0)   11  Chronic obstructive pulmonary disease (496) (J44 9)   12  Chronic, continuous use of opioids (305 51) (F11 90)   13  Colon cancer screening (V76 51) (Z12 11)   14  Cough (786 2) (R05)   15  Dental caries (521 00) (K02 9)   16   Diabetic Charcot foot (250 60,713 5) (E11 610) 17  Diabetic foot ulcer (250 80,707 15) (E11 621,L97 509)   18  Disc degeneration, lumbar (722 52) (M51 36)   19  Dyslipidemia (272 4) (E78 5)   20  Elevated white blood cell count (288 60) (D72 829)   21  Facet Syndrome (724 8)   22  Flu vaccine need (V04 81) (Z23)   23  Generalized osteoarthritis of multiple sites (715 09) (M15 9)   24  Generalized pain (780 96) (R52)   25  Hypercholesterolemia (272 0) (E78 00)   26  Hypertension (401 9) (I10)   27  Hypertension, essential (401 9) (I10)   28  Leukocytosis (288 60) (D72 829)   29  Low back pain (724 2) (M54 5)   30  Morbid or severe obesity due to excess calories (278 01) (E66 01)   31  Open wound of foot except toe(s) alone (892 0) (S91 309A)   32  Organic impotence (607 84) (N52 9)   33  YOVANI on CPAP (327 23,V46 8) (G47 33,Z99 89)   34  Osteomyelitis, acute, ankle or foot (730 07) (M86 179)   35  Other chronic pain (338 29) (G89 29)   36  Pain syndrome, chronic (338 4) (G89 4)   37  Pilonidal cyst (685 1) (L05 91)   38  Screening for skin condition (V82 0) (Z13 89)   39  Seborrheic keratosis (702 19) (L82 1)   40  Squamous cell cancer of skin of forearm (173 62) (C44 621)   41  Type 2 Diabetes Mellitus - Uncomplicated, Controlled (250 00)   42  Type 2 diabetes mellitus with hyperglycemia (250 00) (E11 65)   43  Wound, open, foot (892 0) (S91 309A)    Past Medical History    1  History of Arthralgia Of The Knee / Patella / Tibia / Fibula (719 46)   2  History of Arthralgia Of The Pelvis / Hip / Femur (719 45)   3  History of Benign Localized Prostatic Hyperplasia Without Urinary Obstruction (600 20)   4  History of Cellulitis (682 9) (L03 90)   5  History of Colonoscopy (Fiberoptic) Screening   6  History of Depression (311) (F32 9)   7  History of Diabetes Mellitus (250 00)   8  History of Drug dependence, in remission (304 93) (F19 21)   9  History of Dysfunction of Eustachian tube, unspecified laterality (381 81) (H69 80)   10   History of Foot ulcer, unspecified laterality, with unspecified severity (707 15) (L97 509)   11  H/O nonmelanoma skin cancer (V10 83) (Z85 828)   12  History of athlete's foot (V12 09) (Z86 19)   13  History of chronic obstructive lung disease (V12 69) (Z87 09)   14  History of diverticulitis of colon (V12 79) (Z87 19)   15  History of headache (V13 89) (Z87 898)   16  History of hidradenitis suppurativa (V13 3) (Z87 2)   17  History of hidradenitis suppurativa (V13 3) (Z87 2)   18  History of hyperlipidemia (V12 29) (Z86 39)   19  History of hypertension (V12 59) (Z86 79)   20  History of obesity (V12 29) (Z86 39)   21  History of sciatica (V12 49) (Z86 69)   22  History of Myoclonus (333 2) (G25 3)   23  History of Open Wound Of The Foot (892 0)   24  History of Other muscle spasm (728 85) (M62 838)   25  History of Pilonidal cyst with abscess (685 0) (L05 01)   26  History of Poisoning By Tobacco (989 84)   27  History of Subacromial or subdeltoid bursitis, unspecified laterality   28  History of Subacute osteomyelitis, osteomyelitis of unspecified site    Surgical History    1  History of Hemicolectomy   2  History of Incision And Drainage Of Pilonidal Cyst   3  History of Rectal Surgery Repair Of Perirectal Fistula   4  History of Surgery Foot Amputation Metatarsal And Toe    Family History  Father    1  Family history of Family Health Status Of Father -   Brother    2  Family history of Metastatic stomach plasmacytoma (multiple myeloma EBV+)    Social History    · Current Smoker (305 1)   · Current Some Day Smoker (305 1)   · Denied: History of Drug Use   · Ex-cigarette smoker (V15 82) (Z80 18)   · Denied: History of Heavy Alcohol Consumption   · Home DME CPAP   · Lives with spouse   · Denied: History of Marijuana   ·    · No drug use   · Occupation:   · Retired   · Tobacco user (305 1) (Z72 0)    Current Meds    1  ALPRAZolam 1 MG Oral Tablet; take one tablet by mouth twice daily;    Therapy: 49ENI3454 to (Evaluate:64Wly5551); Last Rx:04Jun2016 Ordered    2  Aspirin 81 MG TABS; TAKE 1 TABLET DAILY; Therapy: 42KCG2125 to (Metta Bake)  Requested for: ; Last   Rx:27Oct2014 Ordered    3  OxyCODONE HCl - 20 MG Oral Tablet; 1  tablet q6hrs; Therapy: 40LCU3260 to (Evaluate:14Jun2016); Last Rx:98Zix0921 Ordered    4  Albuterol Sulfate (2 5 MG/3ML) 0 083% Inhalation Nebulization Solution; INHALE   CONTENTS OF 1 VIAL IN NEBULIZER Q 4 HRS IF NEEDED; Therapy: 61DEE4453 to (Last Rx:58Grn5501)  Requested for: 70JRH3452 Ordered   5  Symbicort 160-4 5 MCG/ACT Inhalation Aerosol; INHALE 2 PUFFS TWICE DAILY  RINSE   MOUTH AFTER USE; Therapy: 36PBT9121 to (Last FC:64VLU6554)  Requested for: 91EMM2595 Ordered   6  Ventolin  (90 Base) MCG/ACT Inhalation Aerosol Solution; INHALE 1 PUFF   EVERY 4 HOURS AS NEEDED; Therapy: 52ZTK2586 to (Evaluate:17Nrm4760)  Requested for: 93XYH2884; Last   RC:90UIK8438 Ordered    7  Atorvastatin Calcium 10 MG Oral Tablet; take 1 tablet every day; Therapy: 77CEI7708 to (Evaluate:43Juf9641)  Requested for: 65FTS8646; Last   Rx:27Jan2017 Ordered    8  Ibuprofen 800 MG Oral Tablet; TAKE 1 TABLET 3 TIMES DAILY; Therapy: 06BQH8627 to (Evaluate:30Jan2016)  Requested for: 80VDB5300; Last   Rx:02Oct2015 Ordered    9  Chlorthalidone 25 MG Oral Tablet; ONE-HALF(1/2) OF A TABLET DAILY; Therapy: 31OVE2131 to (Evaluate:71Vlp4587)  Requested for: 61PHL4569; Last   Rx:01Mar2016 Ordered   10  Lisinopril 40 MG Oral Tablet; TAKE 1 TABLET DAILY  CV;    Therapy: 27KZD3777 to (Evaluate:36Rnc8188)  Requested for: 05UEM1626; Last    Rx:38Aef4048 Ordered    11  Viagra 100 MG Oral Tablet (Sildenafil Citrate); TAKE 1 TABLET AS DIRECTED; Therapy: 49KYD3434 to (Evaluate:23Oct2014); Last Rx:96Qsl8337 Ordered    12  MetFORMIN HCl - 1000 MG Oral Tablet; Take 1 tablet twice daily; Therapy: 19UTV4460 to (Evaluate:93Evv3782)  Requested for: 30SBV0658; Last    Rx:12Yzi8038 Ordered    13   Accu-Chek Anisha Plus In Vitro Strip; CHECK BLOOD SUGAR 5 TIMES DAILY,DX E11 65; Therapy: 49XTP6671 to (Evaluate:16Gmv3120)  Requested for: 80SNP5037; Last    Rx:03Jan2018 Ordered   14  BD Insulin Syringe 30G X 1/2" 0 5 ML Miscellaneous; FOR 5X INJECTIONS; Therapy: 02Egh3912 to (Evaluate:28Ugn0188)  Requested for: 11Uuo3503; Last    Rx:23Apr2016 Ordered   15  Insulin Syringe 30G X 5/16" 1 ML Miscellaneous; pt  injecting 5 x's a day; Therapy: 32KAJ7078 to (Last Rx:96Eii7502)  Requested for: 14Wud8439 Ordered   16  Lantus 100 UNIT/ML Subcutaneous Solution; INJECT 85 UNITS SUBCUTANEOUSLY    TWICE DAILY; Therapy: 61SPM4351 to (Evaluate:31May2017)  Requested for: 47XAL5259; Last    Rx:24Mar2017 Ordered   17  NovoLOG 100 UNIT/ML Subcutaneous Solution; INJECT 2 TO 20 UNITS    SUBCUTANEOUSLY WITH EACH MEAL PLUS 7 UNITS AT BREAKFAST, 7 UNITS AT    LUNCH, AND 9 UNITS AT Dozwil; Therapy: 32LZP9909 to ([id number])  Requested for: 45AZH1953; Last    Rx:30May2017 Ordered   18  TRUEresult Blood Glucose w/Device Kit; USE AS DIRECTED; Therapy: 81SBW7344 to (Last Rx:27Aoi9260)  Requested for: 74TNF0744 Ordered   19  TRUEtest Test In Vitro Strip; TEST 5 X A DAY TESTING; Therapy: 22Apr2016 to (Evaluate:13Apr2017)  Requested for: 41DXR9522; Last    Rx:85Nbh8452 Ordered    20  Litetouch Insulin Syringe 31G X 5/16" 1 ML Miscellaneous; Therapy: 03FTR8630 to (Evaluate:11Rkj7663) Recorded   21  Omeprazole 20 MG Oral Tablet Delayed Release; Take 1 tablet daily; Therapy: 75CJH3151 to Recorded   22  Vitamin D 1000 UNIT Oral Tablet Recorded    Allergies    1  No Known Drug Allergies    Health Management   Influenza (Split); every 1 year; Last 69QMX5576; Next Due: 20NFH6466; Overdue    End of Encounter Meds    1  ALPRAZolam 1 MG Oral Tablet; take one tablet by mouth twice daily; Therapy: 84QOK3777 to (Evaluate:40Mvk9271); Last Rx:04Jun2016 Ordered    2  Aspirin 81 MG TABS; TAKE 1 TABLET DAILY;    Therapy: 06OTT3642 to (Evaluate:25Apr2015) Requested for: 25INM7417; Last   Rx:27Oct2014 Ordered    3  OxyCODONE HCl - 20 MG Oral Tablet; 1  tablet q6hrs; Therapy: 39IKC6639 to (Evaluate:14Jun2016); Last Rx:13Son0688 Ordered    4  Albuterol Sulfate (2 5 MG/3ML) 0 083% Inhalation Nebulization Solution; INHALE   CONTENTS OF 1 VIAL IN NEBULIZER Q 4 HRS IF NEEDED; Therapy: 85BTK1660 to (Last Rx:75Mwn1127)  Requested for: 97TMD5277 Ordered   5  Symbicort 160-4 5 MCG/ACT Inhalation Aerosol; INHALE 2 PUFFS TWICE DAILY  RINSE   MOUTH AFTER USE; Therapy: 18QMR5273 to (Last LP:59QJJ4596)  Requested for: 90AKK4970 Ordered   6  Ventolin  (90 Base) MCG/ACT Inhalation Aerosol Solution; INHALE 1 PUFF   EVERY 4 HOURS AS NEEDED; Therapy: 09XHG2364 to (Evaluate:98Fej9437)  Requested for: 86KCO5660; Last   NZ:13GGB7999 Ordered    7  Atorvastatin Calcium 10 MG Oral Tablet; take 1 tablet every day; Therapy: 42FSG5913 to (Evaluate:69Usl9075)  Requested for: 39JXB8557; Last   Rx:27Jan2017 Ordered    8  Ibuprofen 800 MG Oral Tablet; TAKE 1 TABLET 3 TIMES DAILY; Therapy: 84KSO4309 to (Evaluate:30Jan2016)  Requested for: 70BDI2629; Last   Rx:02Oct2015 Ordered    9  Chlorthalidone 25 MG Oral Tablet; ONE-HALF(1/2) OF A TABLET DAILY; Therapy: 86LBZ1984 to (Evaluate:37Fht6338)  Requested for: 35TIZ9867; Last   Rx:01Mar2016 Ordered   10  Lisinopril 40 MG Oral Tablet; TAKE 1 TABLET DAILY  CV;    Therapy: 11KNU0969 to (Evaluate:89Xpu2483)  Requested for: 22AOQ2126; Last    Rx:69Zke2215 Ordered    11  Viagra 100 MG Oral Tablet (Sildenafil Citrate); TAKE 1 TABLET AS DIRECTED; Therapy: 61PVN1435 to (Evaluate:23Oct2014); Last Rx:19Rly8424 Ordered    12  MetFORMIN HCl - 1000 MG Oral Tablet; Take 1 tablet twice daily; Therapy: 70SQK4134 to (Evaluate:21Wfe4296)  Requested for: 33OEP0421; Last    Rx:44Evf3373 Ordered    13  Accu-Chek Anisha Plus In Vitro Strip; CHECK BLOOD SUGAR 5 TIMES DAILY,DX E11 65;     Therapy: 73VUG9146 to (Evaluate:96Iqo0494)  Requested for: 78SZD3217; Last    Rx:03Jan2018 Ordered   14  BD Insulin Syringe 30G X 1/2" 0 5 ML Miscellaneous; FOR 5X INJECTIONS; Therapy: 72Xex7966 to (Evaluate:57Mop4614)  Requested for: 12Ryj3329; Last    Rx:23Apr2016 Ordered   15  Insulin Syringe 30G X 5/16" 1 ML Miscellaneous; pt  injecting 5 x's a day; Therapy: 61KGB2061 to (Last Rx:28Feb2017)  Requested for: 32Fzk3736 Ordered   16  Lantus 100 UNIT/ML Subcutaneous Solution; INJECT 85 UNITS SUBCUTANEOUSLY    TWICE DAILY; Therapy: 52FSY7454 to (Evaluate:31May2017)  Requested for: 13ARB7761; Last    Rx:24Mar2017 Ordered   17  NovoLOG 100 UNIT/ML Subcutaneous Solution; INJECT 2 TO 20 UNITS    SUBCUTANEOUSLY WITH EACH MEAL PLUS 7 UNITS AT BREAKFAST, 7 UNITS AT    LUNCH, AND 9 UNITS AT Dozwil; Therapy: 70JXN8180 to (77 873 135)  Requested for: 44VZB8351; Last    Rx:30May2017 Ordered   18  TRUEresult Blood Glucose w/Device Kit; USE AS DIRECTED; Therapy: 23TEG2474 to (Last Rx:20Xad4976)  Requested for: 36AXV2653 Ordered   19  TRUEtest Test In Vitro Strip; TEST 5 X A DAY TESTING; Therapy: 29Dat5952 to (Evaluate:84Irp3158)  Requested for: 32RGA6023; Last    Rx:63Ien7635 Ordered    20  Litetouch Insulin Syringe 31G X 5/16" 1 ML Miscellaneous; Therapy: 35JRZ0390 to (Evaluate:06Nos2358) Recorded   21  Omeprazole 20 MG Oral Tablet Delayed Release; Take 1 tablet daily; Therapy: 00LBD8503 to Recorded   22  Vitamin D 1000 UNIT Oral Tablet Recorded    Patient Care Team    Care Team Member Role Specialty Office Number   Yessenia PATEL  Internal Medicine [phone]   Jessica PATEL    Dermatology [phone]   Ze Tse MD  Pain Management [phone]     Signatures   Electronically signed by : Ramón Romero RN; Jan 11 2018 11:15AM EST                       (Author)

## 2018-02-26 NOTE — PROGRESS NOTES
History of Present Illness  Care Coordination Encounter Information:   Type of Encounter: Telephonic    Spoke to Patient  Care Coordination SL Nurse [facility]:   The reason for call is to discuss outreach for follow up/needed services, coordination of meeting care plan treatment goals and results  Per patient, vitals have been stable  Wound nurse continues to come to his home to change dressings  Wound to right foot continues to heal well  Blood sugars have been better since last conversation  He stated that it has been in the 115s  He has been trying to add more fresh vegetables to his diet along with lean proteins  He has been trying to keep a little more active but he stated it does get a little hard due to his wound and also he is caring for his wife  He does not over exert himself though because then his right foot does get sore due to open wound  Reviewed over with patient s/s of infection such as drainage, fever, redness and when to seek medical attention  He verbalized understanding  Also discussed correlation of maintaining a good healthy diet and wound healing  He continues to have contact information  Has no questions or concerns at this time  Active Problems    1  Abnormal glucose (790 29) (R73 09)   2  Abrasion of toe of right foot, initial encounter (917 0) (S90 414A)   3  Actinic keratosis (702 0) (L57 0)   4  Anxiety disorder (300 00) (F41 9)   5  Benign essential hypertension (401 1) (I10)   6  Blister of toe (917 2) (S90 426A)   7  Cellulitis of foot, left (682 7) (L03 116)   8  Changes in skin texture (782 8) (R23 4)   9  Changing skin lesion (709 9) (L98 9)   10  Chronic maxillary sinusitis (473 0) (J32 0)   11  Chronic obstructive pulmonary disease (496) (J44 9)   12  Chronic, continuous use of opioids (305 51) (F11 90)   13  Colon cancer screening (V76 51) (Z12 11)   14  Cough (786 2) (R05)   15  Dental caries (521 00) (K02 9)   16   Diabetic Charcot foot (250 60,713 5) (E11 610) 17  Diabetic foot ulcer (250 80,707 15) (E11 621,L97 509)   18  Disc degeneration, lumbar (722 52) (M51 36)   19  Dyslipidemia (272 4) (E78 5)   20  Elevated white blood cell count (288 60) (D72 829)   21  Facet Syndrome (724 8)   22  Flu vaccine need (V04 81) (Z23)   23  Generalized osteoarthritis of multiple sites (715 09) (M15 9)   24  Generalized pain (780 96) (R52)   25  Hypercholesterolemia (272 0) (E78 00)   26  Hypertension (401 9) (I10)   27  Hypertension, essential (401 9) (I10)   28  Leukocytosis (288 60) (D72 829)   29  Low back pain (724 2) (M54 5)   30  Morbid or severe obesity due to excess calories (278 01) (E66 01)   31  Open wound of foot except toe(s) alone (892 0) (S91 309A)   32  Organic impotence (607 84) (N52 9)   33  YOVANI on CPAP (327 23,V46 8) (G47 33,Z99 89)   34  Osteomyelitis, acute, ankle or foot (730 07) (M86 179)   35  Other chronic pain (338 29) (G89 29)   36  Pain syndrome, chronic (338 4) (G89 4)   37  Pilonidal cyst (685 1) (L05 91)   38  Screening for skin condition (V82 0) (Z13 89)   39  Seborrheic keratosis (702 19) (L82 1)   40  Squamous cell cancer of skin of forearm (173 62) (C44 621)   41  Type 2 Diabetes Mellitus - Uncomplicated, Controlled (250 00)   42  Type 2 diabetes mellitus with hyperglycemia (250 00) (E11 65)   43  Wound, open, foot (892 0) (S91 309A)    Past Medical History    1  History of Arthralgia Of The Knee / Patella / Tibia / Fibula (719 46)   2  History of Arthralgia Of The Pelvis / Hip / Femur (719 45)   3  History of Benign Localized Prostatic Hyperplasia Without Urinary Obstruction (600 20)   4  History of Cellulitis (682 9) (L03 90)   5  History of Colonoscopy (Fiberoptic) Screening   6  History of Depression (311) (F32 9)   7  History of Diabetes Mellitus (250 00)   8  History of Drug dependence, in remission (304 93) (F19 21)   9  History of Dysfunction of Eustachian tube, unspecified laterality (381 81) (H69 80)   10   History of Foot ulcer, unspecified laterality, with unspecified severity (707 15) (L97 509)   11  H/O nonmelanoma skin cancer (V10 83) (Z85 828)   12  History of athlete's foot (V12 09) (Z86 19)   13  History of chronic obstructive lung disease (V12 69) (Z87 09)   14  History of diverticulitis of colon (V12 79) (Z87 19)   15  History of headache (V13 89) (Z87 898)   16  History of hidradenitis suppurativa (V13 3) (Z87 2)   17  History of hidradenitis suppurativa (V13 3) (Z87 2)   18  History of hyperlipidemia (V12 29) (Z86 39)   19  History of hypertension (V12 59) (Z86 79)   20  History of obesity (V12 29) (Z86 39)   21  History of sciatica (V12 49) (Z86 69)   22  History of Myoclonus (333 2) (G25 3)   23  History of Open Wound Of The Foot (892 0)   24  History of Other muscle spasm (728 85) (M62 838)   25  History of Pilonidal cyst with abscess (685 0) (L05 01)   26  History of Poisoning By Tobacco (989 84)   27  History of Subacromial or subdeltoid bursitis, unspecified laterality   28  History of Subacute osteomyelitis, osteomyelitis of unspecified site    Surgical History    1  History of Hemicolectomy   2  History of Incision And Drainage Of Pilonidal Cyst   3  History of Rectal Surgery Repair Of Perirectal Fistula   4  History of Surgery Foot Amputation Metatarsal And Toe    Family History  Father    1  Family history of Family Health Status Of Father -   Brother    2  Family history of Metastatic stomach plasmacytoma (multiple myeloma EBV+)    Social History    · Current Smoker (305 1)   · Current Some Day Smoker (305 1)   · Denied: History of Drug Use   · Ex-cigarette smoker (V15 82) (Z80 18)   · Denied: History of Heavy Alcohol Consumption   · Home DME CPAP   · Lives with spouse   · Denied: History of Marijuana   ·    · No drug use   · Occupation:   · Retired   · Tobacco user (305 1) (Z72 0)    Current Meds    1  ALPRAZolam 1 MG Oral Tablet; take one tablet by mouth twice daily;    Therapy: 82WZH2936 to (Evaluate:89Rug8088); Last Rx:04Jun2016 Ordered    2  Aspirin 81 MG TABS; TAKE 1 TABLET DAILY; Therapy: 38WQM0744 to ([facility])  Requested for: ; Last   Rx:27Oct2014 Ordered    3  OxyCODONE HCl - 20 MG Oral Tablet; 1  tablet q6hrs; Therapy: 22BYT8180 to (Evaluate:14Jun2016); Last Rx:62Jmq3498 Ordered    4  Albuterol Sulfate (2 5 MG/3ML) 0 083% Inhalation Nebulization Solution; INHALE   CONTENTS OF 1 VIAL IN NEBULIZER Q 4 HRS IF NEEDED; Therapy: 96CWL8514 to (Last Rx:95Vad7019)  Requested for: 94HSJ9732 Ordered   5  Symbicort 160-4 5 MCG/ACT Inhalation Aerosol; INHALE 2 PUFFS TWICE DAILY  RINSE   MOUTH AFTER USE; Therapy: 12LVU4641 to (Last UJ:30CZW5930)  Requested for: 04WZK0749 Ordered   6  Ventolin  (90 Base) MCG/ACT Inhalation Aerosol Solution; INHALE 1 PUFF   EVERY 4 HOURS AS NEEDED; Therapy: 55TUE4877 to (Evaluate:36Nbp3393)  Requested for: 96EBN2767; Last   KH:55GEG4738 Ordered    7  Atorvastatin Calcium 10 MG Oral Tablet; take 1 tablet every day; Therapy: 60YAT9820 to (Evaluate:07Esb2802)  Requested for: 20ZRC5044; Last   Rx:27Jan2017 Ordered    8  Ibuprofen 800 MG Oral Tablet; TAKE 1 TABLET 3 TIMES DAILY; Therapy: 42SBF7108 to (Evaluate:30Jan2016)  Requested for: 77NZY0874; Last   Rx:02Oct2015 Ordered    9  Chlorthalidone 25 MG Oral Tablet; ONE-HALF(1/2) OF A TABLET DAILY; Therapy: 18QID8631 to (Evaluate:70Jpt9536)  Requested for: 45UMU5271; Last   Rx:01Mar2016 Ordered   10  Lisinopril 40 MG Oral Tablet; TAKE 1 TABLET DAILY  CV;    Therapy: 09MGN8373 to (Evaluate:56Zdz8922)  Requested for: 92HXN9729; Last    Rx:83Wxa3884 Ordered    11  Viagra 100 MG Oral Tablet (Sildenafil Citrate); TAKE 1 TABLET AS DIRECTED; Therapy: 74FNY0170 to (Evaluate:23Oct2014); Last Rx:29Vrt5052 Ordered    12  MetFORMIN HCl - 1000 MG Oral Tablet; Take 1 tablet twice daily; Therapy: 13HXN5277 to (Evaluate:11May2017)  Requested for: 95RZO3294; Last    Rx:24Thv6426 Ordered    13   Accu-Chek Anisha Plus In Vitro Strip; CHECK BLOOD SUGAR 5 TIMES DAILY,DX E11 65; Therapy: 65ECS8682 to (Evaluate:30Mut3150)  Requested for: 02HDD4998; Last    Rx:03Jan2018 Ordered   14  BD Insulin Syringe 30G X 1/2" 0 5 ML Miscellaneous; FOR 5X INJECTIONS; Therapy: 17Ggf2324 to (Evaluate:32Gqd0273)  Requested for: 04Knu9601; Last    Rx:71Sgk5261 Ordered   15  Insulin Syringe 30G X 5/16" 1 ML Miscellaneous; pt  injecting 5 x's a day; Therapy: 37GUW5279 to (Last Rx:80Xfa6177)  Requested for: 40Hdt7464 Ordered   16  Lantus 100 UNIT/ML Subcutaneous Solution; INJECT 85 UNITS SUBCUTANEOUSLY    TWICE DAILY; Therapy: 34BNY4774 to (Evaluate:31May2017)  Requested for: 52ZRP6858; Last    Rx:24Mar2017 Ordered   17  NovoLOG 100 UNIT/ML Subcutaneous Solution; INJECT 2 TO 20 UNITS    SUBCUTANEOUSLY WITH EACH MEAL PLUS 7 UNITS AT BREAKFAST, 7 UNITS AT    LUNCH, AND 9 UNITS AT Dozwil; Therapy: 57IUV9937 to (21 )  Requested for: 21NDY0125; Last    Rx:30May2017 Ordered   18  TRUEresult Blood Glucose w/Device Kit; USE AS DIRECTED; Therapy: 30JOP1399 to (Last Rx:45Zrc8710)  Requested for: 95XGG5875 Ordered   19  TRUEtest Test In Vitro Strip; TEST 5 X A DAY TESTING; Therapy: 22Apr2016 to (Evaluate:13Apr2017)  Requested for: 65LDH5392; Last    Rx:64Jqx1500 Ordered    20  Litetouch Insulin Syringe 31G X 5/16" 1 ML Miscellaneous; Therapy: 89UDU1574 to (Evaluate:58Kcy5634) Recorded   21  Omeprazole 20 MG Oral Tablet Delayed Release; Take 1 tablet daily; Therapy: 67CKT6754 to Recorded   22  Vitamin D 1000 UNIT Oral Tablet Recorded    Allergies    1  No Known Drug Allergies    Health Management   Influenza (Split); every 1 year; Last 43WNN0249; Next Due: 66RWN5226; Overdue    End of Encounter Meds    1  ALPRAZolam 1 MG Oral Tablet; take one tablet by mouth twice daily; Therapy: 70DLP1112 to (Evaluate:04Jul2016); Last Rx:04Jun2016 Ordered    2  Aspirin 81 MG TABS; TAKE 1 TABLET DAILY;    Therapy: 24DJH1154 to (Evaluate:25Apr2015) Requested for: 01VRZ5095; Last   Rx:27Oct2014 Ordered    3  OxyCODONE HCl - 20 MG Oral Tablet; 1  tablet q6hrs; Therapy: 04XRE7035 to (Evaluate:14Jun2016); Last Rx:77Aii4334 Ordered    4  Albuterol Sulfate (2 5 MG/3ML) 0 083% Inhalation Nebulization Solution; INHALE   CONTENTS OF 1 VIAL IN NEBULIZER Q 4 HRS IF NEEDED; Therapy: 22IBA7623 to (Last Rx:80Ewy1262)  Requested for: 67DQX9377 Ordered   5  Symbicort 160-4 5 MCG/ACT Inhalation Aerosol; INHALE 2 PUFFS TWICE DAILY  RINSE   MOUTH AFTER USE; Therapy: 15VMB3409 to (Last :90GDQ8247)  Requested for: 91PYM1073 Ordered   6  Ventolin  (90 Base) MCG/ACT Inhalation Aerosol Solution; INHALE 1 PUFF   EVERY 4 HOURS AS NEEDED; Therapy: 37FIJ2006 to (Evaluate:60Szr2648)  Requested for: 54AOU7397; Last   AD:69GAF4899 Ordered    7  Atorvastatin Calcium 10 MG Oral Tablet; take 1 tablet every day; Therapy: 81PHI4427 to (Evaluate:41Jrc6623)  Requested for: 51FEH8798; Last   Rx:27Jan2017 Ordered    8  Ibuprofen 800 MG Oral Tablet; TAKE 1 TABLET 3 TIMES DAILY; Therapy: 96QYP8415 to (Evaluate:30Jan2016)  Requested for: 63RSO1837; Last   Rx:02Oct2015 Ordered    9  Chlorthalidone 25 MG Oral Tablet; ONE-HALF(1/2) OF A TABLET DAILY; Therapy: 63HJI0153 to (Evaluate:68Wjv5006)  Requested for: 67PJX2212; Last   Rx:01Mar2016 Ordered   10  Lisinopril 40 MG Oral Tablet; TAKE 1 TABLET DAILY  CV;    Therapy: 39BII9338 to (Evaluate:81Gij7973)  Requested for: 86RDM2123; Last    Rx:14Lqk1661 Ordered    11  Viagra 100 MG Oral Tablet (Sildenafil Citrate); TAKE 1 TABLET AS DIRECTED; Therapy: 60JRF8498 to (Evaluate:23Oct2014); Last Rx:05Gqp4737 Ordered    12  MetFORMIN HCl - 1000 MG Oral Tablet; Take 1 tablet twice daily; Therapy: 38NRM8264 to (Evaluate:44Epe8750)  Requested for: 56OZQ2933; Last    Rx:85Gyq8087 Ordered    13  Accu-Chek Anisha Plus In Vitro Strip; CHECK BLOOD SUGAR 5 TIMES DAILY,DX E11 65;     Therapy: 64VRO9826 to (Evaluate:03Rne1206)  Requested for: 11EWN9320; Last    Rx:03Jan2018 Ordered   14  BD Insulin Syringe 30G X 1/2" 0 5 ML Miscellaneous; FOR 5X INJECTIONS; Therapy: 35Crn3242 to (Evaluate:76Yzm5469)  Requested for: 95Trz3307; Last    Rx:23Apr2016 Ordered   15  Insulin Syringe 30G X 5/16" 1 ML Miscellaneous; pt  injecting 5 x's a day; Therapy: 75JEA7958 to (Last Rx:28Feb2017)  Requested for: 46Vmw9506 Ordered   16  Lantus 100 UNIT/ML Subcutaneous Solution; INJECT 85 UNITS SUBCUTANEOUSLY    TWICE DAILY; Therapy: 19CWO1277 to (Evaluate:31May2017)  Requested for: 35ZON1881; Last    Rx:24Mar2017 Ordered   17  NovoLOG 100 UNIT/ML Subcutaneous Solution; INJECT 2 TO 20 UNITS    SUBCUTANEOUSLY WITH EACH MEAL PLUS 7 UNITS AT BREAKFAST, 7 UNITS AT    LUNCH, AND 9 UNITS AT Dozwil; Therapy: 08MOD0056 to (21 )  Requested for: 22JSD0794; Last    Rx:30May2017 Ordered   18  TRUEresult Blood Glucose w/Device Kit; USE AS DIRECTED; Therapy: 47TII8262 to (Last Rx:14Feb2017)  Requested for: 61ADK5246 Ordered   19  TRUEtest Test In Vitro Strip; TEST 5 X A DAY TESTING; Therapy: 22Apr2016 to (Evaluate:29Liz2582)  Requested for: 02BIT8855; Last    Rx:52Dme1208 Ordered    20  Litetouch Insulin Syringe 31G X 5/16" 1 ML Miscellaneous; Therapy: 12ONU4375 to (Evaluate:56Jrh7787) Recorded   21  Omeprazole 20 MG Oral Tablet Delayed Release; Take 1 tablet daily; Therapy: 93KKQ9528 to Recorded   22  Vitamin D 1000 UNIT Oral Tablet Recorded    Patient Care Team    Care Team Member Role Specialty Office Number   Sadie PATEL  Internal Medicine [phone]   Robbi PATEL    Dermatology [phone]   Janelle Yusuf MD  Pain Management [phone]     Signatures   Electronically signed by : Danielle Mosleey RN; Jan 11 2018 10:56AM EST                       (Author)

## 2018-02-26 NOTE — PROGRESS NOTES
History of Present Illness  Care Coordination Encounter Information:   Type of Encounter: Telephonic    Spoke to Patient  Care Coordination  Nurse Martha Merino:   The reason for call is to discuss outreach for follow up/needed services, coordination of meeting care plan treatment goals and results  Per patient, vitals have been stable  Wound nurse continues to come to his home to change dressings  Wound to right foot continues to heal well  Blood sugars have been better since last conversation  He stated that it has been in the 115s  He has been trying to add more fresh vegetables to his diet along with lean proteins  He has been trying to keep a little more active but he stated it does get a little hard due to his wound and also he is caring for his wife  He does not over exert himself though because then his right foot does get sore due to open wound  Reviewed over with patient s/s of infection such as drainage, fever, redness and when to seek medical attention  He verbalized understanding  Also discussed correlation of maintaining a good healthy diet and wound healing  He continues to have contact information  Has no questions or concerns at this time  Active Problems    1  Abnormal glucose (790 29) (R73 09)   2  Abrasion of toe of right foot, initial encounter (917 0) (S90 414A)   3  Actinic keratosis (702 0) (L57 0)   4  Anxiety disorder (300 00) (F41 9)   5  Benign essential hypertension (401 1) (I10)   6  Blister of toe (917 2) (S90 426A)   7  Cellulitis of foot, left (682 7) (L03 116)   8  Changes in skin texture (782 8) (R23 4)   9  Changing skin lesion (709 9) (L98 9)   10  Chronic maxillary sinusitis (473 0) (J32 0)   11  Chronic obstructive pulmonary disease (496) (J44 9)   12  Chronic, continuous use of opioids (305 51) (F11 90)   13  Colon cancer screening (V76 51) (Z12 11)   14  Cough (786 2) (R05)   15  Dental caries (521 00) (K02 9)   16   Diabetic Charcot foot (250 60,713 5) (E11 610) 17  Diabetic foot ulcer (250 80,707 15) (E11 621,L97 509)   18  Disc degeneration, lumbar (722 52) (M51 36)   19  Dyslipidemia (272 4) (E78 5)   20  Elevated white blood cell count (288 60) (D72 829)   21  Facet Syndrome (724 8)   22  Flu vaccine need (V04 81) (Z23)   23  Generalized osteoarthritis of multiple sites (715 09) (M15 9)   24  Generalized pain (780 96) (R52)   25  Hypercholesterolemia (272 0) (E78 00)   26  Hypertension (401 9) (I10)   27  Hypertension, essential (401 9) (I10)   28  Leukocytosis (288 60) (D72 829)   29  Low back pain (724 2) (M54 5)   30  Morbid or severe obesity due to excess calories (278 01) (E66 01)   31  Open wound of foot except toe(s) alone (892 0) (S91 309A)   32  Organic impotence (607 84) (N52 9)   33  YOVANI on CPAP (327 23,V46 8) (G47 33,Z99 89)   34  Osteomyelitis, acute, ankle or foot (730 07) (M86 179)   35  Other chronic pain (338 29) (G89 29)   36  Pain syndrome, chronic (338 4) (G89 4)   37  Pilonidal cyst (685 1) (L05 91)   38  Screening for skin condition (V82 0) (Z13 89)   39  Seborrheic keratosis (702 19) (L82 1)   40  Squamous cell cancer of skin of forearm (173 62) (C44 621)   41  Type 2 Diabetes Mellitus - Uncomplicated, Controlled (250 00)   42  Type 2 diabetes mellitus with hyperglycemia (250 00) (E11 65)   43  Wound, open, foot (892 0) (S91 309A)    Past Medical History    1  History of Arthralgia Of The Knee / Patella / Tibia / Fibula (719 46)   2  History of Arthralgia Of The Pelvis / Hip / Femur (719 45)   3  History of Benign Localized Prostatic Hyperplasia Without Urinary Obstruction (600 20)   4  History of Cellulitis (682 9) (L03 90)   5  History of Colonoscopy (Fiberoptic) Screening   6  History of Depression (311) (F32 9)   7  History of Diabetes Mellitus (250 00)   8  History of Drug dependence, in remission (304 93) (F19 21)   9  History of Dysfunction of Eustachian tube, unspecified laterality (381 81) (H69 80)   10   History of Foot ulcer, unspecified laterality, with unspecified severity (707 15) (L97 509)   11  H/O nonmelanoma skin cancer (V10 83) (Z85 828)   12  History of athlete's foot (V12 09) (Z86 19)   13  History of chronic obstructive lung disease (V12 69) (Z87 09)   14  History of diverticulitis of colon (V12 79) (Z87 19)   15  History of headache (V13 89) (Z87 898)   16  History of hidradenitis suppurativa (V13 3) (Z87 2)   17  History of hidradenitis suppurativa (V13 3) (Z87 2)   18  History of hyperlipidemia (V12 29) (Z86 39)   19  History of hypertension (V12 59) (Z86 79)   20  History of obesity (V12 29) (Z86 39)   21  History of sciatica (V12 49) (Z86 69)   22  History of Myoclonus (333 2) (G25 3)   23  History of Open Wound Of The Foot (892 0)   24  History of Other muscle spasm (728 85) (M62 838)   25  History of Pilonidal cyst with abscess (685 0) (L05 01)   26  History of Poisoning By Tobacco (989 84)   27  History of Subacromial or subdeltoid bursitis, unspecified laterality   28  History of Subacute osteomyelitis, osteomyelitis of unspecified site    Surgical History    1  History of Hemicolectomy   2  History of Incision And Drainage Of Pilonidal Cyst   3  History of Rectal Surgery Repair Of Perirectal Fistula   4  History of Surgery Foot Amputation Metatarsal And Toe    Family History  Father    1  Family history of Family Health Status Of Father -   Brother    2  Family history of Metastatic stomach plasmacytoma (multiple myeloma EBV+)    Social History    · Current Smoker (305 1)   · Current Some Day Smoker (305 1)   · Denied: History of Drug Use   · Ex-cigarette smoker (V15 82) (Z80 18)   · Denied: History of Heavy Alcohol Consumption   · Home DME CPAP   · Lives with spouse   · Denied: History of Marijuana   ·    · No drug use   · Occupation:   · Retired   · Tobacco user (305 1) (Z72 0)    Current Meds    1  ALPRAZolam 1 MG Oral Tablet; take one tablet by mouth twice daily;    Therapy: 41QPB1449 to (Evaluate:46Fme8616); Last Rx:04Jun2016 Ordered    2  Aspirin 81 MG TABS; TAKE 1 TABLET DAILY; Therapy: 52YMM1992 to (Go Skshawna)  Requested for: ; Last   Rx:27Oct2014 Ordered    3  OxyCODONE HCl - 20 MG Oral Tablet; 1  tablet q6hrs; Therapy: 19YHT0609 to (Evaluate:14Jun2016); Last Rx:11Nhi1755 Ordered    4  Albuterol Sulfate (2 5 MG/3ML) 0 083% Inhalation Nebulization Solution; INHALE   CONTENTS OF 1 VIAL IN NEBULIZER Q 4 HRS IF NEEDED; Therapy: 47ZNO0159 to (Last Rx:06Dyg1860)  Requested for: 37GYH7310 Ordered   5  Symbicort 160-4 5 MCG/ACT Inhalation Aerosol; INHALE 2 PUFFS TWICE DAILY  RINSE   MOUTH AFTER USE; Therapy: 65ERH6320 to (Last CB:80FOM7741)  Requested for: 26ULN3746 Ordered   6  Ventolin  (90 Base) MCG/ACT Inhalation Aerosol Solution; INHALE 1 PUFF   EVERY 4 HOURS AS NEEDED; Therapy: 35BEA0650 to (Evaluate:39Bzr3402)  Requested for: 40ZGV3344; Last   LB:93LXI6306 Ordered    7  Atorvastatin Calcium 10 MG Oral Tablet; take 1 tablet every day; Therapy: 89ACW1049 to (Evaluate:93Vth0759)  Requested for: 29FAW8890; Last   Rx:27Jan2017 Ordered    8  Ibuprofen 800 MG Oral Tablet; TAKE 1 TABLET 3 TIMES DAILY; Therapy: 69USK6254 to (Evaluate:30Jan2016)  Requested for: 05GZL3212; Last   Rx:02Oct2015 Ordered    9  Chlorthalidone 25 MG Oral Tablet; ONE-HALF(1/2) OF A TABLET DAILY; Therapy: 66UMP2715 to (Evaluate:84Roq9828)  Requested for: 49VUL8214; Last   Rx:01Mar2016 Ordered   10  Lisinopril 40 MG Oral Tablet; TAKE 1 TABLET DAILY  CV;    Therapy: 04JMK8503 to (Evaluate:16Jtp0600)  Requested for: 45GOH3820; Last    Rx:45Fjz9351 Ordered    11  Viagra 100 MG Oral Tablet (Sildenafil Citrate); TAKE 1 TABLET AS DIRECTED; Therapy: 12UAM3651 to (Evaluate:87Wsh8421); Last Rx:33Uwa2664 Ordered    12  MetFORMIN HCl - 1000 MG Oral Tablet; Take 1 tablet twice daily; Therapy: 68FRD6418 to (Evaluate:68Cuz7631)  Requested for: 43DKD5046; Last    Rx:94Zpq0876 Ordered    13   Accu-Chek Anisha Plus In Vitro Strip; CHECK BLOOD SUGAR 5 TIMES DAILY,DX E11 65; Therapy: 24AJC8621 to (Evaluate:14Pph4641)  Requested for: 15VMF0270; Last    Rx:03Jan2018 Ordered   14  BD Insulin Syringe 30G X 1/2" 0 5 ML Miscellaneous; FOR 5X INJECTIONS; Therapy: 83Izu0354 to (Evaluate:07Jaq7962)  Requested for: 92Uxy4041; Last    Rx:11Wlx4451 Ordered   15  Insulin Syringe 30G X 5/16" 1 ML Miscellaneous; pt  injecting 5 x's a day; Therapy: 08CWJ5749 to (Last Rx:28Feb2017)  Requested for: 52Ojg8854 Ordered   16  Lantus 100 UNIT/ML Subcutaneous Solution; INJECT 85 UNITS SUBCUTANEOUSLY    TWICE DAILY; Therapy: 90XIA2247 to (Evaluate:31May2017)  Requested for: 85HFT0982; Last    Rx:24Mar2017 Ordered   17  NovoLOG 100 UNIT/ML Subcutaneous Solution; INJECT 2 TO 20 UNITS    SUBCUTANEOUSLY WITH EACH MEAL PLUS 7 UNITS AT BREAKFAST, 7 UNITS AT    LUNCH, AND 9 UNITS AT Dozwil; Therapy: 76HEJ8586 to (96 411055)  Requested for: 32RKQ1150; Last    Rx:30May2017 Ordered   18  TRUEresult Blood Glucose w/Device Kit; USE AS DIRECTED; Therapy: 52IYG1939 to (Last Rx:31Rei4868)  Requested for: 58CFU3601 Ordered   19  TRUEtest Test In Vitro Strip; TEST 5 X A DAY TESTING; Therapy: 22Apr2016 to (Evaluate:13Apr2017)  Requested for: 42RKJ4830; Last    Rx:21Mqg5932 Ordered    20  Litetouch Insulin Syringe 31G X 5/16" 1 ML Miscellaneous; Therapy: 72SCH9444 to (Evaluate:07Ben6888) Recorded   21  Omeprazole 20 MG Oral Tablet Delayed Release; Take 1 tablet daily; Therapy: 22ZUT5780 to Recorded   22  Vitamin D 1000 UNIT Oral Tablet Recorded    Allergies    1  No Known Drug Allergies    Health Management   Influenza (Split); every 1 year; Last 28ODD1027; Next Due: 33TMF2689; Overdue    End of Encounter Meds    1  ALPRAZolam 1 MG Oral Tablet; take one tablet by mouth twice daily; Therapy: 92LFN3824 to (Evaluate:04Jul2016); Last Rx:04Jun2016 Ordered    2  Aspirin 81 MG TABS; TAKE 1 TABLET DAILY;    Therapy: 18DLO9411 to (Evaluate:25Apr2015) Requested for: 92YAH8323; Last   Rx:27Oct2014 Ordered    3  OxyCODONE HCl - 20 MG Oral Tablet; 1  tablet q6hrs; Therapy: 18ILA7763 to (Evaluate:14Jun2016); Last Rx:74Wpt9631 Ordered    4  Albuterol Sulfate (2 5 MG/3ML) 0 083% Inhalation Nebulization Solution; INHALE   CONTENTS OF 1 VIAL IN NEBULIZER Q 4 HRS IF NEEDED; Therapy: 60WHZ6250 to (Last Rx:62Pda9988)  Requested for: 32TYR3969 Ordered   5  Symbicort 160-4 5 MCG/ACT Inhalation Aerosol; INHALE 2 PUFFS TWICE DAILY  RINSE   MOUTH AFTER USE; Therapy: 95QOO1088 to (Last RP:09KAS9824)  Requested for: 41KFE1828 Ordered   6  Ventolin  (90 Base) MCG/ACT Inhalation Aerosol Solution; INHALE 1 PUFF   EVERY 4 HOURS AS NEEDED; Therapy: 69ARE5035 to (Evaluate:59Vvn5631)  Requested for: 35EEF4243; Last   BI:39LNE7181 Ordered    7  Atorvastatin Calcium 10 MG Oral Tablet; take 1 tablet every day; Therapy: 57IFV6086 to (Evaluate:81Vze5829)  Requested for: 53PAB8213; Last   Rx:27Jan2017 Ordered    8  Ibuprofen 800 MG Oral Tablet; TAKE 1 TABLET 3 TIMES DAILY; Therapy: 07XJF0633 to (Evaluate:30Jan2016)  Requested for: 32OCX0428; Last   Rx:02Oct2015 Ordered    9  Chlorthalidone 25 MG Oral Tablet; ONE-HALF(1/2) OF A TABLET DAILY; Therapy: 12SVS8535 to (Evaluate:11Cro1029)  Requested for: 37WZG7458; Last   Rx:01Mar2016 Ordered   10  Lisinopril 40 MG Oral Tablet; TAKE 1 TABLET DAILY  CV;    Therapy: 29HKK6546 to (Evaluate:28Bjs4699)  Requested for: 28ABQ7847; Last    Rx:31Isp0609 Ordered    11  Viagra 100 MG Oral Tablet (Sildenafil Citrate); TAKE 1 TABLET AS DIRECTED; Therapy: 85PJH5564 to (Evaluate:23Oct2014); Last Rx:31Yvq9354 Ordered    12  MetFORMIN HCl - 1000 MG Oral Tablet; Take 1 tablet twice daily; Therapy: 88XZZ4638 to (Evaluate:15Zyi5733)  Requested for: 23ZFJ3930; Last    Rx:82Lba4554 Ordered    13  Accu-Chek Anisha Plus In Vitro Strip; CHECK BLOOD SUGAR 5 TIMES DAILY,DX E11 65;     Therapy: 37TFS0564 to (Evaluate:19Trj8394)  Requested for: 85SNO9586; Last    Rx:03Jan2018 Ordered   14  BD Insulin Syringe 30G X 1/2" 0 5 ML Miscellaneous; FOR 5X INJECTIONS; Therapy: 20Tpv4652 to (Evaluate:20Hql8233)  Requested for: 24Bus9930; Last    Rx:23Apr2016 Ordered   15  Insulin Syringe 30G X 5/16" 1 ML Miscellaneous; pt  injecting 5 x's a day; Therapy: 50RHW9470 to (Last Rx:28Feb2017)  Requested for: 51Gvr4089 Ordered   16  Lantus 100 UNIT/ML Subcutaneous Solution; INJECT 85 UNITS SUBCUTANEOUSLY    TWICE DAILY; Therapy: 16DMW9040 to (Evaluate:31May2017)  Requested for: 32MTD7179; Last    Rx:24Mar2017 Ordered   17  NovoLOG 100 UNIT/ML Subcutaneous Solution; INJECT 2 TO 20 UNITS    SUBCUTANEOUSLY WITH EACH MEAL PLUS 7 UNITS AT BREAKFAST, 7 UNITS AT    LUNCH, AND 9 UNITS AT Dozwil; Therapy: 93XCX0327 to (96 934444)  Requested for: 78GTU9356; Last    Rx:30May2017 Ordered   18  TRUEresult Blood Glucose w/Device Kit; USE AS DIRECTED; Therapy: 56MPZ1920 to (Last Rx:42Dpc2548)  Requested for: 02IWB1884 Ordered   19  TRUEtest Test In Vitro Strip; TEST 5 X A DAY TESTING; Therapy: 22Apr2016 to (Evaluate:26Qfu9462)  Requested for: 91TKL2517; Last    Rx:36Jke4827 Ordered    20  Litetouch Insulin Syringe 31G X 5/16" 1 ML Miscellaneous; Therapy: 14BVP5099 to (Evaluate:84Uuq9498) Recorded   21  Omeprazole 20 MG Oral Tablet Delayed Release; Take 1 tablet daily; Therapy: 53HTB5373 to Recorded   22  Vitamin D 1000 UNIT Oral Tablet Recorded    Patient Care Team    Care Team Member Role Specialty Office Number   Zara PATEL  Internal Medicine [phone]   Garett PATEL    Dermatology [phone]   Alan Velázquez MD  Pain Management [phone]     Signatures   Electronically signed by : Lizz Mills RN; Jan 11 2018 10:54AM EST                       (Author)

## 2019-02-02 ENCOUNTER — HOSPITAL ENCOUNTER (EMERGENCY)
Facility: HOSPITAL | Age: 64
Discharge: HOME | End: 2019-02-02
Attending: EMERGENCY MEDICINE
Payer: MEDICARE

## 2019-02-02 ENCOUNTER — APPOINTMENT (EMERGENCY)
Dept: RADIOLOGY | Facility: HOSPITAL | Age: 64
End: 2019-02-02
Payer: MEDICARE

## 2019-02-02 VITALS
RESPIRATION RATE: 18 BRPM | SYSTOLIC BLOOD PRESSURE: 167 MMHG | DIASTOLIC BLOOD PRESSURE: 77 MMHG | TEMPERATURE: 98.1 F | HEART RATE: 87 BPM | OXYGEN SATURATION: 92 %

## 2019-02-02 DIAGNOSIS — R06.02 SHORTNESS OF BREATH: ICD-10-CM

## 2019-02-02 DIAGNOSIS — R09.81 NASAL CONGESTION: Primary | ICD-10-CM

## 2019-02-02 LAB
ANION GAP SERPL CALCULATED.3IONS-SCNC: 5 MMOL/L (ref 4–13)
ANION GAP SERPL CALCULATED.3IONS-SCNC: 5 MMOL/L (ref 4–13)
ATRIAL RATE: 80 BPM
BASOPHILS # BLD AUTO: 0.09 THOUSANDS/ΜL (ref 0–0.1)
BASOPHILS NFR BLD AUTO: 1 % (ref 0–1)
BUN SERPL-MCNC: 12 MG/DL (ref 5–25)
BUN SERPL-MCNC: 12 MG/DL (ref 5–25)
CALCIUM SERPL-MCNC: 8.7 MG/DL (ref 8.3–10.1)
CALCIUM SERPL-MCNC: 8.7 MG/DL (ref 8.3–10.1)
CHLORIDE SERPL-SCNC: 94 MMOL/L (ref 100–108)
CHLORIDE SERPL-SCNC: 98 MMOL/L (ref 100–108)
CO2 SERPL-SCNC: 30 MMOL/L (ref 21–32)
CO2 SERPL-SCNC: 30 MMOL/L (ref 21–32)
CREAT SERPL-MCNC: 0.86 MG/DL (ref 0.6–1.3)
CREAT SERPL-MCNC: 0.88 MG/DL (ref 0.6–1.3)
EOSINOPHIL # BLD AUTO: 0.29 THOUSAND/ΜL (ref 0–0.61)
EOSINOPHIL NFR BLD AUTO: 2 % (ref 0–6)
ERYTHROCYTE [DISTWIDTH] IN BLOOD BY AUTOMATED COUNT: 16.1 % (ref 11.6–15.1)
GFR SERPL CREATININE-BSD FRML MDRD: 93 ML/MIN/1.73SQ M
GFR SERPL CREATININE-BSD FRML MDRD: 94 ML/MIN/1.73SQ M
GLUCOSE SERPL-MCNC: 113 MG/DL (ref 65–140)
GLUCOSE SERPL-MCNC: 119 MG/DL (ref 65–140)
HCT VFR BLD AUTO: 36.4 % (ref 36.5–49.3)
HGB BLD-MCNC: 11.5 G/DL (ref 12–17)
IMM GRANULOCYTES # BLD AUTO: 0.14 THOUSAND/UL (ref 0–0.2)
IMM GRANULOCYTES NFR BLD AUTO: 1 % (ref 0–2)
LYMPHOCYTES # BLD AUTO: 2.03 THOUSANDS/ΜL (ref 0.6–4.47)
LYMPHOCYTES NFR BLD AUTO: 11 % (ref 14–44)
MCH RBC QN AUTO: 24.7 PG (ref 26.8–34.3)
MCHC RBC AUTO-ENTMCNC: 31.6 G/DL (ref 31.4–37.4)
MCV RBC AUTO: 78 FL (ref 82–98)
MONOCYTES # BLD AUTO: 1.08 THOUSAND/ΜL (ref 0.17–1.22)
MONOCYTES NFR BLD AUTO: 6 % (ref 4–12)
NEUTROPHILS # BLD AUTO: 15.15 THOUSANDS/ΜL (ref 1.85–7.62)
NEUTS SEG NFR BLD AUTO: 79 % (ref 43–75)
NRBC BLD AUTO-RTO: 0 /100 WBCS
P AXIS: 46 DEGREES
PLATELET # BLD AUTO: 358 THOUSANDS/UL (ref 149–390)
PMV BLD AUTO: 9.6 FL (ref 8.9–12.7)
POTASSIUM SERPL-SCNC: 3.7 MMOL/L (ref 3.5–5.3)
POTASSIUM SERPL-SCNC: 5.5 MMOL/L (ref 3.5–5.3)
PR INTERVAL: 144 MS
QRS AXIS: 7 DEGREES
QRSD INTERVAL: 98 MS
QT INTERVAL: 394 MS
QTC INTERVAL: 454 MS
RBC # BLD AUTO: 4.65 MILLION/UL (ref 3.88–5.62)
SODIUM SERPL-SCNC: 129 MMOL/L (ref 136–145)
SODIUM SERPL-SCNC: 133 MMOL/L (ref 136–145)
T WAVE AXIS: 56 DEGREES
TROPONIN I SERPL-MCNC: <0.02 NG/ML
VENTRICULAR RATE: 80 BPM
WBC # BLD AUTO: 18.78 THOUSAND/UL (ref 4.31–10.16)

## 2019-02-02 PROCEDURE — 36415 COLL VENOUS BLD VENIPUNCTURE: CPT | Performed by: EMERGENCY MEDICINE

## 2019-02-02 PROCEDURE — 71046 X-RAY EXAM CHEST 2 VIEWS: CPT

## 2019-02-02 PROCEDURE — 80048 BASIC METABOLIC PNL TOTAL CA: CPT | Performed by: EMERGENCY MEDICINE

## 2019-02-02 PROCEDURE — 84484 ASSAY OF TROPONIN QUANT: CPT | Performed by: EMERGENCY MEDICINE

## 2019-02-02 PROCEDURE — 93010 ELECTROCARDIOGRAM REPORT: CPT | Performed by: INTERNAL MEDICINE

## 2019-02-02 PROCEDURE — 93005 ELECTROCARDIOGRAM TRACING: CPT

## 2019-02-02 PROCEDURE — 99284 EMERGENCY DEPT VISIT MOD MDM: CPT

## 2019-02-02 PROCEDURE — 85025 COMPLETE CBC W/AUTO DIFF WBC: CPT | Performed by: EMERGENCY MEDICINE

## 2019-02-02 RX ORDER — IPRATROPIUM BROMIDE AND ALBUTEROL SULFATE 2.5; .5 MG/3ML; MG/3ML
3 SOLUTION RESPIRATORY (INHALATION) EVERY 8 HOURS PRN
Qty: 30 VIAL | Refills: 0 | Status: SHIPPED | OUTPATIENT
Start: 2019-02-02 | End: 2019-02-02

## 2019-02-02 RX ORDER — IPRATROPIUM BROMIDE AND ALBUTEROL SULFATE 2.5; .5 MG/3ML; MG/3ML
3 SOLUTION RESPIRATORY (INHALATION) ONCE
Status: COMPLETED | OUTPATIENT
Start: 2019-02-02 | End: 2019-02-02

## 2019-02-02 RX ORDER — OXYMETAZOLINE HYDROCHLORIDE 0.05 G/100ML
2 SPRAY NASAL ONCE
Status: COMPLETED | OUTPATIENT
Start: 2019-02-02 | End: 2019-02-02

## 2019-02-02 RX ORDER — ALBUTEROL SULFATE 90 UG/1
1-2 AEROSOL, METERED RESPIRATORY (INHALATION) EVERY 6 HOURS PRN
Qty: 1 INHALER | Refills: 0 | Status: SHIPPED | OUTPATIENT
Start: 2019-02-02 | End: 2019-02-02

## 2019-02-02 RX ORDER — IPRATROPIUM BROMIDE AND ALBUTEROL SULFATE 2.5; .5 MG/3ML; MG/3ML
3 SOLUTION RESPIRATORY (INHALATION) EVERY 8 HOURS PRN
Qty: 30 VIAL | Refills: 0 | Status: SHIPPED | OUTPATIENT
Start: 2019-02-02

## 2019-02-02 RX ORDER — ALBUTEROL SULFATE 90 UG/1
1-2 AEROSOL, METERED RESPIRATORY (INHALATION) EVERY 6 HOURS PRN
Qty: 1 INHALER | Refills: 0 | Status: SHIPPED | OUTPATIENT
Start: 2019-02-02

## 2019-02-02 RX ADMIN — OXYMETAZOLINE HCL 2 SPRAY: 0.05 SPRAY NASAL at 09:15

## 2019-02-02 RX ADMIN — IPRATROPIUM BROMIDE AND ALBUTEROL SULFATE 3 ML: 2.5; .5 SOLUTION RESPIRATORY (INHALATION) at 07:52

## 2019-02-02 NOTE — DISCHARGE INSTRUCTIONS
Shortness of Breath   WHAT YOU NEED TO KNOW:   Shortness of breath is a feeling that you cannot get enough air when you breathe in  You may have this feeling only during activity, or all the time  Your symptoms can range from mild to severe  Shortness of breath may be a sign of a serious health condition that needs immediate care  DISCHARGE INSTRUCTIONS:   Return to the emergency department if:   · Your signs and symptoms are the same or worse within 24 hours of treatment  · The skin over your ribs or on your neck sinks in when you breathe  · You feel confused or dizzy  Contact your healthcare provider if:   · You have new or worsening symptoms  · You have questions or concerns about your condition or care  Medicines:   · Medicines  may be used to treat the cause of your symptoms  You may need medicine to treat a bacterial infection or reduce anxiety  Other medicines may be used to open your airway, reduce swelling, or remove extra fluid  If you have a heart condition, you may need medicine to help your heart beat more strongly or regularly  · Take your medicine as directed  Contact your healthcare provider if you think your medicine is not helping or if you have side effects  Tell him or her if you are allergic to any medicine  Keep a list of the medicines, vitamins, and herbs you take  Include the amounts, and when and why you take them  Bring the list or the pill bottles to follow-up visits  Carry your medicine list with you in case of an emergency  Manage shortness of breath:   · Create an action plan  You and your healthcare provider can work together to create a plan for how to handle shortness of breath  The plan can include daily activities, treatment changes, and what to do if you have severe breathing problems  · Lean forward on your elbows when you sit  This helps your lungs expand and may make it easier to breathe  · Use pursed-lip breathing any time you feel short of breath  Breathe in through your nose and then slowly breathe out through your mouth with your lips slightly puckered  It should take you twice as long to breathe out as it did to breathe in  · Do not smoke  Nicotine and other chemicals in cigarettes and cigars can cause lung damage and make shortness of breath worse  Ask your healthcare provider for information if you currently smoke and need help to quit  E-cigarettes or smokeless tobacco still contain nicotine  Talk to your healthcare provider before you use these products  · Reach or maintain a healthy weight  Your healthcare provider can help you create a safe weight loss plan if you are overweight  · Exercise as directed  Exercise can help your lungs work more easily  Exercise can also help you lose weight if needed  Try to get at least 30 minutes of exercise most days of the week  Follow up with your healthcare provider or specialist as directed:  Write down your questions so you remember to ask them during your visits  © 2017 [employer] Information is for End User's use only and may not be sold, redistributed or otherwise used for commercial purposes  All illustrations and images included in CareNotes® are the copyrighted property of [employer]  or Jordi Keith  The above information is an  only  It is not intended as medical advice for individual conditions or treatments  Talk to your doctor, nurse or pharmacist before following any medical regimen to see if it is safe and effective for you

## 2019-02-02 NOTE — ED PROVIDER NOTES
History  Chief Complaint   Patient presents with    Nasal Congestion     patient presents with increased nasal congestion over the last few days with some increased work of breathing  patient denies CP or any other symptons  patient ran out of ventolin inhaler      HPI   71-year-old pleasant, well-appearing male with history of COPD, diabetes, HLD, HTN, sleep apnea presents to the emergency department with complaints of nasal congestion and shortness of breath  Patient states that symptoms began yesterday and have been constant since onset  He states that shortness of breath has been worse with exertion, slightly better with rest   States that he ran out of his Ventolin inhaler yesterday and that he believes his symptoms may be related to not using inhaler  He also admits that he is feeling quite anxious about having his inhaler and that that may, in turn, be making him feel short of breath  On review of systems, patient denies any recent fevers, chills, chest pain, abdominal pain, nausea, vomiting, lower extremity edema/pain, or complaints other than stated above  Prior to Admission Medications   Prescriptions Last Dose Informant Patient Reported? Taking?    Empagliflozin (JARDIANCE) 25 MG TABS   Yes No   Sig: Take 25 mg by mouth every morning   Insulin Glargine-Lixisenatide (SOLIQUA) 100-33 UNT-MCG/ML SOPN   Yes No   Sig: Inject 15 Units under the skin daily   atorvastatin (LIPITOR) 10 mg tablet   Yes No   Sig: Take 10 mg by mouth daily   buPROPion (WELLBUTRIN XL) 150 mg 24 hr tablet   Yes No   Sig: Take 150 mg by mouth daily   chlorthalidone 25 mg tablet   Yes No   Sig: Take 25 mg by mouth daily   lisinopril (ZESTRIL) 40 mg tablet   Yes No   Sig: Take 40 mg by mouth daily   metFORMIN (GLUCOPHAGE) 1000 MG tablet   Yes No   Sig: Take 1,000 mg by mouth 2 (two) times a day with meals   morphine (MS CONTIN) 30 mg 12 hr tablet   Yes No   Sig: Take 30 mg by mouth every 8 (eight) hours   nicotine (NICODERM CQ) 21 mg/24 hr TD 24 hr patch   No No   Sig: Place 1 patch on the skin daily   oxyCODONE (ROXICODONE) 15 mg immediate release tablet   Yes No   Sig: Take 15 mg by mouth every 4 (four) hours as needed for moderate pain   saccharomyces boulardii (FLORASTOR) 250 mg capsule   No No   Sig: Take 1 capsule by mouth 2 (two) times a day      Facility-Administered Medications: None       Past Medical History:   Diagnosis Date    Chronic pain syndrome     [facility]ch[facility] obstructive [facility] disease) ([facility] )     Diabetes mellitus ([facility] )     Diverticulitis     Hyperlipidemia     Hypertension     Sleep apnea        Past Surgical History:   Procedure Laterality Date    AMPUTATION Left     toe     HEMICOLECTOMY      PA AMPUTATION FOOT,TRANSMETATARSAL Right 10/16/2017    Procedure: AMPUTATION TRANSMETATARSAL (TMA);  Surgeon: Dorie Armenta DPM;  Location: [facility];  Service: Podiatry    TREATMENT FISTULA ANAL         Family History   Problem Relation Age of Onset    Family history unknown: Yes     I have reviewed and agree with the history as documented  Social History   Substance Use Topics    Smoking status: Current Every Day Smoker     Packs/day: 0 50    Smokeless tobacco: Never Used    Alcohol use No        Review of Systems   Constitutional: Negative for chills and fever  HENT: Positive for congestion  Respiratory: Positive for shortness of breath and wheezing  Cardiovascular: Negative for chest pain  Gastrointestinal: Negative for abdominal pain, nausea and vomiting  Musculoskeletal: Negative for arthralgias and joint swelling  Skin: Negative for rash and wound  Allergic/Immunologic: Negative for immunocompromised state  Neurological: Negative for headaches  Psychiatric/Behavioral: The patient is nervous/anxious  Physical Exam  Physical Exam   Constitutional: He is oriented to person, place, and time  He appears well-nourished  No distress     HENT:   Head: Normocephalic and atraumatic  Eyes: EOM are normal    Neck: Normal range of motion  Neck supple  Cardiovascular: Normal rate and regular rhythm  Pulmonary/Chest: Effort normal  No respiratory distress  He has wheezes (diffuse)  Abdominal: Soft  He exhibits no distension  There is no tenderness  Musculoskeletal: Normal range of motion  Neurological: He is alert and oriented to person, place, and time  Skin: Skin is warm and dry  He is not diaphoretic  Psychiatric: He has a normal mood and affect  His behavior is normal    Nursing note and vitals reviewed        Vital Signs  ED Triage Vitals   Temperature Pulse Respirations Blood Pressure SpO2   02/02/19 0729 02/02/19 0725 02/02/19 0725 02/02/19 0725 02/02/19 0725   98 1 °F (36 7 °C) 88 20 162/76 94 %      Temp src Heart Rate Source Patient Position - Orthostatic VS BP Location FiO2 (%)   -- 02/02/19 0935 -- 02/02/19 0935 --    Monitor  Right arm       Pain Score       02/02/19 0935       8           Vitals:    02/02/19 0725 02/02/19 0935   BP: 162/76 167/77   Pulse: 88 87       Visual Acuity      ED Medications  Medications   ipratropium-albuterol (DUO-NEB) 0 5-2 5 mg/3 mL inhalation solution 3 mL (3 mL Nebulization Given 2/2/19 0752)   oxymetazoline (AFRIN) 0 05 % nasal spray 2 spray (2 sprays Each Nare Given 2/2/19 0915)       Diagnostic Studies  Results Reviewed     Procedure Component Value Units Date/Time    Basic metabolic panel [[id number]]  (Abnormal) Collected:  02/02/19 0905    Lab Status:  Final result Specimen:  Blood from Hand, Left Updated:  02/02/19 0931     Sodium 129 (L) mmol/L      Potassium 3 7 mmol/L      Chloride 94 (L) mmol/L      CO2 30 mmol/L      ANION GAP 5 mmol/L      BUN 12 mg/dL      Creatinine 0 88 mg/dL      Glucose 119 mg/dL      Calcium 8 7 mg/dL      eGFR 93 ml/min/1 73sq m     Narrative:         National Kidney Disease Education Program recommendations are as follows:  GFR calculation is accurate only with a steady state creatinine  Chronic Kidney disease less than 60 ml/min/1 73 sq  meters  Kidney failure less than 15 ml/min/1 73 sq  meters  Basic metabolic panel [[id number]]  (Abnormal) Collected:  02/02/19 0837    Lab Status:  Final result Specimen:  Blood from Arm, Right Updated:  02/02/19 0855     Sodium 133 (L) mmol/L      Potassium 5 5 (H) mmol/L      Chloride 98 (L) mmol/L      CO2 30 mmol/L      ANION GAP 5 mmol/L      BUN 12 mg/dL      Creatinine 0 86 mg/dL      Glucose 113 mg/dL      Calcium 8 7 mg/dL      eGFR 94 ml/min/1 73sq m     Narrative:         National Kidney Disease Education Program recommendations are as follows:  GFR calculation is accurate only with a steady state creatinine  Chronic Kidney disease less than 60 ml/min/1 73 sq  meters  Kidney failure less than 15 ml/min/1 73 sq  meters      Troponin I [[id number]]  (Normal) Collected:  02/02/19 0811    Lab Status:  Final result Specimen:  Blood from Arm, Right Updated:  02/02/19 0838     Troponin I <0 02 ng/mL     CBC and differential [[id number]]  (Abnormal) Collected:  02/02/19 0811    Lab Status:  Final result Specimen:  Blood from Arm, Right Updated:  02/02/19 0817     WBC 18 78 (H) Thousand/uL      RBC 4 65 Million/uL      Hemoglobin 11 5 (L) g/dL      Hematocrit 36 4 (L) %      MCV 78 (L) fL      MCH 24 7 (L) pg      MCHC 31 6 g/dL      RDW 16 1 (H) %      MPV 9 6 fL      Platelets 908 Thousands/uL      nRBC 0 /100 WBCs      Neutrophils Relative 79 (H) %      Immat GRANS % 1 %      Lymphocytes Relative 11 (L) %      Monocytes Relative 6 %      Eosinophils Relative 2 %      Basophils Relative 1 %      Neutrophils Absolute 15 15 (H) Thousands/µL      Immature Grans Absolute 0 14 Thousand/uL      Lymphocytes Absolute 2 03 Thousands/µL      Monocytes Absolute 1 08 Thousand/µL      Eosinophils Absolute 0 29 Thousand/µL      Basophils Absolute 0 09 Thousands/µL                  XR chest 2 views   Final Result by Kang Bucio DO (02/02 1532)   No acute cardiopulmonary disease  Workstation performed: [id number]                    Procedures  Procedures       Phone Contacts  ED Phone Contact    ED Course  ED Course as of Feb 04 2006   Sat Feb 02, 2019   0822 Chronically elevated WBC: (!) 18 78   0830 EKG: NSR @ 80 BPM, normal axis, normal intervals, normal     0855 Potassium: (!) 5 5   0933 Potassium: 3 7                               MDM    Disposition  Final diagnoses:   Nasal congestion   Shortness of breath     Time reflects when diagnosis was documented in both MDM as applicable and the Disposition within this note     Time User Action Codes Description Comment    2/2/2019  9:36 AM Luz Maria Quiet Add [R09 81] Nasal congestion     2/2/2019  9:36 AM Luz Maria Quiet Add [R06 02] Shortness of breath       ED Disposition     ED Disposition Condition Date/Time Comment    Discharge  Sat Feb 2, 2019  9:37 AM Leonel Nobles discharge to home/self care      Condition at discharge: Good        Follow-up Information     Follow up With Specialties Details Why Contact Info    Dorie Lane MD Family Medicine In 3 days  [address]   [phone]            Discharge Medication List as of 2/2/2019  9:39 AM      START taking these medications    Details   albuterol (PROVENTIL HFA,VENTOLIN HFA) 90 mcg/act inhaler Inhale 1-2 puffs every 6 (six) hours as needed for wheezing, Starting Sat 2/2/2019, Normal      ipratropium-albuterol (DUO-NEB) 0 5-2 5 mg/3 mL nebulizer solution Take 1 vial (3 mL total) by nebulization every 8 (eight) hours as needed for wheezing or shortness of breath, Starting Sat 2/2/2019, Normal         CONTINUE these medications which have NOT CHANGED    Details   atorvastatin (LIPITOR) 10 mg tablet Take 10 mg by mouth daily, Historical Med      buPROPion (WELLBUTRIN XL) 150 mg 24 hr tablet Take 150 mg by mouth daily, Historical Med      chlorthalidone 25 mg tablet Take 25 mg by mouth daily, Historical Med Empagliflozin (JARDIANCE) 25 MG TABS Take 25 mg by mouth every morning, Historical Med      Insulin Glargine-Lixisenatide (SOLIQUA) 100-33 UNT-MCG/ML SOPN Inject 15 Units under the skin daily, Historical Med      lisinopril (ZESTRIL) 40 mg tablet Take 40 mg by mouth daily, Historical Med      metFORMIN (GLUCOPHAGE) 1000 MG tablet Take 1,000 mg by mouth 2 (two) times a day with meals, Historical Med      morphine (MS CONTIN) 30 mg 12 hr tablet Take 30 mg by mouth every 8 (eight) hours, Historical Med      nicotine (NICODERM CQ) 21 mg/24 hr TD 24 hr patch Place 1 patch on the skin daily, Starting Sat 10/21/2017, Print      oxyCODONE (ROXICODONE) 15 mg immediate release tablet Take 15 mg by mouth every 4 (four) hours as needed for moderate pain, Historical Med      saccharomyces boulardii (FLORASTOR) 250 mg capsule Take 1 capsule by mouth 2 (two) times a day, Starting Fri 10/20/2017, Print             Outpatient Discharge Orders  NM myocardial perfusion spect (rx stress and/or rest)   Standing Status: Future  Standing Exp   Date: 02/02/23         ED Provider  Electronically Signed by           Lj Jesus MD  02/04/19 2006

## 2019-04-03 RX ORDER — IBUPROFEN 800 MG/1
1 TABLET ORAL 3 TIMES DAILY
COMMUNITY
Start: 2014-01-02 | End: 2019-07-08 | Stop reason: HOSPADM

## 2019-04-03 RX ORDER — BUDESONIDE AND FORMOTEROL FUMARATE DIHYDRATE 160; 4.5 UG/1; UG/1
2 AEROSOL RESPIRATORY (INHALATION) 2 TIMES DAILY
COMMUNITY
Start: 2014-01-02

## 2019-04-03 RX ORDER — ALPRAZOLAM 1 MG/1
1 TABLET ORAL 2 TIMES DAILY
Status: ON HOLD | COMMUNITY
Start: 2015-03-17 | End: 2019-04-16 | Stop reason: HOSPADM

## 2019-04-03 RX ORDER — ACARBOSE 100 MG/1
100 TABLET ORAL
Status: ON HOLD | COMMUNITY
Start: 2018-06-05 | End: 2019-06-28 | Stop reason: ALTCHOICE

## 2019-04-03 RX ORDER — OMEPRAZOLE 20 MG/1
1 CAPSULE, DELAYED RELEASE ORAL DAILY
COMMUNITY
Start: 2014-01-02

## 2019-04-03 RX ORDER — INSULIN GLARGINE 100 [IU]/ML
INJECTION, SOLUTION SUBCUTANEOUS
COMMUNITY
Start: 2014-01-02 | End: 2019-07-08 | Stop reason: HOSPADM

## 2019-04-03 RX ORDER — NAPROXEN SODIUM 220 MG
TABLET ORAL
COMMUNITY
Start: 2017-02-28

## 2019-04-03 RX ORDER — BLOOD-GLUCOSE METER
KIT MISCELLANEOUS
COMMUNITY
Start: 2017-02-14

## 2019-04-05 ENCOUNTER — OFFICE VISIT (OUTPATIENT)
Dept: GASTROENTEROLOGY | Facility: CLINIC | Age: 64
End: 2019-04-05
Payer: MEDICARE

## 2019-04-05 VITALS
SYSTOLIC BLOOD PRESSURE: 140 MMHG | BODY MASS INDEX: 40.26 KG/M2 | HEIGHT: 72 IN | DIASTOLIC BLOOD PRESSURE: 72 MMHG | HEART RATE: 86 BPM | WEIGHT: 297.2 LBS

## 2019-04-05 DIAGNOSIS — R19.4 CHANGE IN BOWEL HABITS: ICD-10-CM

## 2019-04-05 PROBLEM — Z12.11 SCREENING FOR COLON CANCER: Status: ACTIVE | Noted: 2019-04-05

## 2019-04-05 PROCEDURE — 99204 OFFICE O/P NEW MOD 45 MIN: CPT | Performed by: PHYSICIAN ASSISTANT

## 2019-04-15 ENCOUNTER — TELEPHONE (OUTPATIENT)
Dept: GASTROENTEROLOGY | Facility: CLINIC | Age: 64
End: 2019-04-15

## 2019-04-16 ENCOUNTER — ANESTHESIA EVENT (OUTPATIENT)
Dept: PERIOP | Facility: HOSPITAL | Age: 64
End: 2019-04-16
Payer: MEDICARE

## 2019-04-16 ENCOUNTER — ANESTHESIA (OUTPATIENT)
Dept: PERIOP | Facility: HOSPITAL | Age: 64
End: 2019-04-16
Payer: MEDICARE

## 2019-04-16 ENCOUNTER — HOSPITAL ENCOUNTER (OUTPATIENT)
Facility: HOSPITAL | Age: 64
Setting detail: OUTPATIENT SURGERY
Discharge: HOME | End: 2019-04-16
Attending: INTERNAL MEDICINE | Admitting: INTERNAL MEDICINE
Payer: MEDICARE

## 2019-04-16 VITALS
DIASTOLIC BLOOD PRESSURE: 71 MMHG | HEART RATE: 79 BPM | SYSTOLIC BLOOD PRESSURE: 124 MMHG | OXYGEN SATURATION: 97 % | WEIGHT: 297 LBS | RESPIRATION RATE: 15 BRPM | TEMPERATURE: 98.1 F | BODY MASS INDEX: 40.28 KG/M2

## 2019-04-16 PROCEDURE — G0121 COLON CA SCRN NOT HI RSK IND: HCPCS | Performed by: INTERNAL MEDICINE

## 2019-04-16 PROCEDURE — NC001 PR NO CHARGE: Performed by: INTERNAL MEDICINE

## 2019-04-16 RX ORDER — LIDOCAINE HYDROCHLORIDE 10 MG/ML
INJECTION, SOLUTION EPIDURAL; INFILTRATION; INTRACAUDAL; PERINEURAL AS NEEDED
Status: DISCONTINUED | OUTPATIENT
Start: 2019-04-16 | End: 2019-04-16 | Stop reason: SURG

## 2019-04-16 RX ORDER — PROPOFOL 10 MG/ML
INJECTION, EMULSION INTRAVENOUS AS NEEDED
Status: DISCONTINUED | OUTPATIENT
Start: 2019-04-16 | End: 2019-04-16 | Stop reason: SURG

## 2019-04-16 RX ORDER — SODIUM CHLORIDE, SODIUM LACTATE, POTASSIUM CHLORIDE, CALCIUM CHLORIDE 600; 310; 30; 20 MG/100ML; MG/100ML; MG/100ML; MG/100ML
125 INJECTION, SOLUTION INTRAVENOUS CONTINUOUS
Status: DISCONTINUED | OUTPATIENT
Start: 2019-04-16 | End: 2019-04-16 | Stop reason: HOSPADM

## 2019-04-16 RX ADMIN — PROPOFOL 30 MG: 10 INJECTION, EMULSION INTRAVENOUS at 08:46

## 2019-04-16 RX ADMIN — PROPOFOL 10 MG: 10 INJECTION, EMULSION INTRAVENOUS at 08:48

## 2019-04-16 RX ADMIN — PROPOFOL 10 MG: 10 INJECTION, EMULSION INTRAVENOUS at 08:33

## 2019-04-16 RX ADMIN — PROPOFOL 20 MG: 10 INJECTION, EMULSION INTRAVENOUS at 08:38

## 2019-04-16 RX ADMIN — PROPOFOL 30 MG: 10 INJECTION, EMULSION INTRAVENOUS at 08:44

## 2019-04-16 RX ADMIN — PROPOFOL 20 MG: 10 INJECTION, EMULSION INTRAVENOUS at 08:42

## 2019-04-16 RX ADMIN — PROPOFOL 100 MG: 10 INJECTION, EMULSION INTRAVENOUS at 08:29

## 2019-04-16 RX ADMIN — LIDOCAINE HYDROCHLORIDE 25 MG: 10 INJECTION, SOLUTION EPIDURAL; INFILTRATION; INTRACAUDAL; PERINEURAL at 08:29

## 2019-04-16 RX ADMIN — PROPOFOL 10 MG: 10 INJECTION, EMULSION INTRAVENOUS at 08:35

## 2019-04-16 RX ADMIN — PROPOFOL 20 MG: 10 INJECTION, EMULSION INTRAVENOUS at 08:32

## 2019-04-16 RX ADMIN — SODIUM CHLORIDE, SODIUM LACTATE, POTASSIUM CHLORIDE, AND CALCIUM CHLORIDE 125 ML/HR: .6; .31; .03; .02 INJECTION, SOLUTION INTRAVENOUS at 08:13

## 2019-04-16 RX ADMIN — PROPOFOL 20 MG: 10 INJECTION, EMULSION INTRAVENOUS at 08:36

## 2019-04-16 RX ADMIN — PROPOFOL 30 MG: 10 INJECTION, EMULSION INTRAVENOUS at 08:40

## 2019-04-22 ENCOUNTER — OFFICE VISIT (OUTPATIENT)
Dept: DERMATOLOGY | Facility: CLINIC | Age: 64
End: 2019-04-22
Payer: MEDICARE

## 2019-04-22 DIAGNOSIS — Z13.89 SCREENING FOR SKIN CONDITION: ICD-10-CM

## 2019-04-22 DIAGNOSIS — L72.9 FOLLICULAR CYST OF SKIN: Primary | ICD-10-CM

## 2019-04-22 DIAGNOSIS — L82.1 SEBORRHEIC KERATOSIS: ICD-10-CM

## 2019-04-22 PROCEDURE — 99213 OFFICE O/P EST LOW 20 MIN: CPT | Performed by: DERMATOLOGY

## 2019-04-24 ENCOUNTER — OFFICE VISIT (OUTPATIENT)
Dept: SURGERY | Facility: CLINIC | Age: 64
End: 2019-04-24
Payer: MEDICARE

## 2019-04-24 VITALS
WEIGHT: 290 LBS | HEIGHT: 72 IN | HEART RATE: 101 BPM | BODY MASS INDEX: 39.28 KG/M2 | RESPIRATION RATE: 20 BRPM | TEMPERATURE: 97.7 F | DIASTOLIC BLOOD PRESSURE: 88 MMHG | SYSTOLIC BLOOD PRESSURE: 142 MMHG

## 2019-04-24 DIAGNOSIS — R22.1 MASS OF RIGHT SIDE OF NECK: Primary | ICD-10-CM

## 2019-04-24 PROCEDURE — 99406 BEHAV CHNG SMOKING 3-10 MIN: CPT | Performed by: SURGERY

## 2019-04-24 PROCEDURE — 99204 OFFICE O/P NEW MOD 45 MIN: CPT | Performed by: SURGERY

## 2019-06-09 ENCOUNTER — APPOINTMENT (EMERGENCY)
Dept: RADIOLOGY | Facility: HOSPITAL | Age: 64
End: 2019-06-09
Payer: MEDICARE

## 2019-06-09 ENCOUNTER — HOSPITAL ENCOUNTER (EMERGENCY)
Facility: HOSPITAL | Age: 64
Discharge: HOME | End: 2019-06-09
Attending: EMERGENCY MEDICINE | Admitting: EMERGENCY MEDICINE
Payer: MEDICARE

## 2019-06-09 VITALS
DIASTOLIC BLOOD PRESSURE: 65 MMHG | HEART RATE: 89 BPM | SYSTOLIC BLOOD PRESSURE: 141 MMHG | TEMPERATURE: 98.3 F | OXYGEN SATURATION: 96 % | RESPIRATION RATE: 17 BRPM

## 2019-06-09 DIAGNOSIS — M54.31 SCIATICA OF RIGHT SIDE: ICD-10-CM

## 2019-06-09 DIAGNOSIS — M25.551 RIGHT HIP PAIN: Primary | ICD-10-CM

## 2019-06-09 PROCEDURE — 99283 EMERGENCY DEPT VISIT LOW MDM: CPT | Performed by: EMERGENCY MEDICINE

## 2019-06-09 PROCEDURE — 99283 EMERGENCY DEPT VISIT LOW MDM: CPT

## 2019-06-09 PROCEDURE — 96374 THER/PROPH/DIAG INJ IV PUSH: CPT

## 2019-06-09 PROCEDURE — 96372 THER/PROPH/DIAG INJ SC/IM: CPT

## 2019-06-09 PROCEDURE — 73502 X-RAY EXAM HIP UNI 2-3 VIEWS: CPT

## 2019-06-09 RX ORDER — PREDNISONE 20 MG/1
40 TABLET ORAL ONCE
Status: COMPLETED | OUTPATIENT
Start: 2019-06-09 | End: 2019-06-09

## 2019-06-09 RX ORDER — PREDNISONE 20 MG/1
40 TABLET ORAL DAILY
Qty: 12 TABLET | Refills: 0 | Status: SHIPPED | OUTPATIENT
Start: 2019-06-09 | End: 2019-06-13

## 2019-06-09 RX ORDER — HYDROMORPHONE HCL/PF 1 MG/ML
1 SYRINGE (ML) INJECTION ONCE
Status: COMPLETED | OUTPATIENT
Start: 2019-06-09 | End: 2019-06-09

## 2019-06-09 RX ORDER — KETOROLAC TROMETHAMINE 30 MG/ML
15 INJECTION, SOLUTION INTRAMUSCULAR; INTRAVENOUS ONCE
Status: COMPLETED | OUTPATIENT
Start: 2019-06-09 | End: 2019-06-09

## 2019-06-09 RX ORDER — METHOCARBAMOL 750 MG/1
750 TABLET, FILM COATED ORAL 4 TIMES DAILY
Qty: 28 TABLET | Refills: 0 | Status: SHIPPED | OUTPATIENT
Start: 2019-06-09 | End: 2019-06-16

## 2019-06-09 RX ADMIN — HYDROMORPHONE HYDROCHLORIDE 1 MG: 1 INJECTION, SOLUTION INTRAMUSCULAR; INTRAVENOUS; SUBCUTANEOUS at 11:56

## 2019-06-09 RX ADMIN — KETOROLAC TROMETHAMINE 15 MG: 30 INJECTION, SOLUTION INTRAMUSCULAR at 11:57

## 2019-06-09 RX ADMIN — PREDNISONE 40 MG: 20 TABLET ORAL at 11:58

## 2019-06-10 ENCOUNTER — HOSPITAL ENCOUNTER (EMERGENCY)
Facility: HOSPITAL | Age: 64
Discharge: HOME | End: 2019-06-10
Attending: EMERGENCY MEDICINE
Payer: MEDICARE

## 2019-06-10 VITALS
HEIGHT: 70 IN | BODY MASS INDEX: 40.9 KG/M2 | TEMPERATURE: 97.3 F | RESPIRATION RATE: 21 BRPM | OXYGEN SATURATION: 96 % | SYSTOLIC BLOOD PRESSURE: 177 MMHG | DIASTOLIC BLOOD PRESSURE: 79 MMHG | WEIGHT: 285.72 LBS | HEART RATE: 92 BPM

## 2019-06-10 DIAGNOSIS — M54.31 SCIATICA OF RIGHT SIDE: Primary | ICD-10-CM

## 2019-06-10 PROCEDURE — 99284 EMERGENCY DEPT VISIT MOD MDM: CPT | Performed by: EMERGENCY MEDICINE

## 2019-06-10 PROCEDURE — 96372 THER/PROPH/DIAG INJ SC/IM: CPT

## 2019-06-10 PROCEDURE — 99283 EMERGENCY DEPT VISIT LOW MDM: CPT

## 2019-06-10 RX ORDER — NAPROXEN 250 MG/1
250 TABLET ORAL 2 TIMES DAILY WITH MEALS
Qty: 20 TABLET | Refills: 0 | Status: SHIPPED | OUTPATIENT
Start: 2019-06-10 | End: 2019-07-08 | Stop reason: HOSPADM

## 2019-06-10 RX ORDER — KETOROLAC TROMETHAMINE 30 MG/ML
30 INJECTION, SOLUTION INTRAMUSCULAR; INTRAVENOUS ONCE
Status: COMPLETED | OUTPATIENT
Start: 2019-06-10 | End: 2019-06-10

## 2019-06-10 RX ORDER — HYDROMORPHONE HCL/PF 1 MG/ML
1 SYRINGE (ML) INJECTION ONCE
Status: COMPLETED | OUTPATIENT
Start: 2019-06-10 | End: 2019-06-10

## 2019-06-10 RX ADMIN — KETOROLAC TROMETHAMINE 30 MG: 30 INJECTION, SOLUTION INTRAMUSCULAR at 17:57

## 2019-06-10 RX ADMIN — HYDROMORPHONE HYDROCHLORIDE 1 MG: 1 INJECTION, SOLUTION INTRAMUSCULAR; INTRAVENOUS; SUBCUTANEOUS at 17:56

## 2019-06-16 ENCOUNTER — APPOINTMENT (EMERGENCY)
Dept: RADIOLOGY | Facility: HOSPITAL | Age: 64
End: 2019-06-16
Payer: MEDICARE

## 2019-06-16 ENCOUNTER — HOSPITAL ENCOUNTER (EMERGENCY)
Facility: HOSPITAL | Age: 64
Discharge: HOME | End: 2019-06-16
Attending: EMERGENCY MEDICINE | Admitting: EMERGENCY MEDICINE
Payer: MEDICARE

## 2019-06-16 VITALS
OXYGEN SATURATION: 95 % | HEART RATE: 98 BPM | RESPIRATION RATE: 18 BRPM | DIASTOLIC BLOOD PRESSURE: 75 MMHG | SYSTOLIC BLOOD PRESSURE: 156 MMHG | TEMPERATURE: 98.8 F

## 2019-06-16 DIAGNOSIS — M54.50 ACUTE LOW BACK PAIN: Primary | ICD-10-CM

## 2019-06-16 DIAGNOSIS — M54.31 SCIATICA OF RIGHT SIDE: ICD-10-CM

## 2019-06-16 LAB — GLUCOSE SERPL-MCNC: 183 MG/DL (ref 65–140)

## 2019-06-16 PROCEDURE — 82948 REAGENT STRIP/BLOOD GLUCOSE: CPT

## 2019-06-16 PROCEDURE — 96372 THER/PROPH/DIAG INJ SC/IM: CPT

## 2019-06-16 PROCEDURE — 72100 X-RAY EXAM L-S SPINE 2/3 VWS: CPT

## 2019-06-16 PROCEDURE — 99283 EMERGENCY DEPT VISIT LOW MDM: CPT | Performed by: PHYSICIAN ASSISTANT

## 2019-06-16 PROCEDURE — 99283 EMERGENCY DEPT VISIT LOW MDM: CPT

## 2019-06-16 RX ORDER — DIAZEPAM 5 MG/ML
5 INJECTION, SOLUTION INTRAMUSCULAR; INTRAVENOUS ONCE
Status: COMPLETED | OUTPATIENT
Start: 2019-06-16 | End: 2019-06-16

## 2019-06-16 RX ADMIN — DIAZEPAM 5 MG: 10 INJECTION, SOLUTION INTRAMUSCULAR; INTRAVENOUS at 07:57

## 2019-06-17 ENCOUNTER — TELEPHONE (OUTPATIENT)
Dept: PAIN MEDICINE | Facility: CLINIC | Age: 64
End: 2019-06-17

## 2019-06-28 ENCOUNTER — APPOINTMENT (EMERGENCY)
Dept: RADIOLOGY | Facility: HOSPITAL | Age: 64
DRG: 167 | End: 2019-06-28
Payer: MEDICARE

## 2019-06-28 ENCOUNTER — APPOINTMENT (EMERGENCY)
Dept: CT IMAGING | Facility: HOSPITAL | Age: 64
DRG: 167 | End: 2019-06-28
Payer: MEDICARE

## 2019-06-28 ENCOUNTER — HOSPITAL ENCOUNTER (INPATIENT)
Facility: HOSPITAL | Age: 64
LOS: 10 days | Discharge: NURSING, REHAB OR LONG-TERM CARE | DRG: 167 | End: 2019-07-08
Attending: EMERGENCY MEDICINE | Admitting: INTERNAL MEDICINE
Payer: MEDICARE

## 2019-06-28 DIAGNOSIS — E11.621 TYPE 2 DIABETES MELLITUS WITH FOOT ULCER, WITH LONG-TERM CURRENT USE OF INSULIN (HCC): ICD-10-CM

## 2019-06-28 DIAGNOSIS — L97.509 FOOT ULCER DUE TO SECONDARY DM (HCC): ICD-10-CM

## 2019-06-28 DIAGNOSIS — L97.509 DIABETIC FOOT ULCERS (HCC): ICD-10-CM

## 2019-06-28 DIAGNOSIS — Z79.4 TYPE 2 DIABETES MELLITUS WITH FOOT ULCER, WITH LONG-TERM CURRENT USE OF INSULIN (HCC): ICD-10-CM

## 2019-06-28 DIAGNOSIS — Z79.891 CHRONIC PRESCRIPTION OPIATE USE: ICD-10-CM

## 2019-06-28 DIAGNOSIS — C79.51 SPINE METASTASIS (HCC): ICD-10-CM

## 2019-06-28 DIAGNOSIS — Z72.0 TOBACCO ABUSE: ICD-10-CM

## 2019-06-28 DIAGNOSIS — R29.898 WEAKNESS OF LEFT ARM: ICD-10-CM

## 2019-06-28 DIAGNOSIS — I82.409 DVT (DEEP VENOUS THROMBOSIS) (HCC): ICD-10-CM

## 2019-06-28 DIAGNOSIS — I10 ESSENTIAL HYPERTENSION: ICD-10-CM

## 2019-06-28 DIAGNOSIS — E13.621 FOOT ULCER DUE TO SECONDARY DM (HCC): ICD-10-CM

## 2019-06-28 DIAGNOSIS — E11.621 DIABETIC FOOT ULCERS (HCC): ICD-10-CM

## 2019-06-28 DIAGNOSIS — L97.509 TYPE 2 DIABETES MELLITUS WITH FOOT ULCER, WITH LONG-TERM CURRENT USE OF INSULIN (HCC): ICD-10-CM

## 2019-06-28 DIAGNOSIS — I26.09 ACUTE PULMONARY EMBOLISM WITH ACUTE COR PULMONALE, UNSPECIFIED PULMONARY EMBOLISM TYPE (HCC): Primary | ICD-10-CM

## 2019-06-28 DIAGNOSIS — M54.9 INTRACTABLE BACK PAIN: ICD-10-CM

## 2019-06-28 DIAGNOSIS — C79.51 METASTATIC CANCER TO SPINE (HCC): ICD-10-CM

## 2019-06-28 DIAGNOSIS — G89.3 CANCER ASSOCIATED PAIN: ICD-10-CM

## 2019-06-28 PROBLEM — R06.89 RESPIRATORY INSUFFICIENCY: Status: ACTIVE | Noted: 2019-06-28

## 2019-06-28 LAB
ALBUMIN SERPL BCP-MCNC: 2.6 G/DL (ref 3.5–5)
ALP SERPL-CCNC: 677 U/L (ref 46–116)
ALT SERPL W P-5'-P-CCNC: 37 U/L (ref 12–78)
ANION GAP SERPL CALCULATED.3IONS-SCNC: 7 MMOL/L (ref 4–13)
APTT PPP: 33 SECONDS (ref 23–37)
APTT PPP: 34 SECONDS (ref 23–37)
AST SERPL W P-5'-P-CCNC: 43 U/L (ref 5–45)
BASOPHILS # BLD AUTO: 0.03 THOUSANDS/ΜL (ref 0–0.1)
BASOPHILS NFR BLD AUTO: 0 % (ref 0–1)
BILIRUB SERPL-MCNC: 1.3 MG/DL (ref 0.2–1)
BUN SERPL-MCNC: 23 MG/DL (ref 5–25)
CALCIUM SERPL-MCNC: 8.5 MG/DL (ref 8.3–10.1)
CHLORIDE SERPL-SCNC: 98 MMOL/L (ref 100–108)
CO2 SERPL-SCNC: 30 MMOL/L (ref 21–32)
CREAT SERPL-MCNC: 1.03 MG/DL (ref 0.6–1.3)
DEPRECATED D DIMER PPP: ABNORMAL NG/ML (FEU)
EOSINOPHIL # BLD AUTO: 0.04 THOUSAND/ΜL (ref 0–0.61)
EOSINOPHIL NFR BLD AUTO: 0 % (ref 0–6)
ERYTHROCYTE [DISTWIDTH] IN BLOOD BY AUTOMATED COUNT: 20.5 % (ref 11.6–15.1)
GFR SERPL CREATININE-BSD FRML MDRD: 77 ML/MIN/1.73SQ M
GLUCOSE SERPL-MCNC: 443 MG/DL (ref 65–140)
GLUCOSE SERPL-MCNC: 449 MG/DL (ref 65–140)
HCT VFR BLD AUTO: 38.8 % (ref 36.5–49.3)
HGB BLD-MCNC: 12.4 G/DL (ref 12–17)
IMM GRANULOCYTES # BLD AUTO: 0.2 THOUSAND/UL (ref 0–0.2)
IMM GRANULOCYTES NFR BLD AUTO: 1 % (ref 0–2)
INR PPP: 1.8 (ref 0.84–1.19)
LYMPHOCYTES # BLD AUTO: 2.09 THOUSANDS/ΜL (ref 0.6–4.47)
LYMPHOCYTES NFR BLD AUTO: 11 % (ref 14–44)
MCH RBC QN AUTO: 25.5 PG (ref 26.8–34.3)
MCHC RBC AUTO-ENTMCNC: 32 G/DL (ref 31.4–37.4)
MCV RBC AUTO: 80 FL (ref 82–98)
MONOCYTES # BLD AUTO: 1.11 THOUSAND/ΜL (ref 0.17–1.22)
MONOCYTES NFR BLD AUTO: 6 % (ref 4–12)
NEUTROPHILS # BLD AUTO: 15.76 THOUSANDS/ΜL (ref 1.85–7.62)
NEUTS SEG NFR BLD AUTO: 82 % (ref 43–75)
NRBC BLD AUTO-RTO: 0 /100 WBCS
NT-PROBNP SERPL-MCNC: 391 PG/ML
PLATELET # BLD AUTO: 211 THOUSANDS/UL (ref 149–390)
PMV BLD AUTO: 11.5 FL (ref 8.9–12.7)
POTASSIUM SERPL-SCNC: 4.2 MMOL/L (ref 3.5–5.3)
PROT SERPL-MCNC: 7.2 G/DL (ref 6.4–8.2)
PROTHROMBIN TIME: 21 SECONDS (ref 11.6–14.5)
RBC # BLD AUTO: 4.86 MILLION/UL (ref 3.88–5.62)
SODIUM SERPL-SCNC: 135 MMOL/L (ref 136–145)
TROPONIN I SERPL-MCNC: 0.02 NG/ML
TROPONIN I SERPL-MCNC: 0.04 NG/ML
TSH SERPL DL<=0.05 MIU/L-ACNC: 1.13 UIU/ML (ref 0.36–3.74)
WBC # BLD AUTO: 19.23 THOUSAND/UL (ref 4.31–10.16)

## 2019-06-28 PROCEDURE — 71046 X-RAY EXAM CHEST 2 VIEWS: CPT

## 2019-06-28 PROCEDURE — 96361 HYDRATE IV INFUSION ADD-ON: CPT

## 2019-06-28 PROCEDURE — 85025 COMPLETE CBC W/AUTO DIFF WBC: CPT | Performed by: PHYSICIAN ASSISTANT

## 2019-06-28 PROCEDURE — 84484 ASSAY OF TROPONIN QUANT: CPT | Performed by: NURSE PRACTITIONER

## 2019-06-28 PROCEDURE — 80053 COMPREHEN METABOLIC PANEL: CPT | Performed by: PHYSICIAN ASSISTANT

## 2019-06-28 PROCEDURE — 96375 TX/PRO/DX INJ NEW DRUG ADDON: CPT

## 2019-06-28 PROCEDURE — 99285 EMERGENCY DEPT VISIT HI MDM: CPT | Performed by: PHYSICIAN ASSISTANT

## 2019-06-28 PROCEDURE — 84484 ASSAY OF TROPONIN QUANT: CPT | Performed by: PHYSICIAN ASSISTANT

## 2019-06-28 PROCEDURE — 85610 PROTHROMBIN TIME: CPT | Performed by: PHYSICIAN ASSISTANT

## 2019-06-28 PROCEDURE — 96365 THER/PROPH/DIAG IV INF INIT: CPT

## 2019-06-28 PROCEDURE — 71275 CT ANGIOGRAPHY CHEST: CPT

## 2019-06-28 PROCEDURE — 83880 ASSAY OF NATRIURETIC PEPTIDE: CPT | Performed by: PHYSICIAN ASSISTANT

## 2019-06-28 PROCEDURE — 96367 TX/PROPH/DG ADDL SEQ IV INF: CPT

## 2019-06-28 PROCEDURE — 93005 ELECTROCARDIOGRAM TRACING: CPT

## 2019-06-28 PROCEDURE — 84443 ASSAY THYROID STIM HORMONE: CPT | Performed by: NURSE PRACTITIONER

## 2019-06-28 PROCEDURE — 82948 REAGENT STRIP/BLOOD GLUCOSE: CPT

## 2019-06-28 PROCEDURE — 87040 BLOOD CULTURE FOR BACTERIA: CPT | Performed by: PHYSICIAN ASSISTANT

## 2019-06-28 PROCEDURE — 96368 THER/DIAG CONCURRENT INF: CPT

## 2019-06-28 PROCEDURE — 85379 FIBRIN DEGRADATION QUANT: CPT | Performed by: PHYSICIAN ASSISTANT

## 2019-06-28 PROCEDURE — 85730 THROMBOPLASTIN TIME PARTIAL: CPT | Performed by: PHYSICIAN ASSISTANT

## 2019-06-28 PROCEDURE — 99285 EMERGENCY DEPT VISIT HI MDM: CPT

## 2019-06-28 PROCEDURE — 94640 AIRWAY INHALATION TREATMENT: CPT

## 2019-06-28 PROCEDURE — 83036 HEMOGLOBIN GLYCOSYLATED A1C: CPT | Performed by: NURSE PRACTITIONER

## 2019-06-28 PROCEDURE — 99223 1ST HOSP IP/OBS HIGH 75: CPT | Performed by: NURSE PRACTITIONER

## 2019-06-28 PROCEDURE — 36415 COLL VENOUS BLD VENIPUNCTURE: CPT | Performed by: PHYSICIAN ASSISTANT

## 2019-06-28 RX ORDER — PANTOPRAZOLE SODIUM 40 MG/1
40 TABLET, DELAYED RELEASE ORAL
Status: DISCONTINUED | OUTPATIENT
Start: 2019-06-29 | End: 2019-07-08 | Stop reason: HOSPADM

## 2019-06-28 RX ORDER — ATORVASTATIN CALCIUM 10 MG/1
10 TABLET, FILM COATED ORAL DAILY
Status: DISCONTINUED | OUTPATIENT
Start: 2019-06-28 | End: 2019-07-05

## 2019-06-28 RX ORDER — BUPROPION HYDROCHLORIDE 150 MG/1
150 TABLET ORAL DAILY
Status: DISCONTINUED | OUTPATIENT
Start: 2019-06-29 | End: 2019-07-08 | Stop reason: HOSPADM

## 2019-06-28 RX ORDER — SENNOSIDES 8.6 MG
1 TABLET ORAL DAILY
Status: DISCONTINUED | OUTPATIENT
Start: 2019-06-29 | End: 2019-07-08 | Stop reason: HOSPADM

## 2019-06-28 RX ORDER — ALBUTEROL SULFATE 90 UG/1
2 AEROSOL, METERED RESPIRATORY (INHALATION) EVERY 4 HOURS PRN
Status: DISCONTINUED | OUTPATIENT
Start: 2019-06-28 | End: 2019-07-08 | Stop reason: HOSPADM

## 2019-06-28 RX ORDER — DOCUSATE SODIUM 100 MG/1
100 CAPSULE, LIQUID FILLED ORAL 2 TIMES DAILY
Status: DISCONTINUED | OUTPATIENT
Start: 2019-06-28 | End: 2019-07-05

## 2019-06-28 RX ORDER — HEPARIN SODIUM 1000 [USP'U]/ML
10000 INJECTION, SOLUTION INTRAVENOUS; SUBCUTANEOUS ONCE
Status: COMPLETED | OUTPATIENT
Start: 2019-06-28 | End: 2019-06-28

## 2019-06-28 RX ORDER — HEPARIN SODIUM 1000 [USP'U]/ML
5000 INJECTION, SOLUTION INTRAVENOUS; SUBCUTANEOUS AS NEEDED
Status: DISCONTINUED | OUTPATIENT
Start: 2019-06-28 | End: 2019-07-04

## 2019-06-28 RX ORDER — NICOTINE 21 MG/24HR
1 PATCH, TRANSDERMAL 24 HOURS TRANSDERMAL DAILY
Status: DISCONTINUED | OUTPATIENT
Start: 2019-06-29 | End: 2019-07-08 | Stop reason: HOSPADM

## 2019-06-28 RX ORDER — OXYCODONE HCL 10 MG/1
10 TABLET, FILM COATED, EXTENDED RELEASE ORAL ONCE
Status: COMPLETED | OUTPATIENT
Start: 2019-06-28 | End: 2019-06-28

## 2019-06-28 RX ORDER — BUDESONIDE AND FORMOTEROL FUMARATE DIHYDRATE 160; 4.5 UG/1; UG/1
2 AEROSOL RESPIRATORY (INHALATION) 2 TIMES DAILY
Status: DISCONTINUED | OUTPATIENT
Start: 2019-06-28 | End: 2019-07-08 | Stop reason: HOSPADM

## 2019-06-28 RX ORDER — HEPARIN SODIUM 10000 [USP'U]/100ML
3-30 INJECTION, SOLUTION INTRAVENOUS
Status: DISCONTINUED | OUTPATIENT
Start: 2019-06-28 | End: 2019-07-04

## 2019-06-28 RX ORDER — HEPARIN SODIUM 1000 [USP'U]/ML
10000 INJECTION, SOLUTION INTRAVENOUS; SUBCUTANEOUS AS NEEDED
Status: DISCONTINUED | OUTPATIENT
Start: 2019-06-28 | End: 2019-07-04

## 2019-06-28 RX ORDER — MORPHINE SULFATE 15 MG/1
15 TABLET, FILM COATED, EXTENDED RELEASE ORAL EVERY 12 HOURS SCHEDULED
Status: DISCONTINUED | OUTPATIENT
Start: 2019-06-29 | End: 2019-07-02

## 2019-06-28 RX ORDER — MORPHINE SULFATE 30 MG/1
30 TABLET, FILM COATED, EXTENDED RELEASE ORAL EVERY 8 HOURS
Status: DISCONTINUED | OUTPATIENT
Start: 2019-06-28 | End: 2019-06-28

## 2019-06-28 RX ORDER — METHOCARBAMOL 750 MG/1
750 TABLET, FILM COATED ORAL 4 TIMES DAILY
Status: DISCONTINUED | OUTPATIENT
Start: 2019-06-28 | End: 2019-07-05

## 2019-06-28 RX ORDER — INSULIN GLARGINE 100 [IU]/ML
85 INJECTION, SOLUTION SUBCUTANEOUS
Status: DISCONTINUED | OUTPATIENT
Start: 2019-06-28 | End: 2019-06-30

## 2019-06-28 RX ORDER — ALBUTEROL SULFATE 2.5 MG/3ML
5 SOLUTION RESPIRATORY (INHALATION) ONCE
Status: COMPLETED | OUTPATIENT
Start: 2019-06-28 | End: 2019-06-28

## 2019-06-28 RX ORDER — MELATONIN
1000 DAILY
Status: DISCONTINUED | OUTPATIENT
Start: 2019-06-29 | End: 2019-07-08 | Stop reason: HOSPADM

## 2019-06-28 RX ADMIN — AZITHROMYCIN MONOHYDRATE 500 MG: 500 INJECTION, POWDER, LYOPHILIZED, FOR SOLUTION INTRAVENOUS at 20:05

## 2019-06-28 RX ADMIN — ALBUTEROL SULFATE 5 MG: 2.5 SOLUTION RESPIRATORY (INHALATION) at 16:13

## 2019-06-28 RX ADMIN — OXYCODONE HYDROCHLORIDE 10 MG: 10 TABLET, FILM COATED, EXTENDED RELEASE ORAL at 19:02

## 2019-06-28 RX ADMIN — IOHEXOL 100 ML: 350 INJECTION, SOLUTION INTRAVENOUS at 18:44

## 2019-06-28 RX ADMIN — METHOCARBAMOL TABLETS 750 MG: 750 TABLET, COATED ORAL at 22:00

## 2019-06-28 RX ADMIN — HEPARIN SODIUM AND DEXTROSE 18 UNITS/KG/HR: 10000; 5 INJECTION INTRAVENOUS at 19:54

## 2019-06-28 RX ADMIN — INSULIN GLARGINE 85 UNITS: 100 INJECTION, SOLUTION SUBCUTANEOUS at 22:08

## 2019-06-28 RX ADMIN — HEPARIN SODIUM 10000 UNITS: 1000 INJECTION, SOLUTION INTRAVENOUS; SUBCUTANEOUS at 19:48

## 2019-06-28 RX ADMIN — CEFTRIAXONE SODIUM 1000 MG: 10 INJECTION, POWDER, FOR SOLUTION INTRAVENOUS at 18:02

## 2019-06-28 RX ADMIN — SODIUM CHLORIDE 1000 ML: 0.9 INJECTION, SOLUTION INTRAVENOUS at 19:05

## 2019-06-28 RX ADMIN — ATORVASTATIN CALCIUM 10 MG: 10 TABLET, FILM COATED ORAL at 22:00

## 2019-06-28 RX ADMIN — IPRATROPIUM BROMIDE 0.5 MG: 0.5 SOLUTION RESPIRATORY (INHALATION) at 16:13

## 2019-06-28 RX ADMIN — DOCUSATE SODIUM 100 MG: 100 CAPSULE, LIQUID FILLED ORAL at 22:01

## 2019-06-28 NOTE — ED PROVIDER NOTES
History  Chief Complaint   Patient presents with    Back Pain     Pt states he has hx of sciatica that "hurts so bad, I'm not able to take care of my other problems"  pt has wounds on feet that he feels he is unable to treat himself     61 yo male here for back pain  Began about 2 weeks ago  This is his fourth ER visit for the same complaint  He was told it is likely sciatica  He states over the past week it has gotten worse  Pain in right lower back and radiates to right leg  Constant  States he is hardly able to ambulate  He is now not caring for himself because he has such severe pain when he moves  He states it's been days since he has showered  He is still taking his home medications  He is having difficulty taking care of his chronic foot ulcers however he has a visiting nurse who comes regularly and last saw him yesterday for dressing change and told him his wounds looked good  He contacted pain management after his last ER visit but cannot be seen for another month         History provided by:  Patient   used: No    Back Pain   Location:  Lumbar spine  Quality:  Aching  Radiates to:  Does not radiate  Pain severity:  Severe  Pain is:  Same all the time  Onset quality:  Gradual  Duration:  2 weeks  Timing:  Constant  Progression:  Worsening  Chronicity:  Recurrent  Context: not emotional stress, not falling, not jumping from heights, not lifting heavy objects, not MCA, not MVA, not occupational injury, not pedestrian accident, not physical stress, not recent illness, not recent injury and not twisting    Relieved by:  Nothing  Worsened by:  Ambulation, movement and twisting  Ineffective treatments:  None tried  Associated symptoms: chest pain    Associated symptoms: no abdominal pain, no abdominal swelling, no bladder incontinence, no bowel incontinence, no dysuria, no fever, no headaches, no leg pain, no numbness, no paresthesias, no pelvic pain, no perianal numbness, no tingling, no weakness and no weight loss    Risk factors: lack of exercise and obesity    Risk factors: no hx of cancer, no hx of osteoporosis, no menopause, not pregnant, no recent surgery, no steroid use and no vascular disease    Shortness of Breath   Severity:  Moderate  Onset quality:  Gradual  Duration:  1 week  Timing:  Constant  Progression:  Worsening  Chronicity:  New  Context: not activity, not animal exposure, not emotional upset, not fumes, not known allergens, not occupational exposure, not pollens, not smoke exposure, not strong odors, not URI and not weather changes    Relieved by:  Nothing  Worsened by:  Exertion  Ineffective treatments:  None tried  Associated symptoms: chest pain and sputum production    Associated symptoms: no abdominal pain, no claudication, no cough, no diaphoresis, no ear pain, no fever, no headaches, no hemoptysis, no neck pain, no PND, no rash, no sore throat, no syncope, no swollen glands, no vomiting and no wheezing        Prior to Admission Medications   Prescriptions Last Dose Informant Patient Reported? Taking?    Blood Glucose Monitoring Suppl ([employer] [employer] ModeULT BLOOD GLUCOSE) w/Device KIT 6/28/2019 at Unknown time Self Yes Yes   Sig: by Does not apply route   Empagliflozin (JARDIANCE) 25 MG TABS Not Taking at Unknown time Self Yes No   Sig: Take 25 mg by mouth every morning   Insulin Glargine-Lixisenatide (SOLIQUA) 100-33 UNT-MCG/ML SOPN Not Taking at Unknown time Self Yes No   Sig: Inject 15 Units under the skin daily   Insulin Syringe-Needle U-100 (INSULIN SYRINGE 1CC/30GX5/16") 30G X 5/16" 1 ML MISC  Self Yes No   Sig: by Does not apply route   albuterol (PROVENTIL HFA,VENTOLIN HFA) 90 mcg/act inhaler 6/28/2019 at Unknown time Self No Yes   Sig: Inhale 1-2 puffs every 6 (six) hours as needed for wheezing   aspirin 81 MG tablet 6/28/2019 at Unknown time Self Yes Yes   Sig: Take 1 tablet by mouth daily   atorvastatin (LIPITOR) 10 mg tablet Not Taking at Unknown time Self Yes No   Sig: Take 10 mg by mouth daily   buPROPion (WELLBUTRIN XL) 150 mg 24 hr tablet 6/28/2019 at Unknown time Self Yes Yes   Sig: Take 150 mg by mouth daily   budesonide-formoterol (SYMBICORT) 160-4 5 mcg/act inhaler Not Taking at Unknown time Self Yes No   Sig: Inhale 2 puffs 2 (two) times a day   chlorthalidone 25 mg tablet 6/28/2019 at Unknown time Self Yes Yes   Sig: Take 25 mg by mouth daily   cholecalciferol (VITAMIN D3) 1,000 units tablet 6/28/2019 at Unknown time Self Yes Yes   Sig: Take by mouth   glucose blood (TRUETEST TEST) test strip 6/28/2019 at Unknown time Self Yes Yes   Sig: by In Vitro route   glucose blood test strip 6/28/2019 at Unknown time Self Yes Yes   Sig: TEST 3 TIMES DAILY   glucose blood test strip 6/28/2019 at Unknown time Self Yes Yes   Sig: by In Vitro route   ibuprofen (MOTRIN) 800 mg tablet 6/28/2019 at Unknown time Self Yes Yes   Sig: Take 1 tablet by mouth 3 (three) times a day   insulin NPH-insulin regular (NovoLIN 70/30) 100 units/mL subcutaneous injection 6/28/2019 at Unknown time Self Yes Yes   Sig: Pt uses sliding scale  Max dose 60 units per day     insulin aspart (NOVOLOG) 100 units/mL injection 6/28/2019 at Unknown time Self Yes Yes   Sig: Inject under the skin   insulin glargine (LANTUS) 100 units/mL subcutaneous injection Not Taking at Unknown time Self Yes No   Sig: Inject under the skin   insulin regular (HumuLIN R,NovoLIN R) 100 units/mL injection 6/28/2019 at Unknown time Self Yes Yes   Sig: Inject 60 Units under the skin   ipratropium-albuterol (DUO-NEB) 0 5-2 5 mg/3 mL nebulizer solution 6/28/2019 at Unknown time Self No Yes   Sig: Take 1 vial (3 mL total) by nebulization every 8 (eight) hours as needed for wheezing or shortness of breath   metFORMIN (GLUCOPHAGE) 1000 MG tablet 6/28/2019 at Unknown time Self Yes Yes   Sig: Take 1,000 mg by mouth 2 (two) times a day with meals   methocarbamol (ROBAXIN) 750 mg tablet   No No   Sig: Take 1 tablet (750 mg total) by mouth 4 (four) times a day for 7 days   morphine (MS CONTIN) 30 mg 12 hr tablet 2019 at Unknown time Self Yes Yes   Sig: Take 30 mg by mouth every 8 (eight) hours   naproxen (NAPROSYN) 250 mg tablet 2019 at Unknown time  No Yes   Sig: Take 1 tablet (250 mg total) by mouth 2 (two) times a day with meals   omeprazole (PriLOSEC) 20 mg delayed release capsule Not Taking at Unknown time Self Yes No   Sig: Take 1 tablet by mouth daily   oxyCODONE (ROXICODONE) 15 mg immediate release tablet 2019 at Unknown time Self Yes Yes   Sig: Take 15 mg by mouth every 4 (four) hours as needed for moderate pain   saccharomyces boulardii (FLORASTOR) 250 mg capsule Past Month at Unknown time Self No Yes   Sig: Take 1 capsule by mouth 2 (two) times a day      Facility-Administered Medications: None       Past Medical History:   Diagnosis Date    Chronic pain syndrome     [facility] [address] [facility]     [facility]     Diverticulitis     Hyperlipidemia     Hypertension     Sleep apnea        Past Surgical History:   Procedure Laterality Date    AMPUTATION Left     toe     HEMICOLECTOMY      IR IMAGE GUIDED BIOPSY/ASPIRATION LIVER  7/3/2019    TN AMPUTATION FOOT,TRANSMETATARSAL Right 10/16/2017    Procedure: AMPUTATION TRANSMETATARSAL (TMA);  Surgeon: Chin Frias DPM;  Location: [facility];  Service: Podiatry    TN COLONOSCOPY FLX DX W/COLLJ SPEC WHEN PFRMD N/A 2019    Procedure: COLONOSCOPY;  Surgeon: Maye Apgar, DO;  Location: [facility]; Service: Gastroenterology    TREATMENT FISTULA ANAL         Family History   Family history unknown: Yes     I have reviewed and agree with the history as documented      Social History     Tobacco Use    Smoking status: Current Every Day Smoker     Packs/day: 0 50    Smokeless tobacco: Never Used   Substance Use Topics    Alcohol use: Never     Frequency: Never    Drug use: No        Review of Systems   Constitutional: Negative for activity change, appetite change, chills, diaphoresis, fatigue, fever, unexpected weight change and weight loss  HENT: Negative for congestion, ear pain, rhinorrhea, sinus pressure, sore throat and trouble swallowing  Eyes: Negative for photophobia and visual disturbance  Respiratory: Positive for sputum production  Negative for apnea, cough, hemoptysis, choking, chest tightness, shortness of breath, wheezing and stridor  Cardiovascular: Positive for chest pain  Negative for palpitations, claudication, leg swelling, syncope and PND  Gastrointestinal: Negative for abdominal distention, abdominal pain, blood in stool, bowel incontinence, constipation, diarrhea, nausea and vomiting  Genitourinary: Negative for bladder incontinence, decreased urine volume, difficulty urinating, dysuria, enuresis, flank pain, frequency, hematuria, pelvic pain and urgency  Musculoskeletal: Positive for back pain  Negative for arthralgias, myalgias, neck pain and neck stiffness  Skin: Negative for color change, pallor, rash and wound  Allergic/Immunologic: Negative  Neurological: Negative for dizziness, tingling, tremors, syncope, weakness, light-headedness, numbness, headaches and paresthesias  Hematological: Negative  Psychiatric/Behavioral: Negative  All other systems reviewed and are negative  Physical Exam  Physical Exam   Constitutional: He is oriented to person, place, and time  He appears well-developed and well-nourished  Non-toxic appearance  He does not have a sickly appearance  He does not appear ill  No distress  HENT:   Head: Normocephalic and atraumatic  Eyes: Pupils are equal, round, and reactive to light  EOM and lids are normal    Neck: Normal range of motion  Neck supple  Cardiovascular: Normal rate, regular rhythm, S1 normal, S2 normal, normal heart sounds, intact distal pulses and normal pulses   Exam reveals no gallop, no distant heart sounds, no friction rub and no decreased pulses  No murmur heard  Pulses:       Radial pulses are 2+ on the right side, and 2+ on the left side  Pulmonary/Chest: No accessory muscle usage  Tachypnea noted  No apnea and no bradypnea  No respiratory distress  He has no decreased breath sounds  He has wheezes (scattered b/l)  He has no rhonchi  He has rales (scattered b/l)  Abdominal: Soft  Normal appearance  He exhibits no distension  There is no tenderness  There is no rigidity, no rebound and no guarding  Musculoskeletal: Normal range of motion  He exhibits no edema, tenderness or deformity  Back:    Neurological: He is alert and oriented to person, place, and time  No cranial nerve deficit  GCS eye subscore is 4  GCS verbal subscore is 5  GCS motor subscore is 6  Skin: Skin is warm, dry and intact  No rash noted  He is not diaphoretic  No erythema  No pallor  Psychiatric: His speech is normal    Nursing note and vitals reviewed        Vital Signs  ED Triage Vitals   Temperature Pulse Respirations Blood Pressure SpO2   06/28/19 1455 06/28/19 1452 06/28/19 1452 06/28/19 1452 06/28/19 1452   98 9 °F (37 2 °C) (!) 112 18 142/75 90 %      Temp Source Heart Rate Source Patient Position - Orthostatic VS BP Location FiO2 (%)   06/28/19 1455 06/28/19 1559 06/28/19 1559 06/28/19 1559 --   Oral Monitor Sitting Right arm       Pain Score       06/28/19 1452       Worst Possible Pain           Vitals:    07/03/19 1358 07/03/19 1413 07/03/19 1443 07/03/19 1513   BP: 133/66 121/67 137/63 140/73   Pulse: 102 98 97 99   Patient Position - Orthostatic VS: Lying Lying Lying Lying         Visual Acuity      ED Medications  Medications   heparin (porcine) 25,000 units in 250 mL infusion (premix) (0 Units/kg/hr × 125 kg (Order-Specific) Intravenous Stopped 7/3/19 0821)   heparin (porcine) injection 10,000 Units (has no administration in time range)   heparin (porcine) injection 5,000 Units (5,000 Units Intravenous Given 7/3/19 0744)   atorvastatin (LIPITOR) tablet 10 mg (10 mg Oral Given 7/2/19 1713)   budesonide-formoterol (SYMBICORT) 160-4 5 mcg/act inhaler 2 puff (2 puffs Inhalation Given 7/3/19 0836)   buPROPion (WELLBUTRIN XL) 24 hr tablet 150 mg (150 mg Oral Given 7/3/19 0833)   cholecalciferol (VITAMIN D3) tablet 1,000 Units (1,000 Units Oral Given 7/3/19 0835)   methocarbamol (ROBAXIN) tablet 750 mg (750 mg Oral Not Given 7/3/19 1230)   pantoprazole (PROTONIX) EC tablet 40 mg (40 mg Oral Given 7/3/19 0513)   nicotine (NICODERM CQ) 14 mg/24hr TD 24 hr patch 1 patch (1 patch Transdermal Medication Applied 7/3/19 0827)   docusate sodium (COLACE) capsule 100 mg (100 mg Oral Given 7/3/19 0834)   senna (SENOKOT) tablet 8 6 mg (8 6 mg Oral Given 7/3/19 0833)   insulin lispro (HumaLOG) 100 units/mL subcutaneous injection 2-12 Units (0 Units Subcutaneous Not Given 7/3/19 1304)   insulin lispro (HumaLOG) 100 units/mL subcutaneous injection 1-5 Units (1 Units Subcutaneous Refused 7/2/19 2144)   albuterol (PROVENTIL HFA,VENTOLIN HFA) inhaler 2 puff (has no administration in time range)   acetaminophen (TYLENOL) tablet 975 mg (975 mg Oral Given 7/3/19 1450)   lidocaine (LIDODERM) 5 % patch 1 patch (1 patch Topical Medication Applied 7/3/19 0834)   gabapentin (NEURONTIN) capsule 100 mg (100 mg Oral Given 7/3/19 0833)   azithromycin (ZITHROMAX) tablet 500 mg (500 mg Oral Given 7/2/19 1714)   insulin glargine (LANTUS) subcutaneous injection 75 Units 0 75 mL (75 Units Subcutaneous Refused 7/2/19 2140)   insulin lispro (HumaLOG) 100 units/mL subcutaneous injection 10 Units (10 Units Subcutaneous Not Given 7/3/19 1305)   morphine (MS CONTIN) ER tablet 45 mg (45 mg Oral Given 7/3/19 1451)   oxyCODONE (ROXICODONE) immediate release tablet 30 mg (30 mg Oral Given 7/3/19 1107)   HYDROmorphone (DILAUDID) injection 1 5 mg (has no administration in time range)   albuterol inhalation solution 5 mg (5 mg Nebulization Given 6/28/19 1613)   ipratropium (ATROVENT) 0 02 % inhalation solution 0 5 mg (0 5 mg Nebulization Given 6/28/19 1613)   ceftriaxone (ROCEPHIN) 1 g/50 mL in dextrose IVPB (0 mg Intravenous Stopped 6/28/19 1914)   oxyCODONE (OxyCONTIN) 12 hr tablet 10 mg (10 mg Oral Given 6/28/19 1902)   sodium chloride 0 9 % bolus 1,000 mL (0 mL Intravenous Stopped 6/28/19 2014)   iohexol (OMNIPAQUE) 350 MG/ML injection (MULTI-DOSE) 100 mL (100 mL Intravenous Given 6/28/19 1844)   heparin (porcine) injection 10,000 Units (10,000 Units Intravenous Given 6/28/19 1948)   perflutren lipid microsphere (DEFINITY) injection (0 8 mL/min Intravenous Given 7/1/19 1106)   iohexol (OMNIPAQUE) 350 MG/ML injection (MULTI-DOSE) 100 mL (100 mL Intravenous Given 7/1/19 2022)   iohexol (OMNIPAQUE) 240 MG/ML solution 50 mL (50 mL Oral Given 7/1/19 2022)   midazolam (VERSED) injection (1 mg Intravenous Given 7/3/19 1258)   fentanyl citrate (PF) 100 MCG/2ML (50 mcg Intravenous Given 7/3/19 1311)   lidocaine (XYLOCAINE) 1 % injection (10 mL Infiltration Given 7/3/19 1257)       Diagnostic Studies  Results Reviewed     Procedure Component Value Units Date/Time    Blood culture #1 [[id number]] Collected:  06/28/19 1645    Lab Status:  Preliminary result Specimen:  Blood from Arm, Right Updated:  07/02/19 2301     Blood Culture No Growth After 4 Days  Blood culture #2 [[id number]] Collected:  06/28/19 1645    Lab Status:  Preliminary result Specimen:  Blood from Hand, Right Updated:  07/02/19 2301     Blood Culture No Growth After 4 Days      Hemoglobin A1c w/EAG Estimation (Orders if not completed within the last 90 days) [[id number]]  (Abnormal) Collected:  06/28/19 1645    Lab Status:  Final result Specimen:  Blood from Arm, Right Updated:  06/29/19 1227     Hemoglobin A1C 7 9 %       mg/dl     TSH, 3rd generation [[id number]]  (Normal) Collected:  06/28/19 1645    Lab Status:  Final result Specimen:  Blood from Arm, Right Updated:  06/28/19 2157     TSH [address] [place] [address] 1 126 uIU/mL     Narrative:       Patients undergoing fluorescein dye angiography may retain small amounts of fluorescein in the body for 48-72 hours post procedure  Samples containing fluorescein can produce falsely depressed TSH values  If the patient had this procedure,a specimen should be resubmitted post fluorescein clearance        APTT [[id number]]  (Normal) Collected:  06/28/19 1917    Lab Status:  Final result Specimen:  Blood from Arm, Left Updated:  06/28/19 1938     PTT 33 seconds     D-Dimer [[id number]]  (Abnormal) Collected:  06/28/19 1645    Lab Status:  Final result Specimen:  Blood from Arm, Right Updated:  06/28/19 1735     D-Dimer, Quant >10,000 ng/ml (FEU)     Comprehensive metabolic panel [[id number]]  (Abnormal) Collected:  06/28/19 1645    Lab Status:  Final result Specimen:  Blood from Arm, Right Updated:  06/28/19 1722     Sodium 135 mmol/L      Potassium 4 2 mmol/L      Chloride 98 mmol/L      CO2 30 mmol/L      ANION GAP 7 mmol/L      BUN 23 mg/dL      Creatinine 1 03 mg/dL      Glucose 449 mg/dL      Calcium 8 5 mg/dL      AST 43 U/L      ALT 37 U/L      Alkaline Phosphatase 677 U/L      Total Protein 7 2 g/dL      Albumin 2 6 g/dL      Total Bilirubin 1 30 mg/dL      eGFR 77 ml/min/1 73sq m     Narrative:       [place] guidelines for Chronic Kidney Disease (CKD):     Stage 1 with normal or high GFR (GFR > 90 mL/min/1 73 square meters)    Stage 2 Mild CKD (GFR = 60-89 mL/min/1 73 square meters)    Stage 3A Moderate CKD (GFR = 45-59 mL/min/1 73 square meters)    Stage 3B Moderate CKD (GFR = 30-44 mL/min/1 73 square meters)    Stage 4 Severe CKD (GFR = 15-29 mL/min/1 73 square meters)    Stage 5 End Stage CKD (GFR <15 mL/min/1 73 square meters)  Note: GFR calculation is accurate only with a steady state creatinine    NT-BNP PRO (BNP for [place], [place], [place], [place], [place], [facility], [facility], [place] campuses) [[id number]]  (Abnormal) Collected:  06/28/19 1645    Lab Status:  Final result Specimen:  Blood from Arm, Right Updated: 06/28/19 1722     NT-proBNP 391 pg/mL     Troponin I [[id number]]  (Normal) Collected:  06/28/19 1645    Lab Status:  Final result Specimen:  Blood from Arm, Right Updated:  06/28/19 1718     Troponin I 0 02 ng/mL     Protime-INR [[id number]]  (Abnormal) Collected:  06/28/19 1645    Lab Status:  Final result Specimen:  Blood from Arm, Right Updated:  06/28/19 1718     Protime 21 0 seconds      INR 1 80    APTT [[id number]]  (Normal) Collected:  06/28/19 1645    Lab Status:  Final result Specimen:  Blood from Arm, Right Updated:  06/28/19 1718     PTT 34 seconds     CBC and differential [[id number]]  (Abnormal) Collected:  06/28/19 1645    Lab Status:  Final result Specimen:  Blood from Arm, Right Updated:  06/28/19 1700     WBC 19 23 Thousand/uL      RBC 4 86 Million/uL      Hemoglobin 12 4 g/dL      Hematocrit 38 8 %      MCV 80 fL      MCH 25 5 pg      MCHC 32 0 g/dL      RDW 20 5 %      MPV 11 5 fL      Platelets 992 Thousands/uL      nRBC 0 /100 WBCs      Neutrophils Relative 82 %      Immat GRANS % 1 %      Lymphocytes Relative 11 %      Monocytes Relative 6 %      Eosinophils Relative 0 %      Basophils Relative 0 %      Neutrophils Absolute 15 76 Thousands/µL      Immature Grans Absolute 0 20 Thousand/uL      Lymphocytes Absolute 2 09 Thousands/µL      Monocytes Absolute 1 11 Thousand/µL      Eosinophils Absolute 0 04 Thousand/µL      Basophils Absolute 0 03 Thousands/µL                  IR image guided biopsy/aspiration liver   Final Result by Evette Whyte MD (07/03 1354)   Impression:    Successful ultrasound-guided liver biopsy  Workstation performed: [id number]         CT abdomen pelvis w contrast   Final Result by Dennis Levi MD (07/01 2149)   1  Diffuse sclerotic metastases through the visualized osseous structures  Correlate with PSA as findings are suspicious for metastatic prostate cancer  There is a pathologic fracture and partial collapse of the L5 vertebral body     2  Multiple hypodense lesions throughout the liver suspicious for metastatic disease  3  Prominent para-aortic, bilateral iliac chain and pelvic sidewall lymph nodes suspicious for metastatic disease  4  Thickening of the omentum suspicious for peritoneal carcinomatosis  There is a small amount of free fluid in the abdomen or pelvis  5  Cirrhotic appearance of the liver  6  5 mm right lower lobe pulmonary nodule  7  Left renal cyst  Indeterminate hypodense lesion arising from the anterior aspect of the left kidney likely represents a hyperdense or proteinaceous cyst  This could be confirmed with renal ultrasound if clinically warranted  7  Indeterminate hyperdense lesions in the bilateral gluteus lola muscles measuring 2 cm (series 2 image 72)  Differential considerations include small intramuscular hematomas versus metastatic or other soft tissue nodule  8  Mild prostatomegaly  9  Small bilateral pleural effusions  The study was marked in EPIC for significant notification  Workstation performed: [id number]         XR foot 3+ vw left   Final Result by Abner Rosario MD (07/01 1531)      No acute osseous abnormality  Workstation performed: [id number]         VAS lower limb venous duplex study, complete bilateral   Final Result by Glenn Villarreal MD (07/01 1014)      CTA ED chest PE Study   Final Result by Tacos Briscoe MD (06/28 1910)   Numerous fairly diffusely distributed pulmonary emboli the level of the lobar and segmental arteries bilaterally      Mild right heart strain      Suspected cirrhosis and ascites      Multiple thoracic vertebral body densities, likely blastic metastases      Findings personally discussed by phone with Wilfrido Contreras at 7:10 PM, 6/28/2019            Workstation performed: [id number]         XR chest 2 views   Final Result by Missy Way MD (06/28 1925)      No acute cardiopulmonary disease              Workstation performed: [id number] Procedures  ECG 12 Lead Documentation Only  Date/Time: 6/28/2019 4:43 PM  Performed by: Agnieszka Wallace PA-C  Authorized by: Agnieszka Wallace PA-C     Indications / Diagnosis:  Sob  ECG reviewed by me, the ED Provider: yes    Patient location:  ED  Previous ECG:     Previous ECG:  Compared to current    Comparison ECG info:  Feb 2 2019    Similarity:  Changes noted    Comparison to cardiac monitor: Yes    Interpretation:     Interpretation: abnormal    Quality:     Tracing quality:  Limited by artifact  Rate:     ECG rate:  100    ECG rate assessment: tachycardic    Rhythm:     Rhythm: sinus tachycardia    Ectopy:     Ectopy: none    QRS:     QRS axis:  Normal    QRS intervals:  Normal  Conduction:     Conduction: normal    ST segments:     ST segments:  Normal  T waves:     T waves: normal        Conscious Sedation Assessment      Classification Score   ASA Scale Assessment  3-Severe systemic disease that results in functional limitation filed at 07/03/2019 1247   Mallampati Classification  Class III: soft palate, base of uvula visible - Moderate Difficulty filed at 07/03/2019 1247           ED Course  ED Course as of Jul 03 1518 Fri Jun 28, 2019   1900 Spoke with Dr Anna Faustin  Pt with multiple bilateral PE      2010 Discussed with ICU   Ok for floor            HEART Risk Score      Most Recent Value   History  0 Filed at: 06/28/2019 1556   ECG  1 Filed at: 06/28/2019 1556   Age  1 Filed at: 06/28/2019 1556   Risk Factors  2 Filed at: 06/28/2019 1556   Troponin  0 Filed at: 06/28/2019 1556   Heart Score Risk Calculator   History  0 Filed at: 06/28/2019 1556   ECG  1 Filed at: 06/28/2019 1556   Age  1 Filed at: 06/28/2019 1556   Risk Factors  2 Filed at: 06/28/2019 1556   Troponin  0 Filed at: 06/28/2019 1556   HEART Score  4 Filed at: 06/28/2019 1556   HEART Score  4 Filed at: 06/28/2019 1556                            MDM  Number of Diagnoses or Management Options  Diagnosis management comments: Shayna including but not limited to: sciatica, herniated disc, arthritis, spinal stenosis, strain, sprain, fracture, cauda equina syndrome, epidural abscess, AAA, PE, MI, ACS, COPD exacerbation, pneumonia  Plan: breathing tx  Cardiac workup EKG, CXR, ddimer  Discussed PT eval for back pain  Spoke with patient's pain management office  They had him scheduled on July 1st (this coming Monday) but patient cancelled it and per report of office he did this because they would not treat him with opioid therapy  Confronted pt on this  He states "I may have said that out of frustration but I'm not here for narcotics and people should stop saying that"  Amount and/or Complexity of Data Reviewed  Clinical lab tests: ordered and reviewed  Tests in the radiology section of CPT®: ordered and reviewed  Independent visualization of images, tracings, or specimens: yes    Risk of Complications, Morbidity, and/or Mortality  Presenting problems: moderate  Management options: low  General comments: 59 yo male with back pain, cough, chest pain and sob  After breathing tx he had minimal improvement of his SOB  Trop and ekg negative  DDimer elevated  PE study obtained revealed multiple acute PEs  No evidence of pneumonia  He was given abx empirically given leukocytosis, cough, hypoxia  His respiratory symptoms are likely due to his PE  He does have what appears to be thoracic mets  Discussed admission with pt and is in agreement  Spoke with ICU as elevated BNP and ?strain on CT  ICU evaluated   Okay for med/surg    Patient Progress  Patient progress: stable      Disposition  Final diagnoses:   Acute pulmonary embolism with acute cor pulmonale, unspecified pulmonary embolism type ([facility] [address] )   Foot ulcer due to secondary DM ([facility] [address] )   Type 2 diabetes mellitus with foot ulcer, with long-term current use of insulin ([facility] [address] )   Metastatic cancer to spine ([facility] [address] )   Intractable back pain   Weakness of left arm     Time reflects when diagnosis was documented in both MDM as applicable and the Disposition within this note     Time User Action Codes Description Comment    6/28/2019  8:31 PM Cristian Dais Add [I26 09] Acute pulmonary embolism with acute cor pulmonale, unspecified pulmonary embolism type ([place] [address] )     6/28/2019  8:31 PM Cristian Dais Modify [I26 09] Acute pulmonary embolism with acute cor pulmonale, unspecified pulmonary embolism type ([place] [address] )     6/28/2019  8:34 PM Cristian Dais Add [L11 499,  R24 226] Foot ulcer due to secondary DM ([place] [address] )     6/28/2019  8:35 PM Cristian Dais Add [E11 621,  L97 509,  Z79 4] Type 2 diabetes mellitus with foot ulcer, with long-term current use of insulin ([place] [address] )     6/29/2019 10:57 AM Trae Zamudio N Add [C79 51] Metastatic cancer to spine ([place] [address] )     7/2/2019  9:10 AM Tasneem Downing Job N Add [M54 9] Intractable back pain     7/2/2019 12:27 PM Tasneem Chang, Job N Add [R29 898] Weakness of left arm       ED Disposition     None      Follow-up Information    None         Current Discharge Medication List      CONTINUE these medications which have NOT CHANGED    Details   albuterol (PROVENTIL HFA,VENTOLIN HFA) 90 mcg/act inhaler Inhale 1-2 puffs every 6 (six) hours as needed for wheezing  Qty: 1 Inhaler, Refills: 0    Associated Diagnoses: Shortness of breath      aspirin 81 MG tablet Take 1 tablet by mouth daily      Blood Glucose Monitoring Suppl (TRUERESULT BLOOD GLUCOSE) w/Device KIT by Does not apply route      buPROPion (WELLBUTRIN XL) 150 mg 24 hr tablet Take 150 mg by mouth daily      chlorthalidone 25 mg tablet Take 25 mg by mouth daily      cholecalciferol (VITAMIN D3) 1,000 units tablet Take by mouth      !! glucose blood (TRUETEST TEST) test strip by In Vitro route      !! glucose blood test strip TEST 3 TIMES DAILY      !! glucose blood test strip by In Vitro route      ibuprofen (MOTRIN) 800 mg tablet Take 1 tablet by mouth 3 (three) times a day      insulin aspart (NOVOLOG) 100 units/mL injection Inject under the skin insulin NPH-insulin regular (NovoLIN 70/30) 100 units/mL subcutaneous injection Pt uses sliding scale  Max dose 60 units per day        insulin regular (HumuLIN R,NovoLIN R) 100 units/mL injection Inject 60 Units under the skin      ipratropium-albuterol (DUO-NEB) 0 5-2 5 mg/3 mL nebulizer solution Take 1 vial (3 mL total) by nebulization every 8 (eight) hours as needed for wheezing or shortness of breath  Qty: 30 vial, Refills: 0    Associated Diagnoses: Shortness of breath      metFORMIN (GLUCOPHAGE) 1000 MG tablet Take 1,000 mg by mouth 2 (two) times a day with meals      morphine (MS CONTIN) 30 mg 12 hr tablet Take 30 mg by mouth every 8 (eight) hours      naproxen (NAPROSYN) 250 mg tablet Take 1 tablet (250 mg total) by mouth 2 (two) times a day with meals  Qty: 20 tablet, Refills: 0    Associated Diagnoses: Sciatica of right side      oxyCODONE (ROXICODONE) 15 mg immediate release tablet Take 15 mg by mouth every 4 (four) hours as needed for moderate pain      saccharomyces boulardii (FLORASTOR) 250 mg capsule Take 1 capsule by mouth 2 (two) times a day  Qty: 10 capsule, Refills: 0      atorvastatin (LIPITOR) 10 mg tablet Take 10 mg by mouth daily      budesonide-formoterol (SYMBICORT) 160-4 5 mcg/act inhaler Inhale 2 puffs 2 (two) times a day      Empagliflozin (JARDIANCE) 25 MG TABS Take 25 mg by mouth every morning      insulin glargine (LANTUS) 100 units/mL subcutaneous injection Inject under the skin      Insulin Glargine-Lixisenatide (SOLIQUA) 100-33 UNT-MCG/ML SOPN Inject 15 Units under the skin daily      Insulin Syringe-Needle U-100 (INSULIN SYRINGE 1CC/30GX5/16") 30G X 5/16" 1 ML MISC by Does not apply route      methocarbamol (ROBAXIN) 750 mg tablet Take 1 tablet (750 mg total) by mouth 4 (four) times a day for 7 days  Qty: 28 tablet, Refills: 0    Associated Diagnoses: Right hip pain; Sciatica of right side      omeprazole (PriLOSEC) 20 mg delayed release capsule Take 1 tablet by mouth daily !! - Potential duplicate medications found  Please discuss with provider  No discharge procedures on file      ED Provider  Electronically Signed by           Darrin Billy PA-C  07/03/19 1487

## 2019-06-29 ENCOUNTER — APPOINTMENT (INPATIENT)
Dept: ULTRASOUND IMAGING | Facility: HOSPITAL | Age: 64
DRG: 167 | End: 2019-06-29
Payer: MEDICARE

## 2019-06-29 PROBLEM — C79.51 SPINE METASTASIS (HCC): Status: ACTIVE | Noted: 2019-06-29

## 2019-06-29 LAB
ALBUMIN SERPL BCP-MCNC: 2.3 G/DL (ref 3.5–5)
ALP SERPL-CCNC: 612 U/L (ref 46–116)
ALT SERPL W P-5'-P-CCNC: 33 U/L (ref 12–78)
ANION GAP SERPL CALCULATED.3IONS-SCNC: 7 MMOL/L (ref 4–13)
APTT PPP: 109 SECONDS (ref 23–37)
APTT PPP: 149 SECONDS (ref 23–37)
APTT PPP: 62 SECONDS (ref 23–37)
AST SERPL W P-5'-P-CCNC: 34 U/L (ref 5–45)
BILIRUB SERPL-MCNC: 1.4 MG/DL (ref 0.2–1)
BUN SERPL-MCNC: 20 MG/DL (ref 5–25)
CALCIUM SERPL-MCNC: 8.6 MG/DL (ref 8.3–10.1)
CHLORIDE SERPL-SCNC: 102 MMOL/L (ref 100–108)
CO2 SERPL-SCNC: 29 MMOL/L (ref 21–32)
CREAT SERPL-MCNC: 0.73 MG/DL (ref 0.6–1.3)
ERYTHROCYTE [DISTWIDTH] IN BLOOD BY AUTOMATED COUNT: 20.5 % (ref 11.6–15.1)
EST. AVERAGE GLUCOSE BLD GHB EST-MCNC: 180 MG/DL
GFR SERPL CREATININE-BSD FRML MDRD: 99 ML/MIN/1.73SQ M
GLUCOSE SERPL-MCNC: 128 MG/DL (ref 65–140)
GLUCOSE SERPL-MCNC: 211 MG/DL (ref 65–140)
GLUCOSE SERPL-MCNC: 222 MG/DL (ref 65–140)
GLUCOSE SERPL-MCNC: 366 MG/DL (ref 65–140)
GLUCOSE SERPL-MCNC: 52 MG/DL (ref 65–140)
GLUCOSE SERPL-MCNC: 64 MG/DL (ref 65–140)
GLUCOSE SERPL-MCNC: 64 MG/DL (ref 65–140)
GLUCOSE SERPL-MCNC: 70 MG/DL (ref 65–140)
GLUCOSE SERPL-MCNC: 94 MG/DL (ref 65–140)
HBA1C MFR BLD: 7.9 % (ref 4.2–6.3)
HCT VFR BLD AUTO: 37.6 % (ref 36.5–49.3)
HGB BLD-MCNC: 12.1 G/DL (ref 12–17)
MAGNESIUM SERPL-MCNC: 1.8 MG/DL (ref 1.6–2.6)
MCH RBC QN AUTO: 25.9 PG (ref 26.8–34.3)
MCHC RBC AUTO-ENTMCNC: 32.2 G/DL (ref 31.4–37.4)
MCV RBC AUTO: 81 FL (ref 82–98)
PHOSPHATE SERPL-MCNC: 3.1 MG/DL (ref 2.3–4.1)
PLATELET # BLD AUTO: 206 THOUSANDS/UL (ref 149–390)
PMV BLD AUTO: 11.3 FL (ref 8.9–12.7)
POTASSIUM SERPL-SCNC: 3.6 MMOL/L (ref 3.5–5.3)
PROT SERPL-MCNC: 6.7 G/DL (ref 6.4–8.2)
PSA SERPL-MCNC: 0.5 NG/ML (ref 0–4)
RBC # BLD AUTO: 4.67 MILLION/UL (ref 3.88–5.62)
SODIUM SERPL-SCNC: 138 MMOL/L (ref 136–145)
TROPONIN I SERPL-MCNC: 0.02 NG/ML
WBC # BLD AUTO: 23.71 THOUSAND/UL (ref 4.31–10.16)

## 2019-06-29 PROCEDURE — 85730 THROMBOPLASTIN TIME PARTIAL: CPT | Performed by: INTERNAL MEDICINE

## 2019-06-29 PROCEDURE — 84100 ASSAY OF PHOSPHORUS: CPT | Performed by: NURSE PRACTITIONER

## 2019-06-29 PROCEDURE — 84484 ASSAY OF TROPONIN QUANT: CPT | Performed by: NURSE PRACTITIONER

## 2019-06-29 PROCEDURE — 82947 ASSAY GLUCOSE BLOOD QUANT: CPT | Performed by: FAMILY MEDICINE

## 2019-06-29 PROCEDURE — 94762 N-INVAS EAR/PLS OXIMTRY CONT: CPT

## 2019-06-29 PROCEDURE — 84153 ASSAY OF PSA TOTAL: CPT | Performed by: FAMILY MEDICINE

## 2019-06-29 PROCEDURE — 99233 SBSQ HOSP IP/OBS HIGH 50: CPT | Performed by: FAMILY MEDICINE

## 2019-06-29 PROCEDURE — 85027 COMPLETE CBC AUTOMATED: CPT | Performed by: NURSE PRACTITIONER

## 2019-06-29 PROCEDURE — 83735 ASSAY OF MAGNESIUM: CPT | Performed by: NURSE PRACTITIONER

## 2019-06-29 PROCEDURE — 80053 COMPREHEN METABOLIC PANEL: CPT | Performed by: NURSE PRACTITIONER

## 2019-06-29 PROCEDURE — 86334 IMMUNOFIX E-PHORESIS SERUM: CPT | Performed by: FAMILY MEDICINE

## 2019-06-29 PROCEDURE — 99222 1ST HOSP IP/OBS MODERATE 55: CPT | Performed by: INTERNAL MEDICINE

## 2019-06-29 PROCEDURE — 86334 IMMUNOFIX E-PHORESIS SERUM: CPT | Performed by: PATHOLOGY

## 2019-06-29 PROCEDURE — 85730 THROMBOPLASTIN TIME PARTIAL: CPT | Performed by: FAMILY MEDICINE

## 2019-06-29 PROCEDURE — 84165 PROTEIN E-PHORESIS SERUM: CPT | Performed by: PATHOLOGY

## 2019-06-29 PROCEDURE — 82948 REAGENT STRIP/BLOOD GLUCOSE: CPT

## 2019-06-29 PROCEDURE — 84165 PROTEIN E-PHORESIS SERUM: CPT | Performed by: FAMILY MEDICINE

## 2019-06-29 RX ORDER — ACETAMINOPHEN 325 MG/1
975 TABLET ORAL EVERY 8 HOURS SCHEDULED
Status: DISCONTINUED | OUTPATIENT
Start: 2019-06-29 | End: 2019-07-08 | Stop reason: HOSPADM

## 2019-06-29 RX ORDER — LIDOCAINE 50 MG/G
1 PATCH TOPICAL DAILY
Status: DISCONTINUED | OUTPATIENT
Start: 2019-06-29 | End: 2019-07-08 | Stop reason: HOSPADM

## 2019-06-29 RX ORDER — GABAPENTIN 100 MG/1
100 CAPSULE ORAL 3 TIMES DAILY
Status: DISCONTINUED | OUTPATIENT
Start: 2019-06-29 | End: 2019-07-08 | Stop reason: HOSPADM

## 2019-06-29 RX ORDER — MORPHINE SULFATE 4 MG/ML
4 INJECTION, SOLUTION INTRAMUSCULAR; INTRAVENOUS EVERY 4 HOURS PRN
Status: DISCONTINUED | OUTPATIENT
Start: 2019-06-29 | End: 2019-07-02

## 2019-06-29 RX ADMIN — AZITHROMYCIN MONOHYDRATE 500 MG: 500 INJECTION, POWDER, LYOPHILIZED, FOR SOLUTION INTRAVENOUS at 18:50

## 2019-06-29 RX ADMIN — METHOCARBAMOL TABLETS 750 MG: 750 TABLET, COATED ORAL at 12:08

## 2019-06-29 RX ADMIN — VITAMIN D, TAB 1000IU (100/BT) 1000 UNITS: 25 TAB at 08:42

## 2019-06-29 RX ADMIN — PANTOPRAZOLE SODIUM 40 MG: 40 TABLET, DELAYED RELEASE ORAL at 06:01

## 2019-06-29 RX ADMIN — METHOCARBAMOL TABLETS 750 MG: 750 TABLET, COATED ORAL at 18:06

## 2019-06-29 RX ADMIN — OXYCODONE HYDROCHLORIDE 15 MG: 5 TABLET ORAL at 05:59

## 2019-06-29 RX ADMIN — ACETAMINOPHEN 975 MG: 325 TABLET, FILM COATED ORAL at 21:38

## 2019-06-29 RX ADMIN — OXYCODONE HYDROCHLORIDE 15 MG: 5 TABLET ORAL at 00:29

## 2019-06-29 RX ADMIN — LIDOCAINE 1 PATCH: 50 PATCH TOPICAL at 12:06

## 2019-06-29 RX ADMIN — HEPARIN SODIUM AND DEXTROSE 15 UNITS/KG/HR: 10000; 5 INJECTION INTRAVENOUS at 08:40

## 2019-06-29 RX ADMIN — SENNOSIDES 8.6 MG: 8.6 TABLET, FILM COATED ORAL at 08:41

## 2019-06-29 RX ADMIN — INSULIN LISPRO 20 UNITS: 100 INJECTION, SOLUTION INTRAVENOUS; SUBCUTANEOUS at 08:43

## 2019-06-29 RX ADMIN — METHOCARBAMOL TABLETS 750 MG: 750 TABLET, COATED ORAL at 08:41

## 2019-06-29 RX ADMIN — NICOTINE 1 PATCH: 14 PATCH TRANSDERMAL at 08:41

## 2019-06-29 RX ADMIN — MORPHINE SULFATE 15 MG: 15 TABLET, FILM COATED, EXTENDED RELEASE ORAL at 08:42

## 2019-06-29 RX ADMIN — MORPHINE SULFATE 15 MG: 15 TABLET, FILM COATED, EXTENDED RELEASE ORAL at 21:38

## 2019-06-29 RX ADMIN — BUPROPION HYDROCHLORIDE 150 MG: 150 TABLET, FILM COATED, EXTENDED RELEASE ORAL at 08:42

## 2019-06-29 RX ADMIN — ACETAMINOPHEN 975 MG: 325 TABLET, FILM COATED ORAL at 13:16

## 2019-06-29 RX ADMIN — GABAPENTIN 100 MG: 100 CAPSULE ORAL at 18:06

## 2019-06-29 RX ADMIN — ATORVASTATIN CALCIUM 10 MG: 10 TABLET, FILM COATED ORAL at 18:06

## 2019-06-29 RX ADMIN — DOCUSATE SODIUM 100 MG: 100 CAPSULE, LIQUID FILLED ORAL at 18:06

## 2019-06-29 RX ADMIN — GABAPENTIN 100 MG: 100 CAPSULE ORAL at 12:06

## 2019-06-29 RX ADMIN — INSULIN LISPRO 10 UNITS: 100 INJECTION, SOLUTION INTRAVENOUS; SUBCUTANEOUS at 14:33

## 2019-06-29 RX ADMIN — INSULIN LISPRO 4 UNITS: 100 INJECTION, SOLUTION INTRAVENOUS; SUBCUTANEOUS at 08:43

## 2019-06-29 RX ADMIN — MORPHINE SULFATE 4 MG: 4 INJECTION INTRAVENOUS at 18:19

## 2019-06-29 RX ADMIN — GABAPENTIN 100 MG: 100 CAPSULE ORAL at 21:38

## 2019-06-29 RX ADMIN — METHOCARBAMOL TABLETS 750 MG: 750 TABLET, COATED ORAL at 21:38

## 2019-06-29 RX ADMIN — MORPHINE SULFATE 4 MG: 4 INJECTION INTRAVENOUS at 11:16

## 2019-06-29 RX ADMIN — INSULIN LISPRO 4 UNITS: 100 INJECTION, SOLUTION INTRAVENOUS; SUBCUTANEOUS at 00:32

## 2019-06-29 RX ADMIN — BUDESONIDE AND FORMOTEROL FUMARATE DIHYDRATE 2 PUFF: 160; 4.5 AEROSOL RESPIRATORY (INHALATION) at 08:43

## 2019-06-29 RX ADMIN — DOCUSATE SODIUM 100 MG: 100 CAPSULE, LIQUID FILLED ORAL at 08:42

## 2019-06-29 NOTE — PLAN OF CARE
Problem: Potential for Falls  Goal: Patient will remain free of falls  Description  INTERVENTIONS:  - Assess patient frequently for physical needs  -  Identify cognitive and physical deficits and behaviors that affect risk of falls    -  [place] fall precautions as indicated by assessment   - Educate patient/family on patient safety including physical limitations  - Instruct patient to call for assistance with activity based on assessment  - Modify environment to reduce risk of injury  - Consider OT/PT consult to assist with strengthening/mobility  Outcome: Progressing     Problem: PAIN - ADULT  Goal: Verbalizes/displays adequate comfort level or baseline comfort level  Description  Interventions:  - Encourage patient to monitor pain and request assistance  - Assess pain using appropriate pain scale  - Administer analgesics based on type and severity of pain and evaluate response  - Implement non-pharmacological measures as appropriate and evaluate response  - Consider cultural and social influences on pain and pain management  - Notify physician/advanced practitioner if interventions unsuccessful or patient reports new pain  Outcome: Progressing     Problem: INFECTION - ADULT  Goal: Absence or prevention of progression during hospitalization  Description  INTERVENTIONS:  - Assess and monitor for signs and symptoms of infection  - Monitor lab/diagnostic results  - Monitor all insertion sites, i e  indwelling lines, tubes, and drains  - Monitor endotracheal (as able) and nasal secretions for changes in amount and color  - [place] appropriate cooling/warming therapies per order  - Administer medications as ordered  - Instruct and encourage patient and family to use good hand hygiene technique  - Identify and instruct in appropriate isolation precautions for identified infection/condition  Outcome: Progressing  Goal: Absence of fever/infection during neutropenic period  Description  INTERVENTIONS:  - Monitor WBC  - Implement neutropenic guidelines  Outcome: Progressing     Problem: SAFETY ADULT  Goal: Maintain or return to baseline ADL function  Description  INTERVENTIONS:  -  Assess patient's ability to carry out ADLs; assess patient's baseline for ADL function and identify physical deficits which impact ability to perform ADLs (bathing, care of mouth/teeth, toileting, grooming, dressing, etc )  - Assess/evaluate cause of self-care deficits   - Assess range of motion  - Assess patient's mobility; develop plan if impaired  - Assess patient's need for assistive devices and provide as appropriate  - Encourage maximum independence but intervene and supervise when necessary  ¯ Involve family in performance of ADLs  ¯ Assess for home care needs following discharge   ¯ Request OT consult to assist with ADL evaluation and planning for discharge  ¯ Provide patient education as appropriate  Outcome: Progressing  Goal: Maintain or return mobility status to optimal level  Description  INTERVENTIONS:  - Assess patient's baseline mobility status (ambulation, transfers, stairs, etc )    - Identify cognitive and physical deficits and behaviors that affect mobility  - Identify mobility aids required to assist with transfers and/or ambulation (gait belt, sit-to-stand, lift, walker, cane, etc )  - [place] fall precautions as indicated by assessment  - Record patient progress and toleration of activity level on Mobility SBAR; progress patient to next Phase/Stage  - Instruct patient to call for assistance with activity based on assessment  - Request Rehabilitation consult to assist with strengthening/weightbearing, etc   Outcome: Progressing     Problem: DISCHARGE PLANNING  Goal: Discharge to home or other facility with appropriate resources  Description  INTERVENTIONS:  - Identify barriers to discharge w/patient and caregiver  - Arrange for needed discharge resources and transportation as appropriate  - Identify discharge learning needs (meds, wound care, etc )  - Arrange for interpretive services to assist at discharge as needed  - Refer to Case Management Department for coordinating discharge planning if the patient needs post-hospital services based on physician/advanced practitioner order or complex needs related to functional status, cognitive ability, or social support system  Outcome: Progressing     Problem: Knowledge Deficit  Goal: Patient/family/caregiver demonstrates understanding of disease process, treatment plan, medications, and discharge instructions  Description  Complete learning assessment and assess knowledge base    Interventions:  - Provide teaching at level of understanding  - Provide teaching via preferred learning methods  Outcome: Progressing

## 2019-06-29 NOTE — CONSULTS
Consultation - Cardiology   Laura Vo 60 y o  male MRN: [id number]  Unit/Bed#: -01 Encounter: [id number]  06/29/19  2:36 PM    Assessment/ Plan:  1- Acute pulmonary embolism, numerous severely diffusely distributed pulmonary emboli at the level of the lobar and segmental arteries bilaterally  Also with CT evidence of blastic metastasis; Oncology consult pending  Echocardiogram to evaluate for right heart strain  Continue IV heparin for now  Will need PO anticoagulation prior to discharge, hold off until oncology workup complete  Will work with case management for affordability  2- Tobacco abuse  Reports he cut back on smoking from 1 pack a day to 8 cigarettes daily  Strongly encouraged tobacco cessation  History of Present Illness   Physician Requesting Consult: Angus Israel MD  Reason for Consult / Principal Problem:  Acute PE  HPI: Laura Vo is a 60y o  year old male who presents with lower back and right hip pain for 2 weeks requiring 4 trips to the ED  Reports pain is getting worse and he is unable to ambulate because of it  He also reports some significant shortness of breath which is getting progressively worse  Was worked up with CTA PE study of chest which was positive for numerous diffuse pulmonary emboli, he was initially tachycardic however heart rates have decreased  He was placed on IV heparin and states his breathing is back to baseline  He denies any chest pain or discomfort, palpitations, pain or swelling in the extremities beyond his baseline  Denies any history of cardiac illness  Does have family history of mother with valvular disease (AVR and bioprosthetic mitral valve)  Of note, CT did show evidence of blastic metastasis to his spine  Oncology was consulted for further analysis       Inpatient consult to Cardiology  Consult performed by: Jessie Rodgers PA-C  Consult ordered by: KATIE Medeiros      EKG:  Normal sinus rhythm      Review of Systems   Constitutional: Negative for appetite change, chills, diaphoresis, fatigue and fever  Respiratory: Negative for cough, chest tightness and shortness of breath  Cardiovascular: Negative for chest pain, palpitations and leg swelling  Gastrointestinal: Negative for diarrhea, nausea and vomiting  Endocrine: Negative for cold intolerance and heat intolerance  Genitourinary: Negative for difficulty urinating, dysuria and enuresis  Musculoskeletal: Negative for arthralgias, back pain and gait problem  Allergic/Immunologic: Negative for environmental allergies and food allergies  Neurological: Negative for dizziness, facial asymmetry and headaches  Hematological: Negative for adenopathy  Does not bruise/bleed easily  Psychiatric/Behavioral: Negative for agitation, behavioral problems and confusion  Historical Information   Past Medical History:   Diagnosis Date    Chronic pain syndrome     COPD (chronic obstructive pulmonary disease) (HCC)     Diabetes mellitus (HCC)     Diverticulitis     Hyperlipidemia     Hypertension     Sleep apnea      Past Surgical History:   Procedure Laterality Date    AMPUTATION Left     toe     HEMICOLECTOMY      MI AMPUTATION FOOT,TRANSMETATARSAL Right 10/16/2017    Procedure: AMPUTATION TRANSMETATARSAL (TMA);  Surgeon: Aureliano Redd DPM;  Location: [facility];  Service: Podiatry    MI COLONOSCOPY FLX DX W/COLLJ SPEC WHEN PFRMD N/A 4/16/2019    Procedure: COLONOSCOPY;  Surgeon: Inderjit Whipple DO;  Location: [facility];   Service: Gastroenterology    TREATMENT FISTULA ANAL       Social History     Substance and Sexual Activity   Alcohol Use Never    Frequency: Never     Social History     Substance and Sexual Activity   Drug Use No     Social History     Tobacco Use   Smoking Status Current Every Day Smoker    Packs/day: 0 50   Smokeless Tobacco Never Used       Family History:   Family History   Family history unknown: Yes       Meds/Allergies   current meds: Current Facility-Administered Medications   Medication Dose Route Frequency    acetaminophen (TYLENOL) tablet 975 mg  975 mg Oral Q8H [address]    albuterol (PROVENTIL HFA,VENTOLIN HFA) inhaler 2 puff  2 puff Inhalation Q4H PRN    atorvastatin (LIPITOR) tablet 10 mg  10 mg Oral Daily    azithromycin (ZITHROMAX) 500 mg in sodium chloride 0 9% 250mL IVPB 500 mg  500 mg Intravenous Q24H    budesonide-formoterol (SYMBICORT) 160-4 5 mcg/act inhaler 2 puff  2 puff Inhalation BID    buPROPion (WELLBUTRIN XL) 24 hr tablet 150 mg  150 mg Oral Daily    cholecalciferol (VITAMIN D3) tablet 1,000 Units  1,000 Units Oral Daily    docusate sodium (COLACE) capsule 100 mg  100 mg Oral BID    gabapentin (NEURONTIN) capsule 100 mg  100 mg Oral TID    heparin (porcine) 25,000 units in 250 mL infusion (premix)  3-30 Units/kg/hr (Order-Specific) Intravenous Titrated    heparin (porcine) injection 10,000 Units  10,000 Units Intravenous PRN    heparin (porcine) injection 5,000 Units  5,000 Units Intravenous PRN    insulin glargine (LANTUS) subcutaneous injection 85 Units 0 85 mL  85 Units Subcutaneous HS    insulin lispro (HumaLOG) 100 units/mL subcutaneous injection 1-5 Units  1-5 Units Subcutaneous HS    insulin lispro (HumaLOG) 100 units/mL subcutaneous injection 2-12 Units  2-12 Units Subcutaneous TID AC    insulin lispro (HumaLOG) 100 units/mL subcutaneous injection 20 Units  20 Units Subcutaneous TID With Meals    lidocaine (LIDODERM) 5 % patch 1 patch  1 patch Topical Daily    methocarbamol (ROBAXIN) tablet 750 mg  750 mg Oral 4x Daily    morphine (MS CONTIN) ER tablet 15 mg  15 mg Oral Q12H LEONCIO    morphine (PF) 4 mg/mL injection 4 mg  4 mg Intravenous Q4H PRN    nicotine (NICODERM CQ) 14 mg/24hr TD 24 hr patch 1 patch  1 patch Transdermal Daily    oxyCODONE (ROXICODONE) IR tablet 15 mg  15 mg Oral Q4H PRN    pantoprazole (PROTONIX) EC tablet 40 mg  40 mg Oral Early Morning    senna (SENOKOT) tablet 8 6 mg  1 tablet Oral Daily     No Known Allergies    Objective   Vitals: Blood pressure 128/73, pulse 96, temperature 98 4 °F (36 9 °C), temperature source Oral, resp  rate 18, height 6' (1 829 m), weight 119 kg (262 lb 5 6 oz), SpO2 (!) 89 %  , Body mass index is 35 58 kg/m² ,   Orthostatic Blood Pressures      Most Recent Value   Blood Pressure  128/73 filed at 06/29/2019 0821   Patient Position - Orthostatic VS  Sitting filed at 06/29/2019 4079          Systolic (39XMF), SSU:201 , Min:102 , UDD:765     Diastolic (46STG), SFK:94, Min:56, Max:75        Intake/Output Summary (Last 24 hours) at 6/29/2019 1436  Last data filed at 6/29/2019 0301  Gross per 24 hour   Intake    Output 950 ml   Net -950 ml       Invasive Devices     Peripheral Intravenous Line            Peripheral IV 06/28/19 Right Antecubital less than 1 day                    Physical Exam:  GEN: Alert and oriented x 3, in no acute distress  Well appearing and well nourished  HEENT: Sclera anicteric, conjunctivae pink, mucous membranes moist  Oropharynx clear  NECK: Supple, no carotid bruits, no significant JVD  Trachea midline, no thyromegaly  HEART: Regular rhythm, normal S1 and S2, no murmurs, clicks, gallops or rubs  PMI nondisplaced, no thrills  LUNGS: Clear to auscultation bilaterally; no wheezes, rales, or rhonchi  No increased work of breathing or signs of respiratory distress  ABDOMEN: Soft, nontender, nondistended, normoactive bowel sounds  EXTREMITIES: Skin warm and well perfused, no clubbing, cyanosis, or edema  NEURO: No focal findings  Normal speech  Mood and affect normal    SKIN: Normal without suspicious lesions on exposed skin        Lab Results:     Troponins:   Results from last 7 days   Lab Units 06/29/19  0204 06/28/19  2230 06/28/19  1645   TROPONIN I ng/mL 0 02 0 04 0 02       CBC with diff:   Results from last 7 days   Lab Units 06/29/19  0604 06/28/19  1645   WBC Thousand/uL 23 71* 19 23*   HEMOGLOBIN g/dL 12 1 12 4 HEMATOCRIT % 37 6 38 8   MCV fL 81* 80*   PLATELETS Thousands/uL 206 211   MCH pg 25 9* 25 5*   MCHC g/dL 32 2 32 0   RDW % 20 5* 20 5*   MPV fL 11 3 11 5   NRBC AUTO /100 WBCs  --  0         CMP:   Results from last 7 days   Lab Units 06/29/19  0604 06/28/19  1645   POTASSIUM mmol/L 3 6 4 2   CHLORIDE mmol/L 102 98*   CO2 mmol/L 29 30   BUN mg/dL 20 23   CREATININE mg/dL 0 73 1 03   CALCIUM mg/dL 8 6 8 5   AST U/L 34 43   ALT U/L 33 37   ALK PHOS U/L 612* 677*   EGFR ml/min/1 73sq m 99 77

## 2019-06-29 NOTE — PHYSICAL THERAPY NOTE
Physical Therapy Cancellation Note    PT order received  Chart review performed: reveals pt is admitted & dx with acute PE  Will cancel x24 hour following initiation of anticoagulation per department policy  At this time, PT evaluation cancelled  PT will follow and evaluate as appropriate      Yesenia Bailon, PT, DPT

## 2019-06-29 NOTE — ASSESSMENT & PLAN NOTE
Encourage good pulmonary hygiene  Respiratory protocol, incentive spirometry  Secondary to pulmonary embolism    Echocardiogram

## 2019-06-29 NOTE — PLAN OF CARE
PT refused his Humalog this evening, Pt explained the importance to have his insulin  his poc-443 this evening, pt took his Lantus and requested to have it at a later time  Dr Diane Bailey made aware  Will recheck poc again later and offer insulin dose again

## 2019-06-29 NOTE — PROGRESS NOTES
Progress Note - Celia Salinas 1955, 60 y o  male MRN: [id number]    Unit/Bed#: -01 Encounter: [id number]    Primary Care Provider: Linda Pearson MD   Date and time admitted to hospital: 6/28/2019  3:14 PM        Spine metastasis [facility])  Assessment & Plan  Looks to be metastatic disease to the spine  Will check a PSA level  Will also check an SPEP and a UPEP  Oncology consultation on Monday  Respiratory insufficiency  Assessment & Plan  Encourage good pulmonary hygiene  Respiratory protocol, incentive spirometry  Secondary to pulmonary embolism  Echocardiogram     Chronic prescription opiate use  Assessment & Plan  Continue home doses of opioids  Will add some p r n  Medications here as well  Add Neurontin    Type 2 diabetes mellitus with foot ulcer, with long-term current use of insulin (HCC)  Assessment & Plan  Lab Results   Component Value Date    HGBA1C 8 8 (H) 10/14/2017       No results for input(s): POCGLU in the last 72 hours  Blood Sugar Average: Last 72 hrs:   continue long-acting insulin  Continue short-acting insulin schedule 20 units with meals  Start sliding scale insulin    Morbid obesity (HCC)  Assessment & Plan  Encourage lifestyle changes  Consult Nutrition    Tobacco abuse  Assessment & Plan  Encourage smoking cessation  Nicotine replacement    * Acute pulmonary embolism with acute cor pulmonale (HCC)  Assessment & Plan  Begin heparin infusion  Trend troponins, check echocardiogram  Consult Cardiology  Continue monitor closely  Question of cirrhosis as well  VTE Pharmacologic Prophylaxis:   Pharmacologic: Heparin Drip  Mechanical VTE Prophylaxis in Place: Yes    Patient Centered Rounds: I have performed bedside rounds with nursing staff today      Discussions with Specialists or Other Care Team Provider:  Cardiology    Education and Discussions with Family / Patient:  Discussed with the mother    Time Spent for Care:  32 minutes  More than 50% of total time spent on counseling and coordination of care as described above  Current Length of Stay: 1 day(s)    Current Patient Status: Inpatient   Certification Statement: The patient will continue to require additional inpatient hospital stay due to Having pretty significant pulmonary emboli needing a heparin drip    Discharge Plan:  Patient will be here at least another 3-4 days    Code Status: Level 1 - Full Code      Subjective:   Patient seen examined  Complaining of intractable back pain  Objective:     Vitals:   Temp (24hrs), Av 7 °F (37 1 °C), Min:98 4 °F (36 9 °C), Max:99 °F (37 2 °C)    Temp:  [98 4 °F (36 9 °C)-99 °F (37 2 °C)] 98 4 °F (36 9 °C)  HR:  [] 96  Resp:  [18-24] 18  BP: (102-142)/(56-75) 128/73  SpO2:  [86 %-95 %] 89 %  Body mass index is 35 58 kg/m²  Input and Output Summary (last 24 hours):        Intake/Output Summary (Last 24 hours) at 2019 1101  Last data filed at 2019 0301  Gross per 24 hour   Intake    Output 950 ml   Net -950 ml       Physical Exam:     Physical Exam  (   General Appearance:    Alert, cooperative, no distress, appears stated age                               Lungs:    Decreased breath sounds at the bases       Heart:    Regular rate and rhythm, S1 and S2 normal, no murmur, rub    or gallop   Abdomen:     Soft, non-tender, bowel sounds active all four quadrants,     no masses, no organomegaly           Extremities:   Extremities normal, atraumatic, no cyanosis or edema   Pulses:   2+ and symmetric all extremities   Skin:  Multiple foot lesions       Additional Data:     Labs:    Results from last 7 days   Lab Units 19  0604 19  1645   WBC Thousand/uL 23 71* 19 23*   HEMOGLOBIN g/dL 12 1 12 4   HEMATOCRIT % 37 6 38 8   PLATELETS Thousands/uL 206 211   NEUTROS PCT %  --  82*   LYMPHS PCT %  --  11*   MONOS PCT %  --  6   EOS PCT %  --  0     Results from last 7 days   Lab Units 19  0604   SODIUM mmol/L 138   POTASSIUM mmol/L 3 6   CHLORIDE mmol/L 102   CO2 mmol/L 29   BUN mg/dL 20   CREATININE mg/dL 0 73   ANION GAP mmol/L 7   CALCIUM mg/dL 8 6   ALBUMIN g/dL 2 3*   TOTAL BILIRUBIN mg/dL 1 40*   ALK PHOS U/L 612*   ALT U/L 33   AST U/L 34   GLUCOSE RANDOM mg/dL 211*     Results from last 7 days   Lab Units 06/28/19  1645   INR  1 80*     Results from last 7 days   Lab Units 06/29/19  0608 06/29/19  0021 06/28/19  2142   POC GLUCOSE mg/dl 222* 366* 443*                   * I Have Reviewed All Lab Data Listed Above  * Additional Pertinent Lab Tests Reviewed:  [place] laura 66 Admission Reviewed    Imaging:    Imaging Reports Reviewed Today Include:  Reviewed      Recent Cultures (last 7 days):           Last 24 Hours Medication List:     Current Facility-Administered Medications:  acetaminophen 975 mg Oral Q8H [address] Job Downing MD    albuterol 2 puff Inhalation Q4H PRN Marlo Chu MD    atorvastatin 10 mg Oral Daily Moss Romie, CRNP    azithromycin 500 mg Intravenous Q24H Moss Romie, CRNP Last Rate: 500 mg (06/28/19 2005)   budesonide-formoterol 2 puff Inhalation BID Moss Romie, CRNP    buPROPion 150 mg Oral Daily Moss Romie, CRNP    cholecalciferol 1,000 Units Oral Daily Moss Romie, CRNP    docusate sodium 100 mg Oral BID Moss Romie, CRNP    gabapentin 100 mg Oral TID Maria Victoria Landers MD    heparin (porcine) 3-30 Units/kg/hr (Order-Specific) Intravenous Titrated Moss Romie, CRNP Last Rate: 15 Units/kg/hr (06/29/19 0840)   heparin (porcine) 10,000 Units Intravenous PRN Moss Romie, CRNP    heparin (porcine) 5,000 Units Intravenous PRN Moss Romie, CRNP    insulin glargine 85 Units Subcutaneous HS Moss Romie, CRNP    insulin lispro 1-5 Units Subcutaneous HS Moss Romie, CRNP    insulin lispro 2-12 Units Subcutaneous TID AC KATIE Quintanilla    insulin lispro 20 Units Subcutaneous TID With Meals Moss Romie, CRNP    lidocaine 1 patch Topical Daily Maria Victoria Landers MD methocarbamol 750 mg Oral 4x Daily KATIE Dexter    morphine 15 mg Oral Q12H [address] KATIE Dexter    morphine injection 4 mg Intravenous Q4H PRN Angel Julien MD    nicotine 1 patch Transdermal Daily KATIE Dexter    oxyCODONE 15 mg Oral Q4H PRN KATIE Dexter    pantoprazole 40 mg Oral Early Morning KATIE Dexter    senna 1 tablet Oral Daily KATIE Dexter         Today, Patient Was Seen By: Angel Julien MD    ** Please Note: Dictation voice to text software may have been used in the creation of this document   **

## 2019-06-29 NOTE — H&P
History and Physical - [facility] Internal Medicine    Patient Information: Jessi Zurita 60 y o  male MRN: [id number]  Unit/Bed#: ED 27 Encounter: [id number]  Admitting Physician: KATIE Patterson  PCP: Lilia Zeng MD  Date of Admission:  06/28/19    Assessment/Plan:    Hospital Problem List:     Principal Problem:    Acute pulmonary embolism with acute cor pulmonale ([facility] )  Active Problems:    Tobacco abuse    Morbid obesity ([facility] )    Type 2 diabetes mellitus with foot ulcer, with long-term current use of insulin ([facility] )    Chronic prescription opiate use    Respiratory insufficiency    Respiratory insufficiency  Assessment & Plan  Encourage good pulmonary hygiene  Respiratory protocol, incentive spirometry    Chronic prescription opiate use  Assessment & Plan  Continue home doses of opioids    Type 2 diabetes mellitus with foot ulcer, with long-term current use of insulin ([place] [address] )  Assessment & Plan  Lab Results   Component Value Date    HGBA1C 8 8 (H) 10/14/2017       No results for input(s): POCGLU in the last 72 hours  Blood Sugar Average: Last 72 hrs:   continue long-acting insulin  Continue short-acting insulin schedule 20 units with meals  Start sliding scale insulin    Morbid obesity (HCC)  Assessment & Plan  Encourage lifestyle changes  Consult Nutrition    Tobacco abuse  Assessment & Plan  Encourage smoking cessation  Nicotine replacement    * Acute pulmonary embolism with acute cor pulmonale (HCC)  Assessment & Plan  Begin heparin infusion  Trend troponins, check echocardiogram  Consult Cardiology    VTE Prophylaxis: Heparin Drip  / sequential compression device   Code Status:  Level 1  POLST: POLST form is not discussed and not completed at this time  Anticipated Length of Stay:  Patient will be admitted on an Inpatient basis with an anticipated length of stay of  less than 2 midnights     Justification for Hospital Stay:  Pulmonary emboli    Total Time for Visit, including Counseling / Coordination of Care: 30 minutes  Greater than 50% of this total time spent on direct patient counseling and coordination of care  Chief Complaint:   Right hip pain    History of Present Illness:    Carina Britt is a 60 y o  male who presents on 06/28 with complaints of worsening right hip pain  He has past medical history of hypertension, hyperlipidemia, type 2 diabetes with bilateral foot ulcers, obstructive sleep apnea not compliant with CPAP therapy, ongoing tobacco abuse, and evaluation for posterior neck masses  Per the patient he has had worsening right lower extremity pain to the point that he is no longer able to ambulate  Reports has been refractory to his opioid medications for his chronic lower back pain  The patient does report a history of cancer in his brother with plasmacytosis who passed away 3 years ago  In the emergency room his workup was concerning for pulmonary emboli which was demonstrated on CTA of the chest   He was started on a heparin infusion referred to the [facility] telemetry for continuous monitoring  Review of Systems:    Review of Systems   Constitutional: Positive for activity change (Unable to walk), appetite change ( decreased) and fatigue  Negative for chills, diaphoresis, fever and unexpected weight change  HENT: Negative  Eyes: Negative  Respiratory: Negative  Cardiovascular: Negative  Gastrointestinal: Negative  Endocrine: Negative  Genitourinary: Negative  Musculoskeletal: Positive for arthralgias ( right hip), back pain, gait problem, neck pain and neck stiffness  Negative for joint swelling and myalgias  Skin: Positive for color change and wound  Allergic/Immunologic: Negative  Neurological: Positive for weakness  Hematological: Negative  Psychiatric/Behavioral: Negative          Past Medical and Surgical History:     Past Medical History:   Diagnosis Date    Chronic pain syndrome     [facility]ch[facility] disease) [facility] )     Diabetes mellitus ([facility] )     Diverticulitis     Hyperlipidemia     Hypertension     Sleep apnea        Past Surgical History:   Procedure Laterality Date    AMPUTATION Left     toe     HEMICOLECTOMY      AK AMPUTATION FOOT,TRANSMETATARSAL Right 10/16/2017    Procedure: AMPUTATION TRANSMETATARSAL (TMA);  Surgeon: Melanie Gonzales DPM;  Location: [facility];  Service: Podiatry    AK COLONOSCOPY FLX DX W/COLLJ SPEC WHEN PFRMD N/A 4/16/2019    Procedure: COLONOSCOPY;  Surgeon: Gabriela Fritz DO;  Location: [facility]; Service: Gastroenterology    TREATMENT FISTULA ANAL         Meds/Allergies:    Prior to Admission medications    Medication Sig Start Date End Date Taking?  Authorizing Provider   acarbose (PRECOSE) 100 MG tablet Take 100 mg by mouth 6/5/18 6/5/19  Historical Provider, MD   albuterol (PROVENTIL HFA,VENTOLIN HFA) 90 mcg/act inhaler Inhale 1-2 puffs every 6 (six) hours as needed for wheezing 2/2/19   Jaylyn Whyte MD   aspirin 81 MG tablet Take 1 tablet by mouth daily 10/27/14   Historical Provider, MD   atorvastatin (LIPITOR) 10 mg tablet Take 10 mg by mouth daily    Historical Provider, MD   Blood Glucose Monitoring Suppl (TRUERESULT BLOOD GLUCOSE) w/Device KIT by Does not apply route 2/14/17   Historical Provider, MD   budesonide-formoterol (SYMBICORT) 160-4 5 mcg/act inhaler Inhale 2 puffs 2 (two) times a day 1/2/14   Historical Provider, MD   buPROPion (WELLBUTRIN XL) 150 mg 24 hr tablet Take 150 mg by mouth daily    Historical Provider, MD   chlorthalidone 25 mg tablet Take 25 mg by mouth daily    Historical Provider, MD   cholecalciferol (VITAMIN D3) 1,000 units tablet Take by mouth    Historical Provider, MD   Empagliflozin (JARDIANCE) 25 MG TABS Take 25 mg by mouth every morning    Historical Provider, MD   glucose blood (TRUETEST TEST) test strip by In Vitro route 4/22/16   Historical Provider, MD   glucose blood test strip TEST 3 TIMES DAILY 5/5/18   Historical Provider, MD   glucose blood test strip by In Vitro route 11/1/17   Historical Provider, MD   ibuprofen (MOTRIN) 800 mg tablet Take 1 tablet by mouth 3 (three) times a day 1/2/14   Historical Provider, MD   insulin aspart (NOVOLOG) 100 units/mL injection Inject under the skin 11/11/14   Historical Provider, MD   insulin glargine (LANTUS) 100 units/mL subcutaneous injection Inject under the skin 1/2/14   Historical Provider, MD   Insulin Glargine-Lixisenatide (SOLIQUA) 100-33 UNT-MCG/ML SOPN Inject 15 Units under the skin daily    Historical Provider, MD   insulin NPH-insulin regular (NovoLIN 70/30) 100 units/mL subcutaneous injection Pt uses sliding scale  Max dose 60 units per day   6/5/18   Historical Provider, MD   insulin regular (HumuLIN R,NovoLIN R) 100 units/mL injection Inject 60 Units under the skin 6/5/18   Historical Provider, MD   Insulin Syringe-Needle U-100 (INSULIN SYRINGE 1CC/30GX5/16") 30G X 5/16" 1 ML MISC by Does not apply route 2/28/17   Historical Provider, MD   ipratropium-albuterol (DUO-NEB) 0 5-2 5 mg/3 mL nebulizer solution Take 1 vial (3 mL total) by nebulization every 8 (eight) hours as needed for wheezing or shortness of breath 2/2/19   Viviane Mcnally MD   metFORMIN (GLUCOPHAGE) 1000 MG tablet Take 1,000 mg by mouth 2 (two) times a day with meals    Historical Provider, MD   methocarbamol (ROBAXIN) 750 mg tablet Take 1 tablet (750 mg total) by mouth 4 (four) times a day for 7 days 6/9/19 6/16/19  Anjel Fuentes DO   morphine (MS CONTIN) 30 mg 12 hr tablet Take 30 mg by mouth every 8 (eight) hours    Historical Provider, MD   naproxen (NAPROSYN) 250 mg tablet Take 1 tablet (250 mg total) by mouth 2 (two) times a day with meals 6/10/19   Roxanna Davalos MD   omeprazole (PriLOSEC) 20 mg delayed release capsule Take 1 tablet by mouth daily 1/2/14   Historical Provider, MD   oxyCODONE (ROXICODONE) 15 mg immediate release tablet Take 15 mg by mouth every 4 (four) hours as needed for moderate pain    Historical Provider, MD   saccharomyces boulardii (FLORASTOR) 250 mg capsule Take 1 capsule by mouth 2 (two) times a day 10/20/17   Mireille Baer MD     I have reviewed home medications with patient personally  Allergies: No Known Allergies    Social History:     Marital Status: /Civil Union   Occupation:  Not working  Patient Pre-hospital Living Situation:  At home  Patient Pre-hospital Level of Mobility:  Per patient bed bound last 3 weeks  Patient Pre-hospital Diet Restrictions:  None reported  Substance Use History:   Social History     Substance and Sexual Activity   Alcohol Use No     Social History     Tobacco Use   Smoking Status Current Every Day Smoker    Packs/day: 0 50   Smokeless Tobacco Never Used     Social History     Substance and Sexual Activity   Drug Use No       Family History:    Family History   Family history unknown: Yes       Physical Exam:     Vitals:   Blood Pressure: 102/56 (06/28/19 1903)  Pulse: 93 (06/28/19 1903)  Temperature: 98 9 °F (37 2 °C) (06/28/19 1455)  Temp Source: Oral (06/28/19 1455)  Respirations: (!) 24 (06/28/19 1903)  SpO2: 93 % (06/28/19 1903)    Physical Exam   Constitutional: He is oriented to person, place, and time  He is cooperative  No distress  Nasal cannula in place  HENT:   Head: Normocephalic and atraumatic  Eyes: Pupils are equal, round, and reactive to light  Neck: No JVD present  No tracheal deviation present  Cardiovascular: Regular rhythm  Tachycardia present  Pulmonary/Chest: Effort normal  No respiratory distress  Abdominal: Soft  Bowel sounds are normal  He exhibits distension  There is no tenderness  Musculoskeletal: Normal range of motion  He exhibits edema (1+ lower extremity edema), tenderness and deformity ( bilateral dorsal foot wounds)  Neurological: He is alert and oriented to person, place, and time  GCS eye subscore is 4  GCS verbal subscore is 5  GCS motor subscore is 6     Skin: Skin is warm and dry  He is not diaphoretic  Additional Data:     Lab Results: I have personally reviewed pertinent reports  Results from last 7 days   Lab Units 06/28/19  1645   WBC Thousand/uL 19 23*   HEMOGLOBIN g/dL 12 4   HEMATOCRIT % 38 8   PLATELETS Thousands/uL 211   NEUTROS PCT % 82*   LYMPHS PCT % 11*   MONOS PCT % 6   EOS PCT % 0     Results from last 7 days   Lab Units 06/28/19  1645   POTASSIUM mmol/L 4 2   CHLORIDE mmol/L 98*   CO2 mmol/L 30   BUN mg/dL 23   CREATININE mg/dL 1 03   CALCIUM mg/dL 8 5   ALK PHOS U/L 677*   ALT U/L 37   AST U/L 43     Results from last 7 days   Lab Units 06/28/19  1645   INR  1 80*       Imaging: I have personally reviewed pertinent reports  Xr Chest 2 Views    Result Date: 6/28/2019  Narrative: CHEST INDICATION:   sob  COMPARISON:  2/2/2019 EXAM PERFORMED/VIEWS:  XR CHEST PA & LATERAL FINDINGS: Cardiomediastinal silhouette appears stable  The lungs are clear  No pneumothorax or pleural effusion  Osseous structures appear within normal limits for patient age  Impression: No acute cardiopulmonary disease  Workstation performed: [id number]     Xr Spine Lumbar 2 Or 3 Views Injury    Result Date: 6/16/2019  Narrative: LUMBAR SPINE INDICATION:   pain  COMPARISON:  None VIEWS:  XR SPINE LUMBAR 2 OR 3 VIEWS INJURY FINDINGS: There is no evidence of acute fracture or destructive osseous lesion  Alignment is unremarkable  Diffuse disc space narrowing is present throughout the lumbar spine  Vacuum disc phenomenon seen at the L1-L2 level  Prominent facet degeneration seen extending from L4 through S1  The pedicles appear intact  Atherosclerotic vascular calcifications are seen     Impression: No acute osseous pathology  Multilevel degenerative disc disease  Workstation performed: [id number]     Xr Hip/pelv 2-3 Vws Right    Result Date: 6/10/2019  Narrative: RIGHT HIP INDICATION:   hip pain   COMPARISON:  None VIEWS:  XR HIP/PELV 2-3 VWS RIGHT W PELVIS IF PERFORMED FINDINGS: There is no acute fracture or dislocation  Mild bilateral No lytic or blastic osseous lesions  Bilateral greater trochanter enthesopathy  Small peripheral pelvic calcification(s) which may represent calcified phleboliths  Degenerative changes pubic symphysis and visualized lower lumbar spine  Impression: No acute osseous abnormality  Degenerative changes as described  Workstation performed: [id number]     Cta Ed Chest Pe Study    Result Date: 6/28/2019  Narrative: CTA - CHEST WITH IV CONTRAST - PULMONARY ANGIOGRAM INDICATION:   hypoxia, sob, elevate ddimer  COMPARISON: 6/20/2019 chest x-ray TECHNIQUE: CTA examination of the chest was performed using angiographic technique according to a protocol specifically tailored to evaluate for pulmonary embolism  Axial, sagittal, and coronal 2D reformatted images were created from the source data and  submitted for interpretation  In addition, coronal 3D MIP postprocessing was performed on the acquisition scanner  Radiation dose length product (DLP) for this visit:  550 mGy-cm   This examination, like all CT scans performed in the [facility], was performed utilizing techniques to minimize radiation dose exposure, including the use of iterative reconstruction and automated exposure control  IV Contrast:  100 mL of iohexol (OMNIPAQUE)  FINDINGS: PULMONARY ARTERIAL TREE:  Multiple pulmonary emboli are present bilaterally involving primary branches from the main pulmonary arteries as well as peripheral vessels  LUNGS: Patchy atelectasis left lung base PLEURA:  Unremarkable  HEART/GREAT VESSELS:   The calculated ratio of right ventricular to left ventricular diameter (RV/LV ratio) is 1 04  This is greater than 0 9, which is abnormal and indicates right heart strain  An abnormal RV/LV ratio has been shown to be associated with an increased risk of 30 day mortality in the setting of acute pulmonary embolism  MEDIASTINUM AND ZAYDA:  Unremarkable   CHEST WALL AND LOWER NECK:   Unremarkable  VISUALIZED STRUCTURES IN THE UPPER ABDOMEN:  Probable cirrhosis and mild ascites OSSEOUS STRUCTURES: Numerous sclerotic densities throughout the visualized thoracic spinal segments suspicious for blastic metastases  Impression: Numerous fairly diffusely distributed pulmonary emboli the level of the lobar and segmental arteries bilaterally Mild right heart strain Suspected cirrhosis and ascites Multiple thoracic vertebral body densities, likely blastic metastases Findings personally discussed by phone with Brooks Zayas at 7:10 PM, 6/28/2019 Workstation performed: [id number]       EKG, Pathology, and Other Studies Reviewed on Admission:   · EKG:  Review of telemetry demonstrates sinus rhythm    Allscripts / Epic Records Reviewed: Yes     ** Please Note: This note has been constructed using a voice recognition system   **

## 2019-06-29 NOTE — ASSESSMENT & PLAN NOTE
Lab Results   Component Value Date    HGBA1C 8 8 (H) 10/14/2017       No results for input(s): POCGLU in the last 72 hours      Blood Sugar Average: Last 72 hrs:   continue long-acting insulin  Continue short-acting insulin schedule 20 units with meals  Start sliding scale insulin

## 2019-06-29 NOTE — ASSESSMENT & PLAN NOTE
Begin heparin infusion  Trend troponins, check echocardiogram  Consult Cardiology  Continue monitor closely

## 2019-06-29 NOTE — ASSESSMENT & PLAN NOTE
Looks to be metastatic disease to the spine  Will check a PSA level  Will also check an SPEP and a UPEP  Oncology consultation on Monday

## 2019-06-29 NOTE — ED NOTES
1  CC: back pain, sob, denies cp  2  OS: alert and oriented x 4  3  Abnormal labs/vitals, focused assessment: ct scan of chest shows multiple PE  4  Medications/drips: heparin drip 18 units/kg/hr running at 22 5 mL/hr, Azithromycin 500 mg running 250 ml/hr  5  Narcotic time/pain: rates back and hip pain at 8  6  IV lines/drains/etc: 20 gauge on right AC and hand  7  Isolation status: standard  8  Skin: open wounds on bilateral feet  9  Ambulation status: Assist x 1, uses walker at home  10  Trauma: no  11   Phone number: [phone]     Mari Barrett RN  06/28/19 2100

## 2019-06-30 ENCOUNTER — APPOINTMENT (INPATIENT)
Dept: ULTRASOUND IMAGING | Facility: HOSPITAL | Age: 64
DRG: 167 | End: 2019-06-30
Payer: MEDICARE

## 2019-06-30 ENCOUNTER — APPOINTMENT (INPATIENT)
Dept: NON INVASIVE DIAGNOSTICS | Facility: HOSPITAL | Age: 64
DRG: 167 | End: 2019-06-30
Payer: MEDICARE

## 2019-06-30 LAB
ALBUMIN SERPL BCP-MCNC: 2.1 G/DL (ref 3.5–5)
ALP SERPL-CCNC: 559 U/L (ref 46–116)
ALT SERPL W P-5'-P-CCNC: 29 U/L (ref 12–78)
ANION GAP SERPL CALCULATED.3IONS-SCNC: 7 MMOL/L (ref 4–13)
APTT PPP: 58 SECONDS (ref 23–37)
APTT PPP: 68 SECONDS (ref 23–37)
APTT PPP: 70 SECONDS (ref 23–37)
APTT PPP: 94 SECONDS (ref 23–37)
AST SERPL W P-5'-P-CCNC: 31 U/L (ref 5–45)
ATRIAL RATE: 100 BPM
BILIRUB SERPL-MCNC: 0.9 MG/DL (ref 0.2–1)
BUN SERPL-MCNC: 16 MG/DL (ref 5–25)
CALCIUM SERPL-MCNC: 8.6 MG/DL (ref 8.3–10.1)
CHLORIDE SERPL-SCNC: 103 MMOL/L (ref 100–108)
CO2 SERPL-SCNC: 29 MMOL/L (ref 21–32)
CREAT SERPL-MCNC: 0.75 MG/DL (ref 0.6–1.3)
ERYTHROCYTE [DISTWIDTH] IN BLOOD BY AUTOMATED COUNT: 21 % (ref 11.6–15.1)
GFR SERPL CREATININE-BSD FRML MDRD: 98 ML/MIN/1.73SQ M
GLUCOSE SERPL-MCNC: 146 MG/DL (ref 65–140)
GLUCOSE SERPL-MCNC: 164 MG/DL (ref 65–140)
GLUCOSE SERPL-MCNC: 228 MG/DL (ref 65–140)
GLUCOSE SERPL-MCNC: 41 MG/DL (ref 65–140)
GLUCOSE SERPL-MCNC: 44 MG/DL (ref 65–140)
GLUCOSE SERPL-MCNC: 49 MG/DL (ref 65–140)
GLUCOSE SERPL-MCNC: 69 MG/DL (ref 65–140)
GLUCOSE SERPL-MCNC: 78 MG/DL (ref 65–140)
GLUCOSE SERPL-MCNC: 94 MG/DL (ref 65–140)
HCT VFR BLD AUTO: 38.5 % (ref 36.5–49.3)
HGB BLD-MCNC: 12 G/DL (ref 12–17)
MCH RBC QN AUTO: 25.6 PG (ref 26.8–34.3)
MCHC RBC AUTO-ENTMCNC: 31.2 G/DL (ref 31.4–37.4)
MCV RBC AUTO: 82 FL (ref 82–98)
P AXIS: 76 DEGREES
PLATELET # BLD AUTO: 230 THOUSANDS/UL (ref 149–390)
PMV BLD AUTO: 12 FL (ref 8.9–12.7)
POTASSIUM SERPL-SCNC: 3.8 MMOL/L (ref 3.5–5.3)
PR INTERVAL: 148 MS
PROT SERPL-MCNC: 6.5 G/DL (ref 6.4–8.2)
QRS AXIS: 19 DEGREES
QRSD INTERVAL: 92 MS
QT INTERVAL: 354 MS
QTC INTERVAL: 456 MS
RBC # BLD AUTO: 4.69 MILLION/UL (ref 3.88–5.62)
SODIUM SERPL-SCNC: 139 MMOL/L (ref 136–145)
T WAVE AXIS: 45 DEGREES
VENTRICULAR RATE: 100 BPM
WBC # BLD AUTO: 16.02 THOUSAND/UL (ref 4.31–10.16)

## 2019-06-30 PROCEDURE — 99231 SBSQ HOSP IP/OBS SF/LOW 25: CPT | Performed by: INTERNAL MEDICINE

## 2019-06-30 PROCEDURE — 80053 COMPREHEN METABOLIC PANEL: CPT | Performed by: FAMILY MEDICINE

## 2019-06-30 PROCEDURE — 85730 THROMBOPLASTIN TIME PARTIAL: CPT | Performed by: FAMILY MEDICINE

## 2019-06-30 PROCEDURE — 93010 ELECTROCARDIOGRAM REPORT: CPT | Performed by: INTERNAL MEDICINE

## 2019-06-30 PROCEDURE — 93970 EXTREMITY STUDY: CPT

## 2019-06-30 PROCEDURE — 82948 REAGENT STRIP/BLOOD GLUCOSE: CPT

## 2019-06-30 PROCEDURE — 94762 N-INVAS EAR/PLS OXIMTRY CONT: CPT

## 2019-06-30 PROCEDURE — 82947 ASSAY GLUCOSE BLOOD QUANT: CPT | Performed by: FAMILY MEDICINE

## 2019-06-30 PROCEDURE — 99232 SBSQ HOSP IP/OBS MODERATE 35: CPT | Performed by: FAMILY MEDICINE

## 2019-06-30 PROCEDURE — 85027 COMPLETE CBC AUTOMATED: CPT | Performed by: FAMILY MEDICINE

## 2019-06-30 RX ORDER — AZITHROMYCIN 250 MG/1
500 TABLET, FILM COATED ORAL EVERY 24 HOURS
Status: DISCONTINUED | OUTPATIENT
Start: 2019-06-30 | End: 2019-07-06

## 2019-06-30 RX ORDER — INSULIN GLARGINE 100 [IU]/ML
75 INJECTION, SOLUTION SUBCUTANEOUS
Status: DISCONTINUED | OUTPATIENT
Start: 2019-06-30 | End: 2019-07-06

## 2019-06-30 RX ADMIN — LIDOCAINE 1 PATCH: 50 PATCH TOPICAL at 08:00

## 2019-06-30 RX ADMIN — PANTOPRAZOLE SODIUM 40 MG: 40 TABLET, DELAYED RELEASE ORAL at 06:49

## 2019-06-30 RX ADMIN — ACETAMINOPHEN 975 MG: 325 TABLET, FILM COATED ORAL at 21:58

## 2019-06-30 RX ADMIN — NICOTINE 1 PATCH: 14 PATCH TRANSDERMAL at 08:06

## 2019-06-30 RX ADMIN — MORPHINE SULFATE 4 MG: 4 INJECTION INTRAVENOUS at 06:48

## 2019-06-30 RX ADMIN — MORPHINE SULFATE 4 MG: 4 INJECTION INTRAVENOUS at 14:52

## 2019-06-30 RX ADMIN — INSULIN LISPRO 2 UNITS: 100 INJECTION, SOLUTION INTRAVENOUS; SUBCUTANEOUS at 09:04

## 2019-06-30 RX ADMIN — ACETAMINOPHEN 975 MG: 325 TABLET, FILM COATED ORAL at 13:26

## 2019-06-30 RX ADMIN — OXYCODONE HYDROCHLORIDE 15 MG: 5 TABLET ORAL at 23:26

## 2019-06-30 RX ADMIN — METHOCARBAMOL TABLETS 750 MG: 750 TABLET, COATED ORAL at 08:00

## 2019-06-30 RX ADMIN — METHOCARBAMOL TABLETS 750 MG: 750 TABLET, COATED ORAL at 13:17

## 2019-06-30 RX ADMIN — GABAPENTIN 100 MG: 100 CAPSULE ORAL at 08:00

## 2019-06-30 RX ADMIN — MORPHINE SULFATE 4 MG: 4 INJECTION INTRAVENOUS at 21:59

## 2019-06-30 RX ADMIN — GABAPENTIN 100 MG: 100 CAPSULE ORAL at 18:16

## 2019-06-30 RX ADMIN — HEPARIN SODIUM AND DEXTROSE 11 UNITS/KG/HR: 10000; 5 INJECTION INTRAVENOUS at 01:07

## 2019-06-30 RX ADMIN — DOCUSATE SODIUM 100 MG: 100 CAPSULE, LIQUID FILLED ORAL at 08:00

## 2019-06-30 RX ADMIN — MORPHINE SULFATE 15 MG: 15 TABLET, FILM COATED, EXTENDED RELEASE ORAL at 20:38

## 2019-06-30 RX ADMIN — BUPROPION HYDROCHLORIDE 150 MG: 150 TABLET, FILM COATED, EXTENDED RELEASE ORAL at 08:00

## 2019-06-30 RX ADMIN — INSULIN LISPRO 20 UNITS: 100 INJECTION, SOLUTION INTRAVENOUS; SUBCUTANEOUS at 09:05

## 2019-06-30 RX ADMIN — HEPARIN SODIUM AND DEXTROSE 13 UNITS/KG/HR: 10000; 5 INJECTION INTRAVENOUS at 19:54

## 2019-06-30 RX ADMIN — VITAMIN D, TAB 1000IU (100/BT) 1000 UNITS: 25 TAB at 08:00

## 2019-06-30 RX ADMIN — SENNOSIDES 8.6 MG: 8.6 TABLET, FILM COATED ORAL at 08:00

## 2019-06-30 RX ADMIN — DOCUSATE SODIUM 100 MG: 100 CAPSULE, LIQUID FILLED ORAL at 18:17

## 2019-06-30 RX ADMIN — METHOCARBAMOL TABLETS 750 MG: 750 TABLET, COATED ORAL at 18:15

## 2019-06-30 RX ADMIN — INSULIN GLARGINE 85 UNITS: 100 INJECTION, SOLUTION SUBCUTANEOUS at 01:02

## 2019-06-30 RX ADMIN — OXYCODONE HYDROCHLORIDE 15 MG: 5 TABLET ORAL at 11:29

## 2019-06-30 RX ADMIN — ACETAMINOPHEN 975 MG: 325 TABLET, FILM COATED ORAL at 06:49

## 2019-06-30 RX ADMIN — INSULIN LISPRO 10 UNITS: 100 INJECTION, SOLUTION INTRAVENOUS; SUBCUTANEOUS at 13:17

## 2019-06-30 RX ADMIN — AZITHROMYCIN 500 MG: 250 TABLET, FILM COATED ORAL at 18:15

## 2019-06-30 RX ADMIN — HEPARIN SODIUM 5000 UNITS: 1000 INJECTION INTRAVENOUS; SUBCUTANEOUS at 07:56

## 2019-06-30 RX ADMIN — GABAPENTIN 100 MG: 100 CAPSULE ORAL at 20:38

## 2019-06-30 RX ADMIN — MORPHINE SULFATE 15 MG: 15 TABLET, FILM COATED, EXTENDED RELEASE ORAL at 08:00

## 2019-06-30 RX ADMIN — OXYCODONE HYDROCHLORIDE 15 MG: 5 TABLET ORAL at 18:16

## 2019-06-30 RX ADMIN — METHOCARBAMOL TABLETS 750 MG: 750 TABLET, COATED ORAL at 21:59

## 2019-06-30 RX ADMIN — INSULIN LISPRO 10 UNITS: 100 INJECTION, SOLUTION INTRAVENOUS; SUBCUTANEOUS at 18:18

## 2019-06-30 NOTE — PROGRESS NOTES
Cardiology Progress Note    Assessment/Plan   Multilobar PE  Right heart strain on CT  Hepatic cirrhosis on CT  T-spine lesions on CT  Non-healing left great toe amputation  HTN  HLD  DM 2  COPD      Continue heparin gtt  Echo, LE Duplex pending, but I do not see a role for catheter directed thrombolysis  Start NOAC/warfarin when no invasive procedures are planned (i e  Biopsy)  Will follow  LOS: 2 days     Subjective   No acute events  No subjective complaints  Objective     Vital signs in last 24 hours:  Temp:  [97 7 °F (36 5 °C)-98 8 °F (37 1 °C)] 97 7 °F (36 5 °C)  HR:  [] 71  Resp:  [17-20] 20  BP: (112-142)/(58-71) 112/58      Intake/Output Summary (Last 24 hours) at 6/30/2019 0859  Last data filed at 6/30/2019 0107  Gross per 24 hour   Intake 240 ml   Output 400 ml   Net -160 ml     Weight (last 2 days)     Date/Time   Weight    06/30/19 0600   119 (262 35)    06/29/19 1035   119 (262 35)    06/28/19 2140   119 (261 91)                Physical Exam:  General: AAOx3, NAD  HEENT: Normocephalic/Atraumatic, No thyromegaly, lymphadenopathy, JVD or carotid bruits  Cardiovasc: Regular rhythm with a normal rate  No murmurs, rubs or gallops  Radial, carotid pulses are 2+/4  Chest/Pulm: Mildly decreased with occasional wheeze  Abdomen: +BSx4, Soft, non-tender  No organomegaly, rebound, rigidity or guarding  Extremities: LE's are wrapped in ACE bandages            Scheduled Meds:  Current Facility-Administered Medications:  acetaminophen 975 mg Oral Q8H [address] Job Saeg MD    albuterol 2 puff Inhalation Q4H PRN Marlo Chu MD    atorvastatin 10 mg Oral Daily Marielena Stormy, KATIE    azithromycin 500 mg Oral Q24H Marielena Stormy, KATIE    budesonide-formoterol 2 puff Inhalation BID Marielena Stormy, KATIE    buPROPion 150 mg Oral Daily Marielena Stormy, KATIE    cholecalciferol 1,000 Units Oral Daily KATIE Quintanilla    docusate sodium 100 mg Oral BID Marielena Stormy, KATIE gabapentin 100 mg Oral TID Carrington Myles MD    heparin (porcine) 3-30 Units/kg/hr (Order-Specific) Intravenous Titrated Floreen Flax, CRNP Last Rate: 13 Units/kg/hr (06/30/19 0740)   heparin (porcine) 10,000 Units Intravenous PRN Floreen Flax, CRNP    heparin (porcine) 5,000 Units Intravenous PRN Floreen Flax, CRNP    insulin glargine 85 Units Subcutaneous HS Floreen Flax, CRNP    insulin lispro 1-5 Units Subcutaneous HS Floreen Flax, CRNP    insulin lispro 2-12 Units Subcutaneous TID AC Kvng Mortensen, CRNP    insulin lispro 20 Units Subcutaneous TID With Meals Floreen Flax, CRNP    lidocaine 1 patch Topical Daily Carrington Myles MD    methocarbamol 750 mg Oral 4x Daily Floreen Flax, CRNP    morphine 15 mg Oral Q12H [address] Floreen Flax, CRNP    morphine injection 4 mg Intravenous Q4H PRN Carrington Myles MD    nicotine 1 patch Transdermal Daily Floreen Flax, CRNP    oxyCODONE 15 mg Oral Q4H PRN Floreen Flax, CRNP    pantoprazole 40 mg Oral Early Morning Floreen Flax, CRNP    senna 1 tablet Oral Daily Floreen Flax, CRNP      Continuous Infusions:  heparin (porcine) 3-30 Units/kg/hr (Order-Specific) Last Rate: 13 Units/kg/hr (06/30/19 0740)     PRN Meds: albuterol    heparin (porcine)    heparin (porcine)    morphine injection    oxyCODONE      Cardiographics  Echo pending      Imaging  CTPE, 6/28/2019  IMPRESSION:  Numerous fairly diffusely distributed pulmonary emboli the level of the lobar and segmental arteries bilaterally     Mild right heart strain     Suspected cirrhosis and ascites     Multiple thoracic vertebral body densities, 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

## 2019-06-30 NOTE — PLAN OF CARE
Problem: Potential for Falls  Goal: Patient will remain free of falls  Description  INTERVENTIONS:  - Assess patient frequently for physical needs  -  Identify cognitive and physical deficits and behaviors that affect risk of falls    -  [place] fall precautions as indicated by assessment   - Educate patient/family on patient safety including physical limitations  - Instruct patient to call for assistance with activity based on assessment  - Modify environment to reduce risk of injury  - Consider OT/PT consult to assist with strengthening/mobility  Outcome: Progressing     Problem: PAIN - ADULT  Goal: Verbalizes/displays adequate comfort level or baseline comfort level  Description  Interventions:  - Encourage patient to monitor pain and request assistance  - Assess pain using appropriate pain scale  - Administer analgesics based on type and severity of pain and evaluate response  - Implement non-pharmacological measures as appropriate and evaluate response  - Consider cultural and social influences on pain and pain management  - Notify physician/advanced practitioner if interventions unsuccessful or patient reports new pain  Outcome: Progressing     Problem: INFECTION - ADULT  Goal: Absence or prevention of progression during hospitalization  Description  INTERVENTIONS:  - Assess and monitor for signs and symptoms of infection  - Monitor lab/diagnostic results  - Monitor all insertion sites, i e  indwelling lines, tubes, and drains  - Monitor endotracheal (as able) and nasal secretions for changes in amount and color  - [place] appropriate cooling/warming therapies per order  - Administer medications as ordered  - Instruct and encourage patient and family to use good hand hygiene technique  - Identify and instruct in appropriate isolation precautions for identified infection/condition  Outcome: Progressing  Goal: Absence of fever/infection during neutropenic period  Description  INTERVENTIONS:  - Monitor WBC  - Implement neutropenic guidelines  Outcome: Progressing     Problem: SAFETY ADULT  Goal: Maintain or return to baseline ADL function  Description  INTERVENTIONS:  -  Assess patient's ability to carry out ADLs; assess patient's baseline for ADL function and identify physical deficits which impact ability to perform ADLs (bathing, care of mouth/teeth, toileting, grooming, dressing, etc )  - Assess/evaluate cause of self-care deficits   - Assess range of motion  - Assess patient's mobility; develop plan if impaired  - Assess patient's need for assistive devices and provide as appropriate  - Encourage maximum independence but intervene and supervise when necessary  ¯ Involve family in performance of ADLs  ¯ Assess for home care needs following discharge   ¯ Request OT consult to assist with ADL evaluation and planning for discharge  ¯ Provide patient education as appropriate  Outcome: Progressing  Goal: Maintain or return mobility status to optimal level  Description  INTERVENTIONS:  - Assess patient's baseline mobility status (ambulation, transfers, stairs, etc )    - Identify cognitive and physical deficits and behaviors that affect mobility  - Identify mobility aids required to assist with transfers and/or ambulation (gait belt, sit-to-stand, lift, walker, cane, etc )  - [place] fall precautions as indicated by assessment  - Record patient progress and toleration of activity level on Mobility SBAR; progress patient to next Phase/Stage  - Instruct patient to call for assistance with activity based on assessment  - Request Rehabilitation consult to assist with strengthening/weightbearing, etc   Outcome: Progressing     Problem: DISCHARGE PLANNING  Goal: Discharge to home or other facility with appropriate resources  Description  INTERVENTIONS:  - Identify barriers to discharge w/patient and caregiver  - Arrange for needed discharge resources and transportation as appropriate  - Identify discharge learning needs (meds, wound care, etc )  - Arrange for interpretive services to assist at discharge as needed  - Refer to Case Management Department for coordinating discharge planning if the patient needs post-hospital services based on physician/advanced practitioner order or complex needs related to functional status, cognitive ability, or social support system  Outcome: Progressing     Problem: Knowledge Deficit  Goal: Patient/family/caregiver demonstrates understanding of disease process, treatment plan, medications, and discharge instructions  Description  Complete learning assessment and assess knowledge base  Interventions:  - Provide teaching at level of understanding  - Provide teaching via preferred learning methods  Outcome: Progressing     Problem: Prexisting or High Potential for Compromised Skin Integrity  Goal: Skin integrity is maintained or improved  Description  INTERVENTIONS:  - Identify patients at risk for skin breakdown  - Assess and monitor skin integrity  - Assess and monitor nutrition and hydration status  - Monitor labs (i e  albumin)  - Assess for incontinence   - Turn and reposition patient  - Assist with mobility/ambulation  - Relieve pressure over bony prominences  - Avoid friction and shearing  - Provide appropriate hygiene as needed including keeping skin clean and dry  - Evaluate need for skin moisturizer/barrier cream  - Collaborate with interdisciplinary team (i e  Nutrition, Rehabilitation, etc )   - Patient/family teaching  Outcome: Progressing     Problem: Nutrition/Hydration-ADULT  Goal: Nutrient/Hydration intake appropriate for improving, restoring or maintaining nutritional needs  Description  Monitor and assess patient's nutrition/hydration status for malnutrition (ex- brittle hair, bruises, dry skin, pale skin and conjunctiva, muscle wasting, smooth red tongue, and disorientation)  Collaborate with interdisciplinary team and initiate plan and interventions as ordered    Monitor patient's weight and dietary intake as ordered or per policy  Determine patient's food preferences and provide high-protein, high-caloric foods as appropriate       INTERVENTIONS:  - Monitor oral intake, urinary output, labs, and treatment plans  - Assess nutrition and hydration status and recommend course of action  - Evaluate amount of meals eaten  - Assist patient with eating if necessary   - Allow adequate time for meals  - Recommend/ encourage appropriate diets, oral nutritional supplements, and vitamin/mineral supplements  - Order, calculate, and assess calorie counts as needed  - Assess need for intravenous fluids  - Provide nutrition/hydration education as appropriate  - Include patient/family/caregiver in decisions related to nutrition   Outcome: Progressing

## 2019-06-30 NOTE — PROGRESS NOTES
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[facility])  Assessment & Plan  Looks to be metastatic disease to the spine  Will check a PSA level  Will also check an SPEP and a UPEP  Oncology consultation on Monday  Respiratory insufficiency  Assessment & Plan  Encourage good pulmonary hygiene  Respiratory protocol, incentive spirometry  Secondary to pulmonary embolism  Echocardiogram   This pending  Cardiology evaluation appreciated  Chronic prescription opiate use  Assessment & Plan  Continue home doses of opioids  Will add some p r n  Medications here as well  Add Neurontin    Type 2 diabetes mellitus with foot ulcer, with long-term current use of insulin [facility])  Assessment & Plan  Lab Results   Component Value Date    HGBA1C 7 9 (H) 06/28/2019       Recent Labs     06/29/19  1917 06/29/19  2113 06/30/19  0057 06/30/19  0644   POCGLU 94 128 228* 164*       Blood Sugar Average: Last 72 hrs:  (P) 183 1 continue long-acting insulin  I did decrease the mealtime since he was hypoglycemic this morning  Start sliding scale insulin    Morbid obesity (HCC)  Assessment & Plan  Encourage lifestyle changes  Consult Nutrition    Tobacco abuse  Assessment & Plan  Encourage smoking cessation  Nicotine replacement    * Acute pulmonary embolism with acute cor pulmonale (HCC)  Assessment & Plan  Begin heparin infusion  Trend troponins, check echocardiogram  Consult Cardiology  Continue monitor closely  VTE Pharmacologic Prophylaxis:   Pharmacologic: Heparin Drip  Mechanical VTE Prophylaxis in Place: Yes    Patient Centered Rounds: I have performed bedside rounds with nursing staff today  Education and Discussions with Family / Patient:  Patient    Time Spent for Care: 20 minutes    More than 50% of total time spent on counseling and coordination of care as described above  Current Length of Stay: 2 day(s)    Current Patient Status: Inpatient   Certification Statement: The patient will continue to require additional inpatient hospital stay due to Needing heparin drip for pretty significant pulmonary emboli in need of an echocardiogram to rule out right heart strain    Discharge Plan:  Patient will be here at least another 48-72 hours    Code Status: Level 1 - Full Code      Subjective:   Patient seen examined  Complains of intractable pain  Objective:     Vitals:   Temp (24hrs), Av 4 °F (36 9 °C), Min:97 7 °F (36 5 °C), Max:98 8 °F (37 1 °C)    Temp:  [97 7 °F (36 5 °C)-98 8 °F (37 1 °C)] 97 7 °F (36 5 °C)  HR:  [] 71  Resp:  [17-20] 20  BP: (112-142)/(58-71) 112/58  SpO2:  [87 %-99 %] 92 %  Body mass index is 35 58 kg/m²  Input and Output Summary (last 24 hours):        Intake/Output Summary (Last 24 hours) at 2019 1157  Last data filed at 2019 0107  Gross per 24 hour   Intake 240 ml   Output 400 ml   Net -160 ml       Physical Exam:     Physical Exam  (   General Appearance:    Alert, cooperative, no distress, appears stated age                               Lungs:     Clear to auscultation bilaterally, respirations unlabored       Heart:    Regular rate and rhythm, S1 and S2 normal, no murmur, rub    or gallop   Abdomen:     Soft, non-tender, bowel sounds active all four quadrants,     no masses, no organomegaly           Extremities:   Extremities normal, atraumatic, no cyanosis or edema   Pulses:   2+ and symmetric all extremities   Skin:   Foot wounds       Additional Data:     Labs:    Results from last 7 days   Lab Units 19  0626  19  1645   WBC Thousand/uL 16 02*   < > 19 23*   HEMOGLOBIN g/dL 12 0   < > 12 4   HEMATOCRIT % 38 5   < > 38 8   PLATELETS Thousands/uL 230   < > 211   NEUTROS PCT %  --   --  82*   LYMPHS PCT %  --   --  11*   MONOS PCT %  --   --  6   EOS PCT %  -- --  0    < > = values in this interval not displayed  Results from last 7 days   Lab Units 06/30/19  0626   SODIUM mmol/L 139   POTASSIUM mmol/L 3 8   CHLORIDE mmol/L 103   CO2 mmol/L 29   BUN mg/dL 16   CREATININE mg/dL 0 75   ANION GAP mmol/L 7   CALCIUM mg/dL 8 6   ALBUMIN g/dL 2 1*   TOTAL BILIRUBIN mg/dL 0 90   ALK PHOS U/L 559*   ALT U/L 29   AST U/L 31   GLUCOSE RANDOM mg/dL 146*     Results from last 7 days   Lab Units 06/28/19  1645   INR  1 80*     Results from last 7 days   Lab Units 06/30/19  0644 06/30/19  0057 06/29/19  2113 06/29/19  1917 06/29/19  1744 06/29/19  1620 06/29/19  1220 06/29/19  0608 06/29/19  0021 06/28/19  2142   POC GLUCOSE mg/dl 164* 228* 128 94 64* 52* 70 222* 366* 443*     Results from last 7 days   Lab Units 06/28/19  1645   HEMOGLOBIN A1C % 7 9*               * I Have Reviewed All Lab Data Listed Above  * Additional Pertinent Lab Tests Reviewed: Jack 66 Admission Reviewed      Recent Cultures (last 7 days):     Results from last 7 days   Lab Units 06/28/19  1645   BLOOD CULTURE  No Growth at 24 hrs  No Growth at 24 hrs         Last 24 Hours Medication List:     Current Facility-Administered Medications:  acetaminophen 975 mg Oral Q8H [address] Katheryn Edwards MD    albuterol 2 puff Inhalation Q4H PRN Marlo Chu MD    atorvastatin 10 mg Oral Daily KATIE Rosenberg    azithromycin 500 mg Oral Q24H KATIE Rosenberg    budesonide-formoterol 2 puff Inhalation BID KATIE Rosenberg    buPROPion 150 mg Oral Daily KATIE Rosenberg    cholecalciferol 1,000 Units Oral Daily KATIE Rosenberg    docusate sodium 100 mg Oral BID KATIE Rosenberg    gabapentin 100 mg Oral TID Katheryn Edwards MD    heparin (porcine) 3-30 Units/kg/hr (Order-Specific) Intravenous Titrated KATIE Rosenberg Last Rate: 13 Units/kg/hr (06/30/19 0740)   heparin (porcine) 10,000 Units Intravenous PRN KATIE Rosenberg    heparin (porcine) 5,000 Units Intravenous PRN KATIE Dexter    insulin glargine 75 Units Subcutaneous HS Angel Julien MD    insulin lispro 1-5 Units Subcutaneous HS KATIE Dexter    insulin lispro 10 Units Subcutaneous TID With Meals Angel Julien MD    insulin lispro 2-12 Units Subcutaneous TID AC KATIE Dexter    lidocaine 1 patch Topical Daily Angel Julien MD    methocarbamol 750 mg Oral 4x Daily KATIE Dexter    morphine 15 mg Oral Q12H [facility] KATIE Dexter    morphine injection 4 mg Intravenous Q4H PRN Angel Julien MD    nicotine 1 patch Transdermal Daily KATIE Dexter    oxyCODONE 15 mg Oral Q4H PRN KATIE Dexter    pantoprazole 40 mg Oral Early Morning KATIE Dexter    senna 1 tablet Oral Daily KATIE Dexter         Today, Patient Was Seen By: Angel Julien MD    ** Please Note: Dictation voice to text software may have been used in the creation of this document   **

## 2019-06-30 NOTE — ASSESSMENT & PLAN NOTE
Lab Results   Component Value Date    HGBA1C 7 9 (H) 06/28/2019       Recent Labs     06/29/19  1917 06/29/19 2113 06/30/19  0057 06/30/19  0644   POCGLU 94 128 228* 164*       Blood Sugar Average: Last 72 hrs:  (P) 183 1 continue long-acting insulin  I did decrease the mealtime since he was hypoglycemic this morning  Start sliding scale insulin

## 2019-06-30 NOTE — PROGRESS NOTES
Pt with critical low blood sugar, random glucose collected- provider aware, meal coverage adjusted accordingly  Monitoring

## 2019-06-30 NOTE — PROGRESS NOTES
The azithromycin has / have been converted to Oral per [facility] IV-to-PO Auto-Conversion Protocol for Adults as approved by the Pharmacy and Therapeutics Committee  The patient met all eligible criteria:  3 Age = 27years old   2) Received at least one dose of the IV form   3) Receiving at least one other scheduled oral/enteral medication   4) Tolerating an oral/enteral diet   and did not have any exclusions:   1) Critical care patient   2) Active GI bleed (IF assessing H2RAs or PPIs)   3) Continuous tube feeding (IF assessing cipro, doxycycline, levofloxacin, minocycline, rifampin, or voriconazole)   4) Receiving PO vancomycin (IF assessing metronidazole)   5) Persistent nausea and/or vomiting   6) Ileus or gastrointestinal obstruction   7) Kenneth/nasogastric tube set for continuous suction   8) Specific order not to automatically convert to PO (in the order's comments or if discussed in the most recent Infectious Disease or primary team's progress notes)

## 2019-06-30 NOTE — ASSESSMENT & PLAN NOTE
Encourage good pulmonary hygiene  Respiratory protocol, incentive spirometry  Secondary to pulmonary embolism  Echocardiogram   This pending  Cardiology evaluation appreciated

## 2019-07-01 ENCOUNTER — APPOINTMENT (INPATIENT)
Dept: RADIOLOGY | Facility: HOSPITAL | Age: 64
DRG: 167 | End: 2019-07-01
Payer: MEDICARE

## 2019-07-01 ENCOUNTER — APPOINTMENT (INPATIENT)
Dept: CT IMAGING | Facility: HOSPITAL | Age: 64
DRG: 167 | End: 2019-07-01
Payer: MEDICARE

## 2019-07-01 ENCOUNTER — APPOINTMENT (INPATIENT)
Dept: NON INVASIVE DIAGNOSTICS | Facility: HOSPITAL | Age: 64
DRG: 167 | End: 2019-07-01
Payer: MEDICARE

## 2019-07-01 LAB
AFP-TM SERPL-MCNC: 1.5 NG/ML (ref 0.5–8)
ALBUMIN SERPL BCP-MCNC: 2.1 G/DL (ref 3.5–5)
ALBUMIN SERPL ELPH-MCNC: 2.71 G/DL (ref 3.5–5)
ALBUMIN SERPL ELPH-MCNC: 41.7 % (ref 52–65)
ALP SERPL-CCNC: 570 U/L (ref 46–116)
ALPHA1 GLOB SERPL ELPH-MCNC: 0.48 G/DL (ref 0.1–0.4)
ALPHA1 GLOB SERPL ELPH-MCNC: 7.4 % (ref 2.5–5)
ALPHA2 GLOB SERPL ELPH-MCNC: 0.9 G/DL (ref 0.4–1.2)
ALPHA2 GLOB SERPL ELPH-MCNC: 13.9 % (ref 7–13)
ALT SERPL W P-5'-P-CCNC: 24 U/L (ref 12–78)
ANION GAP SERPL CALCULATED.3IONS-SCNC: 8 MMOL/L (ref 4–13)
APTT PPP: 57 SECONDS (ref 23–37)
APTT PPP: 82 SECONDS (ref 23–37)
APTT PPP: 96 SECONDS (ref 23–37)
AST SERPL W P-5'-P-CCNC: 31 U/L (ref 5–45)
BETA GLOB ABNORMAL SERPL ELPH-MCNC: 0.36 G/DL (ref 0.4–0.8)
BETA1 GLOB SERPL ELPH-MCNC: 5.5 % (ref 5–13)
BETA2 GLOB SERPL ELPH-MCNC: 6.2 % (ref 2–8)
BETA2+GAMMA GLOB SERPL ELPH-MCNC: 0.4 G/DL (ref 0.2–0.5)
BILIRUB SERPL-MCNC: 1.1 MG/DL (ref 0.2–1)
BUN SERPL-MCNC: 12 MG/DL (ref 5–25)
CALCIUM SERPL-MCNC: 8.6 MG/DL (ref 8.3–10.1)
CHLORIDE SERPL-SCNC: 102 MMOL/L (ref 100–108)
CO2 SERPL-SCNC: 27 MMOL/L (ref 21–32)
CREAT SERPL-MCNC: 0.74 MG/DL (ref 0.6–1.3)
ERYTHROCYTE [DISTWIDTH] IN BLOOD BY AUTOMATED COUNT: 21.1 % (ref 11.6–15.1)
GAMMA GLOB ABNORMAL SERPL ELPH-MCNC: 1.64 G/DL (ref 0.5–1.6)
GAMMA GLOB SERPL ELPH-MCNC: 25.3 % (ref 12–22)
GFR SERPL CREATININE-BSD FRML MDRD: 99 ML/MIN/1.73SQ M
GLUCOSE SERPL-MCNC: 140 MG/DL (ref 65–140)
GLUCOSE SERPL-MCNC: 148 MG/DL (ref 65–140)
GLUCOSE SERPL-MCNC: 163 MG/DL (ref 65–140)
GLUCOSE SERPL-MCNC: 209 MG/DL (ref 65–140)
GLUCOSE SERPL-MCNC: 331 MG/DL (ref 65–140)
GLUCOSE SERPL-MCNC: 56 MG/DL (ref 65–140)
GLUCOSE SERPL-MCNC: 56 MG/DL (ref 65–140)
HCT VFR BLD AUTO: 37.8 % (ref 36.5–49.3)
HGB BLD-MCNC: 12 G/DL (ref 12–17)
IGG/ALB SER: 0.72 {RATIO} (ref 1.1–1.8)
INTERPRETATION UR IFE-IMP: NORMAL
MCH RBC QN AUTO: 25.9 PG (ref 26.8–34.3)
MCHC RBC AUTO-ENTMCNC: 31.7 G/DL (ref 31.4–37.4)
MCV RBC AUTO: 82 FL (ref 82–98)
PLATELET # BLD AUTO: 255 THOUSANDS/UL (ref 149–390)
PMV BLD AUTO: 11.6 FL (ref 8.9–12.7)
POTASSIUM SERPL-SCNC: 4 MMOL/L (ref 3.5–5.3)
PROT PATTERN SERPL ELPH-IMP: ABNORMAL
PROT SERPL-MCNC: 6.5 G/DL (ref 6.4–8.2)
PROT SERPL-MCNC: 6.5 G/DL (ref 6.4–8.2)
PSA SERPL-MCNC: 0.4 NG/ML (ref 0–4)
RBC # BLD AUTO: 4.63 MILLION/UL (ref 3.88–5.62)
SODIUM SERPL-SCNC: 137 MMOL/L (ref 136–145)
WBC # BLD AUTO: 15.33 THOUSAND/UL (ref 4.31–10.16)

## 2019-07-01 PROCEDURE — 99222 1ST HOSP IP/OBS MODERATE 55: CPT | Performed by: INTERNAL MEDICINE

## 2019-07-01 PROCEDURE — 85027 COMPLETE CBC AUTOMATED: CPT | Performed by: FAMILY MEDICINE

## 2019-07-01 PROCEDURE — 82948 REAGENT STRIP/BLOOD GLUCOSE: CPT

## 2019-07-01 PROCEDURE — 85730 THROMBOPLASTIN TIME PARTIAL: CPT | Performed by: FAMILY MEDICINE

## 2019-07-01 PROCEDURE — 74177 CT ABD & PELVIS W/CONTRAST: CPT

## 2019-07-01 PROCEDURE — 93306 TTE W/DOPPLER COMPLETE: CPT | Performed by: INTERNAL MEDICINE

## 2019-07-01 PROCEDURE — 73630 X-RAY EXAM OF FOOT: CPT

## 2019-07-01 PROCEDURE — C8929 TTE W OR WO FOL WCON,DOPPLER: HCPCS

## 2019-07-01 PROCEDURE — 99232 SBSQ HOSP IP/OBS MODERATE 35: CPT | Performed by: INTERNAL MEDICINE

## 2019-07-01 PROCEDURE — 93970 EXTREMITY STUDY: CPT | Performed by: SURGERY

## 2019-07-01 PROCEDURE — 82105 ALPHA-FETOPROTEIN SERUM: CPT | Performed by: INTERNAL MEDICINE

## 2019-07-01 PROCEDURE — 84153 ASSAY OF PSA TOTAL: CPT | Performed by: INTERNAL MEDICINE

## 2019-07-01 PROCEDURE — 80053 COMPREHEN METABOLIC PANEL: CPT | Performed by: FAMILY MEDICINE

## 2019-07-01 PROCEDURE — 0JBR0ZZ EXCISION OF LEFT FOOT SUBCUTANEOUS TISSUE AND FASCIA, OPEN APPROACH: ICD-10-PCS | Performed by: PODIATRIST

## 2019-07-01 PROCEDURE — 94762 N-INVAS EAR/PLS OXIMTRY CONT: CPT

## 2019-07-01 RX ADMIN — PERFLUTREN 0.8 ML/MIN: 6.52 INJECTION, SUSPENSION INTRAVENOUS at 11:06

## 2019-07-01 RX ADMIN — METHOCARBAMOL TABLETS 750 MG: 750 TABLET, COATED ORAL at 18:37

## 2019-07-01 RX ADMIN — ACETAMINOPHEN 975 MG: 325 TABLET, FILM COATED ORAL at 05:17

## 2019-07-01 RX ADMIN — GABAPENTIN 100 MG: 100 CAPSULE ORAL at 08:31

## 2019-07-01 RX ADMIN — INSULIN LISPRO 2 UNITS: 100 INJECTION, SOLUTION INTRAVENOUS; SUBCUTANEOUS at 08:13

## 2019-07-01 RX ADMIN — GABAPENTIN 100 MG: 100 CAPSULE ORAL at 18:37

## 2019-07-01 RX ADMIN — LIDOCAINE 1 PATCH: 50 PATCH TOPICAL at 08:33

## 2019-07-01 RX ADMIN — OXYCODONE HYDROCHLORIDE 15 MG: 5 TABLET ORAL at 13:53

## 2019-07-01 RX ADMIN — IOHEXOL 100 ML: 350 INJECTION, SOLUTION INTRAVENOUS at 20:22

## 2019-07-01 RX ADMIN — METHOCARBAMOL TABLETS 750 MG: 750 TABLET, COATED ORAL at 08:31

## 2019-07-01 RX ADMIN — IOHEXOL 50 ML: 240 INJECTION, SOLUTION INTRATHECAL; INTRAVASCULAR; INTRAVENOUS; ORAL at 20:22

## 2019-07-01 RX ADMIN — METHOCARBAMOL TABLETS 750 MG: 750 TABLET, COATED ORAL at 12:07

## 2019-07-01 RX ADMIN — MORPHINE SULFATE 4 MG: 4 INJECTION INTRAVENOUS at 02:45

## 2019-07-01 RX ADMIN — NICOTINE 1 PATCH: 14 PATCH TRANSDERMAL at 08:33

## 2019-07-01 RX ADMIN — PANTOPRAZOLE SODIUM 40 MG: 40 TABLET, DELAYED RELEASE ORAL at 05:22

## 2019-07-01 RX ADMIN — DOCUSATE SODIUM 100 MG: 100 CAPSULE, LIQUID FILLED ORAL at 18:37

## 2019-07-01 RX ADMIN — MORPHINE SULFATE 4 MG: 4 INJECTION INTRAVENOUS at 18:38

## 2019-07-01 RX ADMIN — OXYCODONE HYDROCHLORIDE 15 MG: 5 TABLET ORAL at 05:20

## 2019-07-01 RX ADMIN — VITAMIN D, TAB 1000IU (100/BT) 1000 UNITS: 25 TAB at 08:31

## 2019-07-01 RX ADMIN — MORPHINE SULFATE 4 MG: 4 INJECTION INTRAVENOUS at 08:01

## 2019-07-01 RX ADMIN — MORPHINE SULFATE 15 MG: 15 TABLET, FILM COATED, EXTENDED RELEASE ORAL at 21:34

## 2019-07-01 RX ADMIN — AZITHROMYCIN 500 MG: 250 TABLET, FILM COATED ORAL at 18:37

## 2019-07-01 RX ADMIN — ATORVASTATIN CALCIUM 10 MG: 10 TABLET, FILM COATED ORAL at 18:37

## 2019-07-01 RX ADMIN — INSULIN LISPRO 8 UNITS: 100 INJECTION, SOLUTION INTRAVENOUS; SUBCUTANEOUS at 12:08

## 2019-07-01 RX ADMIN — SENNOSIDES 8.6 MG: 8.6 TABLET, FILM COATED ORAL at 08:31

## 2019-07-01 RX ADMIN — ACETAMINOPHEN 975 MG: 325 TABLET, FILM COATED ORAL at 21:34

## 2019-07-01 RX ADMIN — HEPARIN SODIUM AND DEXTROSE 13 UNITS/KG/HR: 10000; 5 INJECTION INTRAVENOUS at 13:58

## 2019-07-01 RX ADMIN — INSULIN LISPRO 1 UNITS: 100 INJECTION, SOLUTION INTRAVENOUS; SUBCUTANEOUS at 21:34

## 2019-07-01 RX ADMIN — GABAPENTIN 100 MG: 100 CAPSULE ORAL at 21:34

## 2019-07-01 RX ADMIN — HEPARIN SODIUM 5000 UNITS: 1000 INJECTION INTRAVENOUS; SUBCUTANEOUS at 12:06

## 2019-07-01 RX ADMIN — BUPROPION HYDROCHLORIDE 150 MG: 150 TABLET, FILM COATED, EXTENDED RELEASE ORAL at 08:31

## 2019-07-01 RX ADMIN — MORPHINE SULFATE 4 MG: 4 INJECTION INTRAVENOUS at 12:07

## 2019-07-01 RX ADMIN — OXYCODONE HYDROCHLORIDE 15 MG: 5 TABLET ORAL at 19:48

## 2019-07-01 RX ADMIN — OXYCODONE HYDROCHLORIDE 15 MG: 5 TABLET ORAL at 05:18

## 2019-07-01 RX ADMIN — METHOCARBAMOL TABLETS 750 MG: 750 TABLET, COATED ORAL at 21:34

## 2019-07-01 RX ADMIN — MORPHINE SULFATE 15 MG: 15 TABLET, FILM COATED, EXTENDED RELEASE ORAL at 08:31

## 2019-07-01 RX ADMIN — DOCUSATE SODIUM 100 MG: 100 CAPSULE, LIQUID FILLED ORAL at 08:31

## 2019-07-01 RX ADMIN — INSULIN LISPRO 10 UNITS: 100 INJECTION, SOLUTION INTRAVENOUS; SUBCUTANEOUS at 08:14

## 2019-07-01 RX ADMIN — ACETAMINOPHEN 975 MG: 325 TABLET, FILM COATED ORAL at 14:02

## 2019-07-01 RX ADMIN — INSULIN LISPRO 10 UNITS: 100 INJECTION, SOLUTION INTRAVENOUS; SUBCUTANEOUS at 12:08

## 2019-07-01 NOTE — PLAN OF CARE
Problem: DISCHARGE PLANNING - CARE MANAGEMENT  Goal: Discharge to post-acute care or home with appropriate resources  Description  INTERVENTIONS:  - Conduct assessment to determine patient/family and health care team treatment goals, and need for post-acute services based on payer coverage, community resources, and patient preferences, and barriers to discharge  - Address psychosocial, clinical, and financial barriers to discharge as identified in assessment in conjunction with the patient/family and health care team  - Arrange appropriate level of post-acute services according to patients   needs and preference and payer coverage in collaboration with the physician and health care team  - Communicate with and update the patient/family, physician, and health care team regarding progress on the discharge plan  - Arrange appropriate transportation to post-acute venues  7/1/2019 1055 by Alfredo Woodard RN  Outcome: Progressing  Note:   CM spoke to [employer] to check the cost of Eliquis 5mg #74 2 tabs twice daily x 1 wk then 5mg 1 tab twice daily and continue  The first month would be free with the coupon card and then after that it would be $47 00

## 2019-07-01 NOTE — PLAN OF CARE
Problem: DISCHARGE PLANNING - CARE MANAGEMENT  Goal: Discharge to post-acute care or home with appropriate resources  Description  INTERVENTIONS:  - Conduct assessment to determine patient/family and health care team treatment goals, and need for post-acute services based on payer coverage, community resources, and patient preferences, and barriers to discharge  - Address psychosocial, clinical, and financial barriers to discharge as identified in assessment in conjunction with the patient/family and health care team  - Arrange appropriate level of post-acute services according to patients   needs and preference and payer coverage in collaboration with the physician and health care team  - Communicate with and update the patient/family, physician, and health care team regarding progress on the discharge plan  - Arrange appropriate transportation to post-acute venues  Outcome: Progressing  Note:   CM met with pt at bedside  Pt lives in a difficult situation  His estranged wife Bishnu Castañeda is living with her boyfriend in the same house  It is a one story house, but there are 18 GORD[address]-with railing  Pt has been c/o severe back pain and he has had to crawl up the stairs due to this pain  He was just medicated for pain and he still rates his back pain as a 7/10  He uses a cane and walker prn to ambulate  He is able to perform ADL's, but has been having difficulty due to the pain  He received no assistance from his "wife" who lives in the home  He has never been in rehab, but is current with [facility]  Denies substance abuse or mental health issues  He is a smoker-8-10 cigarettes/day  He has smoked for 45 years  Dr Rodger Sepulveda is his PCP  He does not have a POA or Advanced Directive  CM supplied info on both  He does not work, but still drives  His mother or sister will transport home when he is medically cleared  CM discussed d/c needs  Pt "may" be interested in [facility]    He is also debating if he should get home and resume services with [facility] and take care of his home issues  CM will place referral to see if pt meets criteria for [facility]  Pt understands that he has freedom of choice  CM will follow through hospitalization  CM reviewed discharge planning process including the following: identifying help at home, patient preference for discharge planning needs, pharmacy preference, and availability of treatment team to discuss questions or concerns patient and/or family may have regarding understanding medications and recognizing signs and symptoms once discharged  CM also encouraged patient to follow up with all recommended appointments after discharge  Patient advised of importance for patient and family to participate in managing patients medical well being  CM name and role reviewed  Discharge Checklist reviewed and CM will continue to monitor for progress toward discharge goals in nursing and provider rounds

## 2019-07-01 NOTE — PROGRESS NOTES
Progress Note - Wound   Dara Gongora 60 y o  male MRN: [id number]  Unit/Bed#: -01 Encounter: [id number]      Assessment:   Diabetes with peripheral vascular manifestation and peripheral neuropathy  Chronic diabetic ulcer left foot    Plan:   Reviewed labs and x-rays  Reviewed recent chart entries  Reviewed findings with the patient  The ulcer was thoroughly debrided cleansed and dressed  Excisional debridement of subcutaneous tissues  Nursing instructions given for daily wound care  General diabetic skin precautions should be observed  Patient is well known in our podiatry office  After discharge he should follow-up  Subjective:  Patient has recently been informed that he has cancer  He is understandably depressed  Objective:    Vitals: Blood pressure 142/83, pulse 82, temperature 98 2 °F (36 8 °C), temperature source Oral, resp  rate 18, height 6' (1 829 m), weight 119 kg (262 lb 5 6 oz), SpO2 92 %  ,Body mass index is 35 58 kg/m²  WBC's 15 33  XRAYS: 3v L foot shows no soft tissue air, no pathologic fracture, no foreign body  There are old post surgical changes with amputation of the 1st and 2nd toes  Physical Exam:  The patient is in no acute distress  There are no new neurovascular changes or findings  The patient is profoundly neuropathic  There is no cyanosis or gangrene  There is previous partial amputation in the left foot consisting of amputation of the 1st and 2nd toes  There is full-thickness chronic ulceration present in the plantar medial aspect of the left foot  There is a thin filmy/fibrinous exudate and modest biofilm/slough  Debrided to bleeding granulation tissue  There is no deep track  There is no exposed bone or tendon  There is no pus  There is no malodor from the wound  Lab, Imaging and other studies: I have personally reviewed pertinent reports        Wound 09/05/17 Other (Comment) Foot Right (Active)       Wound 09/05/17  Foot Left (Active) Incision 10/16/17 Foot Right (Active)       Other Ulcers 10/13/17 Foot Right (Active)       Other Ulcers 10/13/17 Foot Left (Active)       Other Ulcers 10/13/17 Foot Right; Inner (Active)       Wound 06/28/19 Neuropathic/diabetic ulcer Foot Left (Active)   Staging Stage IV 6/28/2019  9:40 PM   Leeann-wound Assessment Erythema; Other (Comment) 7/1/2019 12:00 AM   Wound Length (cm) 10 cm 6/28/2019  9:40 PM   Wound Width (cm) 5 cm 6/28/2019  9:40 PM   Calculated Wound Area (cm^2) 50 cm^2 6/28/2019  9:40 PM   Drainage Amount None 7/1/2019  9:00 AM   Drainage Description Sanguineous 6/30/2019  8:00 AM   Treatments Cleansed;Site care 6/30/2019  8:00 AM   Dressing Calcium Alginate;ABD;Dry dressing;Gauze 7/1/2019 12:00 AM   Dressing Changed Reinforced 7/1/2019 12:00 AM   Patient Tolerance Tolerated well 7/1/2019 12:00 AM   Dressing Status Clean;Dry; Intact 7/1/2019  9:00 AM       Wound 06/28/19 Arterial/ischemic ulcer Leg Lower (Active)   Wound Description Pink; White 7/1/2019 12:00 AM   Leeann-wound Assessment Fragile 7/1/2019 12:00 AM   Wound Length (cm) 2 cm 6/28/2019  9:40 PM   Wound Width (cm) 1 5 cm 6/28/2019  9:40 PM   Calculated Wound Area (cm^2) 3 cm^2 6/28/2019  9:40 PM   Drainage Amount Scant 7/1/2019 12:00 AM   Drainage Description Yellow 7/1/2019 12:00 AM   Treatments Elevated 7/1/2019 12:00 AM   Dressing Calcium Alginate;Dry dressing 7/1/2019 12:00 AM   Dressing Changed New 6/28/2019  9:40 PM   Patient Tolerance Tolerated well 7/1/2019 12:00 AM   Dressing Status Clean;Dry; Intact 7/1/2019 12:00 AM       Wound 06/30/19 Skin tear Elbow Anterior; Left (Active)   Wound Description Fragile;Pink 7/1/2019 12:00 AM   Drainage Amount None 7/1/2019 12:00 AM   Treatments Site care 7/1/2019 12:00 AM   Dressing Dermagran gauze;Protective barrier 7/1/2019 12:00 AM   Patient Tolerance Tolerated well 7/1/2019 12:00 AM   Dressing Status Clean; Intact;Dry 7/1/2019 12:00 AM       Wound 06/30/19 Skin tear Thigh Left;Posterior;Proximal (Active)   Wound Description Fragile;Pink 7/1/2019 12:00 AM   Drainage Amount None 7/1/2019 12:00 AM   Treatments Site care 7/1/2019 12:00 AM   Dressing Dermagran gauze 7/1/2019 12:00 AM   Dressing Changed Reinforced 7/1/2019 12:00 AM   Patient Tolerance Tolerated well 7/1/2019 12:00 AM   Dressing Status Clean;Dry; Intact 7/1/2019 12:00 AM

## 2019-07-01 NOTE — SOCIAL WORK
CM spoke to Box to check the cost of Eliquis 5mg #74 2 tabs twice daily x 1 wk then 5mg 1 tab twice daily and continue  The first month would be free with the coupon card and then after that it would be $47 00

## 2019-07-01 NOTE — CONSULTS
Consultation - [facility] 60 y o  male MRN: [id number]  Unit/Bed#: -01 Encounter: [id number]    Assessment/Plan     Assessment:  1) Left foot wound to muscle  2) Edema    Plan:  1) Wound is dressed with DSD  2) Xray left foot is ordered to assess nonhealing wound and bone stock  3) Patient states that he follows with Dr Edvin Oliveros so I will defer care to him and alert him to patient status     History of Present Illness   HPI:  Pauline Dalal is a 60 y o  male who presents with a wound located at left foot plantarly  Patient states that he follows with Dr Edvin Oliveros weekly who cares for the wound  He states that "he is not here for the foot so checking on the foot seems excessive"  I have informed him that his care is of utmost importance to us so I want to make sure that the foot is stable and not making him sick  Consults    Review of Systems   No current N/V/F/C/S  No SOB  No CP  No GERD  No dysuria  Positive for weakness  Positive for wound left foot  No hallucinations  No sore throat  Positive for cough  No earache  No headache     Historical Information   Past Medical History:   Diagnosis Date    Chronic pain syndrome     [facility])     Diabetes mellitus ([address] )     Diverticulitis     Hyperlipidemia     Hypertension     Sleep apnea      Past Surgical History:   Procedure Laterality Date    AMPUTATION Left     toe     HEMICOLECTOMY      NM AMPUTATION FOOT,TRANSMETATARSAL Right 10/16/2017    Procedure: AMPUTATION TRANSMETATARSAL (TMA);  Surgeon: Curtis Whitlock DPM;  Location: [facility];  Service: Podiatry    NM COLONOSCOPY FLX DX W/COLLJ SPEC WHEN PFRMD N/A 4/16/2019    Procedure: COLONOSCOPY;  Surgeon: Roxie Servin DO;  Location: [facility];   Service: Gastroenterology    TREATMENT FISTULA ANAL       Social History   Social History     Substance and Sexual Activity   Alcohol Use Never    Frequency: Never     Social History Substance and Sexual Activity   Drug Use No     Social History     Tobacco Use   Smoking Status Current Every Day Smoker    Packs/day: 0 50   Smokeless Tobacco Never Used     Family History:   Family History   Family history unknown: Yes       Meds/Allergies   all current active meds have been reviewed, current meds:   Current Facility-Administered Medications   Medication Dose Route Frequency    acetaminophen (TYLENOL) tablet 975 mg  975 mg Oral Q8H [facility]    albuterol (PROVENTIL HFA,VENTOLIN HFA) inhaler 2 puff  2 puff Inhalation Q4H PRN    atorvastatin (LIPITOR) tablet 10 mg  10 mg Oral Daily    azithromycin (ZITHROMAX) tablet 500 mg  500 mg Oral Q24H    budesonide-formoterol (SYMBICORT) 160-4 5 mcg/act inhaler 2 puff  2 puff Inhalation BID    buPROPion (WELLBUTRIN XL) 24 hr tablet 150 mg  150 mg Oral Daily    cholecalciferol (VITAMIN D3) tablet 1,000 Units  1,000 Units Oral Daily    docusate sodium (COLACE) capsule 100 mg  100 mg Oral BID    gabapentin (NEURONTIN) capsule 100 mg  100 mg Oral TID    heparin (porcine) 25,000 units in 250 mL infusion (premix)  3-30 Units/kg/hr (Order-Specific) Intravenous Titrated    heparin (porcine) injection 10,000 Units  10,000 Units Intravenous PRN    heparin (porcine) injection 5,000 Units  5,000 Units Intravenous PRN    insulin glargine (LANTUS) subcutaneous injection 75 Units 0 75 mL  75 Units Subcutaneous HS    insulin lispro (HumaLOG) 100 units/mL subcutaneous injection 1-5 Units  1-5 Units Subcutaneous HS    insulin lispro (HumaLOG) 100 units/mL subcutaneous injection 10 Units  10 Units Subcutaneous TID With Meals    insulin lispro (HumaLOG) 100 units/mL subcutaneous injection 2-12 Units  2-12 Units Subcutaneous TID AC    lidocaine (LIDODERM) 5 % patch 1 patch  1 patch Topical Daily    methocarbamol (ROBAXIN) tablet 750 mg  750 mg Oral 4x Daily    morphine (MS CONTIN) ER tablet 15 mg  15 mg Oral Q12H [facility]    morphine (PF) 4 mg/mL injection 4 mg  4 mg Intravenous Q4H PRN    nicotine (NICODERM CQ) 14 mg/24hr TD 24 hr patch 1 patch  1 patch Transdermal Daily    oxyCODONE (ROXICODONE) IR tablet 15 mg  15 mg Oral Q4H PRN    pantoprazole (PROTONIX) EC tablet 40 mg  40 mg Oral Early Morning    senna (SENOKOT) tablet 8 6 mg  1 tablet Oral Daily    and PTA meds:   Prior to Admission Medications   Prescriptions Last Dose Informant Patient Reported? Taking?    Blood Glucose Monitoring Suppl (TRUERESULT BLOOD GLUCOSE) w/Device KIT 6/28/2019 at Unknown time Self Yes Yes   Sig: by Does not apply route   Empagliflozin (JARDIANCE) 25 MG TABS Not Taking at Unknown time Self Yes No   Sig: Take 25 mg by mouth every morning   Insulin Glargine-Lixisenatide (SOLIQUA) 100-33 UNT-MCG/ML SOPN Not Taking at Unknown time Self Yes No   Sig: Inject 15 Units under the skin daily   Insulin Syringe-Needle U-100 (INSULIN SYRINGE 1CC/30GX5/16") 30G X 5/16" 1 ML MISC  Self Yes No   Sig: by Does not apply route   albuterol (PROVENTIL HFA,VENTOLIN HFA) 90 mcg/act inhaler 6/28/2019 at Unknown time Self No Yes   Sig: Inhale 1-2 puffs every 6 (six) hours as needed for wheezing   aspirin 81 MG tablet 6/28/2019 at Unknown time Self Yes Yes   Sig: Take 1 tablet by mouth daily   atorvastatin (LIPITOR) 10 mg tablet Not Taking at Unknown time Self Yes No   Sig: Take 10 mg by mouth daily   buPROPion (WELLBUTRIN XL) 150 mg 24 hr tablet 6/28/2019 at Unknown time Self Yes Yes   Sig: Take 150 mg by mouth daily   budesonide-formoterol (SYMBICORT) 160-4 5 mcg/act inhaler Not Taking at Unknown time Self Yes No   Sig: Inhale 2 puffs 2 (two) times a day   chlorthalidone 25 mg tablet 6/28/2019 at Unknown time Self Yes Yes   Sig: Take 25 mg by mouth daily   cholecalciferol (VITAMIN D3) 1,000 units tablet 6/28/2019 at Unknown time Self Yes Yes   Sig: Take by mouth   glucose blood (TRUETEST TEST) test strip 6/28/2019 at Unknown time Self Yes Yes   Sig: by In Vitro route   glucose blood test strip 6/28/2019 at Unknown time Self Yes Yes   Sig: TEST 3 TIMES DAILY   glucose blood test strip 6/28/2019 at Unknown time Self Yes Yes   Sig: by In Vitro route   ibuprofen (MOTRIN) 800 mg tablet 6/28/2019 at Unknown time Self Yes Yes   Sig: Take 1 tablet by mouth 3 (three) times a day   insulin NPH-insulin regular (NovoLIN 70/30) 100 units/mL subcutaneous injection 6/28/2019 at Unknown time Self Yes Yes   Sig: Pt uses sliding scale  Max dose 60 units per day     insulin aspart (NOVOLOG) 100 units/mL injection 6/28/2019 at Unknown time Self Yes Yes   Sig: Inject under the skin   insulin glargine (LANTUS) 100 units/mL subcutaneous injection Not Taking at Unknown time Self Yes No   Sig: Inject under the skin   insulin regular (HumuLIN R,NovoLIN R) 100 units/mL injection 6/28/2019 at Unknown time Self Yes Yes   Sig: Inject 60 Units under the skin   ipratropium-albuterol (DUO-NEB) 0 5-2 5 mg/3 mL nebulizer solution 6/28/2019 at Unknown time Self No Yes   Sig: Take 1 vial (3 mL total) by nebulization every 8 (eight) hours as needed for wheezing or shortness of breath   metFORMIN (GLUCOPHAGE) 1000 MG tablet 6/28/2019 at Unknown time Self Yes Yes   Sig: Take 1,000 mg by mouth 2 (two) times a day with meals   methocarbamol (ROBAXIN) 750 mg tablet   No No   Sig: Take 1 tablet (750 mg total) by mouth 4 (four) times a day for 7 days   morphine (MS CONTIN) 30 mg 12 hr tablet 6/28/2019 at Unknown time Self Yes Yes   Sig: Take 30 mg by mouth every 8 (eight) hours   naproxen (NAPROSYN) 250 mg tablet 6/28/2019 at Unknown time  No Yes   Sig: Take 1 tablet (250 mg total) by mouth 2 (two) times a day with meals   omeprazole (PriLOSEC) 20 mg delayed release capsule Not Taking at Unknown time Self Yes No   Sig: Take 1 tablet by mouth daily   oxyCODONE (ROXICODONE) 15 mg immediate release tablet 6/28/2019 at Unknown time Self Yes Yes   Sig: Take 15 mg by mouth every 4 (four) hours as needed for moderate pain   saccharomyces boulardii (FLORASTOR) 250 mg capsule Past Month at Unknown time Self No Yes   Sig: Take 1 capsule by mouth 2 (two) times a day      Facility-Administered Medications: None     No Known Allergies    Objective   Vitals: Blood pressure 125/71, pulse 74, temperature 98 2 °F (36 8 °C), temperature source Oral, resp  rate 18, height 6' (1 829 m), weight 119 kg (262 lb 5 6 oz), SpO2 91 %  Wounds:     Wound 09/05/17 Other (Comment) Foot Right (Active)       Wound 09/05/17  Foot Left (Active)       Incision 10/16/17 Foot Right (Active)       Other Ulcers 10/13/17 Foot Right (Active)       Other Ulcers 10/13/17 Foot Left (Active)       Other Ulcers 10/13/17 Foot Right; Inner (Active)       Wound 06/28/19 Neuropathic/diabetic ulcer Foot Left (Active)   Staging Stage IV 6/28/2019  9:40 PM   Leeann-wound Assessment Erythema; Other (Comment) 7/1/2019 12:00 AM   Wound Length (cm) 10 cm 6/28/2019  9:40 PM   Wound Width (cm) 5 cm 6/28/2019  9:40 PM   Calculated Wound Area (cm^2) 50 cm^2 6/28/2019  9:40 PM   Drainage Amount None 7/1/2019 12:00 AM   Drainage Description Sanguineous 6/30/2019  8:00 AM   Treatments Cleansed;Site care 6/30/2019  8:00 AM   Dressing Calcium Alginate;ABD;Dry dressing;Gauze 7/1/2019 12:00 AM   Dressing Changed Reinforced 7/1/2019 12:00 AM   Patient Tolerance Tolerated well 7/1/2019 12:00 AM   Dressing Status Clean;Dry; Intact 7/1/2019 12:00 AM       Wound 06/28/19 Arterial/ischemic ulcer Leg Lower (Active)   Wound Description Pink; White 7/1/2019 12:00 AM   Leeann-wound Assessment Fragile 7/1/2019 12:00 AM   Wound Length (cm) 2 cm 6/28/2019  9:40 PM   Wound Width (cm) 1 5 cm 6/28/2019  9:40 PM   Calculated Wound Area (cm^2) 3 cm^2 6/28/2019  9:40 PM   Drainage Amount Scant 7/1/2019 12:00 AM   Drainage Description Yellow 7/1/2019 12:00 AM   Treatments Elevated 7/1/2019 12:00 AM   Dressing Calcium Alginate;Dry dressing 7/1/2019 12:00 AM   Dressing Changed New 6/28/2019  9:40 PM   Patient Tolerance Tolerated well 7/1/2019 12:00 AM   Dressing Status Clean;Dry; Intact 7/1/2019 12:00 AM       Wound 06/30/19 Skin tear Elbow Anterior; Left (Active)   Wound Description Fragile;Pink 7/1/2019 12:00 AM   Drainage Amount None 7/1/2019 12:00 AM   Treatments Site care 7/1/2019 12:00 AM   Dressing Dermagran gauze;Protective barrier 7/1/2019 12:00 AM   Patient Tolerance Tolerated well 7/1/2019 12:00 AM   Dressing Status Clean; Intact;Dry 7/1/2019 12:00 AM       Wound 06/30/19 Skin tear Thigh Left;Posterior;Proximal (Active)   Wound Description Fragile;Pink 7/1/2019 12:00 AM   Drainage Amount None 7/1/2019 12:00 AM   Treatments Site care 7/1/2019 12:00 AM   Dressing Dermagran gauze 7/1/2019 12:00 AM   Dressing Changed Reinforced 7/1/2019 12:00 AM   Patient Tolerance Tolerated well 7/1/2019 12:00 AM   Dressing Status Clean;Dry; Intact 7/1/2019 12:00 AM         Physical Exam  Xray left foot is pending    Patient is awake alert and oriented x 3  Vascular: Pulses palpable, pedal hair is absent, thin shiny skin, hyperpigmented calves  Neurological: No POP, no sensation, no babinski  Orthopedic: Positive for multiple amputations to both feet, right great toe, left great toe   Dermatological: Open wound to left foot plantarly, 10 cm x 5 cm x 0 4 cm, granular and slough tissue, no probing, no tracking, no malodor, no periwound erythema, open lesion to the left anterior calf 2 cm x 1 5 cm x 0 2 cm, no probing, no tracking, no erythema, no purulence, no malodor

## 2019-07-01 NOTE — CONSULTS
Patient: Caitlin Gupta  Patient MRN: [id number]  Service date: 7/1/2019  Attending Physician:       CHIEF COMPLAIN  Chief Complaint   Patient presents with    Back Pain     Pt states he has hx of sciatica that "hurts so bad, I'm not able to take care of my other problems"  pt has wounds on feet that he feels he is unable to treat himself       Heme / Oncology history:  Caitlin Gupta is a 60 y o  male     No prior hematology oncology issues      HISTORY OF PRESENT ILLNESS:  Caitlin Gupta is a 60 y o  male who has history of hypertension diabetes, bilateral foot ulcers, sleep apnea, smoker, presents with worsening of neck pain, imaging study showed PE, and diffuse bone lesion concern for metastatic cancer  Oncology was consulted for comanagement  Patient is active smoker  Again no oncology history in the past   Brother has history of multiple myeloma at age of 12  Patient reported has been losing weight, about 50 lb in the past 2 months  Otherwise there is no chest pain cough hemoptysis  No abdominal pain  No change of urination or bowel movement habits  No bleeding  He is in the process of  with his wife  He has good social support locally  ECOG 2        ASSESSMENT/PLAN:  Caitlin Gupta is a 60 y o  male with:    1) diffuse bone lesion  - malignancy until proven otherwise  However CT chest did not suggest primary source  We will proceed with CT abdomen pelvic to complete the imaging study  I will check a PSA, if high, this is diagnostic for prostate cancer otherwise patient will need a bone  Biopsy or biopsy of other site depending result of CT abdomen pelvic    - SPEP is pending     - depending on the CT result, patient may also need bisphosphonate    2)  elevation of alkaline phosphatase  - agree to bone lesion               minutes were spent face to face with patient with greater than 50% of the time spent in counseling or coordination of care including discussions of treatment instructions  All of the patient's questions were answered to their satisfactory during this discussion  Yanely Ramos MD PhD  Hematology / Oncology              PROBLEM LIST:  Patient Active Problem List   Diagnosis    Tobacco abuse    Morbid obesity ([facility] )    Type 2 diabetes mellitus with foot ulcer, with long-term current use of insulin ([place] [address] )    Chronic prescription opiate use    Essential hypertension    Screening for colon cancer    Mass of right side of neck    Acute pulmonary embolism with acute cor pulmonale (HCC)    Respiratory insufficiency    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[place] [address] )                     PAST MEDICAL HISTORY:   has a past medical history of Chronic pain syndrome, COPD (chronic obstructive pulmonary disease) ([place] [address] ), Diabetes mellitus ([place] [address] ), Diverticulitis, Hyperlipidemia, Hypertension, and Sleep apnea  PAST SURGICAL HISTORY:   has a past surgical history that includes Hemicolectomy; Amputation (Left); Treatment fistula anal; pr amputation foot,transmetatarsal (Right, 10/16/2017); and pr colonoscopy flx dx w/collj spec when pfrmd (N/A, 4/16/2019)      CURRENT MEDICATIONS  Scheduled Meds:  Current Facility-Administered Medications:  acetaminophen 975 mg Oral Q8H [facility] Nick Silva MD    albuterol 2 puff Inhalation Q4H PRN Marlo Chu MD    atorvastatin 10 mg Oral Daily Rolene Codding, CRNP    azithromycin 500 mg Oral Q24H Rolene Codding, CRNP    budesonide-formoterol 2 puff Inhalation BID Rolene Codding, CRNP    buPROPion 150 mg Oral Daily Rolene Codding, CRNP    cholecalciferol 1,000 Units Oral Daily Rolene Codding, CRNP    docusate sodium 100 mg Oral BID Rolene Codding, CRNP    gabapentin 100 mg Oral TID Nick Silva MD    heparin (porcine) 3-30 Units/kg/hr (Order-Specific) Intravenous Titrated RoleSREE MayenNP Last Rate: 13 Units/kg/hr (07/01/19 1668)   heparin (porcine) 10,000 Units Intravenous PRN Caty Aguilar CRNP    heparin (porcine) 5,000 Units Intravenous PRN Ivory Robins KATIE Mortensen    insulin glargine 75 Units Subcutaneous HS Kendra Cortez MD    insulin lispro 1-5 Units Subcutaneous HS KATIE Zaman    insulin lispro 10 Units Subcutaneous TID With Meals Kendra Cortez MD    insulin lispro 2-12 Units Subcutaneous TID AC KATIE Zaman    lidocaine 1 patch Topical Daily Kendra Cortez MD    methocarbamol 750 mg Oral 4x Daily Cathy Pitts, KATIE    morphine 15 mg Oral Q12H [address] Cathy Pitts, KATIE    morphine injection 4 mg Intravenous Q4H PRN Kendra Cortez MD    nicotine 1 patch Transdermal Daily Cathy Pitts, KATIE    oxyCODONE 15 mg Oral Q4H PRN Cathy Pitts, KATIE    pantoprazole 40 mg Oral Early Morning Cathy Pitts, KATIE    senna 1 tablet Oral Daily KATIE Quintanilla      Continuous Infusions:  heparin (porcine) 3-30 Units/kg/hr (Order-Specific) Last Rate: 13 Units/kg/hr (07/01/19 1305)     PRN Meds: albuterol    heparin (porcine)    heparin (porcine)    morphine injection    oxyCODONE    SOCIAL HISTORY:   reports that he has been smoking  He has been smoking about 0 50 packs per day  He has never used smokeless tobacco  He reports that he does not drink alcohol or use drugs  FAMILY HISTORY:  Family history is unknown by patient  ALLERGIES:  has No Known Allergies  REVIEW OF SYSTEMS:  Please note that a 14-point review of systems was performed to include Constitutional, HEENT, Respiratory, CVS, GI, , Musculoskeletal, Integumentary, Neurologic, Rheumatologic, Endocrinologic, Psychiatric, Lymphatic, and Hematologic/Oncologic systems were reviewed and are negative unless otherwise stated in HPI  Positive and negative findings pertinent to this evaluation are incorporated into the history of present illness  PHYSICAL EXAMINATION:  Vital Signs: Temp:  [98 1 °F (36 7 °C)-98 2 °F (36 8 °C)] 98 2 °F (36 8 °C)  HR:  [74-91] 91  Resp:  [18-19] 19  BP: (118-142)/(64-83) 136/72  Body mass index is 35 58 kg/m²    Body surface area is 2 39 meters squared  Constitutional: Alert and oriented    HEENT: Anicteric, PERRLA  Chest: Decreased breathing sound bilaterally, No wheezes/rales/rhonchi  CVS: Regular rhythm  Normal rate  Abdomen: Soft, nontender, nondistended  No palpable organomegaly  Extremities: No cyanosis/clubbing/edema  Integumentary: No obvious rashes or bruises  Musculoskeletal: No obvious bony or joint deformities  Psychiatric: Appropriate affect and mood  Lymph Node Survey: No palpable preauricular, submandibular, cervical, supraclavicular, axillary, epitrochlear or inguinal lymphadenopathy  LABS:  Results from last 7 days   Lab Units 06/29/19  0204 06/28/19  2230 06/28/19  1645   TROPONIN I ng/mL 0 02 0 04 0 02     CBC with diff: Results from last 7 days   Lab Units 07/01/19  0441 06/30/19  0626 06/29/19  0604 06/28/19  1645   WBC Thousand/uL 15 33* 16 02* 23 71* 19 23*   HEMOGLOBIN g/dL 12 0 12 0 12 1 12 4   HEMATOCRIT % 37 8 38 5 37 6 38 8   MCV fL 82 82 81* 80*   PLATELETS Thousands/uL 255 230 206 211   MCH pg 25 9* 25 6* 25 9* 25 5*   MCHC g/dL 31 7 31 2* 32 2 32 0   RDW % 21 1* 21 0* 20 5* 20 5*   MPV fL 11 6 12 0 11 3 11 5   NRBC AUTO /100 WBCs  --   --   --  0       CMP:  Results from last 7 days   Lab Units 07/01/19 0441 06/30/19  0626 06/29/19  0604 06/28/19  1645   POTASSIUM mmol/L 4 0 3 8 3 6 4 2   CHLORIDE mmol/L 102 103 102 98*   CO2 mmol/L 27 29 29 30   BUN mg/dL 12 16 20 23   CREATININE mg/dL 0 74 0 75 0 73 1 03   CALCIUM mg/dL 8 6 8 6 8 6 8 5   AST U/L 31 31 34 43   ALT U/L 24 29 33 37   ALK PHOS U/L 570* 559* 612* 677*   EGFR ml/min/1 73sq m 99 98 99 77       Results from last 7 days   Lab Units 07/01/19  1114 07/01/19 0441 06/1957 06/28/19  1645   INR   --   --   --   --  1 80*   PTT seconds 57* 82* 94*   < > 34    < > = values in this interval not displayed  Invalid input(s): TNI,  PCT              IMAGING:  XR foot 3+ vw left   Final Result      No acute osseous abnormality  Workstation performed: [id number]         VAS lower limb venous duplex study, complete bilateral   Final Result      CTA ED chest PE Study   Final Result   Numerous fairly diffusely distributed pulmonary emboli the level of the lobar and segmental arteries bilaterally      Mild right heart strain      Suspected cirrhosis and ascites      Multiple thoracic vertebral body densities, likely blastic metastases      Findings personally discussed by phone with Harjeet Vázquez at 7:10 PM, 6/28/2019            Workstation performed: [id number]         XR chest 2 views   Final Result      No acute cardiopulmonary disease              Workstation performed: [id number]

## 2019-07-01 NOTE — PROGRESS NOTES
General Cardiology   Progress Note   Sierra Pastcalos 60 y o  male MRN: [id number]  Unit/Bed#: - Encounter: [id number]    Assessment/Plan:  1  Acute bilateral multi-lobar pulmonary emboli  -continue heparin drip until seen by Oncology and their workup is complete  -case management consult for p o  Anticoagulation insurance coverage  -echocardiogram to evaluate for right heart strain is pending    2  Abnormal CT with evidence of spinal metastasis  -oncology consulted  -care per Mikey  Internal Medicine    3  Tobacco abuse  -cessation encouraged      Subjective:    No significant events since the last encounter  Patient seen examined  REVIEW OF SYSTEMS:  Constitutional:  Denies fever or chills, +fatigue   Eyes:  Denies change in visual acuity   HENT:  Denies nasal congestion or sore throat   Respiratory:  Denies cough or shortness of breath   Cardiovascular:  Denies chest pain or edema   GI:  Denies abdominal pain, nausea, vomiting, bloody stools or diarrhea, +decreased appetite   :  Denies dysuria, frequency, difficulty in micturition and nocturia  Musculoskeletal:  Denies back pain or joint pain   Neurologic:  Denies headache, focal weakness or sensory changes   Endocrine:  Denies polyuria or polydipsia   Lymphatic:  Denies swollen glands   Psychiatric:  Denies depression or anxiety     Objective:   Vitals:  Vitals:    19 0723   BP:    Pulse:    Resp:    Temp:    SpO2: 91%       Body mass index is 35 58 kg/m²  Systolic (74ACU), FHB:551 , Min:118 , BBA:329     Diastolic (94MTH), UM, Min:62, Max:71      Intake/Output Summary (Last 24 hours) at 2019 0937  Last data filed at 2019 1129  Gross per 24 hour   Intake    Output 600 ml   Net -600 ml     Weight (last 2 days)     Date/Time   Weight    19 0600   119 (262 35)    19 0600   119 (262 35)    19 1035   119 (262 35)              Telemetry Review: No significant arrhythmias seen on telemetry review    Normal sinus rhythm in the 90s      PHYSICAL EXAMS:  General:  Patient is not in acute distress, laying in the bed comfortably, awake, alert responding to commands  Head: Normocephalic, Atraumatic  HEENT: White sclera, pink conjunctiva,  PERRLA,pharynx benign  Neck:  Supple, no neck vein distention, carotids+2/+2 no bruits, thyromegaly, adenopathy  Respiratory: clear to P/A  Cardiovascular:  PMI normal, S1-S2 normal, No  Murmurs, thrills, gallops, rubs   Regular rhythm  GI:  Abdomen soft nontender   No hepatosplenomegaly, adenopathy, ascites,or rebound tenderness  Extremities:  +1 bilateral pitting lower extremity edema, normal pulses, no calf tenderness, no joint deformities, no venous disease   Integument:  No skin rashes or ulceration, +bilateral PVD discoloration and lower extremity wounds  Lymphatic:  No cervical or inguinal lymphadenopathy  Neurologic:  Patient is awake alert, responding to command, well-oriented to time and place and person moving all extremities      LABORATORY RESULTS:  Results from last 7 days   Lab Units 06/29/19  0204 06/28/19  2230 06/28/19  1645   TROPONIN I ng/mL 0 02 0 04 0 02     CBC with diff: Results from last 7 days   Lab Units 07/01/19  0441 06/30/19  0626 06/29/19  0604 06/28/19  1645   WBC Thousand/uL 15 33* 16 02* 23 71* 19 23*   HEMOGLOBIN g/dL 12 0 12 0 12 1 12 4   HEMATOCRIT % 37 8 38 5 37 6 38 8   MCV fL 82 82 81* 80*   PLATELETS Thousands/uL 255 230 206 211   MCH pg 25 9* 25 6* 25 9* 25 5*   MCHC g/dL 31 7 31 2* 32 2 32 0   RDW % 21 1* 21 0* 20 5* 20 5*   MPV fL 11 6 12 0 11 3 11 5   NRBC AUTO /100 WBCs  --   --   --  0       CMP:  Results from last 7 days   Lab Units 07/01/19  0441 06/30/19  0626 06/29/19  0604   POTASSIUM mmol/L 4 0 3 8 3 6   CHLORIDE mmol/L 102 103 102   CO2 mmol/L 27 29 29   BUN mg/dL 12 16 20   CREATININE mg/dL 0 74 0 75 0 73   CALCIUM mg/dL 8 6 8 6 8 6   AST U/L 31 31 34   ALT U/L 24 29 33   ALK PHOS U/L 570* 559* 612*   EGFR ml/min/1 73sq m 99 98 99 BMP:  Results from last 7 days   Lab Units 07/01/19  0441 06/30/19  0626 06/29/19  0604   POTASSIUM mmol/L 4 0 3 8 3 6   CHLORIDE mmol/L 102 103 102   CO2 mmol/L 27 29 29   BUN mg/dL 12 16 20   CREATININE mg/dL 0 74 0 75 0 73   CALCIUM mg/dL 8 6 8 6 8 6         Results from last 7 days   Lab Units 06/28/19  1645   NT-PRO BNP pg/mL 391*      Results from last 7 days   Lab Units 06/29/19  0604   MAGNESIUM mg/dL 1 8     Results from last 7 days   Lab Units 06/28/19  1645   HEMOGLOBIN A1C % 7 9*     Results from last 7 days   Lab Units 06/28/19  1645   TSH 3RD GENERATON uIU/mL 1  126     Results from last 7 days   Lab Units 06/28/19  1645   INR  1 80*       Lipid Profile:   Lab Results   Component Value Date    CHOL 86 11/13/2015    CHOL 102 06/29/2015    CHOL 108 02/19/2015     Lab Results   Component Value Date    HDL 32 (L) 01/26/2017    HDL 30 (L) 06/03/2016    HDL 31 11/13/2015     Lab Results   Component Value Date    LDLCALC 38 01/26/2017    LDLCALC 33 06/03/2016    LDLCALC 27 11/13/2015     Lab Results   Component Value Date    TRIG 162 (H) 01/26/2017    TRIG 113 06/03/2016    TRIG 138 11/13/2015       Cardiac testing:   Echocardiogram pending    Meds/Allergies   all current active meds have been reviewed  Medications Prior to Admission   Medication    albuterol (PROVENTIL HFA,VENTOLIN HFA) 90 mcg/act inhaler    aspirin 81 MG tablet    Blood Glucose Monitoring Suppl (TRUERESULT BLOOD GLUCOSE) w/Device KIT    buPROPion (WELLBUTRIN XL) 150 mg 24 hr tablet    chlorthalidone 25 mg tablet    cholecalciferol (VITAMIN D3) 1,000 units tablet    glucose blood (TRUETEST TEST) test strip    glucose blood test strip    glucose blood test strip    ibuprofen (MOTRIN) 800 mg tablet    insulin aspart (NOVOLOG) 100 units/mL injection    insulin NPH-insulin regular (NovoLIN 70/30) 100 units/mL subcutaneous injection    insulin regular (HumuLIN R,NovoLIN R) 100 units/mL injection    ipratropium-albuterol (DUO-NEB) 0 5-2 5 mg/3 mL nebulizer solution    metFORMIN (GLUCOPHAGE) 1000 MG tablet    morphine (MS CONTIN) 30 mg 12 hr tablet    naproxen (NAPROSYN) 250 mg tablet    oxyCODONE (ROXICODONE) 15 mg immediate release tablet    saccharomyces boulardii (FLORASTOR) 250 mg capsule    atorvastatin (LIPITOR) 10 mg tablet    budesonide-formoterol (SYMBICORT) 160-4 5 mcg/act inhaler    Empagliflozin (JARDIANCE) 25 MG TABS    insulin glargine (LANTUS) 100 units/mL subcutaneous injection    Insulin Glargine-Lixisenatide (SOLIQUA) 100-33 UNT-MCG/ML SOPN    Insulin Syringe-Needle U-100 (INSULIN SYRINGE 1CC/30GX5/16") 30G X 5/16" 1 ML MISC    methocarbamol (ROBAXIN) 750 mg tablet    omeprazole (PriLOSEC) 20 mg delayed release capsule         heparin (porcine) 3-30 Units/kg/hr (Order-Specific) Last Rate: 11 Units/kg/hr (06/30/19 2156)             Counseling / Coordination of Care  Total floor / unit time spent today 20 minutes  Greater than 50% of total time was spent with the patient and / or family counseling and / or coordination of care  ** Please Note: Dragon 360 Dictation voice to text software may have been used in the creation of this document   **

## 2019-07-01 NOTE — CONSULTS
Patient is being followed by podiatry for wound to lower extremity  Discussed with primary RN - no other wounds noted or documented in the computer  Wound care orders as per podiatry  Discussed with Dr Landen Davis (podiatry) also  Wound care will sign off

## 2019-07-01 NOTE — UTILIZATION REVIEW
Initial Clinical Review    Admission: Date/Time/Statement: 6/28/19 @ 2035 INPATIENT    Orders Placed This Encounter   Procedures    Inpatient Admission     Standing Status:   Standing     Number of Occurrences:   1     Order Specific Question:   Admitting Physician     Answer:   Brennan Saul [[id number]]     Order Specific Question:   Level of Care     Answer:   Med Surg [[id number]]     Order Specific Question:   Bed request comments     Answer:   Telemetry     Order Specific Question:   Estimated length of stay     Answer:   More than 2 Midnights     Order Specific Question:   Certification     Answer:   I certify that inpatient services are medically necessary for this patient for a duration of greater than two midnights  See H&P and MD Progress Notes for additional information about the patient's course of treatment  ED Arrival Information     Expected Arrival Acuity Means of Arrival Escorted By Service Admission Type    - 6/28/2019 14:42 Emergent Wheelchair Family Member General Medicine Emergency    Arrival Complaint    -        Chief Complaint   Patient presents with    Back Pain     Pt states he has hx of sciatica that "hurts so bad, I'm not able to take care of my other problems"  pt has wounds on feet that he feels he is unable to treat himself     Assessment/Plan: Celia Salinas is a 60 y o  male who presents on 06/28 with complaints of worsening right hip pain  He has past medical history of hypertension, hyperlipidemia, type 2 diabetes with bilateral foot ulcers, obstructive sleep apnea not compliant with CPAP therapy, ongoing tobacco abuse, and evaluation for posterior neck masses  Per the patient he has had worsening right lower extremity pain to the point that he is no longer able to ambulate  Reports has been refractory to his opioid medications for his chronic lower back pain  The patient does report a history of cancer in his brother with plasmacytosis who passed away 3 years ago    In the emergency room his workup was concerning for pulmonary emboli which was demonstrated on CTA of the chest   He was started on a heparin infusion referred to the [facility] telemetry for continuous monitoring  Respiratory insufficiency  Assessment & Plan  Encourage good pulmonary hygiene  Respiratory protocol, incentive spirometry     Chronic prescription opiate use  Assessment & Plan  Continue home doses of opioids     Type 2 diabetes mellitus with foot ulcer, with long-term current use of insulin (HCC)  Assessment & Plan        Lab Results   Component Value Date     HGBA1C 8 8 (H) 10/14/2017         No results for input(s): POCGLU in the last 72 hours      Blood Sugar Average: Last 72 hrs:   continue long-acting insulin  Continue short-acting insulin schedule 20 units with meals  Start sliding scale insulin     Morbid obesity (HCC)  Assessment & Plan  Encourage lifestyle changes  Consult Nutrition     Tobacco abuse  Assessment & Plan  Encourage smoking cessation  Nicotine replacement     * Acute pulmonary embolism with acute cor pulmonale (HCC)  Assessment & Plan  Begin heparin infusion  Trend troponins, check echocardiogram  Consult Cardiology     VTE Prophylaxis: Heparin Drip  / sequential compression device   Code Status:  Level 1  POLST: POLST form is not discussed and not completed at this time      Anticipated Length of Stay:  Patient will be admitted on an Inpatient basis with an anticipated length of stay of  less than 2 midnights     Justification for Hospital Stay:  Pulmonary emboli       ED Triage Vitals   Temperature Pulse Respirations Blood Pressure SpO2   06/28/19 1455 06/28/19 1452 06/28/19 1452 06/28/19 1452 06/28/19 1452   98 9 °F (37 2 °C) (!) 112 18 142/75 90 %      Temp Source Heart Rate Source Patient Position - Orthostatic VS BP Location FiO2 (%)   06/28/19 1455 06/28/19 1559 06/28/19 1559 06/28/19 1559 --   Oral Monitor Sitting Right arm       Pain Score       06/28/19 1452       Worst Possible Pain Wt Readings from Last 1 Encounters:   07/01/19 119 kg (262 lb 5 6 oz)     Additional Vital Signs:     Date/Time  Temp  Pulse  Resp  BP  MAP (mmHg)  SpO2  O2 Device  Patient Position - Orthostatic VS   07/01/19 1604  98 2 °F (36 8 °C)  91  19  136/72    90 %  None (Room air)  Sitting   07/01/19 1100  98 2 °F (36 8 °C)  82  18  142/83  106  92 %  None (Room air)  Sitting   07/01/19 0900              None (Room air)     07/01/19 0723            91 %  None (Room air)     07/01/19 0700  98 2 °F (36 8 °C)  74  18  125/71  91  91 %  None (Room air)  Lying - Orthostatic VS   07/01/19 0342              None (Room air)     07/01/19 0317  98 2 °F (36 8 °C)  83  18  133/70    92 %  None (Room air)  Lying   07/01/19 0133              None (Room air)     07/01/19 0000    Clifm Velarde          None (Room air)     06/30/19 2300  98 1 °F (36 7 °C)  89  18  122/68    90 %  None (Room air)  Sitting   06/30/19 1952            92 %  None (Room air)     06/30/19 1900  98 1 °F (36 7 °C)  74  18  118/64    92 %  None (Room air)  Lying   06/30/19 1539            91 %  None (Room air)     06/30/19 1500  97 9 °F (36 6 °C)  76  18  121/62    92 %  None (Room air)  Lying   06/30/19 1100  98 7 °F (37 1 °C)  83  18  118/66    92 %  None (Room air)  Sitting   06/30/19 0720  97 7 °F (36 5 °C)  71  20      99 %  None (Room air)     06/30/19 0700        112/58        Sitting   06/30/19 0511              Nasal cannula     06/30/19 0300  98 6 °F (37 °C)  88  17  141/69    90 %  None (Room air)  Lying   06/30/19 0105              Nasal cannula     06/29/19 2300  98 2 °F (36 8 °C)  100  17  142/66    87 %Abnormal   None (Room air)  Lying   06/29/19 1955              Nasal cannula     06/29/19 1900  98 8 °F (37 1 °C)  74  18  134/71    93 %  Nasal cannula  Sitting   06/29/19 1500  98 6 °F (37 °C)  76  18  131/70    92 %  Nasal cannula  Lying   06/29/19 0821  98 4 °F (36 9 °C) 96  18  128/73    89 %Abnormal   None (Room air)  Sitting     Pertinent Labs/Diagnostic Test Results:   Results from last 7 days   Lab Units 07/01/19  0441 06/30/19  0626 06/29/19  0604 06/28/19  1645   WBC Thousand/uL 15 33* 16 02* 23 71* 19 23*   HEMOGLOBIN g/dL 12 0 12 0 12 1 12 4   HEMATOCRIT % 37 8 38 5 37 6 38 8   PLATELETS Thousands/uL 255 230 206 211   NEUTROS ABS Thousands/µL  --   --   --  15 76*         Results from last 7 days   Lab Units 07/01/19  0441 06/30/19  0626 06/29/19  0604 06/28/19  1645   SODIUM mmol/L 137 139 138 135*   POTASSIUM mmol/L 4 0 3 8 3 6 4 2   CHLORIDE mmol/L 102 103 102 98*   CO2 mmol/L 27 29 29 30   ANION GAP mmol/L 8 7 7 7   BUN mg/dL 12 16 20 23   CREATININE mg/dL 0 74 0 75 0 73 1 03   EGFR ml/min/1 73sq m 99 98 99 77   CALCIUM mg/dL 8 6 8 6 8 6 8 5   MAGNESIUM mg/dL  --   --  1 8  --    PHOSPHORUS mg/dL  --   --  3 1  --      Results from last 7 days   Lab Units 07/01/19  0441 06/30/19  0626 06/29/19  1655 06/29/19  0604 06/28/19  1645   AST U/L 31 31  --  34 43   ALT U/L 24 29  --  33 37   ALK PHOS U/L 570* 559*  --  612* 677*   TOTAL PROTEIN g/dL 6 5 6 5 6 5 6 7 7 2   ALBUMIN g/dL 2 1* 2 1*  --  2 3* 2 6*   TOTAL BILIRUBIN mg/dL 1 10* 0 90  --  1 40* 1 30*     Results from last 7 days   Lab Units 07/01/19  1607 07/01/19  1558 07/01/19  1102 07/01/19  0607 06/30/19  2108 06/30/19  1622 06/30/19  1259 06/30/19  1144 06/30/19  1141 06/30/19  0644   POC GLUCOSE mg/dl 56* 56* 331* 163* 94 78 69 41* 49* 164*     Results from last 7 days   Lab Units 07/01/19  0441 06/30/19  1149 06/30/19  0626 06/29/19  1655 06/29/19  0604 06/28/19  1645   GLUCOSE RANDOM mg/dL 148* 44* 146* 64* 211* 449*     Results from last 7 days   Lab Units 06/28/19  1645   HEMOGLOBIN A1C % 7 9*   EAG mg/dl 180       Results from last 7 days   Lab Units 06/29/19  0204 06/28/19  2230 06/28/19  1645   TROPONIN I ng/mL 0 02 0 04 0 02     Results from last 7 days   Lab Units 06/28/19  1645   D DIMER QUANT ng/ml (FEU) >10,000*     Results from last 7 days   Lab Units 07/01/19  1114 07/01/19  0441 06/1957  06/28/19  1645   PROTIME seconds  --   --   --   --  21 0*   INR   --   --   --   --  1 80*   PTT seconds 57* 82* 94*   < > 34    < > = values in this interval not displayed  Results from last 7 days   Lab Units 06/28/19  1645   NT-PRO BNP pg/mL 391*       Results from last 7 days   Lab Units 06/28/19  1645   BLOOD CULTURE  No Growth at 48 hrs  No Growth at 48 hrs        6/28 Chest x-ray: no acute cardiopulmonary disease  6/28 CTA chest: Numerous fairly diffusely distributed pulmonary emboli the level of the lobar and segmental arteries bilaterally  Mild right heart strain  Multiple thoracic vertebral body densities, likely blastic metastases   Suspected cirrhosis and ascites    6/28 EKG: NSR    ED Treatment:   Medication Administration from 06/28/2019 1442 to 06/28/2019 2121       Date/Time Order Dose Route Action Action by Comments     06/28/2019 1613 albuterol inhalation solution 5 mg 5 mg Nebulization Given Darlin Crimes, RN      06/28/2019 1613 ipratropium (ATROVENT) 0 02 % inhalation solution 0 5 mg 0 5 mg Nebulization Given [facility], RN      06/28/2019 1914 ceftriaxone (ROCEPHIN) 1 g/50 mL in dextrose IVPB 0 mg Intravenous Stopped Darlin Crimes, RN      06/28/2019 1802 ceftriaxone (ROCEPHIN) 1 g/50 mL in dextrose IVPB 1,000 mg Intravenous [address] Darlin Crimes, RN      06/28/2019 2005 azithromycin (ZITHROMAX) 500 mg in sodium chloride 0 9% 250mL IVPB 500 mg 500 mg Intravenous [address] Darlin Crimes, RN      06/28/2019 1902 oxyCODONE (OxyCONTIN) 12 hr tablet 10 mg 10 mg Oral Given Darlin Crimes, RN      06/28/2019 2014 sodium chloride 0 9 % bolus 1,000 mL 0 mL Intravenous Stopped Darlin Crimes, RN      06/28/2019 1905 sodium chloride 0 9 % bolus 1,000 mL 1,000 mL Intravenous [address] Darlin Crimes, RN      06/28/2019 1841 iohexol (OMNIPAQUE) 350 MG/ML injection (MULTI-DOSE) 100 mL 100 mL Intravenous Given Franko Short      06/28/2019 1948 heparin (porcine) injection 10,000 Units 10,000 Units Intravenous Given Lizeth Gonsalez RN      06/28/2019 1954 heparin (porcine) 25,000 units in 250 mL infusion (premix) 18 Units/kg/hr Intravenous New Bag Lizeth Gonsalez RN         Past Medical History:   Diagnosis Date    Chronic pain syndrome     COPD (chronic obstructive pulmonary disease) ([place] [address] )     Diabetes mellitus ([place] [address] )     Diverticulitis     Hyperlipidemia     Hypertension     Sleep apnea      Present on Admission:   Tobacco abuse   Morbid obesity ([place] [address] )   Acute pulmonary embolism with acute cor pulmonale (HCC)   Respiratory insufficiency      Admitting Diagnosis: Back pain [M54 9]  Foot ulcer due to secondary DM ([place] [address] ) [R66 451, L97 509]  Type 2 diabetes mellitus with foot ulcer, with long-term current use of insulin ([facility]) [Z46 720, L97 509, Z79 4]  Acute pulmonary embolism with acute cor pulmonale, unspecified pulmonary embolism type ([place] [address] ) [I26 09]  Age/Sex: 60 y o  male     Admission Orders: Cardiology consult, Telemetry monitoring, Oncology consult, nasal cannula oxygen to maintain SpO2 of at least 92%, SCD       Current Facility-Administered Medications:  acetaminophen 975 mg Oral Q8H [facility]   albuterol 2 puff Inhalation Q4H PRN   atorvastatin 10 mg Oral Daily   azithromycin 500 mg Oral Q24H   budesonide-formoterol 2 puff Inhalation BID   buPROPion 150 mg Oral Daily   cholecalciferol 1,000 Units Oral Daily   docusate sodium 100 mg Oral BID   gabapentin 100 mg Oral TID   heparin (porcine) 3-30 Units/kg/hr (Order-Specific) Intravenous Titrated   heparin (porcine) 10,000 Units Intravenous PRN   heparin (porcine) 5,000 Units Intravenous PRN   insulin glargine 75 Units Subcutaneous HS   insulin lispro 1-5 Units Subcutaneous HS   insulin lispro 10 Units Subcutaneous TID With Meals   insulin lispro 2-12 Units Subcutaneous TID AC   lidocaine 1 patch Topical Daily methocarbamol 750 mg Oral 4x Daily   morphine 15 mg Oral Q12H LEONCIO   morphine injection 4 mg Intravenous Q4H PRN   nicotine 1 patch Transdermal Daily   oxyCODONE 15 mg Oral Q4H PRN   pantoprazole 40 mg Oral Early Morning   senna 1 tablet Oral Daily         Network Utilization Review Department  Phone: [phone]; Fax [phone]  [email or web address]  ATTENTION: Please call with any questions or concerns to [phone]  and carefully listen to the prompts so that you are directed to the right person  Send all requests for admission clinical reviews, approved or denied determinations and any other requests to fax [phone]   All voicemails are confidential

## 2019-07-01 NOTE — SOCIAL WORK
CM met with pt at bedside  Pt lives in a difficult situation  His estranged wife Rajan Hogan is living with her boyfriend in the same house  It is a one story house, but there are 18 GORD[address]-with railing  Pt has been c/o severe back pain and he has had to crawl up the stairs due to this pain  He was just medicated for pain and he still rates his back pain as a 7/10  He uses a cane and walker prn to ambulate  He is able to perform ADL's, but has been having difficulty due to the pain  He received no assistance from his "wife" who lives in the home  He has never been in rehab, but is current with [facility]  Denies substance abuse or mental health issues  He is a smoker-8-10 cigarettes/day  He has smoked for 45 years  Dr Elton Sarkar is his PCP  He does not have a POA or Advanced Directive  CM supplied info on both  He does not work, but still drives  His mother or sister will transport home when he is medically cleared  CM discussed d/c needs  Pt "may" be interested in [facility]  He is also debating if he should get home and resume services with [facility] and take care of his home issues  CM will place referral to see if pt meets criteria for [facility]  Pt understands that he has freedom of choice  CM will follow through hospitalization  CM reviewed discharge planning process including the following: identifying help at home, patient preference for discharge planning needs, pharmacy preference, and availability of treatment team to discuss questions or concerns patient and/or family may have regarding understanding medications and recognizing signs and symptoms once discharged  CM also encouraged patient to follow up with all recommended appointments after discharge  Patient advised of importance for patient and family to participate in managing patients medical well being  CM name and role reviewed   Discharge Checklist reviewed and CM will continue to monitor for progress toward discharge goals in nursing and provider rounds

## 2019-07-02 PROBLEM — M21.331 RIGHT WRIST DROP: Status: ACTIVE | Noted: 2019-07-02

## 2019-07-02 LAB
APTT PPP: 62 SECONDS (ref 23–37)
APTT PPP: 64 SECONDS (ref 23–37)
CEA SERPL-MCNC: 2186.2 NG/ML (ref 0–3)
GLUCOSE SERPL-MCNC: 155 MG/DL (ref 65–140)
GLUCOSE SERPL-MCNC: 243 MG/DL (ref 65–140)
GLUCOSE SERPL-MCNC: 283 MG/DL (ref 65–140)
GLUCOSE SERPL-MCNC: 322 MG/DL (ref 65–140)

## 2019-07-02 PROCEDURE — 82948 REAGENT STRIP/BLOOD GLUCOSE: CPT

## 2019-07-02 PROCEDURE — 97163 PT EVAL HIGH COMPLEX 45 MIN: CPT

## 2019-07-02 PROCEDURE — G8979 MOBILITY GOAL STATUS: HCPCS

## 2019-07-02 PROCEDURE — 99223 1ST HOSP IP/OBS HIGH 75: CPT | Performed by: INTERNAL MEDICINE

## 2019-07-02 PROCEDURE — 82378 CARCINOEMBRYONIC ANTIGEN: CPT | Performed by: FAMILY MEDICINE

## 2019-07-02 PROCEDURE — 99232 SBSQ HOSP IP/OBS MODERATE 35: CPT | Performed by: INTERNAL MEDICINE

## 2019-07-02 PROCEDURE — 99232 SBSQ HOSP IP/OBS MODERATE 35: CPT | Performed by: FAMILY MEDICINE

## 2019-07-02 PROCEDURE — 86301 IMMUNOASSAY TUMOR CA 19-9: CPT | Performed by: FAMILY MEDICINE

## 2019-07-02 PROCEDURE — 85730 THROMBOPLASTIN TIME PARTIAL: CPT | Performed by: FAMILY MEDICINE

## 2019-07-02 PROCEDURE — G8978 MOBILITY CURRENT STATUS: HCPCS

## 2019-07-02 RX ORDER — MORPHINE SULFATE 30 MG/1
30 TABLET, FILM COATED, EXTENDED RELEASE ORAL EVERY 12 HOURS SCHEDULED
Status: DISCONTINUED | OUTPATIENT
Start: 2019-07-02 | End: 2019-07-03

## 2019-07-02 RX ORDER — OXYCODONE HYDROCHLORIDE 10 MG/1
20 TABLET ORAL EVERY 4 HOURS PRN
Status: DISCONTINUED | OUTPATIENT
Start: 2019-07-02 | End: 2019-07-03

## 2019-07-02 RX ORDER — HYDROMORPHONE HCL/PF 1 MG/ML
1 SYRINGE (ML) INJECTION EVERY 4 HOURS PRN
Status: DISCONTINUED | OUTPATIENT
Start: 2019-07-02 | End: 2019-07-03

## 2019-07-02 RX ADMIN — PANTOPRAZOLE SODIUM 40 MG: 40 TABLET, DELAYED RELEASE ORAL at 05:20

## 2019-07-02 RX ADMIN — AZITHROMYCIN 500 MG: 250 TABLET, FILM COATED ORAL at 17:14

## 2019-07-02 RX ADMIN — ATORVASTATIN CALCIUM 10 MG: 10 TABLET, FILM COATED ORAL at 17:13

## 2019-07-02 RX ADMIN — HYDROMORPHONE HYDROCHLORIDE 1 MG: 1 INJECTION, SOLUTION INTRAMUSCULAR; INTRAVENOUS; SUBCUTANEOUS at 22:55

## 2019-07-02 RX ADMIN — METHOCARBAMOL TABLETS 750 MG: 750 TABLET, COATED ORAL at 13:06

## 2019-07-02 RX ADMIN — MORPHINE SULFATE 4 MG: 4 INJECTION INTRAVENOUS at 02:49

## 2019-07-02 RX ADMIN — VITAMIN D, TAB 1000IU (100/BT) 1000 UNITS: 25 TAB at 09:27

## 2019-07-02 RX ADMIN — ACETAMINOPHEN 975 MG: 325 TABLET, FILM COATED ORAL at 14:01

## 2019-07-02 RX ADMIN — OXYCODONE HYDROCHLORIDE 15 MG: 5 TABLET ORAL at 04:22

## 2019-07-02 RX ADMIN — MORPHINE SULFATE 4 MG: 4 INJECTION INTRAVENOUS at 06:59

## 2019-07-02 RX ADMIN — ACETAMINOPHEN 975 MG: 325 TABLET, FILM COATED ORAL at 21:41

## 2019-07-02 RX ADMIN — INSULIN LISPRO 10 UNITS: 100 INJECTION, SOLUTION INTRAVENOUS; SUBCUTANEOUS at 17:14

## 2019-07-02 RX ADMIN — DOCUSATE SODIUM 100 MG: 100 CAPSULE, LIQUID FILLED ORAL at 09:27

## 2019-07-02 RX ADMIN — HEPARIN SODIUM AND DEXTROSE 11 UNITS/KG/HR: 10000; 5 INJECTION INTRAVENOUS at 05:20

## 2019-07-02 RX ADMIN — HEPARIN SODIUM AND DEXTROSE 11.04 UNITS/KG/HR: 10000; 5 INJECTION INTRAVENOUS at 21:40

## 2019-07-02 RX ADMIN — INSULIN LISPRO 6 UNITS: 100 INJECTION, SOLUTION INTRAVENOUS; SUBCUTANEOUS at 17:14

## 2019-07-02 RX ADMIN — METHOCARBAMOL TABLETS 750 MG: 750 TABLET, COATED ORAL at 09:27

## 2019-07-02 RX ADMIN — OXYCODONE HYDROCHLORIDE 20 MG: 10 TABLET ORAL at 16:35

## 2019-07-02 RX ADMIN — HYDROMORPHONE HYDROCHLORIDE 1 MG: 1 INJECTION, SOLUTION INTRAMUSCULAR; INTRAVENOUS; SUBCUTANEOUS at 14:02

## 2019-07-02 RX ADMIN — OXYCODONE HYDROCHLORIDE 15 MG: 5 TABLET ORAL at 00:05

## 2019-07-02 RX ADMIN — SENNOSIDES 8.6 MG: 8.6 TABLET, FILM COATED ORAL at 09:27

## 2019-07-02 RX ADMIN — METHOCARBAMOL TABLETS 750 MG: 750 TABLET, COATED ORAL at 21:41

## 2019-07-02 RX ADMIN — BUDESONIDE AND FORMOTEROL FUMARATE DIHYDRATE 2 PUFF: 160; 4.5 AEROSOL RESPIRATORY (INHALATION) at 09:28

## 2019-07-02 RX ADMIN — BUPROPION HYDROCHLORIDE 150 MG: 150 TABLET, FILM COATED, EXTENDED RELEASE ORAL at 09:28

## 2019-07-02 RX ADMIN — MORPHINE SULFATE 30 MG: 30 TABLET, FILM COATED, EXTENDED RELEASE ORAL at 21:41

## 2019-07-02 RX ADMIN — GABAPENTIN 100 MG: 100 CAPSULE ORAL at 17:13

## 2019-07-02 RX ADMIN — NICOTINE 1 PATCH: 14 PATCH TRANSDERMAL at 09:29

## 2019-07-02 RX ADMIN — LIDOCAINE 1 PATCH: 50 PATCH TOPICAL at 09:27

## 2019-07-02 RX ADMIN — GABAPENTIN 100 MG: 100 CAPSULE ORAL at 21:41

## 2019-07-02 RX ADMIN — MORPHINE SULFATE 15 MG: 15 TABLET, FILM COATED, EXTENDED RELEASE ORAL at 09:27

## 2019-07-02 RX ADMIN — METHOCARBAMOL TABLETS 750 MG: 750 TABLET, COATED ORAL at 17:13

## 2019-07-02 RX ADMIN — OXYCODONE HYDROCHLORIDE 15 MG: 5 TABLET ORAL at 12:56

## 2019-07-02 RX ADMIN — INSULIN LISPRO 8 UNITS: 100 INJECTION, SOLUTION INTRAVENOUS; SUBCUTANEOUS at 13:00

## 2019-07-02 RX ADMIN — ACETAMINOPHEN 975 MG: 325 TABLET, FILM COATED ORAL at 05:20

## 2019-07-02 RX ADMIN — GABAPENTIN 100 MG: 100 CAPSULE ORAL at 09:27

## 2019-07-02 RX ADMIN — DOCUSATE SODIUM 100 MG: 100 CAPSULE, LIQUID FILLED ORAL at 17:13

## 2019-07-02 NOTE — PLAN OF CARE
Problem: Potential for Falls  Goal: Patient will remain free of falls  Description  INTERVENTIONS:  - Assess patient frequently for physical needs  -  Identify cognitive and physical deficits and behaviors that affect risk of falls    -  [place] fall precautions as indicated by assessment   - Educate patient/family on patient safety including physical limitations  - Instruct patient to call for assistance with activity based on assessment  - Modify environment to reduce risk of injury  - Consider OT/PT consult to assist with strengthening/mobility  Outcome: Progressing     Problem: PAIN - ADULT  Goal: Verbalizes/displays adequate comfort level or baseline comfort level  Description  Interventions:  - Encourage patient to monitor pain and request assistance  - Assess pain using appropriate pain scale  - Administer analgesics based on type and severity of pain and evaluate response  - Implement non-pharmacological measures as appropriate and evaluate response  - Consider cultural and social influences on pain and pain management  - Notify physician/advanced practitioner if interventions unsuccessful or patient reports new pain  Outcome: Progressing     Problem: INFECTION - ADULT  Goal: Absence or prevention of progression during hospitalization  Description  INTERVENTIONS:  - Assess and monitor for signs and symptoms of infection  - Monitor lab/diagnostic results  - Monitor all insertion sites, i e  indwelling lines, tubes, and drains  - Monitor endotracheal (as able) and nasal secretions for changes in amount and color  - [place] appropriate cooling/warming therapies per order  - Administer medications as ordered  - Instruct and encourage patient and family to use good hand hygiene technique  - Identify and instruct in appropriate isolation precautions for identified infection/condition  Outcome: Progressing  Goal: Absence of fever/infection during neutropenic period  Description  INTERVENTIONS:  - Monitor WBC  - Implement neutropenic guidelines  Outcome: Progressing     Problem: SAFETY ADULT  Goal: Maintain or return to baseline ADL function  Description  INTERVENTIONS:  -  Assess patient's ability to carry out ADLs; assess patient's baseline for ADL function and identify physical deficits which impact ability to perform ADLs (bathing, care of mouth/teeth, toileting, grooming, dressing, etc )  - Assess/evaluate cause of self-care deficits   - Assess range of motion  - Assess patient's mobility; develop plan if impaired  - Assess patient's need for assistive devices and provide as appropriate  - Encourage maximum independence but intervene and supervise when necessary  ¯ Involve family in performance of ADLs  ¯ Assess for home care needs following discharge   ¯ Request OT consult to assist with ADL evaluation and planning for discharge  ¯ Provide patient education as appropriate  Outcome: Progressing  Goal: Maintain or return mobility status to optimal level  Description  INTERVENTIONS:  - Assess patient's baseline mobility status (ambulation, transfers, stairs, etc )    - Identify cognitive and physical deficits and behaviors that affect mobility  - Identify mobility aids required to assist with transfers and/or ambulation (gait belt, sit-to-stand, lift, walker, cane, etc )  - [place] fall precautions as indicated by assessment  - Record patient progress and toleration of activity level on Mobility SBAR; progress patient to next Phase/Stage  - Instruct patient to call for assistance with activity based on assessment  - Request Rehabilitation consult to assist with strengthening/weightbearing, etc   Outcome: Progressing     Problem: DISCHARGE PLANNING  Goal: Discharge to home or other facility with appropriate resources  Description  INTERVENTIONS:  - Identify barriers to discharge w/patient and caregiver  - Arrange for needed discharge resources and transportation as appropriate  - Identify discharge learning needs (meds, wound care, etc )  - Arrange for interpretive services to assist at discharge as needed  - Refer to Case Management Department for coordinating discharge planning if the patient needs post-hospital services based on physician/advanced practitioner order or complex needs related to functional status, cognitive ability, or social support system  Outcome: Progressing     Problem: Knowledge Deficit  Goal: Patient/family/caregiver demonstrates understanding of disease process, treatment plan, medications, and discharge instructions  Description  Complete learning assessment and assess knowledge base  Interventions:  - Provide teaching at level of understanding  - Provide teaching via preferred learning methods  Outcome: Progressing     Problem: Prexisting or High Potential for Compromised Skin Integrity  Goal: Skin integrity is maintained or improved  Description  INTERVENTIONS:  - Identify patients at risk for skin breakdown  - Assess and monitor skin integrity  - Assess and monitor nutrition and hydration status  - Monitor labs (i e  albumin)  - Assess for incontinence   - Turn and reposition patient  - Assist with mobility/ambulation  - Relieve pressure over bony prominences  - Avoid friction and shearing  - Provide appropriate hygiene as needed including keeping skin clean and dry  - Evaluate need for skin moisturizer/barrier cream  - Collaborate with interdisciplinary team (i e  Nutrition, Rehabilitation, etc )   - Patient/family teaching  Outcome: Progressing     Problem: Nutrition/Hydration-ADULT  Goal: Nutrient/Hydration intake appropriate for improving, restoring or maintaining nutritional needs  Description  Monitor and assess patient's nutrition/hydration status for malnutrition (ex- brittle hair, bruises, dry skin, pale skin and conjunctiva, muscle wasting, smooth red tongue, and disorientation)  Collaborate with interdisciplinary team and initiate plan and interventions as ordered    Monitor patient's weight and dietary intake as ordered or per policy  Determine patient's food preferences and provide high-protein, high-caloric foods as appropriate       INTERVENTIONS:  - Monitor oral intake, urinary output, labs, and treatment plans  - Assess nutrition and hydration status and recommend course of action  - Evaluate amount of meals eaten  - Assist patient with eating if necessary   - Allow adequate time for meals  - Recommend/ encourage appropriate diets, oral nutritional supplements, and vitamin/mineral supplements  - Order, calculate, and assess calorie counts as needed  - Assess need for intravenous fluids  - Provide nutrition/hydration education as appropriate  - Include patient/family/caregiver in decisions related to nutrition   Outcome: Progressing     Problem: DISCHARGE PLANNING - CARE MANAGEMENT  Goal: Discharge to post-acute care or home with appropriate resources  Description  INTERVENTIONS:  - Conduct assessment to determine patient/family and health care team treatment goals, and need for post-acute services based on payer coverage, community resources, and patient preferences, and barriers to discharge  - Address psychosocial, clinical, and financial barriers to discharge as identified in assessment in conjunction with the patient/family and health care team  - Arrange appropriate level of post-acute services according to patients   needs and preference and payer coverage in collaboration with the physician and health care team  - Communicate with and update the patient/family, physician, and health care team regarding progress on the discharge plan  - Arrange appropriate transportation to post-acute venues  Outcome: Progressing

## 2019-07-02 NOTE — ASSESSMENT & PLAN NOTE
Continue home doses of opioids  Did add more to the patient's regimen  Did ask palliative Care to see the patient

## 2019-07-02 NOTE — ASSESSMENT & PLAN NOTE
Looks to be metastatic disease to the spine  Will also check an SPEP and a UPEP  Oncology consultation appreciated  PSA is normal however imaging favors prostate  Could be melanoma   Biopsy is requested

## 2019-07-02 NOTE — ASSESSMENT & PLAN NOTE
Encourage lifestyle changes    However the patient states he did have some weight loss  Consult Nutrition

## 2019-07-02 NOTE — ASSESSMENT & PLAN NOTE
Lab Results   Component Value Date    HGBA1C 7 9 (H) 06/28/2019       Recent Labs     07/01/19  1607 07/01/19  1715 07/01/19  2114 07/02/19  0553   POCGLU 56* 140 209* 243*       Blood Sugar Average: Last 72 hrs:  (P) 785 [id number] continue long-acting insulin  I did decrease the mealtime since he was hypoglycemic this morning  Start sliding scale insulin

## 2019-07-02 NOTE — ASSESSMENT & PLAN NOTE
Keep the heparin infusion for now  Likely switch over to Eliquis on discharge  The price was already discussed with case management  Patient is okay with it  About 47 dollars a month  But the heparin infusion for now because the patient will need a biopsy  Trend troponins, check echocardiogram  Consult Cardiology  Continue monitor closely

## 2019-07-02 NOTE — PROGRESS NOTES
General Cardiology   Progress Note   Stephan Dennis 60 y o  male MRN: [id number]  Unit/Bed#: -01 Encounter: [id number]    Assessment/Plan:  1  Acute bilateral multilobar pulmonary emboli  -continue heparin drip until seen by Oncology and there workup is complete  -case management verified Eliquis is affordable for patient, can start once oncology workup complete   -echocardiogram showed right ventricle mildly dilated and an EF 55% with no regional wall motion abnormalities    2  Abnormal CT with evidence of spinal metastasis  -oncology consulted  -care percent external medicine    3  Tobacco abuse  -cessation encouraged        Subjective:   Patient seen and examined  No significant events since the last encounter  REVIEW OF SYSTEMS:  Constitutional:  Denies fever or chills , +fatigue  Eyes:  Denies change in visual acuity   HENT:  Denies nasal congestion or sore throat   Respiratory:  Denies cough or shortness of breath   Cardiovascular:  Denies chest pain or edema   GI:  Denies abdominal pain, nausea, vomiting, bloody stools or diarrhea   :  Denies dysuria, frequency, difficulty in micturition and nocturia  Musculoskeletal:  Denies back pain or joint pain   Neurologic:  Denies headache, focal weakness or sensory changes   Endocrine:  Denies polyuria or polydipsia   Lymphatic:  Denies swollen glands   Psychiatric:  Denies depression or anxiety     Objective:   Vitals:  Vitals:    07/02/19 0700   BP: 132/69   Pulse: 87   Resp: 18   Temp: 98 1 °F (36 7 °C)   SpO2: 97%       Body mass index is 36 33 kg/m²    Systolic (61OWK), MATTHIAS:885 , Min:127 , JRR:258     Diastolic (76ATP), RGQ:48, Min:67, Max:72      Intake/Output Summary (Last 24 hours) at 7/2/2019 1109  Last data filed at 7/2/2019 0955  Gross per 24 hour   Intake 480 ml   Output    Net 480 ml     Weight (last 2 days)     Date/Time   Weight    07/02/19 0600   121 (267 86)    07/01/19 0600   119 (262 35)    06/30/19 0600   119 (262 35) PHYSICAL EXAMS:  General:  Patient is not in acute distress, laying in the bed comfortably, awake, alert responding to commands  Head: Normocephalic, Atraumatic  HEENT: White sclera, pink conjunctiva,  PERRLA,pharynx benign  Neck:  Supple, no neck vein distention, carotids+2/+2 no bruits, thyromegaly, adenopathy  Respiratory:  +coarse breath sounds bilaterally  Cardiovascular:  PMI normal, S1-S2 normal, No  Murmurs, thrills, gallops, rubs   Regular rhythm  GI:  Abdomen soft nontender   No hepatosplenomegaly, adenopathy, ascites,or rebound tenderness  Extremities:  +1 bilateral lower extremity pitting edema, normal pulses, no calf tenderness, no joint deformities, no venous disease   Integument:  No skin rashes or ulceration, +PVD discoloration  Lymphatic:  No cervical or inguinal lymphadenopathy  Neurologic:  Patient is awake alert, responding to command, well-oriented to time and place and person moving all extremities      LABORATORY RESULTS:  Results from last 7 days   Lab Units 06/29/19  0204 06/28/19  2230 06/28/19  1645   TROPONIN I ng/mL 0 02 0 04 0 02     CBC with diff: Results from last 7 days   Lab Units 07/01/19  0441 06/30/19  0626 06/29/19  0604 06/28/19  1645   WBC Thousand/uL 15 33* 16 02* 23 71* 19 23*   HEMOGLOBIN g/dL 12 0 12 0 12 1 12 4   HEMATOCRIT % 37 8 38 5 37 6 38 8   MCV fL 82 82 81* 80*   PLATELETS Thousands/uL 255 230 206 211   MCH pg 25 9* 25 6* 25 9* 25 5*   MCHC g/dL 31 7 31 2* 32 2 32 0   RDW % 21 1* 21 0* 20 5* 20 5*   MPV fL 11 6 12 0 11 3 11 5   NRBC AUTO /100 WBCs  --   --   --  0       CMP:  Results from last 7 days   Lab Units 07/01/19  0441 06/30/19  0626 06/29/19  0604   POTASSIUM mmol/L 4 0 3 8 3 6   CHLORIDE mmol/L 102 103 102   CO2 mmol/L 27 29 29   BUN mg/dL 12 16 20   CREATININE mg/dL 0 74 0 75 0 73   CALCIUM mg/dL 8 6 8 6 8 6   AST U/L 31 31 34   ALT U/L 24 29 33   ALK PHOS U/L 570* 559* 612*   EGFR ml/min/1 73sq m 99 98 99       BMP:  Results from last 7 days   Lab Units 19  0441 19  0626 19  0604   POTASSIUM mmol/L 4 0 3 8 3 6   CHLORIDE mmol/L 102 103 102   CO2 mmol/L 27 29 29   BUN mg/dL 12 16 20   CREATININE mg/dL 0 74 0 75 0 73   CALCIUM mg/dL 8 6 8 6 8 6         Results from last 7 days   Lab Units 19  1645   NT-PRO BNP pg/mL 391*      Results from last 7 days   Lab Units 19  0604   MAGNESIUM mg/dL 1 8     Results from last 7 days   Lab Units 19  1645   HEMOGLOBIN A1C % 7 9*     Results from last 7 days   Lab Units 19  1645   TSH 3RD GENERATON uIU/mL 1  126     Results from last 7 days   Lab Units 19  1645   INR  1 80*       Lipid Profile:   Lab Results   Component Value Date    CHOL 86 2015    CHOL 102 2015    CHOL 108 2015     Lab Results   Component Value Date    HDL 32 (L) 2017    HDL 30 (L) 2016    HDL 31 2015     Lab Results   Component Value Date    LDLCALC 38 2017    LDLCALC 33 2016    LDLCALC 27 2015     Lab Results   Component Value Date    TRIG 162 (H) 2017    TRIG 113 2016    TRIG 138 2015       Cardiac testing:   Results for orders placed during the hospital encounter of 19   Echo complete with contrast if indicated    Narrative [address] [place] [id number] [phone]    Transthoracic Echocardiogram  2D, M-mode, Doppler, and Color Doppler    Study date:  2019    Patient: Carmina Steven  MR number: [id number]  Account number: [de-[email or web address]  : 1955  Age: 60 years  Gender: Male  Status: Inpatient  Location: Bedside  Height: 72 in  Weight: 262 lb  BP: 133/ 70 mmHg    Indications: Pulmonary embolism, Evaluate for suspected cardiac source of embolism  Assess left ventricular function      Diagnoses: I27 82 - Chronic pulmonary embolism    Sonographer:   Medical [place] Chely Alvarez  Referring Physician:  KATIE Santiago  Group:  [facility]  Interpreting Physician:  Andreia Rashid MD    SUMMARY    LEFT VENTRICLE:  Ejection fraction was estimated to be 55 %  There were no regional wall motion abnormalities  RIGHT VENTRICLE:  The ventricle was mildly dilated  Systolic function was mildly reduced  MITRAL VALVE:  There was mild to moderate annular calcification  AORTIC VALVE:  There was mild stenosis  HISTORY: Fatigue  PRIOR HISTORY: Pulmonary embolism, Risk factors: hypertension, diabetes, hypercholesterolemia, morbid obesity, and a history of current cigarette use (within the last month)  Chronic lung disease  PROCEDURE: The procedure was performed at the bedside  This was a routine study  The transthoracic approach was used  The study included complete 2D imaging, M-mode, complete spectral Doppler, and color Doppler  The heart rate was 91 bpm,  at the start of the study  Images were obtained from the parasternal, apical, subcostal, and suprasternal notch acoustic windows  Intravenous contrast (  8mL of Definity in NSS) was administered to opacify the left ventricle  Echocardiographic views were limited due to poor acoustic window availability, decreased penetration, and lung interference  This was a technically difficult study  LEFT VENTRICLE: Size was normal  Ejection fraction was estimated to be 55 %  There were no regional wall motion abnormalities  DOPPLER: There was an increased relative contribution of atrial contraction to ventricular filling  RIGHT VENTRICLE: The ventricle was mildly dilated  Systolic function was mildly reduced  Wall thickness was normal     LEFT ATRIUM: Size was normal     RIGHT ATRIUM: Size was normal     MITRAL VALVE: There was mild to moderate annular calcification  DOPPLER: There was no significant regurgitation  AORTIC VALVE: Leaflets exhibited calcification  The valve was not well visualized  DOPPLER: There was mild stenosis      TRICUSPID VALVE: The valve structure was normal  There was normal leaflet separation  DOPPLER: The transtricuspid velocity was within the normal range  There was no evidence for stenosis  There was no regurgitation  PULMONIC VALVE: Leaflets exhibited normal thickness, no calcification, and normal cuspal separation  DOPPLER: The transpulmonic velocity was within the normal range  There was no regurgitation  PERICARDIUM: There was no pericardial effusion  The pericardium was normal in appearance  AORTA: The root exhibited normal size  SYSTEM MEASUREMENT TABLES    2D  %FS: 28 %  Ao Diam: 2 7 cm  EDV(Teich): 45 ml  EF(Teich): 55 4 %  ESV(Teich): 20 1 ml  IVSd: 1 1 cm  LA Diam: 3 6 cm  LVIDd: 3 3 cm  LVIDs: 2 4 cm  LVOT Diam: 2 3 cm  LVPWd: 1 cm  SV(Teich): 24 9 ml    CW  AV Env  Ti: 199 8 ms  AV VTI: 38 2 cm  AV Vmax: 2 4 m/s  AV Vmean: 1 9 m/s  AV maxP 1 mmHg  AV meanP 4 mmHg    MM  TAPSE: 1 6 cm    PW  MOI (VTI): 2 5 cm2  MOI Vmax: 2 4 cm2  E': 0 1 m/s  E/E': 9 6  LVOT Env  Ti: 216 9 ms  LVOT VTI: 23 8 cm  LVOT Vmax: 1 4 m/s  LVOT Vmean: 1 1 m/s  LVOT maxP 4 mmHg  LVOT meanP 4 mmHg  LVSV Dopp: 96 4 ml  MV A Kapil: 0 8 m/s  MV Dec Dooly: 2 6 m/s2  MV DecT: 288 8 ms  MV E Kapil: 0 7 m/s  MV E/A Ratio: 0 9  MV PHT: 83 7 ms  MVA By PHT: 2 6 cm2    [facility]    Prepared and electronically signed by    Carmine Santoro MD  Signed 2019 13:49:00       No results found for this or any previous visit  No results found for this or any previous visit  No procedure found  No results found for this or any previous visit      Meds/Allergies   all current active meds have been reviewed  Medications Prior to Admission   Medication    albuterol (PROVENTIL HFA,VENTOLIN HFA) 90 mcg/act inhaler    aspirin 81 MG tablet    Blood Glucose Monitoring Suppl ([email or web address].comULT BLOOD GLUCOSE) w/Device KIT    buPROPion (WELLBUTRIN XL) 150 mg 24 hr tablet    chlorthalidone 25 mg tablet    cholecalciferol (VITAMIN D3) 1,000 units tablet    glucose blood (TRUETEST TEST) test strip    glucose blood test strip    glucose blood test strip    ibuprofen (MOTRIN) 800 mg tablet    insulin aspart (NOVOLOG) 100 units/mL injection    insulin NPH-insulin regular (NovoLIN 70/30) 100 units/mL subcutaneous injection    insulin regular (HumuLIN R,NovoLIN R) 100 units/mL injection    ipratropium-albuterol (DUO-NEB) 0 5-2 5 mg/3 mL nebulizer solution    metFORMIN (GLUCOPHAGE) 1000 MG tablet    morphine (MS CONTIN) 30 mg 12 hr tablet    naproxen (NAPROSYN) 250 mg tablet    oxyCODONE (ROXICODONE) 15 mg immediate release tablet    saccharomyces boulardii (FLORASTOR) 250 mg capsule    atorvastatin (LIPITOR) 10 mg tablet    budesonide-formoterol (SYMBICORT) 160-4 5 mcg/act inhaler    Empagliflozin (JARDIANCE) 25 MG TABS    insulin glargine (LANTUS) 100 units/mL subcutaneous injection    Insulin Glargine-Lixisenatide (SOLIQUA) 100-33 UNT-MCG/ML SOPN    Insulin Syringe-Needle U-100 (INSULIN SYRINGE 1CC/30GX5/16") 30G X 5/16" 1 ML MISC    methocarbamol (ROBAXIN) 750 mg tablet    omeprazole (PriLOSEC) 20 mg delayed release capsule         heparin (porcine) 3-30 Units/kg/hr (Order-Specific) Last Rate: 11 Units/kg/hr (07/02/19 0520)         Counseling / Coordination of Care  Total floor / unit time spent today 20 minutes  Greater than 50% of total time was spent with the patient and / or family counseling and / or coordination of care  ** Please Note: Dragon 360 Dictation voice to text software may have been used in the creation of this document   **

## 2019-07-02 NOTE — PROGRESS NOTES
Progress Note - Noe Diaz 1955, 60 y o  male MRN: [id number]    Unit/Bed#: -01 Encounter: [id number]    Primary Care Provider: Marycarmen Villegas MD   Date and time admitted to hospital: 6/28/2019  3:14 PM        Spine metastasis [facility])  Assessment & Plan  Looks to be metastatic disease to the spine  Will also check an SPEP and a UPEP  Oncology consultation appreciated  PSA is normal however imaging favors prostate  Could be melanoma  Biopsy is requested    Respiratory insufficiency  Assessment & Plan  Encourage good pulmonary hygiene  Respiratory protocol, incentive spirometry  Secondary to pulmonary embolism  Echocardiogram   This pending  Cardiology evaluation appreciated  Chronic prescription opiate use  Assessment & Plan  Continuous opiate dependence  Continue home doses of opioids  Did add more to the patient's regimen  Did ask palliative Care to see the patient  Type 2 diabetes mellitus with foot ulcer, with long-term current use of insulin [facility])  Assessment & Plan  Lab Results   Component Value Date    HGBA1C 7 9 (H) 06/28/2019       Recent Labs     07/01/19  1607 07/01/19  1715 07/01/19  2114 07/02/19  0553   POCGLU 56* 140 209* 243*       Blood Sugar Average: Last 72 hrs:  (P) 159 [id number] continue long-acting insulin  I did decrease the mealtime since he was hypoglycemic this morning  Start sliding scale insulin    Morbid obesity (HCC)  Assessment & Plan  Encourage lifestyle changes  However the patient states he did have some weight loss  Consult Nutrition    Tobacco abuse  Assessment & Plan  Encourage smoking cessation  Nicotine replacement    * Acute pulmonary embolism with acute cor pulmonale (HCC)  Assessment & Plan  Keep the heparin infusion for now  Likely switch over to Eliquis on discharge  The price was already discussed with case management  Patient is okay with it  About 47 dollars a month    But the heparin infusion for now because the patient will need a biopsy  Trend troponins, check echocardiogram  Consult Cardiology  Continue monitor closely  VTE Pharmacologic Prophylaxis:   Pharmacologic: Heparin Drip  Mechanical VTE Prophylaxis in Place: Yes    Patient Centered Rounds: I have performed bedside rounds with nursing staff today  Discussions with Specialists or Other Care Team Provider:  Oncology    Education and Discussions with Family / Patient:  Patient    Time Spent for Care: 20 minutes  More than 50% of total time spent on counseling and coordination of care as described above  Current Length of Stay: 4 day(s)    Current Patient Status: Inpatient   Certification Statement: The patient will continue to require additional inpatient hospital stay due to Needing a biopsy for unknown metastatic cancer which is pretty extensive    Discharge Plan:  Patient will be here at least another 48 hours    Code Status: Level 1 - Full Code      Subjective:   Patient seen examined  Complaining of intractable pain  Distraught after speaking to Oncology yesterday  Objective:     Vitals:   Temp (24hrs), Av 1 °F (36 7 °C), Min:97 9 °F (36 6 °C), Max:98 2 °F (36 8 °C)    Temp:  [97 9 °F (36 6 °C)-98 2 °F (36 8 °C)] 98 1 °F (36 7 °C)  HR:  [74-91] 87  Resp:  [18-19] 18  BP: (127-142)/(67-83) 132/69  SpO2:  [90 %-97 %] 97 %  Body mass index is 36 33 kg/m²  Input and Output Summary (last 24 hours):        Intake/Output Summary (Last 24 hours) at 2019 0914  Last data filed at 2019 1006  Gross per 24 hour   Intake 200 ml   Output    Net 200 ml       Physical Exam:     Physical Exam  (   General Appearance:    Alert, cooperative, no distress, appears stated age                               Lungs:     Clear to auscultation bilaterally, respirations unlabored       Heart:    Regular rate and rhythm, S1 and S2 normal, no murmur, rub    or gallop   Abdomen:     Soft, non-tender, bowel sounds active all four quadrants,     no masses, no organomegaly Extremities:   Extremities normal, atraumatic, no cyanosis or edema       Additional Data:     Labs:    Results from last 7 days   Lab Units 07/01/19  0441  06/28/19  1645   WBC Thousand/uL 15 33*   < > 19 23*   HEMOGLOBIN g/dL 12 0   < > 12 4   HEMATOCRIT % 37 8   < > 38 8   PLATELETS Thousands/uL 255   < > 211   NEUTROS PCT %  --   --  82*   LYMPHS PCT %  --   --  11*   MONOS PCT %  --   --  6   EOS PCT %  --   --  0    < > = values in this interval not displayed  Results from last 7 days   Lab Units 07/01/19  0441   SODIUM mmol/L 137   POTASSIUM mmol/L 4 0   CHLORIDE mmol/L 102   CO2 mmol/L 27   BUN mg/dL 12   CREATININE mg/dL 0 74   ANION GAP mmol/L 8   CALCIUM mg/dL 8 6   ALBUMIN g/dL 2 1*   TOTAL BILIRUBIN mg/dL 1 10*   ALK PHOS U/L 570*   ALT U/L 24   AST U/L 31   GLUCOSE RANDOM mg/dL 148*     Results from last 7 days   Lab Units 06/28/19  1645   INR  1 80*     Results from last 7 days   Lab Units 07/02/19  0553 07/01/19  2114 07/01/19  1715 07/01/19  1607 07/01/19  1558 07/01/19  1102 07/01/19  0607 06/30/19  2108 06/30/19  1622 06/30/19  1259 06/30/19  1144 06/30/19  1141   POC GLUCOSE mg/dl 243* 209* 140 56* 56* 331* 163* 94 78 69 41* 49*     Results from last 7 days   Lab Units 06/28/19  1645   HEMOGLOBIN A1C % 7 9*               * I Have Reviewed All Lab Data Listed Above  * Additional Pertinent Lab Tests Reviewed: Jack 66 Admission Reviewed        Recent Cultures (last 7 days):     Results from last 7 days   Lab Units 06/28/19  1645   BLOOD CULTURE  No Growth at 72 hrs  No Growth at 72 hrs         Last 24 Hours Medication List:     Current Facility-Administered Medications:  acetaminophen 975 mg Oral Q8H [address] Job Baird MD    albuterol 2 puff Inhalation Q4H PRN Marlo Chu MD    atorvastatin 10 mg Oral Daily KATIE Jon    azithromycin 500 mg Oral Q24H KATIE Jon    budesonide-formoterol 2 puff Inhalation BID KATIE Jon buPROPion 150 mg Oral Daily Darlean Asa, CRNP    cholecalciferol 1,000 Units Oral Daily Darlean Asa, CRNP    docusate sodium 100 mg Oral BID Darlean Asa, CRNP    gabapentin 100 mg Oral TID Bronson Kaur MD    heparin (porcine) 3-30 Units/kg/hr (Order-Specific) Intravenous Titrated Darlean Asa, CRNP Last Rate: 11 Units/kg/hr (07/02/19 0520)   heparin (porcine) 10,000 Units Intravenous PRN Darlean Asa, CRNP    heparin (porcine) 5,000 Units Intravenous PRN Darlean Asa, CRNP    HYDROmorphone 1 mg Intravenous Q4H PRN Bronson Kaur MD    insulin glargine 75 Units Subcutaneous HS Job Mccoy MD    insulin lispro 1-5 Units Subcutaneous HS Darlean Asa, CRNP    insulin lispro 10 Units Subcutaneous TID With Meals Bronson Kaur MD    insulin lispro 2-12 Units Subcutaneous TID AC Darlean Asa, CRNP    lidocaine 1 patch Topical Daily Bronson Kaur MD    methocarbamol 750 mg Oral 4x Daily Darlean Asa, CRNP    morphine 15 mg Oral Q12H [address] Darlean Asa, CRNP    nicotine 1 patch Transdermal Daily Darlean Asa, CRNP    oxyCODONE 15 mg Oral Q4H PRN Darlean Asa, CRNP    pantoprazole 40 mg Oral Early Morning Darlean Asa, CRNP    senna 1 tablet Oral Daily Darlean Asa, CRNP         Today, Patient Was Seen By: Bronson Kaur MD    ** Please Note: Dictation voice to text software may have been used in the creation of this document   **

## 2019-07-02 NOTE — CONSULTS
Consultation - Palliative and Supportive Care   Pauline Dalal 60 y o  male [id number]    Assessment:     Patient Active Problem List   Diagnosis    Tobacco abuse    Morbid obesity ([facility] )    Type 2 diabetes mellitus with foot ulcer, with long-term current use of insulin ([facility] )    Chronic prescription opiate use    Essential hypertension    Screening for colon cancer    Mass of right side of neck    Acute pulmonary embolism with acute cor pulmonale ([facility] )    Respiratory insufficiency    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[facility] )         Plan:  1  Symptom management (copied from incomplete consult note)-    - Increase back to MS Contin 30mg q12h   - Increase PRN oxycodone to 20mg q4h PRN   - Dilaudid 1mg IV for breakthrough  - topical lidocaine   - continue neurontin 100mg TID   - Tylenol 975mg ATC    - Recommend priority neurology evaluation for drop L wrist as patient may need urgent RT  2  Goals - patient would like to undergo further diagnostics and here about possible treatment options for his cancer    -     Code Status:  Level one   Decisional apparatus:  Patient is competent on my exam today  If competence is lost, patient's substitute decision maker would default to patient's spouse unless he is actively filed for divorce by [place]  Advance Directive / Living Will / POLST:  None     I have reviewed the patient's controlled substance dispensing history in the Prescription Drug Monitoring Program in compliance with the [facility] regulations before prescribing any controlled substances  We appreciate the invitation to be involved in this patient's care  We will continue to follow  Please do not hesitate to reach our on call provider through our clinic answering service at  should you have acute symptom control concerns        IDENTIFICATION:  Consults  Physician Requesting Consult: Jamir Barnes MD  Reason for Consult / Principal Problem:  Symptom management  Hx and PE limited by:  None    HISTORY OF PRESENT ILLNESS:       Mitchell Kent is a 60 y o  male with an extensive medical history including chronic pain for which she gives a comprehensive Pain history involving opioids who presented for evaluation of worsening hip pain  Unfortunately imaging revealed diffusely metastatic cancer of unclear primary as well as pulmonary emboli  Patient has complained of significant back and hip pain  Patient reports he has a longstanding history of chronic pain for which he has been followed by chronic pain specialist   He freely admits that there was a point when he was buying opiates on the street, but that time is done  He states the pain he has now in his hip and back is much worse than any pain he has ever had  It is an ache and does not feel like neuropathy  He states it is constant, but worsens when he moves  He has received a variety of different medications since he has been here, but states he got the most relief from his oral oxycodone and IV Dilaudid  Patient expressed significant concern over the role of palliative care and required a significant amount of explanation to explain that I was not there to enroll him in hospice  He also reports a fairly dramatic social situation  His brother unfortunately  of cancer recently  He is  from his wife, and her 77-year-old boyfriend has moved into the home  Review of Systems   Constitution: Positive for decreased appetite and weight loss (50 lbs)  Cardiovascular: Positive for leg swelling  Negative for chest pain  Respiratory: Negative for shortness of breath  Skin: Positive for poor wound healing  Musculoskeletal: Positive for back pain, muscle weakness and stiffness  Gastrointestinal: Negative for bloating, abdominal pain, constipation, diarrhea, excessive appetite, nausea and vomiting  Neurological: Positive for focal weakness ( left wrist)  Psychiatric/Behavioral: Positive for depression   The patient has insomnia and is nervous/anxious  All other systems reviewed and are negative  Past Medical History:   Diagnosis Date    Chronic pain syndrome     COPD (chronic obstructive pulmonary disease) (HCC)     Diabetes mellitus (HCC)     Diverticulitis     Hyperlipidemia     Hypertension     Sleep apnea      Past Surgical History:   Procedure Laterality Date    AMPUTATION Left     toe     HEMICOLECTOMY      NC AMPUTATION FOOT,TRANSMETATARSAL Right 10/16/2017    Procedure: AMPUTATION TRANSMETATARSAL (TMA);  Surgeon: Robina Phalen , DPM;  Location: [facility];  Service: Podiatry    NC COLONOSCOPY FLX DX W/COLLJ SPEC WHEN PFRMD N/A 4/16/2019    Procedure: COLONOSCOPY;  Surgeon: Josh Gonzalez DO;  Location: [facility];   Service: Gastroenterology    TREATMENT FISTULA ANAL       Social History     Socioeconomic History    Marital status: /Civil Union     Spouse name: Not on file    Number of children: Not on file    Years of education: Not on file    Highest education level: Not on file   Occupational History    Not on file   Social Needs    Financial resource strain: Not on file    Food insecurity:     Worry: Not on file     Inability: Not on file    Transportation needs:     Medical: Not on file     Non-medical: Not on file   Tobacco Use    Smoking status: Current Every Day Smoker     Packs/day: 0 50    Smokeless tobacco: Never Used   Substance and Sexual Activity    Alcohol use: Never     Frequency: Never    Drug use: No    Sexual activity: Not on file   Lifestyle    Physical activity:     Days per week: Not on file     Minutes per session: Not on file    Stress: Not on file   Relationships    Social connections:     Talks on phone: Not on file     Gets together: Not on file     Attends Judaism service: Not on file     Active member of club or organization: Not on file     Attends meetings of clubs or organizations: Not on file     Relationship status: Not on file    Intimate partner violence:     Fear of current or ex partner: Not on file     Emotionally abused: Not on file     Physically abused: Not on file     Forced sexual activity: Not on file   Other Topics Concern    Not on file   Social History Narrative    Not on file     Family History   Family history unknown: Yes       MEDICATIONS / ALLERGIES:    all current active meds have been reviewed and current meds:   Current Facility-Administered Medications   Medication Dose Route Frequency    acetaminophen (TYLENOL) tablet 975 mg  975 mg Oral Q8H [address]    albuterol (PROVENTIL HFA,VENTOLIN HFA) inhaler 2 puff  2 puff Inhalation Q4H PRN    atorvastatin (LIPITOR) tablet 10 mg  10 mg Oral Daily    azithromycin (ZITHROMAX) tablet 500 mg  500 mg Oral Q24H    budesonide-formoterol (SYMBICORT) 160-4 5 mcg/act inhaler 2 puff  2 puff Inhalation BID    buPROPion (WELLBUTRIN XL) 24 hr tablet 150 mg  150 mg Oral Daily    cholecalciferol (VITAMIN D3) tablet 1,000 Units  1,000 Units Oral Daily    docusate sodium (COLACE) capsule 100 mg  100 mg Oral BID    gabapentin (NEURONTIN) capsule 100 mg  100 mg Oral TID    heparin (porcine) 25,000 units in 250 mL infusion (premix)  3-30 Units/kg/hr (Order-Specific) Intravenous Titrated    heparin (porcine) injection 10,000 Units  10,000 Units Intravenous PRN    heparin (porcine) injection 5,000 Units  5,000 Units Intravenous PRN    HYDROmorphone (DILAUDID) injection 1 mg  1 mg Intravenous Q4H PRN    insulin glargine (LANTUS) subcutaneous injection 75 Units 0 75 mL  75 Units Subcutaneous HS    insulin lispro (HumaLOG) 100 units/mL subcutaneous injection 1-5 Units  1-5 Units Subcutaneous HS    insulin lispro (HumaLOG) 100 units/mL subcutaneous injection 10 Units  10 Units Subcutaneous TID With Meals    insulin lispro (HumaLOG) 100 units/mL subcutaneous injection 2-12 Units  2-12 Units Subcutaneous TID AC    lidocaine (LIDODERM) 5 % patch 1 patch  1 patch Topical Daily    methocarbamol (ROBAXIN) tablet 750 mg  750 mg Oral 4x Daily    morphine (MS CONTIN) ER tablet 30 mg  30 mg Oral Q12H [address]    nicotine (NICODERM CQ) 14 mg/24hr TD 24 hr patch 1 patch  1 patch Transdermal Daily    oxyCODONE (ROXICODONE) immediate release tablet 20 mg  20 mg Oral Q4H PRN    pantoprazole (PROTONIX) EC tablet 40 mg  40 mg Oral Early Morning    senna (SENOKOT) tablet 8 6 mg  1 tablet Oral Daily       No Known Allergies    OBJECTIVE:    Physical Exam  Physical Exam   Constitutional: He is oriented to person, place, and time  No distress  Obese   HENT:   Head: Atraumatic  Right Ear: External ear normal    Left Ear: External ear normal    Eyes: Right eye exhibits no discharge  Left eye exhibits no discharge  Pulmonary/Chest: Effort normal  No respiratory distress  Abdominal: He exhibits no distension  Musculoskeletal: He exhibits edema and deformity  Left lower foot with multiple missing toes   Neurological: He is alert and oriented to person, place, and time  Good  strength in left hand, however unable to spread fingers with substantial left wrist drop   Skin: Skin is warm and dry  He is not diaphoretic  Areas of dry skin and ecchymosis   Psychiatric: He has a normal mood and affect  Nursing note and vitals reviewed  Lab Results: I have personally reviewed pertinent labs  , CBC: No results found for: WBC, HGB, HCT, MCV, PLT, ADJUSTEDWBC, MCH, MCHC, RDW, MPV, NRBC, CMP: No results found for: SODIUM, K, CL, CO2, ANIONGAP, BUN, CREATININE, GLUCOSE, CALCIUM, AST, ALT, ALKPHOS, PROT, BILITOT, EGFR  Imaging Studies:  I personally reviewed relevant reports  EKG, Pathology, and Other Studies:  I personally reviewed relevant reports    Counseling / Coordination of Care  Total floor / unit time spent today 75+ minutes  Greater than 50% of total time was spent with the patient and / or family counseling and / or coordination of care (14:30-15:25)   A description of the counseling / coordination of care: Significant chart review, pain history, psychosocial support, medication review, complex opioid management,

## 2019-07-02 NOTE — QUICK NOTE
I attempted to see patient for a reportedly acute onset last PM of decreased power in L wrist  Patient reports that last night sometime it "just stopped working!" He is in  "horrific pain" from his feet right now, and needs ace wraps on them and pain meds and her is really unable to concentrate on this  He was otherwise AAO, and moving upper and lower ext with theexception of the flaccid L wrist  Good power demonstrated in R arm and L prox arm  Speech clear w/out dysarthria  No further exam or hx elicited  He asked me to spot and get nurse for him  I alerted the nurse to his problem, and will return to him later

## 2019-07-02 NOTE — PHYSICAL THERAPY NOTE
PHYSICAL THERAPY EVALUATION NOTE          Patient Name: Avery Nicole  AKQTC'G Date: 7/2/2019  AGE:   60 y o  Mrn:   [id number]  ADMIT DX:  Back pain [M54 9]  Foot ulcer due to secondary DM ([facility] ) [E13 621, L97 509]  Type 2 diabetes mellitus with foot ulcer, with long-term current use of insulin ([facility] ) [X03 878, L97 509, Z79 4]  Acute pulmonary embolism with acute cor pulmonale, unspecified pulmonary embolism type (HCC) [I26 09]    Past Medical History:   Diagnosis Date    Chronic pain syndrome     COPD (chronic obstructive pulmonary disease) ([facility] )     Diabetes mellitus ([place] [id number] )     Diverticulitis     Hyperlipidemia     Hypertension     Sleep apnea      Length Of Stay: 4  PHYSICAL THERAPY EVALUATION :      07/02/19 0840   Note Type   Note type Eval only   Pain Assessment   Pain Assessment 0-10   Pain Score 8   Pain Type Chronic pain   Pain Location Back   Pain Orientation Lower   Pain Descriptors Burning;Aching;Stabbing; Sharp   Pain Frequency Constant/continuous   Pain Onset Ongoing   Clinical Progression Not changed   Patient's Stated Pain Goal No pain   Response to Interventions tolerated session   Home Living   Type of [facility] One level   Additional Comments [address] home with rail   Prior Function   Level of [place] Independent with ADLs and functional mobility   Lives With   (soon to be ex wife and her SO)   Receives Help From   (nobody)   ADL Assistance   (unable to bathe PTA, difficulty dressing)   IADLs Needs assistance   Falls in the last 6 months 1 to 4   Comments Patient agitated with questions due to pain so details not gathered  Reports RW use with mobility and driving, chronic back pain which has increase, reports crawling up steps, does report on d/c he will go to a friend's home where he will be alone at times, one GORDO     Restrictions/Precautions   Weight Bearing Precautions Per Order No  (but made heel WB on L foot, dressing L foot, plantar ulcer)   Braces or Orthoses   (none)   Other Precautions Impulsive;Multiple lines;O2;Telemetry; Fall Risk; Chair Alarm  (heparin)   General   Additional Pertinent History Patient admitted with back pain, foot ulcers  Testing revealed PE, now on heparin  Multiple osseous lesions noed thoracic spine, neck mass, bialt glut max nodules, liver and pulmonary lesions  Also with RLE DVT  Had debridement of R foot ulcer by podiatry  50# weight loss 2 months  DX:  w/u for metastic CA unknown origin (melanoma or prostate)  (cleared for PT by RN, OOB at [facility], eval/treat)   Family/Caregiver Present No   Cognition   Overall Cognitive Status WFL   Arousal/Participation Alert  (distractible with pain)   Orientation Level Oriented X4   Memory Within functional limits   Following Commands Follows all commands and directions without difficulty   Comments agreeable to PT even with pain, "I don't want to wait  I need to get moving "  I can't move my hand since last night  I told them about it "   RUE Assessment   RUE Assessment WFL   LUE Assessment   LUE Assessment   (L wrist drop, no active wrist ext)   RLE Assessment   RLE Assessment   (hip 3-, knee 3/3-, ankle 3, Hamstring tight)   LLE Assessment   LLE Assessment   (hip 3+, knee 3+/4-, ankle 4/4-, hamstring tight)   Coordination   Movements are Fluid and Coordinated 1   Sensation X  (no proprioception toes)   Light Touch   RLE Light Touch Absent   RLE Light Touch Comments no sensation R foot, decr stocking pattern R lower leg     LLE Light Touch Absent   LLE Light Touch Comments no sensation L toes, decr stocking pattern in leg, able to feel slightly in L heel   Sharp/Dull   RLE Sharp/Dull Not tested   LLE Sharp/Dull Not tested   Proprioception   RLE Proprioception Not tested   LLE Proprioception Not tested   Bed Mobility   Supine to Sit 3  Moderate assistance  (to max assist)   Additional items Assist x 1;HOB elevated; Bedrails; Increased time required; Impulsive;Verbal cues   Additional Comments verbal cues for sequencing   Transfers   Sit to Stand 3  Moderate assistance  (to max assist)   Additional items Assist x 1;Bedrails;Armrests; Increased time required;Verbal cues; Impulsive  (bed elevated)   Stand to Sit 3  Moderate assistance  (to max assist, poor eccentric control)   Additional items Assist x 1;Bedrails;Armrests; Increased time required; Impulsive;Verbal cues  (manual cues)   Stand pivot 3  Moderate assistance   Additional items Assist x 1;Bedrails;Armrests; Increased time required; Impulsive;Verbal cues  (manual cues)   Additional Comments unable to obtain full upright posture  Heel WB LLE, although reluctant ("I have been walking all over it", assist to rotate pelvis to chair, little LE clearance/movement with SPT,    Ambulation/Elevation   Gait pattern   (unable at this time)   Balance   Static Sitting Good   Dynamic Sitting Fair +   Static Standing Poor   Dynamic Standing Poor   Ambulatory   (unable to perform)   Endurance Deficit   Endurance Deficit Yes   Endurance Deficit Description limited by pain and fatigue   Activity Tolerance   Activity Tolerance Patient limited by fatigue;Patient limited by pain   Nurse Made Aware RN Liudmila aware of session and made aware of L wrist drop  Also suggested assist of 2 back to bed for safety, patient impulsive and weak  Assessment   Prognosis Good  (for goals set)   Problem List Decreased strength;Decreased endurance; Impaired balance;Decreased range of motion;Decreased mobility; Impaired judgement;Decreased safety awareness; Impaired sensation;Obesity; Decreased skin integrity;Pain   Assessment Patient admitted with back pain, foot ulcers  Testing revealed PE, now on heparin  Multiple osseous lesions noed thoracic spine, neck mass, bialt glut max nodules, liver and pulmonary lesions  Also with RLE DVT  Had debridement of R foot ulcer by podiatry  50# weight loss 2 months  DX:  w/u for metastic CA unknown origin (melanoma or prostate)  He presents with the above deficits creating decreased mobility and inability to ambulate putting him at risk for falls  He is below his baseline level of function requiring mod to max assist with mobility and will benefit from skilled PT interventions to increase his level of functional mobility and level of independence while decreasing his fall risk as well as caregiver burden  His history presents with more than 3 elements, a high level of complexity  Topics considered with medical/social history included:  Co morbid conditions impacting function and progress, impaired PLF, context of current functional limitations compared to PLF, lack of physical emotional and/or social support, cultural preferences, employment status, recent hospital admissions/readmissions, response to prior treatment approaches, medication management, personal factors impacting POC  (Number of elements:  0=low, 1-2 =moderate, 3 or more=high)  His examination presents with more than 3 elements, a high level of complexity  Examination of body systems included musculoskeletal, sensory, cardiovascular, pulmonary, neuromuscular, integumentary, and communication  Functional limitations include ROM deficits, strength deficits, irregular vital sign response to interventions, difficulty initiating task/action/activity, edema, pain, self care deficits, community and social reintegration deficits, impaired ability to make needs known, impaired level of consciousness and/or orientation, learning barrier, emotional response behavioral response, tone, balance, sensory, coordination and endurance deficits (Number of elements:  1-2 =low, 3=moderate, >3=high)  Objective testing utilizing the Barthel indicates impaired functional mobility  His clinical presentation is presently unstable  Clinicial decision making reveals a high level of evaluation complexity     Barriers to Discharge Inaccessible home environment;Decreased caregiver support   Goals   Patient Goals move better, decrease pain   STG Expiration Date 07/09/19   Short Term Goal #1 Patient will transfer supine to and from sit with HOB elevated and bedrail used with min assist, sit to and from stand from elevated surface as well as SPT with RW with min to mod assist, increase LE strength 1/2 grade and ambulate with RW for 25 feet with mod assist of one and close chair follow  Plan   Treatment/Interventions Functional transfer training;LE strengthening/ROM; Therapeutic exercise; Endurance training;Patient/family training;Equipment eval/education; Bed mobility;Gait training;Spoke to nursing   PT Frequency   (3-5x/wk)   Recommendation   Recommendation Short-term skilled PT   Equipment Recommended   (TBD at STR)   PT - OK to Discharge No  (unless to STR)   Barthel Index   Feeding 5   Bathing 0   Grooming Score 0   Dressing Score 5   Bladder Score 10   Bowels Score 10   Toilet Use Score 5   Transfers (Bed/Chair) Score 5   Mobility (Level Surface) Score 0   Stairs Score 0   Barthel Index Score 40       Skilled PT recommended while in hospital and upon DC to progress pt toward treatment goals       Amador Hernandez, PT

## 2019-07-03 ENCOUNTER — APPOINTMENT (INPATIENT)
Dept: INTERVENTIONAL RADIOLOGY/VASCULAR | Facility: HOSPITAL | Age: 64
DRG: 167 | End: 2019-07-03
Attending: FAMILY MEDICINE
Payer: MEDICARE

## 2019-07-03 LAB
APTT PPP: 44 SECONDS (ref 23–37)
APTT PPP: 55 SECONDS (ref 23–37)
BACTERIA BLD CULT: NORMAL
BACTERIA BLD CULT: NORMAL
CANCER AG19-9 SERPL-ACNC: NORMAL U/ML (ref 0–35)
GLUCOSE SERPL-MCNC: 173 MG/DL (ref 65–140)
GLUCOSE SERPL-MCNC: 231 MG/DL (ref 65–140)
GLUCOSE SERPL-MCNC: 250 MG/DL (ref 65–140)
GLUCOSE SERPL-MCNC: 267 MG/DL (ref 65–140)
GLUCOSE SERPL-MCNC: 306 MG/DL (ref 65–140)
GLUCOSE SERPL-MCNC: 320 MG/DL (ref 65–140)

## 2019-07-03 PROCEDURE — 99152 MOD SED SAME PHYS/QHP 5/>YRS: CPT

## 2019-07-03 PROCEDURE — 82948 REAGENT STRIP/BLOOD GLUCOSE: CPT

## 2019-07-03 PROCEDURE — 88307 TISSUE EXAM BY PATHOLOGIST: CPT | Performed by: PATHOLOGY

## 2019-07-03 PROCEDURE — 85730 THROMBOPLASTIN TIME PARTIAL: CPT | Performed by: PHYSICIAN ASSISTANT

## 2019-07-03 PROCEDURE — 88341 IMHCHEM/IMCYTCHM EA ADD ANTB: CPT | Performed by: PATHOLOGY

## 2019-07-03 PROCEDURE — 99222 1ST HOSP IP/OBS MODERATE 55: CPT | Performed by: PSYCHIATRY & NEUROLOGY

## 2019-07-03 PROCEDURE — 76937 US GUIDE VASCULAR ACCESS: CPT

## 2019-07-03 PROCEDURE — 0FB03ZX EXCISION OF LIVER, PERCUTANEOUS APPROACH, DIAGNOSTIC: ICD-10-PCS | Performed by: RADIOLOGY

## 2019-07-03 PROCEDURE — 99152 MOD SED SAME PHYS/QHP 5/>YRS: CPT | Performed by: RADIOLOGY

## 2019-07-03 PROCEDURE — 88342 IMHCHEM/IMCYTCHM 1ST ANTB: CPT | Performed by: PATHOLOGY

## 2019-07-03 PROCEDURE — 47000 NEEDLE BIOPSY OF LIVER PERQ: CPT | Performed by: RADIOLOGY

## 2019-07-03 PROCEDURE — 99232 SBSQ HOSP IP/OBS MODERATE 35: CPT | Performed by: STUDENT IN AN ORGANIZED HEALTH CARE EDUCATION/TRAINING PROGRAM

## 2019-07-03 PROCEDURE — 76942 ECHO GUIDE FOR BIOPSY: CPT | Performed by: RADIOLOGY

## 2019-07-03 PROCEDURE — 99153 MOD SED SAME PHYS/QHP EA: CPT

## 2019-07-03 PROCEDURE — 99233 SBSQ HOSP IP/OBS HIGH 50: CPT | Performed by: INTERNAL MEDICINE

## 2019-07-03 PROCEDURE — 94762 N-INVAS EAR/PLS OXIMTRY CONT: CPT

## 2019-07-03 PROCEDURE — NC001 PR NO CHARGE: Performed by: RADIOLOGY

## 2019-07-03 PROCEDURE — 47000 NEEDLE BIOPSY OF LIVER PERQ: CPT

## 2019-07-03 PROCEDURE — 85730 THROMBOPLASTIN TIME PARTIAL: CPT | Performed by: FAMILY MEDICINE

## 2019-07-03 RX ORDER — LIDOCAINE HYDROCHLORIDE 10 MG/ML
INJECTION, SOLUTION INFILTRATION; PERINEURAL CODE/TRAUMA/SEDATION MEDICATION
Status: COMPLETED | OUTPATIENT
Start: 2019-07-03 | End: 2019-07-03

## 2019-07-03 RX ORDER — MIDAZOLAM HYDROCHLORIDE 1 MG/ML
INJECTION INTRAMUSCULAR; INTRAVENOUS CODE/TRAUMA/SEDATION MEDICATION
Status: COMPLETED | OUTPATIENT
Start: 2019-07-03 | End: 2019-07-03

## 2019-07-03 RX ORDER — OXYCODONE HYDROCHLORIDE 10 MG/1
30 TABLET ORAL EVERY 4 HOURS PRN
Status: DISCONTINUED | OUTPATIENT
Start: 2019-07-03 | End: 2019-07-05

## 2019-07-03 RX ORDER — FENTANYL CITRATE 50 UG/ML
INJECTION, SOLUTION INTRAMUSCULAR; INTRAVENOUS CODE/TRAUMA/SEDATION MEDICATION
Status: COMPLETED | OUTPATIENT
Start: 2019-07-03 | End: 2019-07-03

## 2019-07-03 RX ORDER — HYDROMORPHONE HCL 110MG/55ML
1.5 PATIENT CONTROLLED ANALGESIA SYRINGE INTRAVENOUS EVERY 4 HOURS PRN
Status: DISCONTINUED | OUTPATIENT
Start: 2019-07-03 | End: 2019-07-08 | Stop reason: HOSPADM

## 2019-07-03 RX ADMIN — GABAPENTIN 100 MG: 100 CAPSULE ORAL at 23:08

## 2019-07-03 RX ADMIN — BUPROPION HYDROCHLORIDE 150 MG: 150 TABLET, FILM COATED, EXTENDED RELEASE ORAL at 08:33

## 2019-07-03 RX ADMIN — MORPHINE SULFATE 45 MG: 30 TABLET, FILM COATED, EXTENDED RELEASE ORAL at 21:12

## 2019-07-03 RX ADMIN — MIDAZOLAM HYDROCHLORIDE 1 MG: 1 INJECTION, SOLUTION INTRAMUSCULAR; INTRAVENOUS at 12:58

## 2019-07-03 RX ADMIN — OXYCODONE HYDROCHLORIDE 20 MG: 10 TABLET ORAL at 02:04

## 2019-07-03 RX ADMIN — HEPARIN SODIUM AND DEXTROSE 15 UNITS/KG/HR: 10000; 5 INJECTION INTRAVENOUS at 18:28

## 2019-07-03 RX ADMIN — MORPHINE SULFATE 45 MG: 30 TABLET, FILM COATED, EXTENDED RELEASE ORAL at 14:51

## 2019-07-03 RX ADMIN — OXYCODONE HYDROCHLORIDE 30 MG: 10 TABLET ORAL at 11:07

## 2019-07-03 RX ADMIN — LIDOCAINE 1 PATCH: 50 PATCH TOPICAL at 08:34

## 2019-07-03 RX ADMIN — GABAPENTIN 100 MG: 100 CAPSULE ORAL at 18:32

## 2019-07-03 RX ADMIN — HYDROMORPHONE HYDROCHLORIDE 1.5 MG: 2 INJECTION, SOLUTION INTRAMUSCULAR; INTRAVENOUS; SUBCUTANEOUS at 23:05

## 2019-07-03 RX ADMIN — PANTOPRAZOLE SODIUM 40 MG: 40 TABLET, DELAYED RELEASE ORAL at 05:13

## 2019-07-03 RX ADMIN — VITAMIN D, TAB 1000IU (100/BT) 1000 UNITS: 25 TAB at 08:35

## 2019-07-03 RX ADMIN — INSULIN LISPRO 8 UNITS: 100 INJECTION, SOLUTION INTRAVENOUS; SUBCUTANEOUS at 19:32

## 2019-07-03 RX ADMIN — HEPARIN SODIUM 5000 UNITS: 1000 INJECTION INTRAVENOUS; SUBCUTANEOUS at 18:50

## 2019-07-03 RX ADMIN — OXYCODONE HYDROCHLORIDE 30 MG: 10 TABLET ORAL at 19:37

## 2019-07-03 RX ADMIN — MIDAZOLAM HYDROCHLORIDE 1 MG: 1 INJECTION, SOLUTION INTRAMUSCULAR; INTRAVENOUS at 12:48

## 2019-07-03 RX ADMIN — FENTANYL CITRATE 50 MCG: 50 INJECTION, SOLUTION INTRAMUSCULAR; INTRAVENOUS at 12:48

## 2019-07-03 RX ADMIN — AZITHROMYCIN 500 MG: 250 TABLET, FILM COATED ORAL at 18:32

## 2019-07-03 RX ADMIN — HEPARIN SODIUM 5000 UNITS: 1000 INJECTION INTRAVENOUS; SUBCUTANEOUS at 07:44

## 2019-07-03 RX ADMIN — DOCUSATE SODIUM 100 MG: 100 CAPSULE, LIQUID FILLED ORAL at 18:32

## 2019-07-03 RX ADMIN — ACETAMINOPHEN 975 MG: 325 TABLET, FILM COATED ORAL at 05:13

## 2019-07-03 RX ADMIN — FENTANYL CITRATE 50 MCG: 50 INJECTION, SOLUTION INTRAMUSCULAR; INTRAVENOUS at 13:11

## 2019-07-03 RX ADMIN — HYDROMORPHONE HYDROCHLORIDE 1 MG: 1 INJECTION, SOLUTION INTRAMUSCULAR; INTRAVENOUS; SUBCUTANEOUS at 07:59

## 2019-07-03 RX ADMIN — HEPARIN SODIUM AND DEXTROSE 13 UNITS/KG/HR: 10000; 5 INJECTION INTRAVENOUS at 07:26

## 2019-07-03 RX ADMIN — NICOTINE 1 PATCH: 14 PATCH TRANSDERMAL at 08:27

## 2019-07-03 RX ADMIN — METHOCARBAMOL TABLETS 750 MG: 750 TABLET, COATED ORAL at 21:11

## 2019-07-03 RX ADMIN — ACETAMINOPHEN 975 MG: 325 TABLET, FILM COATED ORAL at 21:12

## 2019-07-03 RX ADMIN — FENTANYL CITRATE 50 MCG: 50 INJECTION, SOLUTION INTRAMUSCULAR; INTRAVENOUS at 12:59

## 2019-07-03 RX ADMIN — HYDROMORPHONE HYDROCHLORIDE 1 MG: 1 INJECTION, SOLUTION INTRAMUSCULAR; INTRAVENOUS; SUBCUTANEOUS at 04:14

## 2019-07-03 RX ADMIN — GABAPENTIN 100 MG: 100 CAPSULE ORAL at 08:33

## 2019-07-03 RX ADMIN — INSULIN LISPRO 10 UNITS: 100 INJECTION, SOLUTION INTRAVENOUS; SUBCUTANEOUS at 19:34

## 2019-07-03 RX ADMIN — METHOCARBAMOL TABLETS 750 MG: 750 TABLET, COATED ORAL at 18:32

## 2019-07-03 RX ADMIN — DOCUSATE SODIUM 100 MG: 100 CAPSULE, LIQUID FILLED ORAL at 08:34

## 2019-07-03 RX ADMIN — METHOCARBAMOL TABLETS 750 MG: 750 TABLET, COATED ORAL at 08:33

## 2019-07-03 RX ADMIN — ATORVASTATIN CALCIUM 10 MG: 10 TABLET, FILM COATED ORAL at 18:31

## 2019-07-03 RX ADMIN — ACETAMINOPHEN 975 MG: 325 TABLET, FILM COATED ORAL at 14:50

## 2019-07-03 RX ADMIN — BUDESONIDE AND FORMOTEROL FUMARATE DIHYDRATE 2 PUFF: 160; 4.5 AEROSOL RESPIRATORY (INHALATION) at 08:36

## 2019-07-03 RX ADMIN — SENNOSIDES 8.6 MG: 8.6 TABLET, FILM COATED ORAL at 08:33

## 2019-07-03 RX ADMIN — OXYCODONE HYDROCHLORIDE 20 MG: 10 TABLET ORAL at 06:20

## 2019-07-03 RX ADMIN — LIDOCAINE HYDROCHLORIDE 10 ML: 10 INJECTION, SOLUTION INFILTRATION; PERINEURAL at 12:57

## 2019-07-03 NOTE — PROGRESS NOTES
Progress note - Palliative and Supportive Care   Bobbi Olea 60 y o  male [id number]    Assessment:     Patient Active Problem List   Diagnosis    Tobacco abuse    Morbid obesity ([facility] [address] )    Type 2 diabetes mellitus with foot ulcer, with long-term current use of insulin ([facility] [address] )    Chronic prescription opiate use    Essential hypertension    Screening for colon cancer    Mass of right side of neck    Acute pulmonary embolism with acute cor pulmonale (HCC)    Respiratory insufficiency    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HCC)    Right wrist drop         Plan:  1  Symptom management -   - Will increase MSContin to 45mg q8h   - PRN oxycodone 30mg    - Dilaudid 1 5mg IV for breakthrough    2  Goals - Working towards biopsy  Given substantial pre-morbid baseline, will likely need comprehensive goals meeting once symptoms better controlled  DId not wish to engage further today  Interval history:       Patient seen in chair  Complaining of excrutiating, uncontrolled pain  Writhing in chair  Cannot specifically localize but states it's primarily in his low back and legs  Cannot participupate furteh 2/2 pain  Nursing reports BM       MEDICATIONS / ALLERGIES:     all current active meds have been reviewed and current meds:   Current Facility-Administered Medications   Medication Dose Route Frequency    acetaminophen (TYLENOL) tablet 975 mg  975 mg Oral Q8H [address]    albuterol (PROVENTIL HFA,VENTOLIN HFA) inhaler 2 puff  2 puff Inhalation Q4H PRN    atorvastatin (LIPITOR) tablet 10 mg  10 mg Oral Daily    azithromycin (ZITHROMAX) tablet 500 mg  500 mg Oral Q24H    budesonide-formoterol (SYMBICORT) 160-4 5 mcg/act inhaler 2 puff  2 puff Inhalation BID    buPROPion (WELLBUTRIN XL) 24 hr tablet 150 mg  150 mg Oral Daily    cholecalciferol (VITAMIN D3) tablet 1,000 Units  1,000 Units Oral Daily    docusate sodium (COLACE) capsule 100 mg  100 mg Oral BID    gabapentin (NEURONTIN) capsule 100 mg  100 mg Oral TID    heparin (porcine) 25,000 units in 250 mL infusion (premix)  3-30 Units/kg/hr (Order-Specific) Intravenous Titrated    heparin (porcine) injection 10,000 Units  10,000 Units Intravenous PRN    heparin (porcine) injection 5,000 Units  5,000 Units Intravenous PRN    HYDROmorphone (DILAUDID) injection 1 5 mg  1 5 mg Intravenous Q4H PRN    insulin glargine (LANTUS) subcutaneous injection 75 Units 0 75 mL  75 Units Subcutaneous HS    insulin lispro (HumaLOG) 100 units/mL subcutaneous injection 1-5 Units  1-5 Units Subcutaneous HS    insulin lispro (HumaLOG) 100 units/mL subcutaneous injection 10 Units  10 Units Subcutaneous TID With Meals    insulin lispro (HumaLOG) 100 units/mL subcutaneous injection 2-12 Units  2-12 Units Subcutaneous TID AC    lidocaine (LIDODERM) 5 % patch 1 patch  1 patch Topical Daily    methocarbamol (ROBAXIN) tablet 750 mg  750 mg Oral 4x Daily    morphine (MS CONTIN) ER tablet 45 mg  45 mg Oral Q8H [address]    nicotine (NICODERM CQ) 14 mg/24hr TD 24 hr patch 1 patch  1 patch Transdermal Daily    oxyCODONE (ROXICODONE) immediate release tablet 30 mg  30 mg Oral Q4H PRN    pantoprazole (PROTONIX) EC tablet 40 mg  40 mg Oral Early Morning    senna (SENOKOT) tablet 8 6 mg  1 tablet Oral Daily       No Known Allergies    OBJECTIVE:    Physical Exam  Physical Exam   Constitutional: He appears distressed  HENT:   Head: Atraumatic  Eyes: Right eye exhibits no discharge  Left eye exhibits no discharge  Pulmonary/Chest: Effort normal  No respiratory distress  Abdominal: He exhibits no distension  Musculoskeletal: He exhibits edema and deformity  Neurological: He is alert  Skin: Skin is warm and dry  He is not diaphoretic  Psychiatric:   labile   Nursing note and vitals reviewed  Lab Results: I have personally reviewed pertinent labs  , CBC: No results found for: WBC, HGB, HCT, MCV, PLT, ADJUSTEDWBC, MCH, MCHC, RDW, MPV, NRBC, CMP: No results found for: SODIUM, K, CL, CO2, ANIONGAP, BUN, CREATININE, GLUCOSE, CALCIUM, AST, ALT, ALKPHOS, PROT, BILITOT, EGFR      Counseling / Coordination of Care  Total floor / unit time spent today 15+ minutes  Greater than 50% of total time was spent with the patient and / or family counseling and / or coordination of care  A description of the counseling / coordination of care: symptom mgmt, opioid adjustments

## 2019-07-03 NOTE — PROGRESS NOTES
Heparin held per IR, Eileen Cazares RN for  biopsy  IR called to confirm restart time   Per Alee Rangel RN,  OK to restart heparin at this time ,per Dr Suzanna Johnson

## 2019-07-03 NOTE — CONSULTS
Consultation - Neurology   Stephan Collins 60 y o  male MRN: [id number]  Unit/Bed#: -01 Encounter: [id number]      Assessment/Plan   Assessment:  60year old male with PMH HTN, HLD, DM, tobacco abuse and COPD admitted with complaints of R hip pain and found to have multiple PEs as well as diffuse bony metastases  He was admitted for anticoagulation and further work-up and yesterday he notes that he woke up with inability to move his L wrist and fingers upward  Plan:  1  L wrist drop    - Suspect compression of radial nerve at wrist     - PT/OT   - Recommend splinting  If not improved after 1 month, would recommend EMG-NCS at that time  - Patient will follow-up with neuro-muscular team as an outpatient  Office has been contacted to request appointment  2  PEs    - Anticoagulation as per hematology oncology team      3  Multiple areas of metastases    - Unknown primary  IR biopsy pending     - Management as per hematology oncology team     - Pain control as per Palliative Care team     4  DM2   - Insulin as per medicine team      5  Tobacco abuse    - Patient notes he quit 1 week ago  Encourage continued tobacco cessation  History of Present Illness     Reason for Consult / Principal Problem: Weakness of L arm     HPI: Stephan Collins is a 60 y o  right handed male with PMH DM with diabetic foot ulcers, HTN, HLD, COPD, YOVANI who presented to the hospital initially complaining of R hip pain which was not relieved with pain medications  Work-up in the ED included CTA chest for PE and demonstrated numerous diffusely distributed PEs as well as thoracic vertebral body densities likely metastases  He was admitted to the hospital and started on a Heparin drip and work-up planned for metastases  Patient with unknown primary  He was noted yesterday AM to wake up with inability to move L wrist/hand   He states that his wrist was moving normally when he went to bed the night before and when he woke up he was not able to move his wrist upward but is able to move it downward  He notes that this has never happened before and nothing makes it better or worse  He notes that he is currently in a significant amount of pain in his back, his legs and feels significant weakness in his legs to the point where it has become difficult for him to go to the bathroom  He notes he has been significantly constipated but this morning was able to have a bowel movement  He denies any urinary symptoms  Inpatient consult to Neurology  Consult performed by: Luci Cushing, PA-C  Consult ordered by: Tracie Correa MD          Review of Systems   Constitutional: Positive for unexpected weight change  Negative for chills, fatigue and fever  40 pound weight loss     HENT: Negative for trouble swallowing  Eyes: Negative for visual disturbance  Respiratory: Positive for cough  Negative for shortness of breath  Cardiovascular: Positive for chest pain  Gastrointestinal: Positive for constipation  Negative for abdominal pain, nausea and vomiting  Genitourinary: Negative for difficulty urinating and dysuria  Musculoskeletal: Positive for arthralgias, back pain and neck pain  Neurological: Positive for weakness and numbness  Negative for facial asymmetry, speech difficulty and headaches  Psychiatric/Behavioral: Negative for confusion         Historical Information   Past Medical History:   Diagnosis Date    Chronic pain syndrome     COPD [facility]chronic obstructive pulmonary disease) ([facility])     Diabetes mellitus ([address] )     Diverticulitis     Hyperlipidemia     Hypertension     Sleep apnea      Past Surgical History:   Procedure Laterality Date    AMPUTATION Left     toe     HEMICOLECTOMY      IL AMPUTATION FOOT,TRANSMETATARSAL Right 10/16/2017    Procedure: AMPUTATION TRANSMETATARSAL (TMA);  Surgeon: Ar Moy DPM;  Location: [facility];  Service: Podiatry    IL COLONOSCOPY FLX DX W/COLLJ SPEC WHEN PFRMD N/A 4/16/2019 Procedure: COLONOSCOPY;  Surgeon: Tyesha Gaston DO;  Location: [facility]; Service: Gastroenterology    TREATMENT FISTULA ANAL       Social History   Social History     Substance and Sexual Activity   Alcohol Use Never    Frequency: Never     Social History     Substance and Sexual Activity   Drug Use No     Social History     Tobacco Use   Smoking Status Current Every Day Smoker    Packs/day: 0 50   Smokeless Tobacco Never Used     Family History:   Family History   Family history unknown: Yes       Review of previous medical records was completed  Patient with multiple recent ED visits for right-sided back pain      Meds/Allergies   Scheduled Meds:  Current Facility-Administered Medications:  acetaminophen 975 mg Oral Q8H [facility] Mariah Craft MD    albuterol 2 puff Inhalation Q4H PRN Marlo Chu MD    atorvastatin 10 mg Oral Daily YasirKATIE Quintanilla    azithromycin 500 mg Oral Q24H KATIE Denny    budesonide-formoterol 2 puff Inhalation BID KATIE Denny    buPROPion 150 mg Oral Daily YasirKATIE Quintanilla    cholecalciferol 1,000 Units Oral Daily KATIE Denny    docusate sodium 100 mg Oral BID KATIE Denny    gabapentin 100 mg Oral TID Mariah Craft MD    heparin (porcine) 3-30 Units/kg/hr (Order-Specific) Intravenous Titrated KATIE Denny Last Rate: Stopped (07/03/19 0168)   heparin (porcine) 10,000 Units Intravenous PRN KATIE Denny    heparin (porcine) 5,000 Units Intravenous PRN KATIE Denny    HYDROmorphone 1 mg Intravenous Q4H PRN Mariah Craft MD    insulin glargine 75 Units Subcutaneous HS Job Mccoy MD    insulin lispro 1-5 Units Subcutaneous HS KATIE Denny    insulin lispro 10 Units Subcutaneous TID With Meals Mariah Craft MD    insulin lispro 2-12 Units Subcutaneous TID AC KATIE Denny    lidocaine 1 patch Topical Daily Mariah Craft MD    methocarbamol 750 mg Oral 4x Daily KATIE Denny    morphine 45 mg Oral Q12H [address] Micheline Box MD    nicotine 1 patch Transdermal Daily KATIE Rosenberg    oxyCODONE 20 mg Oral Q4H PRN Micheline Box MD    pantoprazole 40 mg Oral Early Morning KATIE Rosenberg    senna 1 tablet Oral Daily KATIE Rosenberg      Continuous Infusions:  heparin (porcine) 3-30 Units/kg/hr (Order-Specific) Last Rate: Stopped (07/03/19 0821)     PRN Meds: albuterol    heparin (porcine)    heparin (porcine)    HYDROmorphone    oxyCODONE      No Known Allergies    Objective   Vitals:Blood pressure 138/56, pulse 82, temperature 98 2 °F (36 8 °C), temperature source Oral, resp  rate 18, height 6' (1 829 m), weight 121 kg (266 lb 12 1 oz), SpO2 94 %  ,Body mass index is 36 18 kg/m²  Intake/Output Summary (Last 24 hours) at 7/3/2019 1010  Last data filed at 7/3/2019 0410  Gross per 24 hour   Intake    Output 1475 ml   Net -1475 ml       Invasive Devices: Invasive Devices     Peripheral Intravenous Line            Peripheral IV 07/02/19 Left Forearm less than 1 day    Peripheral IV 07/02/19 Right Forearm less than 1 day                Physical Exam   Constitutional: He is oriented to person, place, and time  He appears well-developed and well-nourished  No distress  HENT:   Head: Normocephalic and atraumatic  Eyes: Pupils are equal, round, and reactive to light  Conjunctivae and EOM are normal  Right eye exhibits no discharge  Left eye exhibits no discharge  No scleral icterus  Neck: Normal range of motion  Neck supple  Cardiovascular: Normal rate and regular rhythm  Pulmonary/Chest: Effort normal    Coarse breath sounds B/L     Abdominal: Soft  He exhibits no distension  Musculoskeletal: He exhibits edema  He exhibits no deformity  Neurological: He is alert and oriented to person, place, and time  He has a normal Finger-Nose-Finger Test (Slightly clumsy with LUE secondary to weakness  )     Reflex Scores:       Bicep reflexes are 1+ on the right side and 1+ on the left side  Brachioradialis reflexes are 1+ on the right side and 1+ on the left side  Patellar reflexes are 1+ on the right side and 1+ on the left side  Skin: Skin is warm and dry  No rash noted  He is not diaphoretic  No pallor  Chronic vascular skin changes B/L LE  Dressing noted L foot  Psychiatric: He has a normal mood and affect  His speech is normal and behavior is normal      Neurologic Exam     Mental Status   Oriented to person, place, and time  Oriented to person  Oriented to place  Oriented to time  Registration: recalls 3 of 3 objects  Recall at 5 minutes: recalls 1 of 3 objects  Attention: normal  Concentration: normal    Speech: speech is normal   Level of consciousness: alert  Knowledge: good  Normal comprehension  Able to spell word "world" forward and backward  Cranial Nerves     CN II   Right visual field deficit: none  Left visual field deficit: none     CN III, IV, VI   Pupils are equal, round, and reactive to light  Extraocular motions are normal    Right pupil: Size: 4 mm  Shape: regular  Reactivity: brisk  Consensual response: intact  Left pupil: Size: 4 mm  Shape: regular  Reactivity: brisk  Consensual response: intact     Nystagmus: none   Diplopia: none  Ophthalmoparesis: none  Upgaze: normal  Downgaze: normal  Conjugate gaze: present    CN V   Right facial sensation deficit: none  Left facial sensation deficit: none    CN VII   Right facial weakness: none  Left facial weakness: none    CN VIII   Hearing: intact    CN IX, X   Palate: symmetric    CN XI   Right sternocleidomastoid strength: normal  Left sternocleidomastoid strength: normal    CN XII   Tongue: not atrophic  Fasciculations: absent  Tongue deviation: none    Motor Exam   Muscle bulk: normal  Overall muscle tone: normal  Right arm tone: normal  Left arm tone: normal  Right arm pronator drift: absent  Left arm pronator drift: absent  Right leg tone: normal  Left leg tone: normalMotor strength 5/5 RUE, 5/5 LUE except L wrist extensor 0/5,  2/5 and intrinsics 2/5, B/L LE 5/5 distally and 3/5 proximally (patient notes significantly limited due to pain)  Sensory Exam   Right arm light touch: normal  Left arm light touch: normal  Right leg light touch: normal  Left leg light touch: normal  Right arm vibration: normal  Left arm vibration: normal  Right arm pinprick: normal (Decreased on dorsum of forearm )  Left arm pinprick: decreased from wrist (Palm decreased on dorsum of L hand )  Right leg pinprick: decreased from knee  Left leg pinprick: decreased from knee    Gait, Coordination, and Reflexes     Coordination   Finger to nose coordination: normal (Slightly clumsy with LUE secondary to weakness  )    Tremor   Resting tremor: absent    Reflexes   Right brachioradialis: 1+  Left brachioradialis: 1+  Right biceps: 1+  Left biceps: 1+  Right patellar: 1+  Left patellar: 1+Limited exam secondary to fall risk (gait deferred) as well as did not assess feet secondary to known diabetic ulcers being managed by podiatry  Lab Results:  Recent Results (from the past 24 hour(s))   APTT    Collection Time: 07/02/19 10:37 AM   Result Value Ref Range    PTT 62 (H) 23 - 37 seconds   Fingerstick Glucose (POCT)    Collection Time: 07/02/19 10:59 AM   Result Value Ref Range    POC Glucose 322 (H) 65 - 140 mg/dl   Fingerstick Glucose (POCT)    Collection Time: 07/02/19  3:38 PM   Result Value Ref Range    POC Glucose 283 (H) 65 - 140 mg/dl   Fingerstick Glucose (POCT)    Collection Time: 07/02/19  8:34 PM   Result Value Ref Range    POC Glucose 155 (H) 65 - 140 mg/dl   Fingerstick Glucose (POCT)    Collection Time: 07/03/19  6:12 AM   Result Value Ref Range    POC Glucose 250 (H) 65 - 140 mg/dl   APTT    Collection Time: 07/03/19  6:30 AM   Result Value Ref Range    PTT 55 (H) 23 - 37 seconds       Imaging Studies: No neuro imaging available for review       VTE Prophylaxis: Heparin

## 2019-07-03 NOTE — PROGRESS NOTES
Received patient p[ost op biopsy  Sleepy, easy to arouse  Report received from 2017 Geraldine Kilpatrick as ordered  Vital signs taken and documented   Will continue to monitor

## 2019-07-03 NOTE — ASSESSMENT & PLAN NOTE
Acute pulmonary embolism as noted on CTA, suspected due to underlying metastatic disease  Keep the heparin infusion for now  Likely switch over to Eliquis on discharge  The price was already discussed with case management  Patient is okay with it  About 47 dollars a month  But the heparin infusion for now because plan for liver biopsy today

## 2019-07-03 NOTE — SOCIAL WORK
Cm spoke to [facility]-Dr Piper Hess and palliative Dr Hermelinda Cruz regarding goals of care  Pt is not ready for discharge-will need liver bx and results along with pain control prior to d/c  Discussion included placement to a hospice house or a facility that will allow aggressive hospice care  This discussion will need to be done with pt, but Dr Hermelinda Cruz does not feel this should be broached today  CM will continue to follow  Patient presented to clinic for routine, DM, HCV blood work. Orders verified in patient chart. Name and  verified. Patient is fasting. Blood drawn from the right antecubital, tolerated well without complications.

## 2019-07-03 NOTE — PROGRESS NOTES
Progress Note - Simon Lovelace 1955, 60 y o  male MRN: [id number]    Unit/Bed#: -01 Encounter: [id number]    Primary Care Provider: Maxi Brock MD   Date and time admitted to hospital: 6/28/2019  3:14 PM        * Acute pulmonary embolism with acute cor pulmonale [facility])  Assessment & Plan  Acute pulmonary embolism as noted on CTA, suspected due to underlying metastatic disease  Keep the heparin infusion for now  Likely switch over to Eliquis on discharge  The price was already discussed with case management  Patient is okay with it  About 47 dollars a month  But the heparin infusion for now because plan for liver biopsy today  Right wrist drop  Assessment & Plan  Neurology evaluated the patient and suspected symptoms likely due to compression induced right radial nerve palsy  Recommended PT/OT  Splinting   if does not improve in 1 month outpatient EMG  Spine metastasis [facility])  Assessment & Plan  Looks to be metastatic disease to the spine  Oncology consultation appreciated  PSA is normal however imaging favors prostate  CEA 2180  Plan is for liver biopsy today  Oncology following  Respiratory insufficiency  Assessment & Plan  Encourage good pulmonary hygiene  Respiratory protocol, incentive spirometry  Secondary to pulmonary embolism  Echo noted with EF of 55%  Currently patient noted in uncontrolled pain likely multifactorial    Unclear if patient following up with use of incentive spirometry  Chronic prescription opiate use  Assessment & Plan  Currently patient is on MS Contin 30 mg q 12, oxycodone 20 mg Q 4, Dilaudid 1 mg IV breakthrough, topical lidocaine, Neurontin 100 mg t i d , Tylenol 975 mg ATC per palliative Care recommendations  Patient is still complaining of uncontrolled pain  Have discussed with palliative care to adjust pain regimen      Type 2 diabetes mellitus with foot ulcer, with long-term current use of insulin (HCC)  Assessment & Plan  Lab Results   Component Value Date    HGBA1C 7 9 (H) 2019       Recent Labs     19  2034 19  0612 19  1203 19  1528   POCGLU 155* 250* 267* 306*       Blood Sugar Average: Last 72 hrs:  (P) 175 2     continue long-acting insulin  Meal time coverage was decreased due to hypoglycemic episode earlier  Start sliding scale insulin      Morbid obesity ([address] )  Assessment & Plan  Encourage lifestyle changes  However the patient states he did have some weight loss  Consult Nutrition    Tobacco abuse  Assessment & Plan  Encourage smoking cessation  Nicotine replacement      VTE Pharmacologic Prophylaxis:   Pharmacologic: Heparin Drip    Patient Centered Rounds: I have performed bedside rounds with nursing staff today  Discussions with Specialists or Other Care Team Provider:  Neurology, palliative care recommendation noted  Education and Discussions with Family / Patient:  Patient, cousin present at bedside  Patient does not want me to discuss anything with cousin at bedside  Time Spent for Care: 30 minutes  More than 50% of total time spent on counseling and coordination of care as described above  Current Length of Stay: 5 day(s)    Current Patient Status: Inpatient   Certification Statement: The patient will continue to require additional inpatient hospital stay due to Uncontrolled pain, plan for IR liver biopsy today  Discharge Plan: tbd    Code Status: Level 1 - Full Code      Subjective:   Patient is complaining of intractable pain  Distraught and verbally abusive and not willing to have any meaningful conversation until pain is controlled  Cousin at bedside but patient does not want to discuss anything with cousin      Objective:     Vitals:   Temp (24hrs), Av 3 °F (36 8 °C), Min:98 2 °F (36 8 °C), Max:98 5 °F (36 9 °C)    Temp:  [98 2 °F (36 8 °C)-98 5 °F (36 9 °C)] 98 5 °F (36 9 °C)  HR:  [] 99  Resp:  [16-19] 19  BP: (119-149)/(56-79) 135/68  SpO2: [93 %-100 %] 99 %  Body mass index is 36 18 kg/m²  Input and Output Summary (last 24 hours): Intake/Output Summary (Last 24 hours) at 7/3/2019 1717  Last data filed at 7/3/2019 0410  Gross per 24 hour   Intake    Output 1175 ml   Net -1175 ml       Physical Exam:     Physical Exam   Constitutional: He is oriented to person, place, and time  Distraught due to pain  HENT:   Head: Normocephalic and atraumatic  Eyes: Pupils are equal, round, and reactive to light  EOM are normal    Neck: Normal range of motion  Neck supple  Cardiovascular: Normal rate and regular rhythm  Pulmonary/Chest: Effort normal and breath sounds normal  No stridor  No respiratory distress  He has no wheezes  Abdominal: Soft  Bowel sounds are normal  He exhibits no distension  There is no tenderness  There is no guarding  Musculoskeletal: Normal range of motion  Neurological: He is alert and oriented to person, place, and time  Additional Data:     Labs:    Results from last 7 days   Lab Units 07/01/19  0441  06/28/19  1645   WBC Thousand/uL 15 33*   < > 19 23*   HEMOGLOBIN g/dL 12 0   < > 12 4   HEMATOCRIT % 37 8   < > 38 8   PLATELETS Thousands/uL 255   < > 211   NEUTROS PCT %  --   --  82*   LYMPHS PCT %  --   --  11*   MONOS PCT %  --   --  6   EOS PCT %  --   --  0    < > = values in this interval not displayed       Results from last 7 days   Lab Units 07/01/19  0441   SODIUM mmol/L 137   POTASSIUM mmol/L 4 0   CHLORIDE mmol/L 102   CO2 mmol/L 27   BUN mg/dL 12   CREATININE mg/dL 0 74   ANION GAP mmol/L 8   CALCIUM mg/dL 8 6   ALBUMIN g/dL 2 1*   TOTAL BILIRUBIN mg/dL 1 10*   ALK PHOS U/L 570*   ALT U/L 24   AST U/L 31   GLUCOSE RANDOM mg/dL 148*     Results from last 7 days   Lab Units 06/28/19  1645   INR  1 80*     Results from last 7 days   Lab Units 07/03/19  1528 07/03/19  1203 07/03/19  0612 07/02/19  2034 07/02/19  1538 07/02/19  1059 07/02/19  0553 07/01/19  2114 07/01/19  1715 07/01/19  1607 07/01/19  1558 07/01/19  1102   POC GLUCOSE mg/dl 306* 267* 250* 155* 283* 322* 243* 209* 140 56* 56* 331*     Results from last 7 days   Lab Units 06/28/19  1645   HEMOGLOBIN A1C % 7 9*               * I Have Reviewed All Lab Data Listed Above  * Additional Pertinent Lab Tests Reviewed: No New Labs Available For Today      Recent Cultures (last 7 days):     Results from last 7 days   Lab Units 06/28/19  1645   BLOOD CULTURE  No Growth After 4 Days  No Growth After 4 Days         Last 24 Hours Medication List:     Current Facility-Administered Medications:  acetaminophen 975 mg Oral Q8H [address] Daniel Hernandez MD    albuterol 2 puff Inhalation Q4H PRN Marlo Chu MD    atorvastatin 10 mg Oral Daily Tom Sunshine, SREENP    azithromycin 500 mg Oral Q24H Tom Sunshine, CRNP    budesonide-formoterol 2 puff Inhalation BID Tom Sunshine, CRNP    buPROPion 150 mg Oral Daily Tom Sunshine, CRNP    cholecalciferol 1,000 Units Oral Daily Tom Sunshine, CRNP    docusate sodium 100 mg Oral BID Tom Sunshine, CRNP    gabapentin 100 mg Oral TID Daniel Hernandez MD    heparin (porcine) 3-30 Units/kg/hr (Order-Specific) Intravenous Titrated Otm Sunshine, CRNP Last Rate: 13 Units/kg/hr (07/03/19 1649)   heparin (porcine) 10,000 Units Intravenous PRN Tom Sunshine, CRNP    heparin (porcine) 5,000 Units Intravenous PRN Tom Sunshine, SREENP    HYDROmorphone 1 5 mg Intravenous Q4H PRN Sarah Monroe MD    insulin glargine 75 Units Subcutaneous HS Daniel Hernandez MD    insulin lispro 1-5 Units Subcutaneous HS Tom SunshineKATIE    insulin lispro 10 Units Subcutaneous TID With Meals Daniel Hernandez MD    insulin lispro 2-12 Units Subcutaneous TID AC Tomshanti Gonsalez, KATIE    lidocaine 1 patch Topical Daily Daniel Hernandez MD    methocarbamol 750 mg Oral 4x Daily Tom Sunshine, KATIE    morphine 45 mg Oral [facility] Sarah Monroe MD    nicotine 1 patch Transdermal Daily KATIE Perez    oxyCODONE 30 mg Oral Q4H PRN Campbell Archibald MD    pantoprazole 40 mg Oral Early Morning KATIE Singer    senna 1 tablet Oral Daily KATIE Singer         Today, Patient Was Seen By: Darryl Baker MD    ** Please Note: Dictation voice to text software may have been used in the creation of this document   **

## 2019-07-03 NOTE — ASSESSMENT & PLAN NOTE
Lab Results   Component Value Date    HGBA1C 7 9 (H) 06/28/2019       Recent Labs     07/02/19  2034 07/03/19  0612 07/03/19  1203 07/03/19  1528   POCGLU 155* 250* 267* 306*       Blood Sugar Average: Last 72 hrs:  (P) 175 2     continue long-acting insulin  Meal time coverage was decreased due to hypoglycemic episode earlier    Start sliding scale insulin

## 2019-07-03 NOTE — ASSESSMENT & PLAN NOTE
Looks to be metastatic disease to the spine  Oncology consultation appreciated  PSA is normal however imaging favors prostate  CEA 2180  Plan is for liver biopsy today  Overall prognosis guarded  Oncology following

## 2019-07-03 NOTE — PROGRESS NOTES
The Nurse was in the room with patient when he asked a visitor to get his medication out of his brown bag, inside pocket  The nurse notified him that medications can not be kept at bedside    Eliquis 5mg  and oxycode 15 mg brought to the pharmacy

## 2019-07-03 NOTE — ASSESSMENT & PLAN NOTE
Neurology evaluated the patient and suspected symptoms likely due to compression induced right radial nerve palsy  Recommended PT/OT  Splinting   if does not improve in 1 month outpatient EMG

## 2019-07-03 NOTE — CONSULTS
IR Consult Note    HPI:  60year old male admitted with right hip pain was found to have multiple bony lesions and liver lesions concerning for metastatic disease and is referred for biopsy      PMH:  COPD  DM  HLD  HTN    PSH:  Left toe amputation  Hemicolectomy    Physical exam:  /79   Pulse 104   Temp 98 2 °F (36 8 °C) (Oral)   Resp 18   Ht 6' (1 829 m)   Wt 121 kg (266 lb 12 1 oz)   SpO2 94%   BMI 36 18 kg/m²   Gen: NAD  Abd: Soft    Coags ok    A/P:  60year old male admitted with right hip pain was found to have multiple bony lesions and liver lesions concerning for metastatic disease     - Liver biopsy

## 2019-07-03 NOTE — ASSESSMENT & PLAN NOTE
Encourage good pulmonary hygiene  Respiratory protocol, incentive spirometry  Secondary to pulmonary embolism  Echo noted with EF of 55%  Currently patient noted in uncontrolled pain likely multifactorial    Unclear if patient following up with use of incentive spirometry

## 2019-07-03 NOTE — BRIEF OP NOTE (RAD/CATH)
Liver biopsy  Procedure Note    PATIENT NAME: Wero Cisneros  : 1955  MRN: [id number]     Pre-op Diagnosis:   1  Acute pulmonary embolism with acute cor pulmonale, unspecified pulmonary embolism type ([facility] [address] )    2  Foot ulcer due to secondary DM ([facility] [address] )    3  Type 2 diabetes mellitus with foot ulcer, with long-term current use of insulin ([facility] [address] )    4  Metastatic cancer to spine ([facility] [address] )    5  Intractable back pain    6  Weakness of left arm      Post-op Diagnosis:   1  Acute pulmonary embolism with acute cor pulmonale, unspecified pulmonary embolism type ([facility] [address] )    2  Foot ulcer due to secondary DM ([facility] [address] )    3  Type 2 diabetes mellitus with foot ulcer, with long-term current use of insulin ([facility] [address] )    4  Metastatic cancer to spine ([facility] [address] )    5  Intractable back pain    6  Weakness of left arm        Surgeon:   Denisse Bryan MD    Estimated Blood Loss: 1 cc    Findings: Successful US guided biopsy of segment 4 lesion liver biopsy  Specimens: 4 core needle samples placed into formalin      Complications:  None    Anesthesia: Conscious sedation and Local    Denisse Bryan MD     Date: 7/3/2019  Time: 1:16 PM

## 2019-07-03 NOTE — PLAN OF CARE
Problem: DISCHARGE PLANNING - CARE MANAGEMENT  Goal: Discharge to post-acute care or home with appropriate resources  Description  INTERVENTIONS:  - Conduct assessment to determine patient/family and health care team treatment goals, and need for post-acute services based on payer coverage, community resources, and patient preferences, and barriers to discharge  - Address psychosocial, clinical, and financial barriers to discharge as identified in assessment in conjunction with the patient/family and health care team  - Arrange appropriate level of post-acute services according to patients   needs and preference and payer coverage in collaboration with the physician and health care team  - Communicate with and update the patient/family, physician, and health care team regarding progress on the discharge plan  - Arrange appropriate transportation to post-acute venues  Outcome: Progressing  Note:   Cm spoke to [facility]-Dr Mortimer Hunger and palliative Dr Graham Hogue regarding goals of care  Pt is not ready for discharge-will need liver bx and results along with pain control prior to d/c  Discussion included placement to a hospice house or a facility that will allow aggressive hospice care  This discussion will need to be done with pt, but Dr Graham oHgue does not feel this should be broached today  CM will continue to follow

## 2019-07-04 LAB
ALBUMIN SERPL BCP-MCNC: 2 G/DL (ref 3.5–5)
ALP SERPL-CCNC: 781 U/L (ref 46–116)
ALT SERPL W P-5'-P-CCNC: 28 U/L (ref 12–78)
ANION GAP SERPL CALCULATED.3IONS-SCNC: 7 MMOL/L (ref 4–13)
APTT PPP: 89 SECONDS (ref 23–37)
APTT PPP: 97 SECONDS (ref 23–37)
AST SERPL W P-5'-P-CCNC: 38 U/L (ref 5–45)
BASOPHILS # BLD AUTO: 0.07 THOUSANDS/ΜL (ref 0–0.1)
BASOPHILS NFR BLD AUTO: 1 % (ref 0–1)
BILIRUB SERPL-MCNC: 1 MG/DL (ref 0.2–1)
BUN SERPL-MCNC: 10 MG/DL (ref 5–25)
CALCIUM SERPL-MCNC: 8.6 MG/DL (ref 8.3–10.1)
CHLORIDE SERPL-SCNC: 99 MMOL/L (ref 100–108)
CO2 SERPL-SCNC: 28 MMOL/L (ref 21–32)
CREAT SERPL-MCNC: 0.81 MG/DL (ref 0.6–1.3)
EOSINOPHIL # BLD AUTO: 0.32 THOUSAND/ΜL (ref 0–0.61)
EOSINOPHIL NFR BLD AUTO: 2 % (ref 0–6)
ERYTHROCYTE [DISTWIDTH] IN BLOOD BY AUTOMATED COUNT: 20.9 % (ref 11.6–15.1)
GFR SERPL CREATININE-BSD FRML MDRD: 95 ML/MIN/1.73SQ M
GLUCOSE SERPL-MCNC: 248 MG/DL (ref 65–140)
GLUCOSE SERPL-MCNC: 307 MG/DL (ref 65–140)
GLUCOSE SERPL-MCNC: 314 MG/DL (ref 65–140)
GLUCOSE SERPL-MCNC: 326 MG/DL (ref 65–140)
GLUCOSE SERPL-MCNC: 346 MG/DL (ref 65–140)
HCT VFR BLD AUTO: 37.3 % (ref 36.5–49.3)
HGB BLD-MCNC: 11.8 G/DL (ref 12–17)
IMM GRANULOCYTES # BLD AUTO: 0.31 THOUSAND/UL (ref 0–0.2)
IMM GRANULOCYTES NFR BLD AUTO: 2 % (ref 0–2)
LYMPHOCYTES # BLD AUTO: 1.95 THOUSANDS/ΜL (ref 0.6–4.47)
LYMPHOCYTES NFR BLD AUTO: 14 % (ref 14–44)
MCH RBC QN AUTO: 26.2 PG (ref 26.8–34.3)
MCHC RBC AUTO-ENTMCNC: 31.6 G/DL (ref 31.4–37.4)
MCV RBC AUTO: 83 FL (ref 82–98)
MONOCYTES # BLD AUTO: 1.07 THOUSAND/ΜL (ref 0.17–1.22)
MONOCYTES NFR BLD AUTO: 8 % (ref 4–12)
NEUTROPHILS # BLD AUTO: 10.44 THOUSANDS/ΜL (ref 1.85–7.62)
NEUTS SEG NFR BLD AUTO: 73 % (ref 43–75)
NRBC BLD AUTO-RTO: 0 /100 WBCS
PLATELET # BLD AUTO: 297 THOUSANDS/UL (ref 149–390)
PMV BLD AUTO: 10.9 FL (ref 8.9–12.7)
POTASSIUM SERPL-SCNC: 4.7 MMOL/L (ref 3.5–5.3)
PROT SERPL-MCNC: 6.5 G/DL (ref 6.4–8.2)
RBC # BLD AUTO: 4.5 MILLION/UL (ref 3.88–5.62)
SODIUM SERPL-SCNC: 134 MMOL/L (ref 136–145)
WBC # BLD AUTO: 14.16 THOUSAND/UL (ref 4.31–10.16)

## 2019-07-04 PROCEDURE — 85025 COMPLETE CBC W/AUTO DIFF WBC: CPT | Performed by: STUDENT IN AN ORGANIZED HEALTH CARE EDUCATION/TRAINING PROGRAM

## 2019-07-04 PROCEDURE — 85730 THROMBOPLASTIN TIME PARTIAL: CPT | Performed by: FAMILY MEDICINE

## 2019-07-04 PROCEDURE — 99232 SBSQ HOSP IP/OBS MODERATE 35: CPT | Performed by: STUDENT IN AN ORGANIZED HEALTH CARE EDUCATION/TRAINING PROGRAM

## 2019-07-04 PROCEDURE — 94762 N-INVAS EAR/PLS OXIMTRY CONT: CPT

## 2019-07-04 PROCEDURE — 82948 REAGENT STRIP/BLOOD GLUCOSE: CPT

## 2019-07-04 PROCEDURE — 80053 COMPREHEN METABOLIC PANEL: CPT | Performed by: STUDENT IN AN ORGANIZED HEALTH CARE EDUCATION/TRAINING PROGRAM

## 2019-07-04 RX ADMIN — PANTOPRAZOLE SODIUM 40 MG: 40 TABLET, DELAYED RELEASE ORAL at 05:54

## 2019-07-04 RX ADMIN — DOCUSATE SODIUM 100 MG: 100 CAPSULE, LIQUID FILLED ORAL at 17:29

## 2019-07-04 RX ADMIN — MORPHINE SULFATE 45 MG: 30 TABLET, FILM COATED, EXTENDED RELEASE ORAL at 20:56

## 2019-07-04 RX ADMIN — OXYCODONE HYDROCHLORIDE 30 MG: 10 TABLET ORAL at 01:47

## 2019-07-04 RX ADMIN — ACETAMINOPHEN 975 MG: 325 TABLET, FILM COATED ORAL at 05:55

## 2019-07-04 RX ADMIN — NICOTINE 1 PATCH: 14 PATCH TRANSDERMAL at 09:09

## 2019-07-04 RX ADMIN — LIDOCAINE 1 PATCH: 50 PATCH TOPICAL at 08:53

## 2019-07-04 RX ADMIN — GABAPENTIN 100 MG: 100 CAPSULE ORAL at 15:57

## 2019-07-04 RX ADMIN — HYDROMORPHONE HYDROCHLORIDE 1.5 MG: 2 INJECTION, SOLUTION INTRAMUSCULAR; INTRAVENOUS; SUBCUTANEOUS at 22:05

## 2019-07-04 RX ADMIN — HYDROMORPHONE HYDROCHLORIDE 1.5 MG: 2 INJECTION, SOLUTION INTRAMUSCULAR; INTRAVENOUS; SUBCUTANEOUS at 12:56

## 2019-07-04 RX ADMIN — INSULIN GLARGINE 75 UNITS: 100 INJECTION, SOLUTION SUBCUTANEOUS at 21:09

## 2019-07-04 RX ADMIN — GABAPENTIN 100 MG: 100 CAPSULE ORAL at 08:55

## 2019-07-04 RX ADMIN — GABAPENTIN 100 MG: 100 CAPSULE ORAL at 20:56

## 2019-07-04 RX ADMIN — INSULIN LISPRO 2 UNITS: 100 INJECTION, SOLUTION INTRAVENOUS; SUBCUTANEOUS at 21:08

## 2019-07-04 RX ADMIN — INSULIN LISPRO 8 UNITS: 100 INJECTION, SOLUTION INTRAVENOUS; SUBCUTANEOUS at 17:32

## 2019-07-04 RX ADMIN — HYDROMORPHONE HYDROCHLORIDE 1.5 MG: 2 INJECTION, SOLUTION INTRAMUSCULAR; INTRAVENOUS; SUBCUTANEOUS at 03:45

## 2019-07-04 RX ADMIN — METHOCARBAMOL TABLETS 750 MG: 750 TABLET, COATED ORAL at 08:55

## 2019-07-04 RX ADMIN — ACETAMINOPHEN 975 MG: 325 TABLET, FILM COATED ORAL at 14:47

## 2019-07-04 RX ADMIN — AZITHROMYCIN 500 MG: 250 TABLET, FILM COATED ORAL at 17:28

## 2019-07-04 RX ADMIN — INSULIN LISPRO 8 UNITS: 100 INJECTION, SOLUTION INTRAVENOUS; SUBCUTANEOUS at 09:17

## 2019-07-04 RX ADMIN — METHOCARBAMOL TABLETS 750 MG: 750 TABLET, COATED ORAL at 17:28

## 2019-07-04 RX ADMIN — ATORVASTATIN CALCIUM 10 MG: 10 TABLET, FILM COATED ORAL at 15:56

## 2019-07-04 RX ADMIN — DOCUSATE SODIUM 100 MG: 100 CAPSULE, LIQUID FILLED ORAL at 08:55

## 2019-07-04 RX ADMIN — MORPHINE SULFATE 45 MG: 30 TABLET, FILM COATED, EXTENDED RELEASE ORAL at 05:55

## 2019-07-04 RX ADMIN — BUDESONIDE AND FORMOTEROL FUMARATE DIHYDRATE 2 PUFF: 160; 4.5 AEROSOL RESPIRATORY (INHALATION) at 08:57

## 2019-07-04 RX ADMIN — APIXABAN 10 MG: 5 TABLET, FILM COATED ORAL at 11:30

## 2019-07-04 RX ADMIN — INSULIN LISPRO 10 UNITS: 100 INJECTION, SOLUTION INTRAVENOUS; SUBCUTANEOUS at 14:02

## 2019-07-04 RX ADMIN — MORPHINE SULFATE 45 MG: 30 TABLET, FILM COATED, EXTENDED RELEASE ORAL at 14:46

## 2019-07-04 RX ADMIN — SENNOSIDES 8.6 MG: 8.6 TABLET, FILM COATED ORAL at 08:55

## 2019-07-04 RX ADMIN — INSULIN LISPRO 10 UNITS: 100 INJECTION, SOLUTION INTRAVENOUS; SUBCUTANEOUS at 09:19

## 2019-07-04 RX ADMIN — HYDROMORPHONE HYDROCHLORIDE 1.5 MG: 2 INJECTION, SOLUTION INTRAMUSCULAR; INTRAVENOUS; SUBCUTANEOUS at 17:42

## 2019-07-04 RX ADMIN — METHOCARBAMOL TABLETS 750 MG: 750 TABLET, COATED ORAL at 20:57

## 2019-07-04 RX ADMIN — HYDROMORPHONE HYDROCHLORIDE 1.5 MG: 2 INJECTION, SOLUTION INTRAMUSCULAR; INTRAVENOUS; SUBCUTANEOUS at 08:59

## 2019-07-04 RX ADMIN — METHOCARBAMOL TABLETS 750 MG: 750 TABLET, COATED ORAL at 12:53

## 2019-07-04 RX ADMIN — APIXABAN 10 MG: 5 TABLET, FILM COATED ORAL at 17:28

## 2019-07-04 RX ADMIN — ACETAMINOPHEN 975 MG: 325 TABLET, FILM COATED ORAL at 20:56

## 2019-07-04 RX ADMIN — INSULIN LISPRO 8 UNITS: 100 INJECTION, SOLUTION INTRAVENOUS; SUBCUTANEOUS at 12:51

## 2019-07-04 RX ADMIN — OXYCODONE HYDROCHLORIDE 30 MG: 10 TABLET ORAL at 15:50

## 2019-07-04 RX ADMIN — HEPARIN SODIUM AND DEXTROSE 15 UNITS/KG/HR: 10000; 5 INJECTION INTRAVENOUS at 00:02

## 2019-07-04 RX ADMIN — VITAMIN D, TAB 1000IU (100/BT) 1000 UNITS: 25 TAB at 08:55

## 2019-07-04 RX ADMIN — BUPROPION HYDROCHLORIDE 150 MG: 150 TABLET, FILM COATED, EXTENDED RELEASE ORAL at 08:55

## 2019-07-04 RX ADMIN — BUDESONIDE AND FORMOTEROL FUMARATE DIHYDRATE 2 PUFF: 160; 4.5 AEROSOL RESPIRATORY (INHALATION) at 17:31

## 2019-07-04 NOTE — ASSESSMENT & PLAN NOTE
Currently patient is on MS Contin 30 mg q 12, oxycodone 20 mg Q 4, Dilaudid 1 mg IV breakthrough, topical lidocaine, Neurontin 100 mg t i d , Tylenol 975 mg ATC per palliative Care recommendations  Patient was seen resting comfortably  Does not wake up with verbal stimuli  Wakes up with sternal rub  Starts complaining of uncontrolled pain after waking up  Patient is already on  pain medications per palliative Care recommendations and will not increase further at this time  Will discussed with palliative care team discussed long-term care goals

## 2019-07-04 NOTE — ASSESSMENT & PLAN NOTE
Encourage good pulmonary hygiene  Respiratory protocol, incentive spirometry  Secondary to pulmonary embolism  Echo noted with EF of 55%  Unclear if patient is following up with use of incentive spirometry as recommended

## 2019-07-04 NOTE — ASSESSMENT & PLAN NOTE
Acute pulmonary embolism as noted on CTA, suspected due to underlying metastatic disease  Patient was on heparin drip  Patient had IR liver biopsy yesterday 07/03  Will switch to Eliquis starting this morning  The price was already discussed with case management  Patient is okay with it  About 47 dollars a month

## 2019-07-04 NOTE — PLAN OF CARE
Problem: Potential for Falls  Goal: Patient will remain free of falls  Description  INTERVENTIONS:  - Assess patient frequently for physical needs  -  Identify cognitive and physical deficits and behaviors that affect risk of falls    -  [place] fall precautions as indicated by assessment   - Educate patient/family on patient safety including physical limitations  - Instruct patient to call for assistance with activity based on assessment  - Modify environment to reduce risk of injury  - Consider OT/PT consult to assist with strengthening/mobility  Outcome: Not Progressing     Problem: PAIN - ADULT  Goal: Verbalizes/displays adequate comfort level or baseline comfort level  Description  Interventions:  - Encourage patient to monitor pain and request assistance  - Assess pain using appropriate pain scale  - Administer analgesics based on type and severity of pain and evaluate response  - Implement non-pharmacological measures as appropriate and evaluate response  - Consider cultural and social influences on pain and pain management  - Notify physician/advanced practitioner if interventions unsuccessful or patient reports new pain  Outcome: Not Progressing     Problem: INFECTION - ADULT  Goal: Absence or prevention of progression during hospitalization  Description  INTERVENTIONS:  - Assess and monitor for signs and symptoms of infection  - Monitor lab/diagnostic results  - Monitor all insertion sites, i e  indwelling lines, tubes, and drains  - Monitor endotracheal (as able) and nasal secretions for changes in amount and color  - [place] appropriate cooling/warming therapies per order  - Administer medications as ordered  - Instruct and encourage patient and family to use good hand hygiene technique  - Identify and instruct in appropriate isolation precautions for identified infection/condition  Outcome: Not Progressing  Goal: Absence of fever/infection during neutropenic period  Description  INTERVENTIONS:  - Monitor WBC  - Implement neutropenic guidelines  Outcome: Not Progressing     Problem: SAFETY ADULT  Goal: Maintain or return to baseline ADL function  Description  INTERVENTIONS:  -  Assess patient's ability to carry out ADLs; assess patient's baseline for ADL function and identify physical deficits which impact ability to perform ADLs (bathing, care of mouth/teeth, toileting, grooming, dressing, etc )  - Assess/evaluate cause of self-care deficits   - Assess range of motion  - Assess patient's mobility; develop plan if impaired  - Assess patient's need for assistive devices and provide as appropriate  - Encourage maximum independence but intervene and supervise when necessary  ¯ Involve family in performance of ADLs  ¯ Assess for home care needs following discharge   ¯ Request OT consult to assist with ADL evaluation and planning for discharge  ¯ Provide patient education as appropriate  Outcome: Not Progressing  Goal: Maintain or return mobility status to optimal level  Description  INTERVENTIONS:  - Assess patient's baseline mobility status (ambulation, transfers, stairs, etc )    - Identify cognitive and physical deficits and behaviors that affect mobility  - Identify mobility aids required to assist with transfers and/or ambulation (gait belt, sit-to-stand, lift, walker, cane, etc )  - [place] fall precautions as indicated by assessment  - Record patient progress and toleration of activity level on Mobility SBAR; progress patient to next Phase/Stage  - Instruct patient to call for assistance with activity based on assessment  - Request Rehabilitation consult to assist with strengthening/weightbearing, etc   Outcome: Not Progressing     Problem: DISCHARGE PLANNING  Goal: Discharge to home or other facility with appropriate resources  Description  INTERVENTIONS:  - Identify barriers to discharge w/patient and caregiver  - Arrange for needed discharge resources and transportation as appropriate  - Identify discharge learning needs (meds, wound care, etc )  - Arrange for interpretive services to assist at discharge as needed  - Refer to Case Management Department for coordinating discharge planning if the patient needs post-hospital services based on physician/advanced practitioner order or complex needs related to functional status, cognitive ability, or social support system  Outcome: Not Progressing     Problem: Knowledge Deficit  Goal: Patient/family/caregiver demonstrates understanding of disease process, treatment plan, medications, and discharge instructions  Description  Complete learning assessment and assess knowledge base  Interventions:  - Provide teaching at level of understanding  - Provide teaching via preferred learning methods  Outcome: Not Progressing     Problem: Prexisting or High Potential for Compromised Skin Integrity  Goal: Skin integrity is maintained or improved  Description  INTERVENTIONS:  - Identify patients at risk for skin breakdown  - Assess and monitor skin integrity  - Assess and monitor nutrition and hydration status  - Monitor labs (i e  albumin)  - Assess for incontinence   - Turn and reposition patient  - Assist with mobility/ambulation  - Relieve pressure over bony prominences  - Avoid friction and shearing  - Provide appropriate hygiene as needed including keeping skin clean and dry  - Evaluate need for skin moisturizer/barrier cream  - Collaborate with interdisciplinary team (i e  Nutrition, Rehabilitation, etc )   - Patient/family teaching  Outcome: Not Progressing     Problem: Nutrition/Hydration-ADULT  Goal: Nutrient/Hydration intake appropriate for improving, restoring or maintaining nutritional needs  Description  Monitor and assess patient's nutrition/hydration status for malnutrition (ex- brittle hair, bruises, dry skin, pale skin and conjunctiva, muscle wasting, smooth red tongue, and disorientation)   Collaborate with interdisciplinary team and initiate plan and interventions as ordered  Monitor patient's weight and dietary intake as ordered or per policy  Determine patient's food preferences and provide high-protein, high-caloric foods as appropriate       INTERVENTIONS:  - Monitor oral intake, urinary output, labs, and treatment plans  - Assess nutrition and hydration status and recommend course of action  - Evaluate amount of meals eaten  - Assist patient with eating if necessary   - Allow adequate time for meals  - Recommend/ encourage appropriate diets, oral nutritional supplements, and vitamin/mineral supplements  - Order, calculate, and assess calorie counts as needed  - Assess need for intravenous fluids  - Provide nutrition/hydration education as appropriate  - Include patient/family/caregiver in decisions related to nutrition   Outcome: Not Progressing     Problem: DISCHARGE PLANNING - CARE MANAGEMENT  Goal: Discharge to post-acute care or home with appropriate resources  Description  INTERVENTIONS:  - Conduct assessment to determine patient/family and health care team treatment goals, and need for post-acute services based on payer coverage, community resources, and patient preferences, and barriers to discharge  - Address psychosocial, clinical, and financial barriers to discharge as identified in assessment in conjunction with the patient/family and health care team  - Arrange appropriate level of post-acute services according to patients   needs and preference and payer coverage in collaboration with the physician and health care team  - Communicate with and update the patient/family, physician, and health care team regarding progress on the discharge plan  - Arrange appropriate transportation to post-acute venues  Outcome: Not Progressing

## 2019-07-04 NOTE — ASSESSMENT & PLAN NOTE
Lab Results   Component Value Date    HGBA1C 7 9 (H) 06/28/2019       Recent Labs     07/03/19  1921 07/03/19  2039 07/03/19  2256 07/04/19  0625   POCGLU 320* 231* 173* 314*       Blood Sugar Average: Last 72 hrs:  (P) 012 [id number]     Uncontrolled likely due to noncompliance  continue long-acting insulin  Patient has intermittently refused insulin  I have counseled importance of sugar control  Meal time coverage was decreased due to hypoglycemic episode earlier    Continue sliding scale coverage

## 2019-07-04 NOTE — PROGRESS NOTES
Progress Note - Stephan Dennis 1955, 60 y o  male MRN: [id number]    Unit/Bed#: -01 Encounter: [id number]    Primary Care Provider: Brock Pro MD   Date and time admitted to hospital: 6/28/2019  3:14 PM        * Acute pulmonary embolism with acute cor pulmonale [facility])  Assessment & Plan  Acute pulmonary embolism as noted on CTA, suspected due to underlying metastatic disease  Patient was on heparin drip  Patient had IR liver biopsy yesterday 07/03  Will switch to Eliquis starting this morning  The price was already discussed with case management  Patient is okay with it  About 47 dollars a month  Right wrist drop  Assessment & Plan  Neurology evaluated the patient and suspected symptoms likely due to compression induced right radial nerve palsy  Recommended PT/OT  Splinting   if does not improve in 1 month outpatient EMG  Spine metastasis [facility])  Assessment & Plan  Looks to be metastatic disease to the spine  Oncology consultation appreciated  PSA is normal however imaging favors prostate  CEA 2180  Patient had IR guided liver biopsy on 07/03  Awaiting pathology report  Overall prognosis guarded  Oncology following  Will have discussion with palliative Care for long-term care goals  Respiratory insufficiency  Assessment & Plan  Encourage good pulmonary hygiene  Respiratory protocol, incentive spirometry  Secondary to pulmonary embolism  Echo noted with EF of 55%  Unclear if patient is following up with use of incentive spirometry as recommended  Chronic prescription opiate use  Assessment & Plan  Currently patient is on MS Contin 30 mg q 12, oxycodone 20 mg Q 4, Dilaudid 1 mg IV breakthrough, topical lidocaine, Neurontin 100 mg t i d , Tylenol 975 mg ATC per palliative Care recommendations  Patient was seen resting comfortably  Does not wake up with verbal stimuli  Wakes up with sternal rub    Starts complaining of uncontrolled pain after waking up   Patient is already on  pain medications per palliative Care recommendations and will not increase further at this time  Will discussed with palliative care team discussed long-term care goals  Type 2 diabetes mellitus with foot ulcer, with long-term current use of insulin [facility])  Assessment & Plan  Lab Results   Component Value Date    HGBA1C 7 9 (H) 06/28/2019       Recent Labs     07/03/19  1921 07/03/19  2039 07/03/19  2256 07/04/19  0625   POCGLU 320* 231* 173* 314*       Blood Sugar Average: Last 72 hrs:  (P) 190 [id number]     Uncontrolled likely due to noncompliance  continue long-acting insulin  Patient has intermittently refused insulin  I have counseled importance of sugar control  Meal time coverage was decreased due to hypoglycemic episode earlier  Continue sliding scale coverage      Morbid obesity (HCC)  Assessment & Plan  Encourage lifestyle changes  However the patient states he did have some weight loss  Consult Nutrition    Tobacco abuse  Assessment & Plan  Encourage smoking cessation  Nicotine replacement      VTE Pharmacologic Prophylaxis:   Pharmacologic: Apixaban (Eliquis)    Patient Centered Rounds: I have performed bedside rounds with nursing staff today  Education and Discussions with Family / Patient:  Patient    Time Spent for Care: 20 minutes  More than 50% of total time spent on counseling and coordination of care as described above  Current Length of Stay: 6 day(s)    Current Patient Status: Inpatient   Certification Statement: The patient will continue to require additional inpatient hospital stay due to Awaiting further input from palliative care team regarding long-term care goals, also safe discharge planning in progress for short-term rehab    Discharge Plan: tbd    Code Status: Level 1 - Full Code      Subjective:   Patient was seen resting comfortably  Did not wake up with verbal stimuli  Wakes up with sternal rub    Started complaining of uncontrolled pain and keeps asking for pain medication  Patient states that he just wants to feel better and comfortable  No other events reported  Objective:     Vitals:   Temp (24hrs), Av 2 °F (36 8 °C), Min:97 9 °F (36 6 °C), Max:98 5 °F (36 9 °C)    Temp:  [97 9 °F (36 6 °C)-98 5 °F (36 9 °C)] 97 9 °F (36 6 °C)  HR:  [] 87  Resp:  [16-21] 18  BP: (121-177)/(63-79) 138/74  SpO2:  [91 %-100 %] 95 %  Body mass index is 36 96 kg/m²  Input and Output Summary (last 24 hours): Intake/Output Summary (Last 24 hours) at 2019 0943  Last data filed at 2019 0533  Gross per 24 hour   Intake 343 8 ml   Output 1150 ml   Net -806 2 ml       Physical Exam:     Physical Exam   Constitutional: He is oriented to person, place, and time  HENT:   Head: Normocephalic and atraumatic  Eyes: Pupils are equal, round, and reactive to light  EOM are normal    Neck: Normal range of motion  Neck supple  Cardiovascular: Normal rate and regular rhythm  Pulmonary/Chest: Effort normal and breath sounds normal  No stridor  No respiratory distress  He has no wheezes  Abdominal: Soft  Bowel sounds are normal  He exhibits no distension  There is no tenderness  There is no guarding  Musculoskeletal: Normal range of motion  Neurological: He is alert and oriented to person, place, and time         Additional Data:     Labs:    Results from last 7 days   Lab Units 19  0757   WBC Thousand/uL 14 16*   HEMOGLOBIN g/dL 11 8*   HEMATOCRIT % 37 3   PLATELETS Thousands/uL 297   NEUTROS PCT % 73   LYMPHS PCT % 14   MONOS PCT % 8   EOS PCT % 2     Results from last 7 days   Lab Units 19  0757   SODIUM mmol/L 134*   POTASSIUM mmol/L 4 7   CHLORIDE mmol/L 99*   CO2 mmol/L 28   BUN mg/dL 10   CREATININE mg/dL 0 81   ANION GAP mmol/L 7   CALCIUM mg/dL 8 6   ALBUMIN g/dL 2 0*   TOTAL BILIRUBIN mg/dL 1 00   ALK PHOS U/L 781*   ALT U/L 28   AST U/L 38   GLUCOSE RANDOM mg/dL 326*     Results from last 7 days   Lab Units 06/28/19  1645   INR  1 80*     Results from last 7 days   Lab Units 07/04/19  0625 07/03/19  2256 07/03/19  2039 07/03/19  1921 07/03/19  1528 07/03/19  1203 07/03/19  0612 07/02/19  2034 07/02/19  1538 07/02/19  1059 07/02/19  0553 07/01/19  2114   POC GLUCOSE mg/dl 314* 173* 231* 320* 306* 267* 250* 155* 283* 322* 243* 209*     Results from last 7 days   Lab Units 06/28/19  1645   HEMOGLOBIN A1C % 7 9*               * I Have Reviewed All Lab Data Listed Above  * Additional Pertinent Lab Tests Reviewed: All Labs Within Last 24 Hours Reviewed      Recent Cultures (last 7 days):     Results from last 7 days   Lab Units 06/28/19  1645   BLOOD CULTURE  No Growth After 5 Days  No Growth After 5 Days         Last 24 Hours Medication List:     Current Facility-Administered Medications:  acetaminophen 975 mg Oral Q8H [address] Job Alston MD   albuterol 2 puff Inhalation Q4H PRN Marlo Chu MD   apixaban 10 mg Oral BID Max CAN MD   atorvastatin 10 mg Oral Daily KATIE Singer   azithromycin 500 mg Oral Q24H KATIE Singer   budesonide-formoterol 2 puff Inhalation BID KATIE Singer   buPROPion 150 mg Oral Daily KATIE Singer   cholecalciferol 1,000 Units Oral Daily KATIE Singer   docusate sodium 100 mg Oral BID KATIE Singer   gabapentin 100 mg Oral TID Isauro Sanchez MD   HYDROmorphone 1 5 mg Intravenous Q4H PRN Campbell Archibald MD   insulin glargine 75 Units Subcutaneous HS Isauro Sanchez MD   insulin lispro 1-5 Units Subcutaneous HS KATIE Singer   insulin lispro 10 Units Subcutaneous TID With Meals Isauro Sanchez MD   insulin lispro 2-12 Units Subcutaneous TID AC KATIE Singer   lidocaine 1 patch Topical Daily Isauro Sanchez MD   methocarbamol 750 mg Oral 4x Daily KATIE Singer   morphine 45 mg Oral [facility] hospital Campbell Archibald MD   nicotine 1 patch Transdermal Daily KATIE Singer   oxyCODONE 30 mg Oral Q4H PRN Campbell Archibald, MD   pantoprazole 40 mg Oral Early Morning KATIE Olsen   senna 1 tablet Oral Daily KATIE Olsen        Today, Patient Was Seen By: Terrance Gray MD    ** Please Note: Dictation voice to text software may have been used in the creation of this document   **

## 2019-07-04 NOTE — ASSESSMENT & PLAN NOTE
Looks to be metastatic disease to the spine  Oncology consultation appreciated  PSA is normal however imaging favors prostate  CEA 2180  Patient had IR guided liver biopsy on 07/03  Awaiting pathology report  Overall prognosis guarded  Oncology following  Will have discussion with palliative Care for long-term care goals

## 2019-07-05 LAB
GLUCOSE SERPL-MCNC: 184 MG/DL (ref 65–140)
GLUCOSE SERPL-MCNC: 235 MG/DL (ref 65–140)
GLUCOSE SERPL-MCNC: 266 MG/DL (ref 65–140)
GLUCOSE SERPL-MCNC: 268 MG/DL (ref 65–140)

## 2019-07-05 PROCEDURE — 99232 SBSQ HOSP IP/OBS MODERATE 35: CPT | Performed by: STUDENT IN AN ORGANIZED HEALTH CARE EDUCATION/TRAINING PROGRAM

## 2019-07-05 PROCEDURE — 82948 REAGENT STRIP/BLOOD GLUCOSE: CPT

## 2019-07-05 PROCEDURE — 94762 N-INVAS EAR/PLS OXIMTRY CONT: CPT

## 2019-07-05 RX ORDER — MORPHINE SULFATE 30 MG/1
30 TABLET, FILM COATED, EXTENDED RELEASE ORAL EVERY 8 HOURS SCHEDULED
Status: DISCONTINUED | OUTPATIENT
Start: 2019-07-05 | End: 2019-07-08 | Stop reason: HOSPADM

## 2019-07-05 RX ORDER — HALOPERIDOL 2 MG/ML
0.5 SOLUTION ORAL
Status: DISCONTINUED | OUTPATIENT
Start: 2019-07-05 | End: 2019-07-08 | Stop reason: HOSPADM

## 2019-07-05 RX ORDER — OXYCODONE HYDROCHLORIDE 10 MG/1
30 TABLET ORAL
Status: DISCONTINUED | OUTPATIENT
Start: 2019-07-05 | End: 2019-07-08 | Stop reason: HOSPADM

## 2019-07-05 RX ORDER — HYDROMORPHONE HCL/PF 1 MG/ML
1 SYRINGE (ML) INJECTION ONCE
Status: COMPLETED | OUTPATIENT
Start: 2019-07-05 | End: 2019-07-05

## 2019-07-05 RX ORDER — DEXAMETHASONE 2 MG/1
2 TABLET ORAL
Status: DISCONTINUED | OUTPATIENT
Start: 2019-07-05 | End: 2019-07-08 | Stop reason: HOSPADM

## 2019-07-05 RX ORDER — METHOCARBAMOL 500 MG/1
500 TABLET, FILM COATED ORAL 3 TIMES DAILY PRN
Status: DISCONTINUED | OUTPATIENT
Start: 2019-07-05 | End: 2019-07-08 | Stop reason: HOSPADM

## 2019-07-05 RX ORDER — HALOPERIDOL 1 MG/1
0.5 TABLET ORAL
Status: DISCONTINUED | OUTPATIENT
Start: 2019-07-05 | End: 2019-07-05

## 2019-07-05 RX ADMIN — BUDESONIDE AND FORMOTEROL FUMARATE DIHYDRATE 2 PUFF: 160; 4.5 AEROSOL RESPIRATORY (INHALATION) at 19:00

## 2019-07-05 RX ADMIN — DOCUSATE SODIUM 100 MG: 100 CAPSULE, LIQUID FILLED ORAL at 08:51

## 2019-07-05 RX ADMIN — INSULIN LISPRO 10 UNITS: 100 INJECTION, SOLUTION INTRAVENOUS; SUBCUTANEOUS at 13:31

## 2019-07-05 RX ADMIN — ACETAMINOPHEN 975 MG: 325 TABLET, FILM COATED ORAL at 05:51

## 2019-07-05 RX ADMIN — OXYCODONE HYDROCHLORIDE 30 MG: 10 TABLET ORAL at 16:51

## 2019-07-05 RX ADMIN — INSULIN LISPRO 6 UNITS: 100 INJECTION, SOLUTION INTRAVENOUS; SUBCUTANEOUS at 16:39

## 2019-07-05 RX ADMIN — BUPROPION HYDROCHLORIDE 150 MG: 150 TABLET, FILM COATED, EXTENDED RELEASE ORAL at 08:51

## 2019-07-05 RX ADMIN — APIXABAN 10 MG: 5 TABLET, FILM COATED ORAL at 19:00

## 2019-07-05 RX ADMIN — HYDROMORPHONE HYDROCHLORIDE 1.5 MG: 2 INJECTION, SOLUTION INTRAMUSCULAR; INTRAVENOUS; SUBCUTANEOUS at 08:50

## 2019-07-05 RX ADMIN — GABAPENTIN 100 MG: 100 CAPSULE ORAL at 16:38

## 2019-07-05 RX ADMIN — MORPHINE SULFATE 30 MG: 30 TABLET, FILM COATED, EXTENDED RELEASE ORAL at 14:04

## 2019-07-05 RX ADMIN — GABAPENTIN 100 MG: 100 CAPSULE ORAL at 08:51

## 2019-07-05 RX ADMIN — SENNOSIDES 8.6 MG: 8.6 TABLET, FILM COATED ORAL at 08:51

## 2019-07-05 RX ADMIN — INSULIN LISPRO 10 UNITS: 100 INJECTION, SOLUTION INTRAVENOUS; SUBCUTANEOUS at 16:39

## 2019-07-05 RX ADMIN — NICOTINE 1 PATCH: 14 PATCH TRANSDERMAL at 09:09

## 2019-07-05 RX ADMIN — VITAMIN D, TAB 1000IU (100/BT) 1000 UNITS: 25 TAB at 08:51

## 2019-07-05 RX ADMIN — INSULIN LISPRO 4 UNITS: 100 INJECTION, SOLUTION INTRAVENOUS; SUBCUTANEOUS at 08:52

## 2019-07-05 RX ADMIN — Medication 0.5 MG: at 21:28

## 2019-07-05 RX ADMIN — BUDESONIDE AND FORMOTEROL FUMARATE DIHYDRATE 2 PUFF: 160; 4.5 AEROSOL RESPIRATORY (INHALATION) at 08:50

## 2019-07-05 RX ADMIN — MORPHINE SULFATE 45 MG: 30 TABLET, FILM COATED, EXTENDED RELEASE ORAL at 05:51

## 2019-07-05 RX ADMIN — APIXABAN 10 MG: 5 TABLET, FILM COATED ORAL at 08:51

## 2019-07-05 RX ADMIN — DEXAMETHASONE 2 MG: 2 TABLET ORAL at 15:49

## 2019-07-05 RX ADMIN — Medication 0.5 MG: at 16:40

## 2019-07-05 RX ADMIN — HYDROMORPHONE HYDROCHLORIDE 1.5 MG: 2 INJECTION, SOLUTION INTRAMUSCULAR; INTRAVENOUS; SUBCUTANEOUS at 13:22

## 2019-07-05 RX ADMIN — OXYCODONE HYDROCHLORIDE 30 MG: 10 TABLET ORAL at 11:40

## 2019-07-05 RX ADMIN — HYDROMORPHONE HYDROCHLORIDE 1 MG: 1 INJECTION, SOLUTION INTRAMUSCULAR; INTRAVENOUS; SUBCUTANEOUS at 15:49

## 2019-07-05 RX ADMIN — OXYCODONE HYDROCHLORIDE 30 MG: 10 TABLET ORAL at 07:23

## 2019-07-05 RX ADMIN — METHOCARBAMOL TABLETS 750 MG: 750 TABLET, COATED ORAL at 08:51

## 2019-07-05 RX ADMIN — PANTOPRAZOLE SODIUM 40 MG: 40 TABLET, DELAYED RELEASE ORAL at 05:52

## 2019-07-05 RX ADMIN — INSULIN LISPRO 6 UNITS: 100 INJECTION, SOLUTION INTRAVENOUS; SUBCUTANEOUS at 13:30

## 2019-07-05 RX ADMIN — HYDROMORPHONE HYDROCHLORIDE 1.5 MG: 2 INJECTION, SOLUTION INTRAMUSCULAR; INTRAVENOUS; SUBCUTANEOUS at 02:25

## 2019-07-05 RX ADMIN — ACETAMINOPHEN 975 MG: 325 TABLET, FILM COATED ORAL at 21:13

## 2019-07-05 RX ADMIN — METHOCARBAMOL TABLETS 500 MG: 500 TABLET, COATED ORAL at 21:13

## 2019-07-05 RX ADMIN — LIDOCAINE 1 PATCH: 50 PATCH TOPICAL at 08:51

## 2019-07-05 RX ADMIN — AZITHROMYCIN 500 MG: 250 TABLET, FILM COATED ORAL at 16:46

## 2019-07-05 RX ADMIN — MORPHINE SULFATE 30 MG: 30 TABLET, FILM COATED, EXTENDED RELEASE ORAL at 21:13

## 2019-07-05 RX ADMIN — GABAPENTIN 100 MG: 100 CAPSULE ORAL at 21:13

## 2019-07-05 RX ADMIN — ACETAMINOPHEN 975 MG: 325 TABLET, FILM COATED ORAL at 14:04

## 2019-07-05 NOTE — ASSESSMENT & PLAN NOTE
Looks to be metastatic disease to the spine  Oncology consultation appreciated  PSA is normal however imaging favors prostate  CEA 2180  Patient had IR guided liver biopsy on 07/03  Awaiting pathology report  Overall prognosis guarded  Oncology following  It has been difficult to have discussion regarding long-term goal of care due to pain and emotional suffering

## 2019-07-05 NOTE — ASSESSMENT & PLAN NOTE
Acute pulmonary embolism as noted on CTA, suspected due to underlying metastatic disease  Patient was on heparin drip  Patient had IR liver biopsy yesterday 07/03  Continue Eliquis 10 mg b i d  For 7 days through 07/10/19  Thereafter continue Eliquis 5 mg b i d     The price was already discussed with case management  Patient is okay with it  About 47 dollars a month

## 2019-07-05 NOTE — ASSESSMENT & PLAN NOTE
Currently patient is on MS Contin 30 mg q 12, oxycodone 20 mg Q 4, Dilaudid 1 mg IV breakthrough, topical lidocaine, Neurontin 100 mg t i d , Tylenol 975 mg ATC per palliative Care recommendations  Patient was seen resting comfortably  Does not wake up with verbal stimuli  Wakes up with sternal rub  Starts complaining of uncontrolled pain after waking up  Patient is already on  pain medications per palliative Care recommendations and will not increase further at this time  Will discussed with palliative care team discussed long-term care goals  It has been difficult to have discussion regarding long-term goal of care due to pain and emotional suffering  Follow-up with palliative care

## 2019-07-05 NOTE — SOCIAL WORK
Pt had told SLIM-Dr Ching Avitia that he was interested in in-patient hospice  CM placed referral to [facility]  Dr Wagner Gamino felt he met criteria due to the need for IV pain meds to control his pain  CM spoke to [place] to see when a liaison could speak to pt regarding his options  CM then spoke to pt and mother Marva Burton at bedside  Pt is undecided whether he wants to go to [facility] for STR or hospice house  He appears to be leaning more toward STR  SLIM notified  IMM reviewed and signed by pt  Copy to pt and copy to MR for scanning

## 2019-07-05 NOTE — PROGRESS NOTES
Progress Note - Pauline Dalal 1955, 60 y o  male MRN: [id number]    Unit/Bed#: -01 Encounter: [id number]    Primary Care Provider: Daniel Delong MD   Date and time admitted to hospital: 6/28/2019  3:14 PM        * Acute pulmonary embolism with acute cor pulmonale [facility])  Assessment & Plan  Acute pulmonary embolism as noted on CTA, suspected due to underlying metastatic disease  Patient was on heparin drip  Patient had IR liver biopsy yesterday 07/03  Continue Eliquis 10 mg b i d  For 7 days through 07/10/19  Thereafter continue Eliquis 5 mg b i d     The price was already discussed with case management  Patient is okay with it  About 47 dollars a month  Right wrist drop  Assessment & Plan  Neurology evaluated the patient and suspected symptoms likely due to compression induced right radial nerve palsy  Recommended PT/OT  Splinting   if does not improve in 1 month outpatient EMG  Spine metastasis [facility])  Assessment & Plan  Looks to be metastatic disease to the spine  Oncology consultation appreciated  PSA is normal however imaging favors prostate  CEA 2180  Patient had IR guided liver biopsy on 07/03  Awaiting pathology report  Overall prognosis guarded  Oncology following  It has been difficult to have discussion regarding long-term goal of care due to pain and emotional suffering  Respiratory insufficiency  Assessment & Plan  Encourage good pulmonary hygiene  Respiratory protocol, incentive spirometry  Secondary to pulmonary embolism  Echo noted with EF of 55%  Unclear if patient is following up with use of incentive spirometry as recommended  Chronic prescription opiate use  Assessment & Plan  Currently patient is on MS Contin 30 mg q 12, oxycodone 20 mg Q 4, Dilaudid 1 mg IV breakthrough, topical lidocaine, Neurontin 100 mg t i d , Tylenol 975 mg ATC per palliative Care recommendations  Patient was seen resting comfortably    Does not wake up with verbal stimuli  Wakes up with sternal rub  Starts complaining of uncontrolled pain after waking up  Patient is already on  pain medications per palliative Care recommendations and will not increase further at this time  Will discussed with palliative care team discussed long-term care goals  It has been difficult to have discussion regarding long-term goal of care due to pain and emotional suffering  Follow-up with palliative care  Type 2 diabetes mellitus with foot ulcer, with long-term current use of insulin [facility])  Assessment & Plan  Lab Results   Component Value Date    HGBA1C 7 9 (H) 06/28/2019       Recent Labs     07/04/19  1054 07/04/19  1534 07/04/19  2034 07/05/19  0552   POCGLU 346* 307* 248* 235*       Blood Sugar Average: Last 72 hrs:  (P) 264 [id number]     Uncontrolled likely due to noncompliance  continue long-acting insulin  Patient has intermittently refused insulin  I have counseled importance of sugar control  Meal time coverage was decreased due to hypoglycemic episode earlier  Continue sliding scale coverage      Morbid obesity (HCC)  Assessment & Plan  Encourage lifestyle changes  However the patient states he did have some weight loss  Consult Nutrition    Tobacco abuse  Assessment & Plan  Encourage smoking cessation  Nicotine replacement      VTE Pharmacologic Prophylaxis:   Pharmacologic: Apixaban (Eliquis)    Patient Centered Rounds: I have performed bedside rounds with nursing staff today  Education and Discussions with Family / Patient:  Patient  Patient does not want me to call his wife since in the process of   Mother present at bedside and I answered all questions appropriately  Time Spent for Care: 20 minutes  More than 50% of total time spent on counseling and coordination of care as described above      Current Length of Stay: 7 day(s)    Current Patient Status: Inpatient   Certification Statement: The patient will continue to require additional inpatient hospital stay due to Uncontrolled pain, safe discharge planning in progress    Discharge Plan:  tbd    Code Status: Level 1 - Full Code      Subjective:   Currently stable at his baseline  Still complaining of excruciating pain that is not being controlled on current regimen  Denies new complaints  At has been difficult to have a discussion regarding goals of care  Objective:     Vitals:   Temp (24hrs), Av 2 °F (36 8 °C), Min:97 7 °F (36 5 °C), Max:98 6 °F (37 °C)    Temp:  [97 7 °F (36 5 °C)-98 6 °F (37 °C)] 98 6 °F (37 °C)  HR:  [84-89] 88  Resp:  [18-20] 20  BP: (117-155)/(56-96) 117/56  SpO2:  [88 %-97 %] 93 %  Body mass index is 37 55 kg/m²  Input and Output Summary (last 24 hours): Intake/Output Summary (Last 24 hours) at 2019 1458  Last data filed at 2019 1300  Gross per 24 hour   Intake 360 ml   Output 1100 ml   Net -740 ml       Physical Exam:     Physical Exam   Constitutional: He is oriented to person, place, and time  HENT:   Head: Normocephalic and atraumatic  Eyes: Pupils are equal, round, and reactive to light  EOM are normal    Neck: Normal range of motion  Neck supple  Cardiovascular: Normal rate and regular rhythm  Pulmonary/Chest: Effort normal and breath sounds normal  No stridor  No respiratory distress  He has no wheezes  Abdominal: Soft  Bowel sounds are normal  He exhibits no distension  There is no tenderness  There is no guarding  Musculoskeletal: Normal range of motion  Neurological: He is alert and oriented to person, place, and time  Skin:   Bilateral lower extremity with chronic vascular changes noted         Additional Data:     Labs:    Results from last 7 days   Lab Units 19  0757   WBC Thousand/uL 14 16*   HEMOGLOBIN g/dL 11 8*   HEMATOCRIT % 37 3   PLATELETS Thousands/uL 297   NEUTROS PCT % 73   LYMPHS PCT % 14   MONOS PCT % 8   EOS PCT % 2     Results from last 7 days   Lab Units 19  0757   SODIUM mmol/L 134* POTASSIUM mmol/L 4 7   CHLORIDE mmol/L 99*   CO2 mmol/L 28   BUN mg/dL 10   CREATININE mg/dL 0 81   ANION GAP mmol/L 7   CALCIUM mg/dL 8 6   ALBUMIN g/dL 2 0*   TOTAL BILIRUBIN mg/dL 1 00   ALK PHOS U/L 781*   ALT U/L 28   AST U/L 38   GLUCOSE RANDOM mg/dL 326*     Results from last 7 days   Lab Units 06/28/19  1645   INR  1 80*     Results from last 7 days   Lab Units 07/05/19  1102 07/05/19  0552 07/04/19  2034 07/04/19  1534 07/04/19  1054 07/04/19  0625 07/03/19  2256 07/03/19  2039 07/03/19  1921 07/03/19  1528 07/03/19  1203 07/03/19  0612   POC GLUCOSE mg/dl 266* 235* 248* 307* 346* 314* 173* 231* 320* 306* 267* 250*     Results from last 7 days   Lab Units 06/28/19  1645   HEMOGLOBIN A1C % 7 9*               * I Have Reviewed All Lab Data Listed Above  * Additional Pertinent Lab Tests Reviewed: No New Labs Available For Today      Recent Cultures (last 7 days):     Results from last 7 days   Lab Units 06/28/19  1645   BLOOD CULTURE  No Growth After 5 Days  No Growth After 5 Days         Last 24 Hours Medication List:     Current Facility-Administered Medications:  acetaminophen 975 mg Oral Q8H [facility] Job Flor MD   albuterol 2 puff Inhalation Q4H PRN Marlo Chu MD   apixaban 10 mg Oral BID Max CAN MD   azithromycin 500 mg Oral Q24H Carletha Camper, CRNP   budesonide-formoterol 2 puff Inhalation BID Carletha Camper, CRNP   buPROPion 150 mg Oral Daily Carletha Camper, CRNP   cholecalciferol 1,000 Units Oral Daily Carletha Camper, CRNP   dexamethasone 2 mg Oral BID (AM & Afternoon) Justo Aggarwal MD   gabapentin 100 mg Oral TID Dank Mane MD   haloperidol 0 5 mg Oral 4x Daily (AC & HS) Justo Aggarwal MD   HYDROmorphone 1 mg Intravenous Once Justo Aggarwal MD   HYDROmorphone 1 5 mg Intravenous Q4H PRN Glynn Balo, MD   insulin glargine 75 Units Subcutaneous HS Job Mccoy MD   insulin lispro 1-5 Units Subcutaneous HS KATIE Haro   insulin lispro 10 Units Subcutaneous TID With Meals Daniel Hernandez MD   insulin lispro 2-12 Units Subcutaneous TID AC KATIE Perez   lidocaine 1 patch Topical Daily Daniel Hernandez MD   methocarbamol 500 mg Oral TID PRN Sheridan Araujo MD   morphine 30 mg Oral Critical access hospital Sheridan Araujo MD   nicotine 1 patch Transdermal Daily KATIE Perez   oxyCODONE 30 mg Oral Q3H PRN Sheridan Araujo MD   pantoprazole 40 mg Oral Early Morning KATIE Perez   senna 1 tablet Oral Daily KATIE Perez        Today, Patient Was Seen By: Alexandra Brasher MD    ** Please Note: Dictation voice to text software may have been used in the creation of this document   **

## 2019-07-05 NOTE — PLAN OF CARE
Problem: DISCHARGE PLANNING - CARE MANAGEMENT  Goal: Discharge to post-acute care or home with appropriate resources  Description  INTERVENTIONS:  - Conduct assessment to determine patient/family and health care team treatment goals, and need for post-acute services based on payer coverage, community resources, and patient preferences, and barriers to discharge  - Address psychosocial, clinical, and financial barriers to discharge as identified in assessment in conjunction with the patient/family and health care team  - Arrange appropriate level of post-acute services according to patient's   needs and preference and payer coverage in collaboration with the physician and health care team  - Communicate with and update the patient/family, physician, and health care team regarding progress on the discharge plan  - Arrange appropriate transportation to post-acute venues   Outcome: Progressing  Note:   Pt had told KRISHNA-Dr Brian Finley that he was interested in in-patient hospice  CM placed referral to [facility] Dr Geofm Lords felt he met criteria due to the need for IV pain meds to control his pain  CM spoke to [place] to see when a liaison could speak to pt regarding his options  CM then spoke to pt and mother Nafisa Zuniga at bedside  Pt is undecided whether he wants to go to [facility] for STR or hospice house  He appears to be leaning more toward STR  SLIM notified  IMM reviewed and signed by pt  Copy to pt and copy to MR for scanning

## 2019-07-05 NOTE — ASSESSMENT & PLAN NOTE
Lab Results   Component Value Date    HGBA1C 7 9 (H) 06/28/2019       Recent Labs     07/04/19  1054 07/04/19  1534 07/04/19 2034 07/05/19  0552   POCGLU 346* 307* 248* 235*       Blood Sugar Average: Last 72 hrs:  (P) 840 [id number]     Uncontrolled likely due to noncompliance  continue long-acting insulin  Patient has intermittently refused insulin  I have counseled importance of sugar control  Meal time coverage was decreased due to hypoglycemic episode earlier    Continue sliding scale coverage

## 2019-07-06 LAB
GLUCOSE SERPL-MCNC: 226 MG/DL (ref 65–140)
GLUCOSE SERPL-MCNC: 303 MG/DL (ref 65–140)
GLUCOSE SERPL-MCNC: 333 MG/DL (ref 65–140)
GLUCOSE SERPL-MCNC: 356 MG/DL (ref 65–140)

## 2019-07-06 PROCEDURE — 82948 REAGENT STRIP/BLOOD GLUCOSE: CPT

## 2019-07-06 PROCEDURE — 99232 SBSQ HOSP IP/OBS MODERATE 35: CPT | Performed by: STUDENT IN AN ORGANIZED HEALTH CARE EDUCATION/TRAINING PROGRAM

## 2019-07-06 RX ORDER — INSULIN GLARGINE 100 [IU]/ML
60 INJECTION, SOLUTION SUBCUTANEOUS
Status: DISCONTINUED | OUTPATIENT
Start: 2019-07-07 | End: 2019-07-08 | Stop reason: HOSPADM

## 2019-07-06 RX ADMIN — INSULIN LISPRO 8 UNITS: 100 INJECTION, SOLUTION INTRAVENOUS; SUBCUTANEOUS at 16:30

## 2019-07-06 RX ADMIN — OXYCODONE HYDROCHLORIDE 30 MG: 10 TABLET ORAL at 06:47

## 2019-07-06 RX ADMIN — GABAPENTIN 100 MG: 100 CAPSULE ORAL at 08:44

## 2019-07-06 RX ADMIN — ACETAMINOPHEN 975 MG: 325 TABLET, FILM COATED ORAL at 21:55

## 2019-07-06 RX ADMIN — HYDROMORPHONE HYDROCHLORIDE 1.5 MG: 2 INJECTION, SOLUTION INTRAMUSCULAR; INTRAVENOUS; SUBCUTANEOUS at 00:36

## 2019-07-06 RX ADMIN — VITAMIN D, TAB 1000IU (100/BT) 1000 UNITS: 25 TAB at 08:44

## 2019-07-06 RX ADMIN — Medication 0.5 MG: at 11:59

## 2019-07-06 RX ADMIN — Medication 0.5 MG: at 17:00

## 2019-07-06 RX ADMIN — MORPHINE SULFATE 30 MG: 30 TABLET, FILM COATED, EXTENDED RELEASE ORAL at 06:17

## 2019-07-06 RX ADMIN — MORPHINE SULFATE 30 MG: 30 TABLET, FILM COATED, EXTENDED RELEASE ORAL at 21:55

## 2019-07-06 RX ADMIN — PANTOPRAZOLE SODIUM 40 MG: 40 TABLET, DELAYED RELEASE ORAL at 06:17

## 2019-07-06 RX ADMIN — INSULIN LISPRO 4 UNITS: 100 INJECTION, SOLUTION INTRAVENOUS; SUBCUTANEOUS at 12:00

## 2019-07-06 RX ADMIN — DEXAMETHASONE 2 MG: 2 TABLET ORAL at 08:45

## 2019-07-06 RX ADMIN — APIXABAN 10 MG: 5 TABLET, FILM COATED ORAL at 17:34

## 2019-07-06 RX ADMIN — HYDROMORPHONE HYDROCHLORIDE 1.5 MG: 2 INJECTION, SOLUTION INTRAMUSCULAR; INTRAVENOUS; SUBCUTANEOUS at 20:02

## 2019-07-06 RX ADMIN — INSULIN LISPRO 10 UNITS: 100 INJECTION, SOLUTION INTRAVENOUS; SUBCUTANEOUS at 08:45

## 2019-07-06 RX ADMIN — OXYCODONE HYDROCHLORIDE 30 MG: 10 TABLET ORAL at 12:49

## 2019-07-06 RX ADMIN — GABAPENTIN 100 MG: 100 CAPSULE ORAL at 17:00

## 2019-07-06 RX ADMIN — LIDOCAINE 1 PATCH: 50 PATCH TOPICAL at 08:44

## 2019-07-06 RX ADMIN — MORPHINE SULFATE 30 MG: 30 TABLET, FILM COATED, EXTENDED RELEASE ORAL at 14:08

## 2019-07-06 RX ADMIN — METHOCARBAMOL TABLETS 500 MG: 500 TABLET, COATED ORAL at 11:59

## 2019-07-06 RX ADMIN — Medication 0.5 MG: at 21:55

## 2019-07-06 RX ADMIN — INSULIN LISPRO 10 UNITS: 100 INJECTION, SOLUTION INTRAVENOUS; SUBCUTANEOUS at 06:53

## 2019-07-06 RX ADMIN — Medication 0.5 MG: at 06:17

## 2019-07-06 RX ADMIN — HYDROMORPHONE HYDROCHLORIDE 1.5 MG: 2 INJECTION, SOLUTION INTRAMUSCULAR; INTRAVENOUS; SUBCUTANEOUS at 04:18

## 2019-07-06 RX ADMIN — INSULIN LISPRO 10 UNITS: 100 INJECTION, SOLUTION INTRAVENOUS; SUBCUTANEOUS at 12:00

## 2019-07-06 RX ADMIN — INSULIN LISPRO 4 UNITS: 100 INJECTION, SOLUTION INTRAVENOUS; SUBCUTANEOUS at 23:11

## 2019-07-06 RX ADMIN — HYDROMORPHONE HYDROCHLORIDE 1.5 MG: 2 INJECTION, SOLUTION INTRAMUSCULAR; INTRAVENOUS; SUBCUTANEOUS at 15:37

## 2019-07-06 RX ADMIN — ALBUTEROL SULFATE 2 PUFF: 90 AEROSOL, METERED RESPIRATORY (INHALATION) at 20:00

## 2019-07-06 RX ADMIN — APIXABAN 10 MG: 5 TABLET, FILM COATED ORAL at 08:44

## 2019-07-06 RX ADMIN — BUPROPION HYDROCHLORIDE 150 MG: 150 TABLET, FILM COATED, EXTENDED RELEASE ORAL at 08:44

## 2019-07-06 RX ADMIN — ACETAMINOPHEN 975 MG: 325 TABLET, FILM COATED ORAL at 06:17

## 2019-07-06 RX ADMIN — DEXAMETHASONE 2 MG: 2 TABLET ORAL at 14:08

## 2019-07-06 RX ADMIN — ACETAMINOPHEN 975 MG: 325 TABLET, FILM COATED ORAL at 14:08

## 2019-07-06 RX ADMIN — OXYCODONE HYDROCHLORIDE 30 MG: 10 TABLET ORAL at 00:08

## 2019-07-06 RX ADMIN — OXYCODONE HYDROCHLORIDE 30 MG: 10 TABLET ORAL at 17:34

## 2019-07-06 RX ADMIN — NICOTINE 1 PATCH: 14 PATCH TRANSDERMAL at 08:47

## 2019-07-06 RX ADMIN — SENNOSIDES 8.6 MG: 8.6 TABLET, FILM COATED ORAL at 08:44

## 2019-07-06 RX ADMIN — INSULIN LISPRO 10 UNITS: 100 INJECTION, SOLUTION INTRAVENOUS; SUBCUTANEOUS at 17:30

## 2019-07-06 RX ADMIN — BUDESONIDE AND FORMOTEROL FUMARATE DIHYDRATE 2 PUFF: 160; 4.5 AEROSOL RESPIRATORY (INHALATION) at 20:00

## 2019-07-06 RX ADMIN — HYDROMORPHONE HYDROCHLORIDE 1.5 MG: 2 INJECTION, SOLUTION INTRAMUSCULAR; INTRAVENOUS; SUBCUTANEOUS at 08:46

## 2019-07-06 RX ADMIN — GABAPENTIN 100 MG: 100 CAPSULE ORAL at 21:55

## 2019-07-06 RX ADMIN — BUDESONIDE AND FORMOTEROL FUMARATE DIHYDRATE 2 PUFF: 160; 4.5 AEROSOL RESPIRATORY (INHALATION) at 08:45

## 2019-07-06 RX ADMIN — OXYCODONE HYDROCHLORIDE 30 MG: 10 TABLET ORAL at 23:07

## 2019-07-06 NOTE — ASSESSMENT & PLAN NOTE
Currently patient is on MS Contin 30 mg q 8hr, oxycodone 30 mg Q3 prn, Dilaudid 1 5 mg IV q4hrs breakthrough, topical lidocaine, Neurontin 100 mg t i d , Tylenol 975 mg ATC per palliative Care recommendations  Started on Decadron 2 mg b i d , Haldol 0 5 mg 4 times a day per palliative Care  Today patient reports feeling better after adding Decadron and Haldol  Appears more comfortable, awake and alert and able to participate in a conversation  Currently patient reports feeling better and willing to go to short-term rehab while waiting for pathology report and follow up with Oncology outpatient  CM to work on [facility] placement

## 2019-07-06 NOTE — HOSPICE NOTE
Referral received from case management for evaluation for hospice IPU  Liaison found pt lying in bed  Mother is at bedside  Patient is aao x4  He reports being in excrutiating pain rating his pain as 15 10  Nurse in to administer his scheduled doses of pain meds  Mother reports there are still  many unanswered questions and pt states he is still waiting for biopsy results  Mother questions "why does it take so long to obtain biopsy results?" Patient appears to become anxious when talking about all the unknowns this hospitalization has brought  Patient states his pain is not well controlled and he would like to get better control of this  Pt comments "I was taking much more pain medication at home then what I'm getting here (in the hospital)  Now they've reduced my pain meds and I'm always in pain and no one is listening to me"  Patient DOES NOT meet hospice criteria for the hospice house [facility])  Patient and mother at this point are not interested in receiving information re hospice insurance benefit as their goal is for patient to receive better pain management and learn about biopsy results and possible cancer treatments  Hospice will not follow this case  Please consider re-consulting after results received and reviewed with patient and family and if patient is not a candidate for for oncological treatments      Gloria Frankel, RN BSN  [phone]

## 2019-07-06 NOTE — ASSESSMENT & PLAN NOTE
Lab Results   Component Value Date    HGBA1C 7 9 (H) 06/28/2019       Recent Labs     07/05/19  1102 07/05/19  1537 07/05/19  2034 07/06/19  0609   POCGLU 266* 268* 184* 356*       Blood Sugar Average: Last 72 hrs:  (P) 271 4     Uncontrolled likely due to noncompliance  continue long-acting insulin:  Patient is still intermittently refusing long-acting insulin  I have counseled importance of sugar control  Meal time coverage was decreased due to hypoglycemic episode earlier    Continue sliding scale coverage

## 2019-07-06 NOTE — ASSESSMENT & PLAN NOTE
Looks to be metastatic disease to the spine  Oncology consultation appreciated  PSA is normal however imaging favors prostate  CEA 2180  Patient had IR guided liver biopsy on 07/03  Awaiting pathology report  Overall prognosis guarded  Oncology following  It has been difficult to have discussion regarding long-term goal of care due to pain and emotional suffering and without confirm path report  Currently patient appears better after adding Decadron and Haldol and requesting to go to short-term rehab

## 2019-07-06 NOTE — PROGRESS NOTES
Progress Note - Rosalino Jesus 1955, 60 y o  male MRN: [id number]    Unit/Bed#: -01 Encounter: [id number]    Primary Care Provider: Angely Garcia MD   Date and time admitted to hospital: 6/28/2019  3:14 PM        * Acute pulmonary embolism with acute cor pulmonale [facility])  Assessment & Plan  Acute pulmonary embolism as noted on CTA, suspected due to underlying metastatic disease  Patient was on heparin drip  Patient had IR liver biopsy yesterday 07/03  Continue Eliquis 10 mg b i d  For 7 days through 07/10/19  Thereafter continue Eliquis 5 mg b i d     The price was already discussed with case management  Patient is okay with it  About 47 dollars a month  Right wrist drop  Assessment & Plan  Neurology evaluated the patient and suspected symptoms likely due to compression induced right radial nerve palsy  Recommended PT/OT  Splinting   if does not improve in 1 month outpatient EMG  Spine metastasis [facility])  Assessment & Plan  Looks to be metastatic disease to the spine  Oncology consultation appreciated  PSA is normal however imaging favors prostate  CEA 2180  Patient had IR guided liver biopsy on 07/03  Awaiting pathology report  Overall prognosis guarded  Oncology following  It has been difficult to have discussion regarding long-term goal of care due to pain and emotional suffering and without confirm path report  Currently patient appears better after adding Decadron and Haldol and requesting to go to short-term rehab  Respiratory insufficiency  Assessment & Plan  Encourage good pulmonary hygiene  Respiratory protocol, incentive spirometry  Secondary to pulmonary embolism  Echo noted with EF of 55%  Unclear if patient is following up with use of incentive spirometry as recommended      Chronic prescription opiate use  Assessment & Plan  Currently patient is on MS Contin 30 mg q 8hr, oxycodone 30 mg Q3 prn, Dilaudid 1 5 mg IV q4hrs breakthrough, topical lidocaine, Neurontin 100 mg t i d , Tylenol 975 mg ATC per palliative Care recommendations  Started on Decadron 2 mg b i d , Haldol 0 5 mg 4 times a day per palliative Care  Today patient reports feeling better after adding Decadron and Haldol  Appears more comfortable, awake and alert and able to participate in a conversation  Currently patient reports feeling better and willing to go to short-term rehab while waiting for pathology report and follow up with Oncology outpatient  CM to work on [facility] placement  Type 2 diabetes mellitus with foot ulcer, with long-term current use of insulin [facility])  Assessment & Plan  Lab Results   Component Value Date    HGBA1C 7 9 (H) 06/28/2019       Recent Labs     07/05/19  1102 07/05/19  1537 07/05/19  2034 07/06/19  0609   POCGLU 266* 268* 184* 356*       Blood Sugar Average: Last 72 hrs:  (P) 271 4     Uncontrolled likely due to noncompliance  continue long-acting insulin:  Patient is still intermittently refusing long-acting insulin  I have counseled importance of sugar control  Meal time coverage was decreased due to hypoglycemic episode earlier  Continue sliding scale coverage      Morbid obesity (HCC)  Assessment & Plan  Encourage lifestyle changes  However the patient states he did have some weight loss  Consult Nutrition    Tobacco abuse  Assessment & Plan  Encourage smoking cessation  Nicotine replacement        VTE Pharmacologic Prophylaxis:   Pharmacologic: Apixaban (Eliquis)    Patient Centered Rounds: I have performed bedside rounds with nursing staff today  Discussions with Specialists or Other Care Team Provider:  Palliative care recommendation noted    Education and Discussions with Family / Patient:  Patient, mother was present at bedside yesterday  Time Spent for Care: 20 minutes  More than 50% of total time spent on counseling and coordination of care as described above      Current Length of Stay: 8 day(s)    Current Patient Status: Inpatient Certification Statement: The patient will continue to require additional inpatient hospital stay due to Short-term rehab placement  Discharge Plan:  Short-term rehab placement    Code Status: Level 1 - Full Code      Subjective: Today patient reports feeling better  Patient appears comfortable, more awake  Patient reports feeling better after adding Decadron and hold all to his regimen  Requesting to go to short-term rehab  Denies any other new complaints  Objective:     Vitals:   Temp (24hrs), Av 1 °F (36 7 °C), Min:98 °F (36 7 °C), Max:98 3 °F (36 8 °C)    Temp:  [98 °F (36 7 °C)-98 3 °F (36 8 °C)] 98 °F (36 7 °C)  HR:  [65-74] 65  Resp:  [20] 20  BP: (128-134)/(59-78) 130/78  SpO2:  [94 %-98 %] 94 %  Body mass index is 37 31 kg/m²  Input and Output Summary (last 24 hours): Intake/Output Summary (Last 24 hours) at 2019 1008  Last data filed at 2019 0134  Gross per 24 hour   Intake 240 ml   Output 1300 ml   Net -1060 ml       Physical Exam:     Physical Exam   Constitutional: He is oriented to person, place, and time  Obese patient not in acute distress, chronically ill-appearing, nontoxic appearing   HENT:   Head: Normocephalic and atraumatic  Eyes: Pupils are equal, round, and reactive to light  EOM are normal    Neck: Normal range of motion  Neck supple  Cardiovascular: Normal rate and regular rhythm  Pulmonary/Chest: Effort normal and breath sounds normal  No stridor  No respiratory distress  He has no wheezes  Abdominal: Soft  Bowel sounds are normal  He exhibits no distension  There is no tenderness  There is no guarding  Musculoskeletal: Normal range of motion  Neurological: He is alert and oriented to person, place, and time  Skin:   Bilateral lower extremity with chronic vascular changes noted         Additional Data:     Labs:    Results from last 7 days   Lab Units 19  0757   WBC Thousand/uL 14 16*   HEMOGLOBIN g/dL 11 8*   HEMATOCRIT % 37 3 PLATELETS Thousands/uL 297   NEUTROS PCT % 73   LYMPHS PCT % 14   MONOS PCT % 8   EOS PCT % 2     Results from last 7 days   Lab Units 07/04/19  0757   SODIUM mmol/L 134*   POTASSIUM mmol/L 4 7   CHLORIDE mmol/L 99*   CO2 mmol/L 28   BUN mg/dL 10   CREATININE mg/dL 0 81   ANION GAP mmol/L 7   CALCIUM mg/dL 8 6   ALBUMIN g/dL 2 0*   TOTAL BILIRUBIN mg/dL 1 00   ALK PHOS U/L 781*   ALT U/L 28   AST U/L 38   GLUCOSE RANDOM mg/dL 326*         Results from last 7 days   Lab Units 07/06/19  0609 07/05/19  2034 07/05/19  1537 07/05/19  1102 07/05/19  0552 07/04/19  2034 07/04/19  1534 07/04/19  1054 07/04/19  0625 07/03/19  2256 07/03/19  2039 07/03/19  1921   POC GLUCOSE mg/dl 356* 184* 268* 266* 235* 248* 307* 346* 314* 173* 231* 320*                   * I Have Reviewed All Lab Data Listed Above    * Additional Pertinent Lab Tests Reviewed: No New Labs Available For Today      Recent Cultures (last 7 days):           Last 24 Hours Medication List:     Current Facility-Administered Medications:  acetaminophen 975 mg Oral Q8H [facility] Job Mccoy MD   albuterol 2 puff Inhalation Q4H PRN Marlo Chu MD   apixaban 10 mg Oral BID Abram CAN MD   budesonide-formoterol 2 puff Inhalation BID KATIE Zaman   buPROPion 150 mg Oral Daily KATIE Zaman   cholecalciferol 1,000 Units Oral Daily KATIE Zaman   dexamethasone 2 mg Oral BID (AM & Afternoon) Rebecca Wallace MD   gabapentin 100 mg Oral TID Kendra Cortez MD   haloperidol 0 5 mg Oral 4x Daily (AC & HS) Rebecca Wallace MD   HYDROmorphone 1 5 mg Intravenous Q4H PRN Loren Westbrook MD   insulin glargine 75 Units Subcutaneous HS Kendra Cortez MD   insulin lispro 1-5 Units Subcutaneous HS KATIE Zaman   insulin lispro 10 Units Subcutaneous TID With Meals Kendra Cortez MD   insulin lispro 2-12 Units Subcutaneous TID AC KATIE Quintanilla   lidocaine 1 patch Topical Daily Kendra Cortez MD   methocarbamol 500 mg Oral TID PRN Esther Valle MD   morphine 30 mg Oral [facility] Esther Valle MD   nicotine 1 patch Transdermal Daily Abena GessKATIE   oxyCODONE 30 mg Oral Q3H PRN Esther Valle MD   pantoprazole 40 mg Oral Early Morning Abena GessKATIE   senna 1 tablet Oral Daily Abena Gess, KATIE        Today, Patient Was Seen By: Ai Andre MD    ** Please Note: Dictation voice to text software may have been used in the creation of this document   **

## 2019-07-07 LAB
GLUCOSE SERPL-MCNC: 236 MG/DL (ref 65–140)
GLUCOSE SERPL-MCNC: 366 MG/DL (ref 65–140)
GLUCOSE SERPL-MCNC: 368 MG/DL (ref 65–140)
GLUCOSE SERPL-MCNC: 420 MG/DL (ref 65–140)

## 2019-07-07 PROCEDURE — 99232 SBSQ HOSP IP/OBS MODERATE 35: CPT | Performed by: STUDENT IN AN ORGANIZED HEALTH CARE EDUCATION/TRAINING PROGRAM

## 2019-07-07 PROCEDURE — 82948 REAGENT STRIP/BLOOD GLUCOSE: CPT

## 2019-07-07 RX ORDER — INSULIN GLARGINE 100 [IU]/ML
60 INJECTION, SOLUTION SUBCUTANEOUS ONCE
Status: COMPLETED | OUTPATIENT
Start: 2019-07-07 | End: 2019-07-07

## 2019-07-07 RX ADMIN — Medication 0.5 MG: at 12:58

## 2019-07-07 RX ADMIN — MORPHINE SULFATE 30 MG: 30 TABLET, FILM COATED, EXTENDED RELEASE ORAL at 21:55

## 2019-07-07 RX ADMIN — SENNOSIDES 8.6 MG: 8.6 TABLET, FILM COATED ORAL at 08:26

## 2019-07-07 RX ADMIN — APIXABAN 10 MG: 5 TABLET, FILM COATED ORAL at 17:59

## 2019-07-07 RX ADMIN — APIXABAN 10 MG: 5 TABLET, FILM COATED ORAL at 08:26

## 2019-07-07 RX ADMIN — MORPHINE SULFATE 30 MG: 30 TABLET, FILM COATED, EXTENDED RELEASE ORAL at 07:24

## 2019-07-07 RX ADMIN — INSULIN GLARGINE 60 UNITS: 100 INJECTION, SOLUTION SUBCUTANEOUS at 00:31

## 2019-07-07 RX ADMIN — BUDESONIDE AND FORMOTEROL FUMARATE DIHYDRATE 2 PUFF: 160; 4.5 AEROSOL RESPIRATORY (INHALATION) at 18:00

## 2019-07-07 RX ADMIN — PANTOPRAZOLE SODIUM 40 MG: 40 TABLET, DELAYED RELEASE ORAL at 07:24

## 2019-07-07 RX ADMIN — OXYCODONE HYDROCHLORIDE 30 MG: 10 TABLET ORAL at 08:27

## 2019-07-07 RX ADMIN — INSULIN LISPRO 10 UNITS: 100 INJECTION, SOLUTION INTRAVENOUS; SUBCUTANEOUS at 18:00

## 2019-07-07 RX ADMIN — INSULIN LISPRO 10 UNITS: 100 INJECTION, SOLUTION INTRAVENOUS; SUBCUTANEOUS at 08:28

## 2019-07-07 RX ADMIN — INSULIN GLARGINE 60 UNITS: 100 INJECTION, SOLUTION SUBCUTANEOUS at 21:55

## 2019-07-07 RX ADMIN — HYDROMORPHONE HYDROCHLORIDE 1.5 MG: 2 INJECTION, SOLUTION INTRAMUSCULAR; INTRAVENOUS; SUBCUTANEOUS at 14:15

## 2019-07-07 RX ADMIN — GABAPENTIN 100 MG: 100 CAPSULE ORAL at 21:55

## 2019-07-07 RX ADMIN — BUPROPION HYDROCHLORIDE 150 MG: 150 TABLET, FILM COATED, EXTENDED RELEASE ORAL at 08:26

## 2019-07-07 RX ADMIN — VITAMIN D, TAB 1000IU (100/BT) 1000 UNITS: 25 TAB at 08:26

## 2019-07-07 RX ADMIN — INSULIN LISPRO 10 UNITS: 100 INJECTION, SOLUTION INTRAVENOUS; SUBCUTANEOUS at 12:58

## 2019-07-07 RX ADMIN — OXYCODONE HYDROCHLORIDE 30 MG: 10 TABLET ORAL at 12:19

## 2019-07-07 RX ADMIN — Medication 0.5 MG: at 07:25

## 2019-07-07 RX ADMIN — Medication 0.5 MG: at 21:56

## 2019-07-07 RX ADMIN — HYDROMORPHONE HYDROCHLORIDE 1.5 MG: 2 INJECTION, SOLUTION INTRAMUSCULAR; INTRAVENOUS; SUBCUTANEOUS at 04:21

## 2019-07-07 RX ADMIN — LIDOCAINE 1 PATCH: 50 PATCH TOPICAL at 08:27

## 2019-07-07 RX ADMIN — INSULIN LISPRO 5 UNITS: 100 INJECTION, SOLUTION INTRAVENOUS; SUBCUTANEOUS at 21:56

## 2019-07-07 RX ADMIN — GABAPENTIN 100 MG: 100 CAPSULE ORAL at 08:26

## 2019-07-07 RX ADMIN — HYDROMORPHONE HYDROCHLORIDE 1.5 MG: 2 INJECTION, SOLUTION INTRAMUSCULAR; INTRAVENOUS; SUBCUTANEOUS at 00:16

## 2019-07-07 RX ADMIN — BUDESONIDE AND FORMOTEROL FUMARATE DIHYDRATE 2 PUFF: 160; 4.5 AEROSOL RESPIRATORY (INHALATION) at 08:28

## 2019-07-07 RX ADMIN — OXYCODONE HYDROCHLORIDE 30 MG: 10 TABLET ORAL at 17:59

## 2019-07-07 RX ADMIN — HYDROMORPHONE HYDROCHLORIDE 1.5 MG: 2 INJECTION, SOLUTION INTRAMUSCULAR; INTRAVENOUS; SUBCUTANEOUS at 21:21

## 2019-07-07 RX ADMIN — GABAPENTIN 100 MG: 100 CAPSULE ORAL at 15:29

## 2019-07-07 RX ADMIN — ACETAMINOPHEN 975 MG: 325 TABLET, FILM COATED ORAL at 07:24

## 2019-07-07 RX ADMIN — INSULIN LISPRO 4 UNITS: 100 INJECTION, SOLUTION INTRAVENOUS; SUBCUTANEOUS at 12:58

## 2019-07-07 RX ADMIN — NICOTINE 1 PATCH: 14 PATCH TRANSDERMAL at 08:27

## 2019-07-07 RX ADMIN — ACETAMINOPHEN 975 MG: 325 TABLET, FILM COATED ORAL at 15:29

## 2019-07-07 RX ADMIN — DEXAMETHASONE 2 MG: 2 TABLET ORAL at 15:29

## 2019-07-07 RX ADMIN — Medication 0.5 MG: at 17:59

## 2019-07-07 RX ADMIN — HYDROMORPHONE HYDROCHLORIDE 1.5 MG: 2 INJECTION, SOLUTION INTRAMUSCULAR; INTRAVENOUS; SUBCUTANEOUS at 09:09

## 2019-07-07 RX ADMIN — ACETAMINOPHEN 975 MG: 325 TABLET, FILM COATED ORAL at 21:55

## 2019-07-07 RX ADMIN — DEXAMETHASONE 2 MG: 2 TABLET ORAL at 08:27

## 2019-07-07 RX ADMIN — MORPHINE SULFATE 30 MG: 30 TABLET, FILM COATED, EXTENDED RELEASE ORAL at 15:29

## 2019-07-07 NOTE — ASSESSMENT & PLAN NOTE
Currently patient is on MS Contin 30 mg q 8hr, oxycodone 30 mg Q3 prn, Dilaudid 1 5 mg IV q4hrs breakthrough, topical lidocaine, Neurontin 100 mg t i d , Tylenol 975 mg ATC per palliative Care recommendations  Started on Decadron 2 mg b i d , Haldol 0 5 mg 4 times a day per palliative Care  Today patient reports feeling better after adding Decadron and Haldol  Appears more comfortable, awake and alert and able to participate in a conversation  Currently patient reports feeling better and willing to go to short-term rehab while waiting for pathology report and follow up with Oncology outpatient  CM to work on [employer] placement

## 2019-07-07 NOTE — QUICK NOTE
Call from RN patient refusing his Lantus at 75 units and requested 60 units    Would prefer patient to have insulin in some form will switch patient to 60 units of Lantus per patient's request

## 2019-07-07 NOTE — PROGRESS NOTES
Progress Note - Saskia Thompson 1955, 60 y o  male MRN: [id number]    Unit/Bed#: -01 Encounter: [id number]    Primary Care Provider: Chrystal Rebollar MD   Date and time admitted to hospital: 6/28/2019  3:14 PM        * Acute pulmonary embolism with acute cor pulmonale [facility])  Assessment & Plan  Acute pulmonary embolism as noted on CTA, suspected due to underlying metastatic disease  Patient was on heparin drip  Patient had IR liver biopsy  07/03 awaiting pathology report  After lengthy discussion and risk and benefits of different anticoagulation such as warfarin and new agents, patient requested to be on Eliquis  Continue Eliquis 10 mg b i d  For 7 days through 07/10/19  Thereafter continue Eliquis 5 mg b i d     The price was already discussed with case management  Patient is okay with it  About 47 dollars a month  Right wrist drop  Assessment & Plan  Neurology evaluated the patient and suspected symptoms likely due to compression induced right radial nerve palsy  Recommended PT/OT  Splinting   if does not improve in 1 month outpatient EMG  Spine metastasis [facility])  Assessment & Plan  Looks to be metastatic disease to the spine  Oncology consultation appreciated  PSA is normal however imaging favors prostate  CEA 2180  Patient had IR guided liver biopsy on 07/03  Awaiting pathology report  Overall prognosis guarded  Oncology following  It has been difficult to have discussion regarding long-term goal of care due to pain and emotional suffering and without confirm path report  Currently patient appears better after adding Decadron and Haldol and requesting to go to short-term rehab  Respiratory insufficiency  Assessment & Plan  Encourage good pulmonary hygiene  Respiratory protocol, incentive spirometry  Secondary to pulmonary embolism  Echo noted with EF of 55%  Unclear if patient is following up with use of incentive spirometry as recommended      Chronic prescription opiate use  Assessment & Plan  Currently patient is on MS Contin 30 mg q 8hr, oxycodone 30 mg Q3 prn, Dilaudid 1 5 mg IV q4hrs breakthrough, topical lidocaine, Neurontin 100 mg t i d , Tylenol 975 mg ATC per palliative Care recommendations  Started on Decadron 2 mg b i d , Haldol 0 5 mg 4 times a day per palliative Care  Today patient reports feeling better after adding Decadron and Haldol  Appears more comfortable, awake and alert and able to participate in a conversation  Currently patient reports feeling better and willing to go to short-term rehab while waiting for pathology report and follow up with Oncology outpatient  CM to work on [employer] placement  Type 2 diabetes mellitus with foot ulcer, with long-term current use of insulin [facility])  Assessment & Plan  Lab Results   Component Value Date    HGBA1C 7 9 (H) 06/28/2019       Recent Labs     07/06/19  1118 07/06/19  1539 07/06/19  2203 07/07/19  0619   POCGLU 226* 303* 333* 366*       Blood Sugar Average: Last 72 hrs:  (P) 661 [id number]     Uncontrolled likely due to noncompliance  continue long-acting insulin:  Patient is still intermittently refusing long-acting insulin  Wanted to be on 60 units insulin rather than 75  Currently on 60 units q h s  Will increase as needed  Meal time coverage was decreased due to hypoglycemic episode earlier  Continue sliding scale coverage  Counseled on taking insulin as prescribed for better glycemic control  Morbid obesity ([address] )  Assessment & Plan  Encourage lifestyle changes  However the patient states he did have some weight loss  Consult Nutrition    Tobacco abuse  Assessment & Plan  Encourage smoking cessation  Nicotine replacement        VTE Pharmacologic Prophylaxis:   Pharmacologic: Apixaban (Eliquis)      Patient Centered Rounds: I have performed bedside rounds with nursing staff today  Education and Discussions with Family / Patient:  Patient, mother present at bedside    I have answered all questions appropriately  Time Spent for Care: 20 minutes  More than 50% of total time spent on counseling and coordination of care as described above  Current Length of Stay: 9 day(s)    Current Patient Status: Inpatient   Certification Statement: The patient will continue to require additional inpatient hospital stay due to Awaiting short-term rehab placement    Discharge Plan:  Awaiting short-term rehab placement    Code Status: Level 1 - Full Code      Subjective:   Awake, alert, oriented at baseline  Still complaining of uncontrolled back pain but reports improved on current regimen with steroids and Haldol  Denies any other new complaints  Awaiting short-term rehab placement  Objective:     Vitals:   Temp (24hrs), Av 1 °F (36 7 °C), Min:97 9 °F (36 6 °C), Max:98 2 °F (36 8 °C)    Temp:  [97 9 °F (36 6 °C)-98 2 °F (36 8 °C)] 98 2 °F (36 8 °C)  HR:  [80-89] 83  Resp:  [19-20] 19  BP: (135-162)/(72-86) 155/86  SpO2:  [90 %-99 %] 95 %  Body mass index is 38 27 kg/m²  Input and Output Summary (last 24 hours): Intake/Output Summary (Last 24 hours) at 2019 1047  Last data filed at 2019 2305  Gross per 24 hour   Intake    Output 1200 ml   Net -1200 ml       Physical Exam:     Physical Exam   Constitutional: He is oriented to person, place, and time  Obese patient not in acute distress, chronically ill-appearing, nontoxic appearing   HENT:   Head: Normocephalic and atraumatic  Eyes: Pupils are equal, round, and reactive to light  EOM are normal    Neck: Normal range of motion  Neck supple  Cardiovascular: Normal rate and regular rhythm  Pulmonary/Chest: Effort normal and breath sounds normal  No stridor  No respiratory distress  He has no wheezes  Abdominal: Soft  Bowel sounds are normal  He exhibits no distension  There is no tenderness  There is no guarding  Musculoskeletal: Normal range of motion     Neurological: He is alert and oriented to person, place, and time  Skin:   Bilateral lower extremity with chronic vascular changes and chronic wounds noted  Additional Data:     Labs:    Results from last 7 days   Lab Units 07/04/19  0757   WBC Thousand/uL 14 16*   HEMOGLOBIN g/dL 11 8*   HEMATOCRIT % 37 3   PLATELETS Thousands/uL 297   NEUTROS PCT % 73   LYMPHS PCT % 14   MONOS PCT % 8   EOS PCT % 2     Results from last 7 days   Lab Units 07/04/19  0757   SODIUM mmol/L 134*   POTASSIUM mmol/L 4 7   CHLORIDE mmol/L 99*   CO2 mmol/L 28   BUN mg/dL 10   CREATININE mg/dL 0 81   ANION GAP mmol/L 7   CALCIUM mg/dL 8 6   ALBUMIN g/dL 2 0*   TOTAL BILIRUBIN mg/dL 1 00   ALK PHOS U/L 781*   ALT U/L 28   AST U/L 38   GLUCOSE RANDOM mg/dL 326*         Results from last 7 days   Lab Units 07/07/19  0619 07/06/19  2203 07/06/19  1539 07/06/19  1118 07/06/19  0609 07/05/19  2034 07/05/19  1537 07/05/19  1102 07/05/19  0552 07/04/19  2034 07/04/19  1534 07/04/19  1054   POC GLUCOSE mg/dl 366* 333* 303* 226* 356* 184* 268* 266* 235* 248* 307* 346*                   * I Have Reviewed All Lab Data Listed Above    * Additional Pertinent Lab Tests Reviewed: No New Labs Available For Today      Recent Cultures (last 7 days):           Last 24 Hours Medication List:     Current Facility-Administered Medications:  acetaminophen 975 mg Oral Q8H [address] Job Mccoy MD   albuterol 2 puff Inhalation Q4H PRN Marlo Chu MD   apixaban 10 mg Oral BID Katarina CAN MD   budesonide-formoterol 2 puff Inhalation BID KATIE Carballo   buPROPion 150 mg Oral Daily KATIE Carballo   cholecalciferol 1,000 Units Oral Daily KATIE Quintanilla   dexamethasone 2 mg Oral BID (AM & Afternoon) Timi Abdalla MD   gabapentin 100 mg Oral TID Julissa Shah MD   haloperidol 0 5 mg Oral 4x Daily (AC & HS) Timi Abdalla MD   HYDROmorphone 1 5 mg Intravenous Q4H PRN Olman Marinelli MD   insulin glargine 60 Units Subcutaneous HS KATIE Sandoval   insulin lispro 1-5 Units Subcutaneous HS KATIE Medeiros   insulin lispro 10 Units Subcutaneous TID With Meals Angus Israel MD   insulin lispro 2-12 Units Subcutaneous TID AC KATIE Medeiros   lidocaine 1 patch Topical Daily Angus Israel MD   methocarbamol 500 mg Oral TID PRN Sole Colvin MD   morphine 30 mg Oral [facility] Sole Colvin MD   nicotine 1 patch Transdermal Daily KATIE Medeiros   oxyCODONE 30 mg Oral Q3H PRN Sole Colvin MD   pantoprazole 40 mg Oral Early Morning KATIE Medeiros   senna 1 tablet Oral Daily KATIE Medeiros        Today, Patient Was Seen By: Luz Slaon MD    ** Please Note: Dictation voice to text software may have been used in the creation of this document   **

## 2019-07-07 NOTE — ASSESSMENT & PLAN NOTE
Lab Results   Component Value Date    HGBA1C 7 9 (H) 06/28/2019       Recent Labs     07/06/19  1118 07/06/19  1539 07/06/19  2203 07/07/19  0619   POCGLU 226* 303* 333* 366*       Blood Sugar Average: Last 72 hrs:  (P) 651 [id number]     Uncontrolled likely due to noncompliance  continue long-acting insulin:  Patient is still intermittently refusing long-acting insulin  Wanted to be on 60 units insulin rather than 75  Currently on 60 units q h s  Will increase as needed  Meal time coverage was decreased due to hypoglycemic episode earlier  Continue sliding scale coverage  Counseled on taking insulin as prescribed for better glycemic control

## 2019-07-07 NOTE — ASSESSMENT & PLAN NOTE
Acute pulmonary embolism as noted on CTA, suspected due to underlying metastatic disease  Patient was on heparin drip  Patient had IR liver biopsy  07/03 awaiting pathology report  After lengthy discussion and risk and benefits of different anticoagulation such as warfarin and new agents, patient requested to be on Eliquis  Continue Eliquis 10 mg b i d  For 7 days through 07/10/19  Thereafter continue Eliquis 5 mg b i d     The price was already discussed with case management  Patient is okay with it  About 47 dollars a month

## 2019-07-08 VITALS
HEART RATE: 72 BPM | WEIGHT: 259.7 LBS | SYSTOLIC BLOOD PRESSURE: 149 MMHG | HEIGHT: 72 IN | RESPIRATION RATE: 18 BRPM | DIASTOLIC BLOOD PRESSURE: 72 MMHG | TEMPERATURE: 98.6 F | BODY MASS INDEX: 35.18 KG/M2 | OXYGEN SATURATION: 90 %

## 2019-07-08 PROBLEM — I82.409 DVT (DEEP VENOUS THROMBOSIS) (HCC): Status: ACTIVE | Noted: 2019-07-08

## 2019-07-08 PROBLEM — L97.509 DIABETIC FOOT ULCERS (HCC): Status: ACTIVE | Noted: 2017-10-12

## 2019-07-08 PROBLEM — E11.621 DIABETIC FOOT ULCERS (HCC): Status: ACTIVE | Noted: 2017-10-12

## 2019-07-08 LAB
GLUCOSE SERPL-MCNC: 290 MG/DL (ref 65–140)
GLUCOSE SERPL-MCNC: 313 MG/DL (ref 65–140)

## 2019-07-08 PROCEDURE — 99239 HOSP IP/OBS DSCHRG MGMT >30: CPT | Performed by: STUDENT IN AN ORGANIZED HEALTH CARE EDUCATION/TRAINING PROGRAM

## 2019-07-08 PROCEDURE — 99232 SBSQ HOSP IP/OBS MODERATE 35: CPT | Performed by: INTERNAL MEDICINE

## 2019-07-08 PROCEDURE — 82948 REAGENT STRIP/BLOOD GLUCOSE: CPT

## 2019-07-08 RX ORDER — NICOTINE 21 MG/24HR
1 PATCH, TRANSDERMAL 24 HOURS TRANSDERMAL DAILY
Qty: 2 PATCH | Refills: 0
Start: 2019-07-09

## 2019-07-08 RX ORDER — HALOPERIDOL 2 MG/ML
0.5 SOLUTION ORAL
Qty: 120 ML | Refills: 0 | Status: SHIPPED | OUTPATIENT
Start: 2019-07-08

## 2019-07-08 RX ORDER — ACETAMINOPHEN 325 MG/1
975 TABLET ORAL EVERY 8 HOURS SCHEDULED
Qty: 30 TABLET | Refills: 0
Start: 2019-07-08

## 2019-07-08 RX ORDER — GABAPENTIN 100 MG/1
100 CAPSULE ORAL 3 TIMES DAILY
Qty: 60 CAPSULE | Refills: 0
Start: 2019-07-08

## 2019-07-08 RX ORDER — MORPHINE SULFATE 30 MG/1
30 TABLET, FILM COATED, EXTENDED RELEASE ORAL EVERY 8 HOURS SCHEDULED
Qty: 30 TABLET | Refills: 0 | Status: SHIPPED | OUTPATIENT
Start: 2019-07-08 | End: 2019-07-18

## 2019-07-08 RX ORDER — INSULIN GLARGINE 100 [IU]/ML
60 INJECTION, SOLUTION SUBCUTANEOUS
Qty: 10 ML | Refills: 0
Start: 2019-07-08

## 2019-07-08 RX ORDER — OXYCODONE HYDROCHLORIDE 30 MG/1
30 TABLET ORAL
Qty: 30 TABLET | Refills: 0 | Status: SHIPPED | OUTPATIENT
Start: 2019-07-08 | End: 2019-07-18

## 2019-07-08 RX ORDER — DEXAMETHASONE 2 MG/1
2 TABLET ORAL
Qty: 30 TABLET | Refills: 0
Start: 2019-07-08

## 2019-07-08 RX ORDER — LIDOCAINE 50 MG/G
1 PATCH TOPICAL DAILY
Qty: 10 PATCH | Refills: 0
Start: 2019-07-09

## 2019-07-08 RX ORDER — SENNOSIDES 8.6 MG
1 TABLET ORAL DAILY
Qty: 30 EACH | Refills: 0
Start: 2019-07-09

## 2019-07-08 RX ADMIN — LIDOCAINE 1 PATCH: 50 PATCH TOPICAL at 08:27

## 2019-07-08 RX ADMIN — INSULIN LISPRO 10 UNITS: 100 INJECTION, SOLUTION INTRAVENOUS; SUBCUTANEOUS at 08:29

## 2019-07-08 RX ADMIN — Medication 0.5 MG: at 07:13

## 2019-07-08 RX ADMIN — DEXAMETHASONE 2 MG: 2 TABLET ORAL at 08:27

## 2019-07-08 RX ADMIN — DEXAMETHASONE 2 MG: 2 TABLET ORAL at 13:06

## 2019-07-08 RX ADMIN — GABAPENTIN 100 MG: 100 CAPSULE ORAL at 08:27

## 2019-07-08 RX ADMIN — HYDROMORPHONE HYDROCHLORIDE 1.5 MG: 2 INJECTION, SOLUTION INTRAMUSCULAR; INTRAVENOUS; SUBCUTANEOUS at 08:28

## 2019-07-08 RX ADMIN — MORPHINE SULFATE 30 MG: 30 TABLET, FILM COATED, EXTENDED RELEASE ORAL at 05:12

## 2019-07-08 RX ADMIN — NICOTINE 1 PATCH: 14 PATCH TRANSDERMAL at 08:32

## 2019-07-08 RX ADMIN — HYDROMORPHONE HYDROCHLORIDE 1.5 MG: 2 INJECTION, SOLUTION INTRAMUSCULAR; INTRAVENOUS; SUBCUTANEOUS at 03:40

## 2019-07-08 RX ADMIN — BUDESONIDE AND FORMOTEROL FUMARATE DIHYDRATE 2 PUFF: 160; 4.5 AEROSOL RESPIRATORY (INHALATION) at 08:28

## 2019-07-08 RX ADMIN — PANTOPRAZOLE SODIUM 40 MG: 40 TABLET, DELAYED RELEASE ORAL at 05:12

## 2019-07-08 RX ADMIN — APIXABAN 10 MG: 5 TABLET, FILM COATED ORAL at 08:27

## 2019-07-08 RX ADMIN — Medication 0.5 MG: at 12:06

## 2019-07-08 RX ADMIN — MORPHINE SULFATE 30 MG: 30 TABLET, FILM COATED, EXTENDED RELEASE ORAL at 13:07

## 2019-07-08 RX ADMIN — ACETAMINOPHEN 975 MG: 325 TABLET, FILM COATED ORAL at 05:12

## 2019-07-08 RX ADMIN — INSULIN LISPRO 6 UNITS: 100 INJECTION, SOLUTION INTRAVENOUS; SUBCUTANEOUS at 13:07

## 2019-07-08 RX ADMIN — INSULIN LISPRO 8 UNITS: 100 INJECTION, SOLUTION INTRAVENOUS; SUBCUTANEOUS at 08:29

## 2019-07-08 RX ADMIN — HYDROMORPHONE HYDROCHLORIDE 1.5 MG: 2 INJECTION, SOLUTION INTRAMUSCULAR; INTRAVENOUS; SUBCUTANEOUS at 12:30

## 2019-07-08 RX ADMIN — INSULIN LISPRO 10 UNITS: 100 INJECTION, SOLUTION INTRAVENOUS; SUBCUTANEOUS at 13:07

## 2019-07-08 RX ADMIN — VITAMIN D, TAB 1000IU (100/BT) 1000 UNITS: 25 TAB at 08:27

## 2019-07-08 RX ADMIN — BUPROPION HYDROCHLORIDE 150 MG: 150 TABLET, FILM COATED, EXTENDED RELEASE ORAL at 08:27

## 2019-07-08 RX ADMIN — SENNOSIDES 8.6 MG: 8.6 TABLET, FILM COATED ORAL at 08:27

## 2019-07-08 NOTE — PLAN OF CARE
Problem: DISCHARGE PLANNING - CARE MANAGEMENT  Goal: Discharge to post-acute care or home with appropriate resources  Description  INTERVENTIONS:  - Conduct assessment to determine patient/family and health care team treatment goals, and need for post-acute services based on payer coverage, community resources, and patient preferences, and barriers to discharge  - Address psychosocial, clinical, and financial barriers to discharge as identified in assessment in conjunction with the patient/family and health care team  - Arrange appropriate level of post-acute services according to patient's   needs and preference and payer coverage in collaboration with the physician and health care team  - Communicate with and update the patient/family, physician, and health care team regarding progress on the discharge plan  - Arrange appropriate transportation to post-acute venues   Outcome: Completed  Note:   Pt to be discharged to [facility] today via [facility]LS at 13:00  IMM already done  Pt is agreeable to this plan  Understands that he has freedom of choice  Medical Nec done and faxed to [facility] and placed in pt's chart

## 2019-07-08 NOTE — TRANSPORTATION MEDICAL NECESSITY
Section I - General Information    Name of Patient: Caitlin Gupta                 : 1955    Medicare #: [id number]  Transport Date: 19 (PCS is valid for round trips on this date and for all repetitive trips in the 60-day range as noted below )  Origin: [place] Alonso 84: [facility]  Is the pt's stay covered under Medicare Part A (PPS/DRG)   [x]     Closest appropriate facility? If no, why is transport to more distant facility required? Yes  If hospice pt, is this transport related to pt's terminal illness? NA       Section II - Medical Necessity Questionnaire  Ambulance transportation is medically necessary only if other means of transport are contraindicated or would be potentially harmful to the patient  To meet this requirement, the patient must either be "bed confined" or suffer from a condition such that transport by means other than ambulance is contraindicated by the patient's condition  The following questions must be answered by the medical professional signing below for this form to be valid:    1)  Describe the MEDICAL CONDITION (physical and/or mental) of this patient AT [address] [place] [address] that requires the patient to be transported in an ambulance and why transport by other means is contraindicated by the patient's condition spine metastasis, back pain, foot ulcer, dm, pulm embolism    2) Is the patient "bed confined" as defined below? Yes  To be "be confined" the patient must satisfy all three of the following conditions: (1) unable to get up from bed without Assistance; AND (2) unable to ambulate; AND (3) unable to sit in a chair or wheelchair  3) Can this patient safely be transported by car or wheelchair van (i e , seated during transport without a medical attendant or monitoring)?    No    4) In addition to completing questions 1-3 above, please check any of the following conditions that apply*:   *Note: supporting documentation for any boxes checked must be maintained in the patient's medical records  If hosp-hosp transfer, describe services needed at 2nd facility not available at 1st facility? Moderate/severe pain on movement   Medical attendant required   Unable to tolerate seated position for time needed to transport   Unable to sit in a chair or wheelchair due to decubitus ulcers or other wounds       Section III - Signature of Physician or Healthcare Professional  I certify that the above information is true and correct based on my evaluation of this patient, and represent that the patient requires transport by ambulance and that other forms of transport are contraindicated  I understand that this information will be used by the Centers for Medicare and Medicaid Services (CMS) to support the determination of medical necessity for ambulance services, and I represent that I have personal knowledge of the patient's condition at time of transport  []  If this box is checked, I also certify that the patient is physically or mentally incapable of signing the ambulance service's claim and that the institution with which I am affiliated has furnished care, services, or assistance to the patient  My signature below is made on behalf of the patient pursuant to 42 CFR §424 36(b)(4)   In accordance with 42 CFR §424 37, the specific reason(s) that the patient is physically or mentally incapable of signing the claim form is as follows: n/a      Signature of Physician* or Healthcare Professional______________________________________________________________  Signature Date 07/08/19 (For scheduled repetitive transports, this form is not valid for transports performed more than 60 days after this date)    Printed Name & Credentials of Physician or Healthcare Professional (MD, DO, RN, etc )____Kyara Duran RN____________________________  *Form must be signed by patient's attending physician for scheduled, repetitive transports   For non-repetitive, unscheduled ambulance transports, if unable to obtain the signature of the attending physician, any of the following may sign (choose appropriate option below)  [] Physician Assistant []  Clinical Nurse Specialist [x]  Registered Nurse  []  Nurse Practitioner  [x] Discharge Planner

## 2019-07-08 NOTE — SOCIAL WORK
Pt to be discharged to [facility] today via [facility] at 13:00  IMM already done  Pt is agreeable to this plan  Understands that he has freedom of choice  Medical Nec done and faxed to [facility] and placed in pt's chart

## 2019-07-08 NOTE — PROGRESS NOTES
Upon arrival to patients room for vitals pt asked me to hold his water for him to sip that he was unable to  Pt then ate and drank with breakfast on his own with no assistance  Pt was up in chair and offered a bath while OOB - pt refusing AM care with family present due to wanting to get back to bed  Pt back in bed and still refusing AM care  Pt says he was given a full bed bath between around 2AM this morning  There is no documentation of this

## 2019-07-08 NOTE — ASSESSMENT & PLAN NOTE
Looks to be metastatic disease to the spine on CT    Oncology consultation appreciated  PSA is normal however imaging favors prostate  CEA 2180  Patient had IR guided liver biopsy on 07/03  Awaiting pathology report  Overall prognosis guarded  It has been difficult to have discussion regarding long-term goal of care due to pain and emotional suffering and without confirm path report  Currently patient appears better after adding Decadron and Haldol and requesting to go to short-term rehab  Recommended close follow-up with primary care provider/Oncology outpatient to follow up on pathology report

## 2019-07-08 NOTE — PROGRESS NOTES
Progress note - Palliative and Supportive Care   [facility] 60 y o  male [id number]    Assessment:     Patient Active Problem List   Diagnosis    Tobacco abuse    Morbid obesity ([facility] [address] )    Type 2 diabetes mellitus with foot ulcer, with long-term current use of insulin ([facility] [address] )    Chronic prescription opiate use    Essential hypertension    Screening for colon cancer    Mass of right side of neck    Acute pulmonary embolism with acute cor pulmonale (HCC)    Respiratory insufficiency    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HCC)    Right wrist drop         Plan:  1  Symptom management - current regimen appears to be effective   - continue opioid regimen on d/c:  MS Contin 30mg q8h with oxycodone 30mg q3h PRN with scheduled haldol for pain control  Scripts provided  - continue scheduled tylenol and dexamethasone 2mg BID on d/c    2  Goals - Patient favors STR at this time  Would be a good candidate for ongoing goals of care and is invited to follow with Palliative as an outpatient  Should have outpatient oncology follow up to discuss biopsy results  Interval history:       Patient requesting to get OOB to chair today  Patient reports adequate pain control on current regimen  Documentation from weekend reviewed  Documented BM over the weekend  Current goal is STR  Biopsy still pending  I returned a second time to discuss with his mother  She too is agreeable to rehab  She is dealing with the loss of her other son (whose birthday it is today )  She was surprised to learn that there was a high suspicion of cancer         MEDICATIONS / ALLERGIES:     all current active meds have been reviewed and current meds:   Current Facility-Administered Medications   Medication Dose Route Frequency    acetaminophen (TYLENOL) tablet 975 mg  975 mg Oral Q8H [address]    albuterol (PROVENTIL HFA,VENTOLIN HFA) inhaler 2 puff  2 puff Inhalation Q4H PRN    apixaban (ELIQUIS) tablet 10 mg  10 mg Oral BID    budesonide-formoterol (SYMBICORT) 160-4 5 mcg/act inhaler 2 puff  2 puff Inhalation BID    buPROPion (WELLBUTRIN XL) 24 hr tablet 150 mg  150 mg Oral Daily    cholecalciferol (VITAMIN D3) tablet 1,000 Units  1,000 Units Oral Daily    dexamethasone (DECADRON) tablet 2 mg  2 mg Oral BID (AM & Afternoon)    gabapentin (NEURONTIN) capsule 100 mg  100 mg Oral TID    haloperidol (HALDOL) oral concentrated solution 0 5 mg  0 5 mg Oral 4x Daily (AC & HS)    HYDROmorphone (DILAUDID) injection 1 5 mg  1 5 mg Intravenous Q4H PRN    insulin glargine (LANTUS) subcutaneous injection 60 Units 0 6 mL  60 Units Subcutaneous HS    insulin lispro (HumaLOG) 100 units/mL subcutaneous injection 1-5 Units  1-5 Units Subcutaneous HS    insulin lispro (HumaLOG) 100 units/mL subcutaneous injection 10 Units  10 Units Subcutaneous TID With Meals    insulin lispro (HumaLOG) 100 units/mL subcutaneous injection 2-12 Units  2-12 Units Subcutaneous TID AC    lidocaine (LIDODERM) 5 % patch 1 patch  1 patch Topical Daily    methocarbamol (ROBAXIN) tablet 500 mg  500 mg Oral TID PRN    morphine (MS CONTIN) ER tablet 30 mg  30 mg Oral Q8H [address]    nicotine (NICODERM CQ) 14 mg/24hr TD 24 hr patch 1 patch  1 patch Transdermal Daily    oxyCODONE (ROXICODONE) immediate release tablet 30 mg  30 mg Oral Q3H PRN    pantoprazole (PROTONIX) EC tablet 40 mg  40 mg Oral Early Morning    senna (SENOKOT) tablet 8 6 mg  1 tablet Oral Daily       No Known Allergies    OBJECTIVE:    Physical Exam  Physical Exam   Constitutional: No distress  HENT:   Head: Atraumatic  Eyes: Right eye exhibits no discharge  Left eye exhibits no discharge  Pulmonary/Chest: Effort normal  No respiratory distress  Abdominal: He exhibits no distension  Musculoskeletal: He exhibits edema and deformity  Neurological: He is alert  L wrist drop   Skin: Skin is warm and dry  He is not diaphoretic     Psychiatric:   More pleasant today, more able to engage   Nursing note and vitals reviewed  Lab Results: I have personally reviewed pertinent labs  , CBC: No results found for: WBC, HGB, HCT, MCV, PLT, ADJUSTEDWBC, MCH, MCHC, RDW, MPV, NRBC, CMP: No results found for: SODIUM, K, CL, CO2, ANIONGAP, BUN, CREATININE, GLUCOSE, CALCIUM, AST, ALT, ALKPHOS, PROT, BILITOT, EGFR      Counseling / Coordination of Care  Total floor / unit time spent today 30+ minutes  Greater than 50% of total time was spent with the patient and / or family counseling and / or coordination of care  A description of the counseling / coordination of care: multiple discussions with patient and family  Goals  Symptoms  Opioid mgmt

## 2019-07-08 NOTE — DISCHARGE SUMMARY
Discharge- Avery Nicole 1955, 60 y o  male MRN: [id number]    Unit/Bed#: -01 Encounter: [id number]    Primary Care Provider: Bola Yang MD   Date and time admitted to hospital: 6/28/2019  3:14 PM        * Acute pulmonary embolism with acute cor pulmonale [facility])  Assessment & Plan  Acute pulmonary embolism as noted on CTA, suspected due to underlying metastatic disease  Patient was on heparin drip  Patient had IR liver biopsy  07/03 awaiting pathology report  After lengthy discussion and risk and benefits of different anticoagulation such as warfarin and new agents, patient requested to be on Eliquis  Continue Eliquis 10 mg b i d  For 7 days through 07/10/19  Thereafter continue Eliquis 5 mg b i d     The price was already discussed with case management  Patient is okay with it  About 47 dollars a month  Recommended follow-up with pulmonology outpatient  DVT (deep venous thrombosis) (HCC)  Assessment & Plan  Doppler noted right lower limb acute occlusive DVT in the distal popliteal vein  On Eliquis  Close follow-up with primary care provider outpatient  Right wrist drop  Assessment & Plan  Neurology evaluated the patient and suspected symptoms likely due to compression induced right radial nerve palsy  Recommended PT/OT  Splinting   if does not improve in 1 month outpatient EMG  Spine metastasis [facility])  Assessment & Plan  Looks to be metastatic disease to the spine on CT    Oncology consultation appreciated  PSA is normal however imaging favors prostate  CEA 2180  CA 19-9 elevated  AFT wnl  Patient had IR guided liver biopsy on 07/03  Awaiting pathology report  Overall prognosis guarded  It has been difficult to have discussion regarding long-term goal of care due to pain and emotional suffering and without confirm path report  Currently patient appears better after adding Decadron and Haldol and requesting to go to short-term rehab    Recommended close follow-up with primary care provider/Oncology outpatient to follow up on pathology report  Respiratory insufficiency  Assessment & Plan  Encourage good pulmonary hygiene  Respiratory protocol, incentive spirometry  Secondary to pulmonary embolism  Echo noted with EF of 55%  Unclear if patient is following up with use of incentive spirometry as recommended  Chronic prescription opiate use  Assessment & Plan  Currently patient is on MS Contin 30 mg q 8hr, oxycodone 30 mg Q3 prn, topical lidocaine, Neurontin 100 mg t i d , Tylenol 975 mg ATC  Haldol 0 5 mg every 4 hours  Decadron 2 mg b i d  Per palliative Care recommendations  Today patient reports feeling better after adding Decadron and Haldol  Appears more comfortable, awake and alert and able to participate in a conversation  Able to sit up on side of the bed by himself  Currently patient reports feeling better and willing to go to short-term rehab while waiting for pathology report and follow up with Oncology outpatient  Stable to discharge to short-term rehab  Follow-up with palliative care, pain management outpatient  Type 2 diabetes mellitus with foot ulcer, with long-term current use of insulin [facility])  Assessment & Plan  Lab Results   Component Value Date    HGBA1C 7 9 (H) 06/28/2019       Recent Labs     07/07/19  1543 07/07/19 2016 07/08/19  0547 07/08/19  1114   POCGLU 368* 420* 313* 290*       Blood Sugar Average: Last 72 hrs:  (P) 297 [id number]     Uncontrolled likely due to noncompliance  continue long-acting insulin:  Patient is still intermittently refusing long-acting insulin  Wanted to be on 60 units insulin rather than 75  Due to having episodes of hypoglycemia earlier  Currently on 60 units q h s  Will increase as needed  Meal time coverage was decreased due to hypoglycemic episode earlier  Continue sliding scale coverage  Counseled on taking insulin as prescribed for better glycemic control    Recommended close follow-up with primary care provider outpatient  Morbid obesity ([facility] [address] )  Assessment & Plan  Encourage lifestyle changes  However the patient states he did have some weight loss  Consult Nutrition    Tobacco abuse  Assessment & Plan  Encourage smoking cessation  Nicotine replacement        Discharging Physician / Practitioner: Makayla Harvey MD  PCP: Carina Tony MD  Admission Date:   Admission Orders (From admission, onward)    Ordered        06/28/19 2035  Inpatient Admission  Once             Discharge Date: 07/08/19    Resolved Problems  Date Reviewed: 7/8/2019    None          Consultations During Hospital Stay:  · Neurology, Oncology, palliative Care, Wound Care, Podiatry, Cardiology    Significant Findings / Test Results:   · CTA PE study shows numerous family refused to peer pulmonary emboli, suspected cirrhosis and ascites, multiple thoracic vertebral body densities likely blastic metastases  · Doppler positive for right lower limb acute occlusive DVT in the distal popliteal vein  · CT abdomen pelvis shows 5 mm right lower lobe nodule, scattered hypodense lesions throughout the liver suspicious for metastases  Reason for Admission:  Right hip pain/ pulmonary embolism/DVT/ ? Metastatic disease  Hospital Course:     Aranza Saunders is a 60 y o  male patient past medical history of hypertension, hyperlipidemia, type 2 diabetes with bilateral foot ulcers, peripheral vascular disease, obstructive sleep apnea not compliant with CPAP therapy, ongoing tobacco use, chronic opioid use who originally presented to the hospital on 6/28/2019 due to worsening of right hip pain  Patient reported that pain was not controlled with opiate use at home  In the emergency room his workup was concerning for pulmonary emboli which was demonstrated on CTA PE study  Venous Doppler showed right lower limb occlusive DVT in the distal popliteal vein    Treated with heparin drip initially and then transition into Eliquis per patient request   Had lengthy discussion regarding warfarin and newer agents and patient prefers to be on Eliquis after understanding risk and benefits of both  Tolerating p o  Eliquis well  Plan is to continue Eliquis 10 mg b i d  Through 07/10/2019 thereafter continue Eliquis 5 mg b i d  Echo shows EF of 55% without regional wall motion abnormalities  During the CT scan patient was noted to have multiple lesions in his spine and liver suspicious for metastatic disease  Patient had IR guided liver biopsy awaiting pathology report  Patient was seen by Oncology recommended outpatient follow-up to discuss pathology report  Overall prognosis guarded  It has been challenging to controlled patient's back pain  Patient was consulted by palliative care for pain management as well as to discuss goals of care  It has been difficult to discuss goals of care with patient due to uncontrolled pain and not having pathology results  Currently pain is controlled on current regimen as stated above  Today patient appears more awake, alert, oriented x3, participating in rehab, able to sit up side of his bed by himself  Reports feeling  better and eager to go to short-term rehab  Patient was seen by Podiatry for lower extremity chronic wounds and wounds were debrided and dressed and nursing is urine given for daily wound care, also recommended general diabetic skin precaution should be observed  Recommended follow-up with Podiatry outpatient  Also recommended follow-up with Pulmonary outpatient for pulmonary embolism  Recommended close follow-up with primary care provider for better glycemic control  Refer to earlier notes for further clarification  CT abd/pelvis finding  1  Diffuse sclerotic metastases through the visualized osseous structures  Correlate with PSA as findings are suspicious for metastatic prostate cancer  There is a pathologic fracture and partial collapse of the L5 vertebral body    2  Multiple hypodense lesions throughout the liver suspicious for metastatic disease  3  Prominent para-aortic, bilateral iliac chain and pelvic sidewall lymph nodes suspicious for metastatic disease  4  Thickening of the omentum suspicious for peritoneal carcinomatosis  There is a small amount of free fluid in the abdomen or pelvis  5  Cirrhotic appearance of the liver  6  5 mm right lower lobe pulmonary nodule  7  Left renal cyst  Indeterminate hypodense lesion arising from the anterior aspect of the left kidney likely represents a hyperdense or proteinaceous cyst  This could be confirmed with renal ultrasound if clinically warranted  7  Indeterminate hyperdense lesions in the bilateral gluteus lola muscles measuring 2 cm (series 2 image 72)  Differential considerations include small intramuscular hematomas versus metastatic or other soft tissue nodule  8  Mild prostatomegaly  9  Small bilateral pleural effusions  Please see above list of diagnoses and related plan for additional information  Condition at Discharge: stable     Discharge Day Visit / Exam:     Subjective: Today appears more awake, alert, oriented x3  Able to sit up side of the bed by himself  Reports feeling better and eager to be discharged to go to rehab  Denies chest pain, fever, chills, dyspnea, cough, palpitation, any other new complaints  Vitals: Blood Pressure: 149/72 (07/08/19 0822)  Pulse: 72 (07/08/19 0822)  Temperature: 98 6 °F (37 °C) (07/07/19 2325)  Temp Source: Oral (07/07/19 2325)  Respirations: 18 (07/08/19 3657)  Height: 6' (182 9 cm) (06/28/19 2140)  Weight - Scale: 118 kg (259 lb 11 2 oz) (07/08/19 0600)  SpO2: 90 % (07/08/19 4846)  Exam:   Physical Exam   Constitutional: He is oriented to person, place, and time  Obese patient not in acute distress, chronically ill-appearing, nontoxic appearing   HENT:   Head: Normocephalic and atraumatic  Eyes: Pupils are equal, round, and reactive to light   EOM are normal  Neck: Normal range of motion  Neck supple  Cardiovascular: Normal rate and regular rhythm  Pulmonary/Chest: Effort normal and breath sounds normal  No stridor  No respiratory distress  He has no wheezes  Abdominal: Soft  Bowel sounds are normal  He exhibits distension  There is no tenderness  There is no guarding  Musculoskeletal: Normal range of motion  Neurological: He is alert and oriented to person, place, and time  Skin:   Bilateral lower extremity with chronic vascular changes and chronic wounds noted  Discussion with Family:  Mother and cousin present at bedside at the time of the discharge  I answered all questions appropriately  Discharge instructions/Information to patient and family:   See after visit summary for information provided to patient and family  Provisions for Follow-Up Care:  See after visit summary for information related to follow-up care and any pertinent home health orders  Disposition:     Other: [facility] short-term rehab    For Discharges to [facility]:   · Not Applicable to this Patient - Not Applicable to this Patient    Planned Readmission:  at risk of readmission due to above stated multiple complex medical problems  Discharge Statement:  I spent 40 minutes discharging the patient  This time was spent on the day of discharge  I had direct contact with the patient on the day of discharge  Greater than 50% of the total time was spent examining patient, answering all patient questions, arranging and discussing plan of care with patient as well as directly providing post-discharge instructions  Additional time then spent on discharge activities  Discharge Medications:  See after visit summary for reconciled discharge medications provided to patient and family        ** Please Note: This note has been constructed using a voice recognition system **

## 2019-07-08 NOTE — ASSESSMENT & PLAN NOTE
Doppler noted right lower limb acute occlusive DVT in the distal popliteal vein  On Eliquis  Close follow-up with primary care provider outpatient

## 2019-07-08 NOTE — ASSESSMENT & PLAN NOTE
Lab Results   Component Value Date    HGBA1C 7 9 (H) 06/28/2019       Recent Labs     07/07/19  1543 07/07/19 2016 07/08/19  0547 07/08/19  1114   POCGLU 368* 420* 313* 290*       Blood Sugar Average: Last 72 hrs:  (P) 297 [id number]     Uncontrolled likely due to noncompliance  continue long-acting insulin:  Patient is still intermittently refusing long-acting insulin  Wanted to be on 60 units insulin rather than 75  Currently on 60 units q h s  Will increase as needed  Meal time coverage was decreased due to hypoglycemic episode earlier  Continue sliding scale coverage  Counseled on taking insulin as prescribed for better glycemic control  Recommended close follow-up with primary care provider outpatient

## 2019-07-08 NOTE — ASSESSMENT & PLAN NOTE
Acute pulmonary embolism as noted on CTA, suspected due to underlying metastatic disease  Patient was on heparin drip  Patient had IR liver biopsy  07/03 awaiting pathology report  After lengthy discussion and risk and benefits of different anticoagulation such as warfarin and new agents, patient requested to be on Eliquis  Continue Eliquis 10 mg b i d  For 7 days through 07/10/19  Thereafter continue Eliquis 5 mg b i d     The price was already discussed with case management  Patient is okay with it  About 47 dollars a month  Recommended follow-up with pulmonology outpatient

## 2019-07-08 NOTE — ASSESSMENT & PLAN NOTE
Currently patient is on MS Contin 30 mg q 8hr, oxycodone 30 mg Q3 prn, topical lidocaine, Neurontin 100 mg t i d , Tylenol 975 mg ATC  Haldol 0 5 mg every 4 hours  Decadron 2 mg b i d  Per palliative Care recommendations  Today patient reports feeling better after adding Decadron and Haldol  Appears more comfortable, awake and alert and able to participate in a conversation  Able to sit up on side of the bed by himself  Currently patient reports feeling better and willing to go to short-term rehab while waiting for pathology report and follow up with Oncology outpatient  Stable to discharge to short-term rehab  Follow-up with palliative care, pain management outpatient

## 2020-01-24 NOTE — PLAN OF CARE
Problem: PHYSICAL THERAPY ADULT  Goal: Performs mobility at highest level of function for planned discharge setting  See evaluation for individualized goals  Description  Treatment/Interventions: Functional transfer training, LE strengthening/ROM, Therapeutic exercise, Endurance training, Patient/family training, Equipment eval/education, Bed mobility, Gait training, Spoke to nursing  Equipment Recommended: (TBD at [facility])       See flowsheet documentation for full assessment, interventions and recommendations  Note:   Prognosis: Good(for goals set)  Problem List: Decreased strength, Decreased endurance, Impaired balance, Decreased range of motion, Decreased mobility, Impaired judgement, Decreased safety awareness, Impaired sensation, Obesity, Decreased skin integrity, Pain  Assessment: Patient admitted with back pain, foot ulcers  Testing revealed PE, now on heparin  Multiple osseous lesions noed thoracic spine, neck mass, bialt glut max nodules, liver and pulmonary lesions  Also with RLE DVT  Had debridement of R foot ulcer by podiatry  50# weight loss 2 months  DX:  w/u for metastic CA unknown origin (melanoma or prostate)  He presents with the above deficits creating decreased mobility and inability to ambulate putting him at risk for falls  He is below his baseline level of function requiring mod to max assist with mobility and will benefit from skilled PT interventions to increase his level of functional mobility and level of independence while decreasing his fall risk as well as caregiver burden  His history presents with more than 3 elements, a high level of complexity    Topics considered with medical/social history included:  Co morbid conditions impacting function and progress, impaired PLF, context of current functional limitations compared to PLF, lack of physical emotional and/or social support, cultural preferences, employment status, recent hospital admissions/readmissions, response to prior treatment approaches, medication management, personal factors impacting POC  (Number of elements:  0=low, 1-2 =moderate, 3 or more=high)  His examination presents with more than 3 elements, a high level of complexity  Examination of body systems included musculoskeletal, sensory, cardiovascular, pulmonary, neuromuscular, integumentary, and communication  Functional limitations include ROM deficits, strength deficits, irregular vital sign response to interventions, difficulty initiating task/action/activity, edema, pain, self care deficits, community and social reintegration deficits, impaired ability to make needs known, impaired level of consciousness and/or orientation, learning barrier, emotional response behavioral response, tone, balance, sensory, coordination and endurance deficits (Number of elements:  1-2 =low, 3=moderate, >3=high)  Objective testing utilizing the Barthel indicates impaired functional mobility  His clinical presentation is presently unstable  Clinicial decision making reveals a high level of evaluation complexity  Barriers to Discharge: Inaccessible home environment, Decreased caregiver support     Recommendation: Short-term skilled PT     PT - OK to Discharge: No(unless to STR)    See flowsheet documentation for full assessment  [Spouse] : spouse [Follow-Up Visit] : a follow-up visit for [FreeTextEntry2] : Anemia

## 2021-01-14 NOTE — TREATMENT PLAN
Anesthesia Evaluation     Patient summary reviewed and Nursing notes reviewed   history of anesthetic complications: PONV  NPO Solid Status: > 8 hours  NPO Liquid Status: > 8 hours           Airway   Mallampati: II  TM distance: <3 FB  Neck ROM: full  No difficulty expected  Dental      Comment: Upper bridge    Pulmonary - normal exam   Cardiovascular - normal exam  Exercise tolerance: good (4-7 METS)    ECG reviewed        Neuro/Psych- negative ROS  GI/Hepatic/Renal/Endo    (+)   liver disease fatty liver disease, renal disease (7yrs ago),     Musculoskeletal (-) negative ROS    Abdominal  - normal exam   Substance History - negative use     OB/GYN negative ob/gyn ROS         Other - negative ROS                       Anesthesia Plan    ASA 2     general and general with block   total IV anesthesia(Discussed TIVA due to hx of N/V. Also PECS block per Dr. Hendrickson)  intravenous induction     Anesthetic plan, all risks, benefits, and alternatives have been provided, discussed and informed consent has been obtained with: patient.  Use of blood products discussed with patient  Consented to blood products.      7/5/2019 12:30 PM -  Reviewed ntoes from Dr Toshia Martinez, and a concern is noted that pt is oversedated from opioids and other complex illness; use of sternal rub to rouse pt is not indicative of appropriate medication levels vs pt alertness  Will reduce MSCONTIN by 45mg over course of the day, but allow pt access to a bit more freqent dosing of short-acting oral drugs  Reduced other meds that are overly anticholinergeric (Robaxin), and less likely to improve both pain and mentation  Use of low dose haldol in combination with opioids is occasionally seen to improve mentation and pain control  Low dose oral haldol is not associated with suppression of resp drive nor consciousness  Similarly, steroid medications may be helpful in reducing pain form bone lesions, such as are described on this fellows' scans  As re: goals, it is challenging to clearly address goals without bx data handy  However, his widely distributed lesions, his poor baseline functional health, his poor nutrition, his elevated (corrected) Calcium, his elevated AlkP in absence of AST/ALT changes all suggest active, bone-avid, Stage IV disease from a solid tumor, or multiple myeloma  He appears to have little to no functional health to benefit from traditional therapies for solid organ disease  His appropriateness for multiple myeloma treatments is less clear to me  Regardless, if his primary and overriding concern is for comfort, I think he would be hospice appropriate  Otherwise, without a clear order or acceptance from the patient or his agent for comfort cares only, we must be cautious with misuse of opioids for treatment of psychic or existential pain          Jailyn Ferrera MD  Palliative and Supportive Care  Pager: [phone]

## (undated) DEVICE — UNIVERSAL  MINOR EXTREMITY PK: Brand: CARDINAL HEALTH

## (undated) DEVICE — INTENDED FOR TISSUE SEPARATION, AND OTHER PROCEDURES THAT REQUIRE A SHARP SURGICAL BLADE TO PUNCTURE OR CUT.: Brand: BARD-PARKER ® CARBON RIB-BACK BLADES

## (undated) DEVICE — STRETCH BANDAGE: Brand: CURITY

## (undated) DEVICE — LIGHT HANDLE COVER CAMERA DISP

## (undated) DEVICE — SCD SEQUENTIAL COMPRESSION COMFORT SLEEVE MEDIUM KNEE LENGTH: Brand: KENDALL SCD

## (undated) DEVICE — KERLIX BANDAGE ROLL: Brand: KERLIX

## (undated) DEVICE — IODOFORM PACKING STRIP: Brand: CURITY

## (undated) DEVICE — ABDOMINAL PAD: Brand: DERMACEA

## (undated) DEVICE — WET SKIN PREP TRAY: Brand: MEDLINE INDUSTRIES, INC.

## (undated) DEVICE — TUBING SUCTION 5MM X 12 FT

## (undated) DEVICE — GLOVE INDICATOR PI UNDERGLOVE SZ 7.5 BLUE

## (undated) DEVICE — CHLORAPREP HI-LITE 26ML ORANGE

## (undated) DEVICE — ACE WRAP 4 IN UNSTERILE

## (undated) DEVICE — MEDI-VAC YANKAUER SUCTION HANDLE W/STRAIGHT TIP & CONTROL VENT: Brand: CARDINAL HEALTH

## (undated) DEVICE — REM POLYHESIVE ADULT PATIENT RETURN ELECTRODE: Brand: VALLEYLAB

## (undated) DEVICE — CURITY STRETCH BANDAGE: Brand: CURITY

## (undated) DEVICE — GAUZE SPONGES,16 PLY: Brand: CURITY

## (undated) DEVICE — SUT VICRYL 3-0 SH 27 IN J416H

## (undated) DEVICE — CUFF TOURNIQUET 18 X 4 IN QUICK CONNECT DISP 1 BLADDER

## (undated) DEVICE — SUT ETHILON 4-0 P-3 18 IN 691G

## (undated) DEVICE — STOCKINETTE REGULAR

## (undated) DEVICE — GLOVE SRG BIOGEL 7.5

## (undated) DEVICE — BLADE SAGITTAL 25.6 X 9.5MM